# Patient Record
Sex: FEMALE | Race: WHITE | NOT HISPANIC OR LATINO | Employment: OTHER | URBAN - METROPOLITAN AREA
[De-identification: names, ages, dates, MRNs, and addresses within clinical notes are randomized per-mention and may not be internally consistent; named-entity substitution may affect disease eponyms.]

---

## 2017-01-11 DIAGNOSIS — F41.1 GAD (GENERALIZED ANXIETY DISORDER): ICD-10-CM

## 2017-01-11 NOTE — TELEPHONE ENCOUNTER
----- Message from Freya Turpin sent at 1/11/2017  4:35 PM CST -----  Refill    clonazePAM (KLONOPIN) 0.5 MG tablet   Sig - Route: Take 1 tablet (0.5 mg total) by mouth 2 (two) times daily as needed. - Oral    [facility] [address] [place] [address], [place] - [address] [facility] [address] [phone] (Phone)  [phone] (Fax)    You can reach the patient at [phone].    Thanks!

## 2017-01-12 DIAGNOSIS — F41.1 GAD (GENERALIZED ANXIETY DISORDER): ICD-10-CM

## 2017-01-12 RX ORDER — CLONAZEPAM 0.5 MG/1
0.5 TABLET ORAL 2 TIMES DAILY PRN
Qty: 60 TABLET | Refills: 5 | Status: SHIPPED | OUTPATIENT
Start: 2017-01-12 | End: 2017-07-12 | Stop reason: SDUPTHER

## 2017-01-13 RX ORDER — CLONAZEPAM 0.5 MG/1
TABLET ORAL
Qty: 60 TABLET | Refills: 0 | OUTPATIENT
Start: 2017-01-13

## 2017-01-18 DIAGNOSIS — M48.061 LUMBAR SPINAL STENOSIS: ICD-10-CM

## 2017-01-18 NOTE — TELEPHONE ENCOUNTER
----- Message from Kentrell López sent at 1/17/2017  3:17 PM CST -----  Contact: Self [phone]  Type: Rx    Name of medication(s): oxycodone-acetaminophen (PERCOCET) 5-325 mg per tablet    Is this a refill? New rx? Refill    Who prescribed medication?  Dr Rain    Pharmacy Name, Phone, & Location: Pt will     Comments:Stated that she's visiting from [facility] and is able to  tomorrow, please call and advice    Thanks

## 2017-01-19 RX ORDER — OXYCODONE AND ACETAMINOPHEN 5; 325 MG/1; MG/1
1 TABLET ORAL 2 TIMES DAILY PRN
Qty: 60 TABLET | Refills: 0 | Status: SHIPPED | OUTPATIENT
Start: 2017-01-19 | End: 2017-04-21 | Stop reason: SDUPTHER

## 2017-03-21 ENCOUNTER — HOSPITAL ENCOUNTER (OUTPATIENT)
Dept: RADIOLOGY | Facility: HOSPITAL | Age: 72
Discharge: HOME | End: 2017-03-21
Attending: NURSE PRACTITIONER
Payer: MEDICARE

## 2017-03-21 ENCOUNTER — OFFICE VISIT (OUTPATIENT)
Dept: GASTROENTEROLOGY | Facility: CLINIC | Age: 72
End: 2017-03-21
Payer: MEDICARE

## 2017-03-21 VITALS
DIASTOLIC BLOOD PRESSURE: 75 MMHG | BODY MASS INDEX: 27.71 KG/M2 | HEIGHT: 67 IN | WEIGHT: 176.56 LBS | SYSTOLIC BLOOD PRESSURE: 114 MMHG | HEART RATE: 71 BPM | RESPIRATION RATE: 18 BRPM

## 2017-03-21 DIAGNOSIS — Z87.19 HISTORY OF IBS: ICD-10-CM

## 2017-03-21 DIAGNOSIS — R10.13 EPIGASTRIC PAIN: Primary | ICD-10-CM

## 2017-03-21 DIAGNOSIS — K21.9 GASTROESOPHAGEAL REFLUX DISEASE WITHOUT ESOPHAGITIS: ICD-10-CM

## 2017-03-21 DIAGNOSIS — Z86.010 HISTORY OF COLON POLYPS: ICD-10-CM

## 2017-03-21 DIAGNOSIS — Z86.19 HISTORY OF HELICOBACTER PYLORI INFECTION: ICD-10-CM

## 2017-03-21 DIAGNOSIS — R13.14 PHARYNGOESOPHAGEAL DYSPHAGIA: ICD-10-CM

## 2017-03-21 DIAGNOSIS — R19.8 IRREGULAR BOWEL HABITS: ICD-10-CM

## 2017-03-21 DIAGNOSIS — Z80.0 FAMILY HISTORY OF COLON CANCER: ICD-10-CM

## 2017-03-21 PROCEDURE — 99999 PR PBB SHADOW E&M-EST. PATIENT-LVL IV: CPT | Mod: PBBFAC,,, | Performed by: NURSE PRACTITIONER

## 2017-03-21 PROCEDURE — 1160F RVW MEDS BY RX/DR IN RCRD: CPT | Mod: S$GLB,,, | Performed by: NURSE PRACTITIONER

## 2017-03-21 PROCEDURE — 74020 XR ABDOMEN FLAT AND ERECT: CPT | Mod: 26,,, | Performed by: RADIOLOGY

## 2017-03-21 PROCEDURE — 99499 UNLISTED E&M SERVICE: CPT | Mod: S$GLB,,, | Performed by: NURSE PRACTITIONER

## 2017-03-21 PROCEDURE — 3074F SYST BP LT 130 MM HG: CPT | Mod: S$GLB,,, | Performed by: NURSE PRACTITIONER

## 2017-03-21 PROCEDURE — 1157F ADVNC CARE PLAN IN RCRD: CPT | Mod: S$GLB,,, | Performed by: NURSE PRACTITIONER

## 2017-03-21 PROCEDURE — 74020 XR ABDOMEN FLAT AND ERECT: CPT | Mod: TC

## 2017-03-21 PROCEDURE — 1126F AMNT PAIN NOTED NONE PRSNT: CPT | Mod: S$GLB,,, | Performed by: NURSE PRACTITIONER

## 2017-03-21 PROCEDURE — 3078F DIAST BP <80 MM HG: CPT | Mod: S$GLB,,, | Performed by: NURSE PRACTITIONER

## 2017-03-21 PROCEDURE — 99214 OFFICE O/P EST MOD 30 MIN: CPT | Mod: S$GLB,,, | Performed by: NURSE PRACTITIONER

## 2017-03-21 PROCEDURE — 1159F MED LIST DOCD IN RCRD: CPT | Mod: S$GLB,,, | Performed by: NURSE PRACTITIONER

## 2017-03-21 RX ORDER — OMEPRAZOLE 40 MG/1
40 CAPSULE, DELAYED RELEASE ORAL EVERY MORNING
Qty: 90 CAPSULE | Refills: 1 | Status: SHIPPED | OUTPATIENT
Start: 2017-03-21 | End: 2017-08-08 | Stop reason: SDUPTHER

## 2017-03-21 NOTE — PROGRESS NOTES
Subjective:       Patient ID: Jovana Fall is a 71 y.o. female Body mass index is 27.66 kg/(m^2).    Chief Complaint: Gastroesophageal Reflux; Abdominal Pain (epigastrtic); and Dysphagia    This patient is new to me.  Established patient of Dr. Blas.    GI Problem   The primary symptoms include abdominal pain, nausea (occasional), vomiting (once 2 weeks ago after eating popeyes, none since then) and diarrhea. Primary symptoms do not include fever, weight loss, fatigue, melena, hematochezia or dysuria. Episode onset: recurred about a year ago; happened in 2015. The problem has not changed since onset.  The abdominal pain began more than 2 days ago (started a year ago intermittent). The abdominal pain has been unchanged since its onset. The abdominal pain is generalized (epigastric pain described as burning; generalized cramping with bowel movements- similar to previous symptoms when she saw Dr. Blas). The severity of the abdominal pain is 0/10. The abdominal pain is relieved by bowel movements (worse during bowel movement, lactose (lactose intolerant), sugary items (sour patch kids candy); relieved with bowel movement).   The vomiting began more than 2 days ago (once 2 weeks ago). Frequency: has resolved. The emesis contains stomach contents (denies bright red blood or coffee grounds).     The diarrhea began more than 1 week ago (for several years). The diarrhea is watery and semi-solid (watery occasional; usually semi-solid, and formed stool with pain medication use; bowel movements are once daily of varied consistency; occasional fecal incontinence, reports has to wipe often). Daily occurrences: bowel movements are once daily of varied consistency.   The illness is also significant for chills, dysphagia (occurs occasional with liquids and pills; denies with foods; feels like it doesn't go down, described as a spasm), bloating (especially after eating) and constipation (only after taking pain medication  (percocet), takes it about once a week). The illness does not include anorexia or odynophagia. Associated symptoms comments: Reports foul smelling flatulence and belching; reports early satiety. Significant associated medical issues include GERD (lots of reflux and heartburn, taking prilosec 20 mg once daily) and irritable bowel syndrome. Associated medical issues do not include inflammatory bowel disease, liver disease, alcohol abuse (occasional alcohol use- 1-2 glasses of wine per week), PUD, gastric bypass, bowel resection or diverticulitis (reports history of diverticulosis).     Review of Systems   Constitutional: Positive for chills. Negative for appetite change, fatigue, fever, unexpected weight change and weight loss.   HENT: Positive for trouble swallowing. Negative for sore throat.    Respiratory: Negative for cough, choking and shortness of breath.    Cardiovascular: Negative for chest pain.   Gastrointestinal: Positive for abdominal pain, bloating (especially after eating), constipation (only after taking pain medication (percocet), takes it about once a week), diarrhea, dysphagia (occurs occasional with liquids and pills; denies with foods; feels like it doesn't go down, described as a spasm), nausea (occasional) and vomiting (once 2 weeks ago after eating popeyes, none since then). Negative for anal bleeding, anorexia, blood in stool, hematochezia, melena and rectal pain.   Genitourinary: Negative for difficulty urinating, dysuria, flank pain, frequency, pelvic pain and urgency.   Neurological: Negative for weakness.       Past Medical History:   Diagnosis Date    Anxiety     Basal cell carcinoma 2014    left upper arm     BCC (basal cell carcinoma of skin) 01/07/16    mid upper back    BCC (basal cell carcinoma)     left upper back    Calcium nephrolithiasis     Cataract     Colon polyp     Coronary artery disease     Cortical cataract - Both Eyes 4/29/2013    Depression     Diabetes  mellitus     Displacement of lumbar intervertebral disc without myelopathy 5/6/2013    Diverticulosis     GERD (gastroesophageal reflux disease)     Hepatomegaly     History of melanoma in situ 2/2/2015    Mid upper back ; 5/2014     Hx of colonic polyps 1/27/2015    Hyperlipidemia     Hypertension     Lactose intolerance     Lumbar spinal stenosis 5/6/2013    Melanoma     mid upper back- in situ 5/2014    Nuclear sclerosis - Both Eyes 4/29/2013    Osteopenia     Spinal stenosis, lumbar region, with neurogenic claudication 5/6/2013    Spondylosis without myelopathy 5/6/2013    Type 2 diabetes mellitus      Past Surgical History:   Procedure Laterality Date    APPENDECTOMY      BROW LIFT AND BLEPHAROPLASTY      BUNIONECTOMY Right     CARPAL TUNNEL RELEASE Bilateral     CHOLECYSTECTOMY      COLONOSCOPY  02/05/2015    Dr. Blas, repeat in 3-5 years    HEMORRHOID SURGERY      HYSTERECTOMY      ovaries remain    LUMBAR LAMINECTOMY      RHINOPLASTY TIP      SKIN CANCER EXCISION      TUBAL LIGATION      UPPER GASTROINTESTINAL ENDOSCOPY  2015    Dr. Blas     Family History   Problem Relation Age of Onset    Heart disease Mother      MI    Cancer Mother      SQ carcinoma of lung    Heart attack Mother     Skin cancer Mother     Cancer Father      prostate    Ovarian cancer Cousin     Colon cancer Cousin     Skin cancer Daughter     Cancer Sister      uterine sarcoma    Colon cancer Brother 60    No Known Problems Son     No Known Problems Sister     Diabetes Sister     Hypertension Sister     Arthritis Sister     No Known Problems Daughter     Amblyopia Neg Hx     Blindness Neg Hx     Cataracts Neg Hx     Glaucoma Neg Hx     Retinal detachment Neg Hx     Strabismus Neg Hx     Stroke Neg Hx     Thyroid disease Neg Hx     Melanoma Neg Hx     Psoriasis Neg Hx     Lupus Neg Hx     Eczema Neg Hx     Crohn's disease Neg Hx     Ulcerative colitis Neg Hx      Wt  Readings from Last 10 Encounters:   03/21/17 80.1 kg (176 lb 9.4 oz)   10/28/16 82.5 kg (181 lb 14.1 oz)   10/06/16 81 kg (178 lb 9.6 oz)   08/19/16 85.3 kg (188 lb)   03/03/16 85.4 kg (188 lb 4.4 oz)   02/22/16 83.8 kg (184 lb 11.9 oz)   02/05/16 83.9 kg (185 lb)   01/26/16 83.9 kg (185 lb)   01/13/16 84.3 kg (185 lb 13.6 oz)   10/06/15 86.2 kg (190 lb 0.6 oz)     Lab Results   Component Value Date    WBC 8.39 03/03/2016    HGB 14.2 03/03/2016    HCT 42.4 03/03/2016    MCV 89 03/03/2016     03/03/2016     CMP  Sodium   Date Value Ref Range Status   10/06/2016 142 136 - 145 mmol/L Final     Potassium   Date Value Ref Range Status   10/06/2016 4.5 3.5 - 5.1 mmol/L Final     Chloride   Date Value Ref Range Status   10/06/2016 107 95 - 110 mmol/L Final     CO2   Date Value Ref Range Status   10/06/2016 30 (H) 23 - 29 mmol/L Final     Glucose   Date Value Ref Range Status   10/06/2016 125 (H) 70 - 110 mg/dL Final     BUN, Bld   Date Value Ref Range Status   10/06/2016 14 8 - 23 mg/dL Final     Creatinine   Date Value Ref Range Status   10/06/2016 0.8 0.5 - 1.4 mg/dL Final     Calcium   Date Value Ref Range Status   10/06/2016 9.6 8.7 - 10.5 mg/dL Final     Total Protein   Date Value Ref Range Status   03/03/2016 6.9 6.0 - 8.4 g/dL Final     Albumin   Date Value Ref Range Status   03/03/2016 4.1 3.5 - 5.2 g/dL Final     Total Bilirubin   Date Value Ref Range Status   03/03/2016 1.0 0.1 - 1.0 mg/dL Final     Comment:     For infants and newborns, interpretation of results should be based  on gestational age, weight and in agreement with clinical  observations.  Premature Infant recommended reference ranges:  Up to 24 hours.............<8.0 mg/dL  Up to 48 hours............<12.0 mg/dL  3-5 days..................<15.0 mg/dL  6-29 days.................<15.0 mg/dL       Alkaline Phosphatase   Date Value Ref Range Status   03/03/2016 118 55 - 135 U/L Final     AST   Date Value Ref Range Status   03/03/2016 19 10 - 40  "U/L Final     ALT   Date Value Ref Range Status   03/03/2016 17 10 - 44 U/L Final     Anion Gap   Date Value Ref Range Status   10/06/2016 5 (L) 8 - 16 mmol/L Final     eGFR if    Date Value Ref Range Status   10/06/2016 >60.0 >60 mL/min/1.73 m^2 Final     eGFR if non    Date Value Ref Range Status   10/06/2016 >60.0 >60 mL/min/1.73 m^2 Final     Comment:     Calculation used to obtain the estimated glomerular filtration  rate (eGFR) is the CKD-EPI equation. Since race is unknown   in our information system, the eGFR values for   -American and Non--American patients are given   for each creatinine result.       Reviewed prior medical records including radiology report of 1/15/16 renal ultrasound & h pylori stool test (negative), 11/18/14 ct renal stone/abdomen/pelvis & endoscopy history (see surgical history).    9/30/15 EGD was reviewed and procedure report states:   " Impression:          - Benign-appearing esophageal stenosis. Biopsied.                        Dilated.                       - Erythematous mucosa in the stomach. Biopsied.                       - Normal duodenal bulb and 2nd part of the duodenum.                       - Medium-sized hiatus hernia.  Recommendation:      - Use Prilosec (omeprazole) 40 mg PO daily.                       - Follow an antireflux regimen indefinitely.                       - Discharge patient to home (with escort). ".  Biopsy results:   "FINAL PATHOLOGIC DIAGNOSIS  1. Stomach, antrum/body, biopsy:  Chronic active gastritis.  POSITIVE for H. pylori species by immunostain with appropriately reactive controls.  Negative for dysplasia.  2. Esophagus, biopsy:  Squamous-type mucosa.  Negative for intraepithelial eosinophils or neutrophils.  Negative for intestinal metaplasia, dysplasia, and malignancy.    1/27/15 Colonoscopy was reviewed and procedure report states:   " Impression:          - Three 2 to 3 mm polyps in the rectum, in " "the                        distal descending colon and in the distal transverse                        colon. Resected and retrieved.                       - Diverticulosis in the sigmoid colon.                       - The ascending colon and cecum are normal.                       - The examined portion of the ileum was normal.  Recommendation:      - High fiber diet indefinitely.                       - Continue present medications.                       - Use fiber, for example Citrucel, Fibercon, Konsyl                        or Metamucil.                       - Repeat colonoscopy in 3 - 5 years for surveillance                        based on pathology results.                       - Discharge patient to home (with escort). ".  Biopsy results:   "1. Rectum biopsy:  Unremarkable colonic mucosa  Negative for adenoma  2. Distal transverse biopsy:  Tubular adenoma  3. Distal descending biopsy:  Unremarkable colonic mucosa  Negative for adenoma"  Objective:      Physical Exam   Constitutional: She is oriented to person, place, and time. She appears well-developed and well-nourished. No distress.   HENT:   Mouth/Throat: Oropharynx is clear and moist and mucous membranes are normal. No oral lesions. No oropharyngeal exudate.   Eyes: Conjunctivae are normal. Pupils are equal, round, and reactive to light. No scleral icterus.   Neck: Neck supple. No tracheal deviation present.   Cardiovascular: Normal rate.    Pulmonary/Chest: Effort normal and breath sounds normal. No respiratory distress. She has no wheezes.   Abdominal: Soft. Normal appearance and bowel sounds are normal. She exhibits no distension, no abdominal bruit and no mass. There is no hepatosplenomegaly. There is no tenderness. There is no rigidity, no rebound, no guarding, no tenderness at McBurney's point and negative Urbina's sign. No hernia.   Lymphadenopathy:     She has no cervical adenopathy.   Neurological: She is alert and oriented to person, " place, and time.   Skin: Skin is warm and dry. No rash noted. She is not diaphoretic. No erythema. No pallor.   Non-jaundiced   Psychiatric: She has a normal mood and affect. Her behavior is normal.   Nursing note and vitals reviewed.      Assessment:       1. Epigastric pain    2. History of Helicobacter pylori infection    3. History of colon polyps    4. History of IBS    5. Pharyngoesophageal dysphagia    6. Irregular bowel habits    7. Gastroesophageal reflux disease without esophagitis    8. Family history of colon cancer        Plan:       Epigastric pain  -     H. pylori antigen, stool; Future; Expected date: 3/21/17  -     Amylase; Future; Expected date: 3/21/17  -     Lipase; Future; Expected date: 3/21/17  -     IgA; Future; Expected date: 3/21/17  -     Tissue transglutaminase, IgA; Future; Expected date: 3/21/17  -     US Abdomen Complete; Future; Expected date: 3/21/17  -     Occult blood x 1, stool; Future; Expected date: 3/21/17  -     Hepatitis panel, acute; Future; Expected date: 3/21/17  - schedule EGD, discussed procedure with patient, verbalized understanding    History of Helicobacter pylori infection  -     H. pylori antigen, stool; Future; Expected date: 3/21/17  -     Occult blood x 1, stool; Future; Expected date: 3/21/17     History of colon polyps & family history of colon cancer  - next surveillance colonoscopy due 2/2018    History of IBS & Irregular bowel habits (s/p cholecystectomy)  -     X-Ray Abdomen Flat And Erect; Future; Expected date: 3/21/17  -     Adenovirus Antigen EIA, Stool; Future; Expected date: 3/21/17  -     Giardia / Cryptosporidum, EIA; Future; Expected date: 3/21/17  -     Occult blood x 1, stool; Future; Expected date: 3/21/17  -     Rotavirus antigen, stool; Future; Expected date: 3/21/17  -     Stool Exam-Ova,Cysts,Parasites; Future; Expected date: 3/21/17  -     WBC, Stool; Future; Expected date: 3/21/17  -     Clostridium difficile EIA; Future; Expected date:  3/21/17  -     Stool culture; Future; Expected date: 3/21/17  - recommended OTC probiotics, such as Align or Culturelle, as directed  - avoid lactose  - recommended increase fiber in diet, especially soluble fiber since this can help bulk up the stool consistency and may help to slow down how fast the stool goes through the colon and can prevent diarrhea  - discussed with patient that a side effect of narcotic pain medications is constipation, advised patient to talk to provider who manages pain medication and to try to stop or decrease use of narcotics, patient verbalized understanding  - possible trial of bentyl or colestid pending results of testing and if symptoms persist (if not contraindicated)    Pharyngoesophageal dysphagia  - schedule EGD, discussed procedure with patient and possible esophageal dilation may be performed during procedure if indicated, patient verbalized understanding  - educated patient to eat smaller more frequent meals and to eat slowly and advised to eat soft diet.  - possible UGI/esophagram if symptoms persist    Gastroesophageal reflux disease without esophagitis  -  INCREASE to   omeprazole (PRILOSEC) 40 MG capsule; Take 1 capsule (40 mg total) by mouth every morning.  Dispense: 90 capsule; Refill: 1, - take in the morning before breakfast, discussed about possible long term use of medication (prefer to use lowest effective dose or discontinuing if possible) and discussed the risks & benefits with taking a reflux medication long term, and to take OTC calcium and vitamin d supplements as directed (such as Citracal +D), pt verbalized understanding  - discussed with patient about long term use of reflux medications and the risk of using these medications long term. Some research studies (not all) have shown decrease absorption of calcium when taking PPI's and H2 blockers, but those same research studies showed that adequate dietary (milk and cheese) and/or supplement of calcium and  vitamin d supplement induces enough acid secretion for calcium absorption. Also, discussed about the risk vs benefit of untreated GERD such as victoria's esophagus.  - recommend annual monitoring with blood work to include CMP, CBC, vitamin B12, and magnesium.  -discussed about the different types of medications used to treat reflux and how to use them, antacids can be used PRN for breakthrough heartburn symptoms by reducing stomach acid that is already produced, H2 blockers (zantac) work by limiting the amount acid production, & PPI's work to block acid production and are taken daily, patient verbalized understanding.  -Educated patient on lifestyle modifications to help control/reduce reflux/abdominal pain including: avoid large meals, avoid eating within 2-3 hours of bedtime (avoid late night eating & lying down soon after eating), elevate head of bed if nocturnal symptoms are present, smoking cessation (if current smoker), & weight loss (if overweight).   -Educated to avoid known foods which trigger reflux symptoms & to minimize/avoid high-fat foods, chocolate, caffeine, citrus, alcohol, & tomato products.  -Advised to avoid/limit use of NSAID's, since they can cause GI upset, bleeding, and/or ulcers. If needed, take with food.  - schedule EGD, discussed procedure with patient, verbalized understanding    Return in about 2 months (around 5/21/2017), or if symptoms worsen or fail to improve.    If no improvement in symptoms or symptoms worsen, call/follow-up at clinic or go to ER.

## 2017-03-21 NOTE — MR AVS SNAPSHOT
[facility] [place]   [address] VALEN[address]A, [address]  [place] [address]  Phone: [phone]                  Jovana Fall   3/21/2017 2:30 PM   Office Visit    Description:  Female : 1945   Provider:  KAYLA Howard   Department:  [place] [facility]           Reason for Visit     Gastroesophageal Reflux     Abdominal Pain     Dysphagia           Diagnoses this Visit        Comments    Epigastric pain    -  Primary     History of Helicobacter pylori infection         Gastroesophageal reflux disease, esophagitis presence not specified         History of colon polyps         History of IBS         Pharyngoesophageal dysphagia         Irregular bowel habits                To Do List           Future Appointments        Provider Department Dept Phone    2017 1:30 PM Marcy Staley MD [facility] - Internal Medicine [phone]      Goals (5 Years of Data)     None      Follow-Up and Disposition     Return in about 2 months (around 2017), or if symptoms worsen or fail to improve.      [facility] On Call     [facility] On Call Nurse [facility] -  Assistance  Registered nurses in the [facility] On Call Center provide clinical advisement, health education, appointment booking, and other advisory services.  Call for this free service at [phone].             Medications           Message regarding Medications     Verify the changes and/or additions to your medication regime listed below are the same as discussed with your clinician today.  If any of these changes or additions are incorrect, please notify your healthcare provider.        STOP taking these medications     estradiol (ESTRACE) 0.01 % (0.1 mg/gram) vaginal cream Place 1 g vaginally once daily. Use nightly for 2 weeks and then every other night thereafter.           Verify that the below list of medications is an accurate representation of the medications you are currently taking.  If none reported, the  "list may be blank. If incorrect, please contact your healthcare provider. Carry this list with you in case of emergency.           Current Medications     aspirin 81 mg Tab Take 81 mg by mouth. 1 Tablet Oral Every day    atorvastatin (LIPITOR) 80 MG tablet TAKE 1 TABLET ONE TIME DAILY    blood sugar diagnostic Strp 1 strip by Misc.(Non-Drug; Combo Route) route once daily.    carvedilol (COREG) 12.5 MG tablet TAKE 1 TABLET TWICE DAILY WITH MEALS    clonazePAM (KLONOPIN) 0.5 MG tablet Take 1 tablet (0.5 mg total) by mouth 2 (two) times daily as needed.    fish oil-omega-3 fatty acids 300-1,000 mg capsule     gabapentin (NEURONTIN) 300 MG capsule TAKE 1 CAPSULE EVERY EVENING    JANUVIA 50 mg Tab TAKE 1 TABLET ONE TIME DAILY    lancets Misc 1 lancet by Misc.(Non-Drug; Combo Route) route once daily.    losartan (COZAAR) 50 MG tablet TAKE 1 TABLET ONE TIME DAILY    nitroGLYCERIN (NITROSTAT) 0.4 MG SL tablet Take one tab under the tongue every 5 minutes for max of three doses for chest pain. If chest pain not resolved to to the ER.    omeprazole (PRILOSEC) 20 MG capsule Take 1 capsule (20 mg total) by mouth every morning.    oxycodone-acetaminophen (PERCOCET) 5-325 mg per tablet Take 1 tablet by mouth 2 (two) times daily as needed for Pain.    valacyclovir (VALTREX) 500 MG tablet Take 1 tablet (500 mg total) by mouth 2 (two) times daily as needed. Treat for 3 days.    venlafaxine (EFFEXOR) 100 MG Tab TAKE 1 TABLET EVERY DAY (DOSE INCREASE)    VITAMIN D2 50,000 unit capsule TAKE 1 CAPSULE EVERY SEVEN DAYS           Clinical Reference Information           Your Vitals Were     BP Pulse Resp Height Weight BMI    114/75 (BP Location: Left arm, Patient Position: Sitting, BP Method: Automatic) 71 18 5' 7" (1.702 m) 80.1 kg (176 lb 9.4 oz) 27.66 kg/m2      Blood Pressure          Most Recent Value    BP  114/75      Allergies as of 3/21/2017     Adhesive Tape-silicones    Codeine    Metformin    Morphine    Penicillins    Sulfa " (Sulfonamide Antibiotics)      Immunizations Administered on Date of Encounter - 3/21/2017     None      Orders Placed During Today's Visit     Future Labs/Procedures Expected by Expires    Adenovirus Antigen EIA, Stool  3/21/2017 5/20/2018    Amylase  3/21/2017 3/21/2018    Clostridium difficile EIA  3/21/2017 3/22/2018    Giardia / Cryptosporidum, EIA  3/21/2017 5/20/2018    H. pylori antigen, stool  3/21/2017 3/21/2018    Hepatitis panel, acute  3/21/2017 3/21/2018    IgA  3/21/2017 3/21/2018    Lipase  3/21/2017 3/21/2018    Occult blood x 1, stool  3/21/2017 3/22/2018    Rotavirus antigen, stool  3/21/2017 3/22/2018    Stool culture  3/21/2017 3/22/2018    Stool Exam-Ova,Cysts,Parasites  3/21/2017 3/21/2018    Tissue transglutaminase, IgA  3/21/2017 3/21/2018    US Abdomen Complete  3/21/2017 3/21/2018    WBC, Stool  3/21/2017 3/21/2018    X-Ray Abdomen Flat And Erect  3/21/2017 3/21/2018      Instructions      GERD (Adult)    The esophagus is a tube that carries food from the mouth to the stomach. A valve at the lower end of the esophagus prevents stomach acid from flowing upward. When this valve doesn't work properly, stomach contents may repeatedly flow back up (reflux) into the esophagus. This is called gastroesophageal reflux disease (GERD). GERD can irritate the esophagus. It can cause problems with swallowing or breathing. In severe cases, GERD can cause recurrent pneumonia or other serious problems.  Symptoms of reflux include burning, pressure or sharp pain in the upper abdomen or mid to lower chest. The pain can spread to the neck, back, or shoulder. There may be belching, an acid taste in the back of the throat, chronic cough, or sore throat or hoarseness. GERD symptoms often occur during the day after a big meal. They can also occur at night when lying down.   Home care  Lifestyle changes can help reduce symptoms. If needed, medicines may be prescribed. Symptoms often improve with treatment, but if  "treatment is stopped, the symptoms often return after a few months. So most persons with GERD will need to continue treatment.  Lifestyle changes  · Limit or avoid fatty, fried, and spicy foods, as well as coffee, chocolate, mint, and foods with high acid content such as tomatoes and citrus fruit and juices (orange, grapefruit, lemon).  · Dont eat large meals, especially at night. Frequent, smaller meals are best. Do not lie down right after eating. And dont eat anything 3 hours before going to bed.  · Avoid drinking alcohol and smoking. As much as possible, stay away from second hand smoke.  · If you are overweight, losing weight will reduce symptoms.   · Avoid wearing tight clothing around your stomach area.  · If your symptoms occur during sleep, use a foam wedge to elevate your upper body (not just your head.) Or, place 4" blocks under the head of your bed.  Medicines  If needed, medicines can help relieve the symptoms of GERD and prevent damage to the esophagus. Discuss a medicine plan with your healthcare provider. This may include one or more of the following medicines:  · Antacids to help neutralize the normal acids in your stomach.  · Acid blockers (H2 blockers) to decrease acid production.  · Acid inhibitors (PPIs) to decrease acid production in a different way than the blockers. They may work better, but can take a little longer to take effect.  Take an antacid 30-60 minutes after eating and at bedtime, but not at the same time as an acid blocker.  Try not to take medicines such as ibuprofen and aspirin. If you are taking aspirin for your heart or other medical reasons, talk to your healthcare provider about stopping it.  Follow-up care  Follow up with your healthcare provider or as advised by our staff.  When to seek medical advice  Call your healthcare provider if any of the following occur:  · Stomach pain gets worse or moves to the lower right abdomen (appendix area)  · Chest pain appears or gets " worse, or spreads to the back, neck, shoulder, or arm  · Frequent vomiting (cant keep down liquids)  · Blood in the stool or vomit (red or black in color)  · Feeling weak or dizzy  · Fever of 100.4ºF (38ºC) or higher, or as directed by your healthcare provider  Date Last Reviewed: 6/23/2015 © 2000-2016 [employer]. [address] [place] [address], [place] [address]. All rights reserved. This information is not intended as a substitute for professional medical care. Always follow your healthcare professional's instructions.        Discharge Instructions: Eating a Soft Diet  You have been prescribed a soft diet (also called gastrointestinal soft diet or bland diet). This reduces the amount of work your digestive tract has to do. It also reduces the chance that your digestive tract will be irritated by the food you eat. A soft diet is prescribed for people with digestive problems. The diet consists of foods that are tender, mildly seasoned, and easy to digest. While on this diet, you should not eat fried or spicy foods, or raw fruits and vegetables. Also avoid alcoholic beverages.  General guidelines  · Eat in a calm, relaxed atmosphere. How you eat may be as important as what you eat. Dont rush while eating. Chew your food slowly and thoroughly, and swallow slowly.  · Eat small frequent meals throughout the day, but dont eat within 2 hours of bedtime.  · Avoid any foods that cause discomfort.  · Dont use NSAIDs (nonsteroidal anti-inflammatory drugs), such as aspirin, and ibuprofen. Also avoid medicine that contain aspirin. NSAIDs can cause ulcers and delay or prevent ulcer healing.  · Use antacids as needed, but keep in mind that magnesium-containing antacids may cause diarrhea.  Foods to eat  · Cream of wheat and cream of rice  · Cooked white rice  · Mashed potatoes, and boiled potatoes without skin  · Plain pasta and noodles  · Plain white crackers (such as no-salt soda crackers)  · White  bread  · Applesauce  · Cooked fruits without skins or seeds  · Mild juices, such as apple and grape  · Bananas  · Cooked or mashed vegetables without stems and seeds  ¨ Carrots  ¨ Summer squash (zucchini, yellow squash)  ¨ Winter squash (acorn, butternut, spaghetti squash)  · Cottage cheese  · Mild hard or soft cheeses  · Custard  · Yogurt without seeds or nuts  · Milk (you may need lactose-free milk)  · Ice cream without seeds or nuts  · Smooth peanut butter  · Eggs  · Fish, turkey, chicken, or other meat that is not tough or stringy  · Tofu  Foods to avoid  · Nuts and seeds  · Snack foods, such as the following:  ¨ Chocolate-containing snacks, candy, pastries, or cakes.  ¨ Potato chips (plain, barbecued, or other flavors)  ¨ Taco chips or nachos  ¨ Corn chips  ¨ Popcorn, popcorn cakes, or rice cakes  ¨ Crackers with nuts, seeds, or spicy seasonings  ¨ French fries  · Fried or greasy foods  · Whole-grain breads, rolls, and crackers  · Breads and rolls with nuts, seeds, or bran  · Bran and granola cereals  · Berries with seeds, such as strawberries, raspberries, and blackberries  · Acidic fruits, such as oranges, grapefruits, suzy, limes, and pineapples  · Raw vegetables  · Mild or hot peppers  · Sauerkraut and pickled vegetables  · Tomatoes or tomato products, such as tomato paste, tomato sauce, and tomato juice  · Barbecue sauce  · Spicy or flavored cheeses, such as jalapeño and black pepper cheese  · Crunchy peanut butter  · Dried cooked beans, such as frias, kidney, or navy beans  · The following meats:  ¨ Fried or greasy meats  ¨ Processed, spicy meats, such as sausage, dickson, ham, and lunch meats  ¨ Ribs and other meats with barbecue sauce  ¨ Tough or stringy meats, such as corned beef or beef jerky  Fluids to avoid  · Alcoholic beverages  · Coffee and regular teas  · Woody and other drinks with caffeine  · Cranberry, orange, pineapple, and grapefruit juice  · Lemonade  · Vegetable juice  · Whole milk, if  you are lactose intolerant  Follow-up  Make a follow-up appointment with a dietitian as directed by our staff.  Date Last Reviewed: 6/21/2015 © 2000-2016 [employer]. [address] [place] [address], [place] [address]. All rights reserved. This information is not intended as a substitute for professional medical care. Always follow your healthcare professional's instructions.        Irritable Bowel Syndrome    Irritable bowel syndrome (IBS) is a disorder of the intestines. It is not a disease, but a group of symptoms caused by changes in the way the intestines work. It is fairly common, but the cause is not well understood.  Symptoms of IBS include:  · Abdominal pain, discomfort, and cramping  · Diarrhea  · Constipation or dry, hard stools  · Mucous stool  · Bloating  · Feeling of incomplete bowel movements  It usually results in one of 3 patterns of symptoms:  · Chronic abdominal pain and constipation  · Recurring episodes of diarrhea, with or without pain  · Alternating diarrhea and constipation  Home care  The goal of treatment is to control and relieve your symptoms, so you can lead a full and active life. There is no cure for IBS. But it can be managed.  Diet  Your diet did not cause your IBS, but it can affect it. No one diet works for everyone. Finding the best foods for you may take trial and error. Keep a food log to help find what foods made your symptoms worse. Below are some tips that may help you.  · Eat more slowly. Eat smaller amounts at a time, but more often. Remember, you can always eat more, but cannot eat less once youve eaten too much.  · High-fiber foods are complicated. While they may help relieve constipation, they can make your bloating, cramping, gas, and diarrhea worse.  · Eat less sugar.  · Try cutting out dairy products. Sometimes this helps.  · Try cutting out foods that are high in fat and fatty meats.  · You can control bloating or passing excess gas. Be careful with gassy  vegetables and fruits like beans, cabbage, broccoli, and cauliflower.  · Be careful with carbonated beverages and fruit juices. They can make your bloating and diarrhea worse.  · Caffeine, alcohol, and stimulants can make symptoms worse. These include coffee, tea, sodas, energy drinks, and chocolate.  Lifestyle  · Look for factors that seem to worsen your symptoms. These include stress and emotions.   · Although stress does not cause IBS, it may trigger flare-ups. Counseling can help you learn to handle stress. So can self-help measures like exercise, yoga, and meditation.  · Depression can occur along with IBS. Your healthcare provider may prescribe antidepressant medicine. This may help with diarrhea, constipation, and cramping, as well as with symptoms of depression.  · Smoking doesn't cause IBS, but can make the symptoms worse.  Medicines  Your healthcare provider may prescribe medicines. Take them as directed. For acute flare-ups of your illness, your provider may give you prescription medicines.  · Check with your healthcare provider before taking any medicines for diarrhea.  · Avoid anti-inflammatory medicines like ibuprofen or naproxen.  · Consider nutritional supplements. This is especially true if your diarrhea is prolonged, or you aren't eating or are losing weight  Follow-up care  Follow up with your healthcare provider, or as advised. If a stool sample was taken or cultures were done, you will be told if your treatment needs to change. You can call as directed for the results.  When to seek medical advice  Call your healthcare provider right away if any of these occur:  · Abdominal pain gets worse  · Constant abdominal pain moves to the right-lower abdomen  · You can't keep liquids down because of vomiting  · You have severe diarrhea  · You have blood (red or black color) or mucus in your stool  · You feel very weak or dizzy, faint, or have extreme thirst  · You have a fever of 100.4ºF (38.0ºC) or  higher, or as directed by your healthcare provider  Date Last Reviewed: 8/31/2015  © 0848-8385 [employer]. [address] [place] [address], [place] [address]. All rights reserved. This information is not intended as a substitute for professional medical care. Always follow your healthcare professional's instructions.        What is Irritable Bowel Syndrome (IBS)?     Muscle contraction moves food through the digestive tract.      People who have irritable bowel syndrome (IBS) have digestive tracts that react abnormally to certain substances or to stress. This leads to symptoms like cramps, gas, bloating, pain, constipation, and diarrhea. Sometimes called spastic colon, IBS is a common condition that is not a disease, but rather a group of symptoms that happen together.  IBS--a motility problem  The muscle movement that passes food through the digestive tract is called motility. When you have IBS, the normal motility of the digestive tract (especially the colon) is disrupted. Motility may speed up, slow down, or become irregular. If stool passes too quickly through the colon, not enough water is absorbed from it. Loose, watery stools (diarrhea) can result. If stool passes through the colon too slowly, too much water is absorbed and the stool becomes hard and dry (constipation). Also, stool and gas may back up and cause painful pressure and cramping. There is no single test that can diagnose IBS. It is a group of symptoms that help your healthcare provider with the diagnosis. Often multiple blood, stool, radiologic tests, or even colonoscopy are performed in the evaluation of people suspected to have IBS. These are done primarily to make sure that there are no other illnesses that can account for your symptoms.   What causes IBS?  A great deal of research has been done on IBS, but the cause is still not known. Some of the possible factors include:   · Smoking, eating certain foods, or drinking alcohol or  "caffeinated drinks can cause, or "trigger," symptoms of IBS.  · Although no one knows for sure, IBS may be caused by a problem with the nerves or muscles in your digestive tract.  · There is also some evidence that certain bacteria found after a severe gastroinstestinal infection in the small intestine and colon may cause IBS.  · While stress and anxiety worsen the symptoms of IBS, it is not believed to be the cause.   What you can do  Recommendations include:  · Certain medicines may help regulate the working of your digestive tract. Your healthcare provider may prescribe one or more for you.  · Medicine cant cure IBS, but it may help manage the symptoms.  · Because some medicines may make IBS worse, dont take any medicine, especially laxatives, unless your healthcare provider prescribes it for you.  · Your healthcare provider may suggest some lifestyle changes to help control your IBS. Two of the most important are changing your diet and managing stress. If diet changes are suggested, ask for nutritional guidance from a dietitian so you maintain a healthy nutritional balance in your food intake.   Date Last Reviewed: 7/1/2016 © 2000-2016 [employer]. [address] [place] [address]. All rights reserved. This information is not intended as a substitute for professional medical care. Always follow your healthcare professional's instructions.             Language Assistance Services     ATTENTION: Language assistance services are available, free of charge. Please call [phone].      ATENCIÓN: Si timothyla jasmyn, tiene a jaimes disposición servicios gratuitos de asistencia lingüística. Llame al [phone].     CHÚ Ý: N?u b?n nói Ti?ng Vi?t, có các d?ch v? h? tr? ngôn ng? mi?n phí dành cho b?n. G?i s? [phone].         [facility] complies with applicable Federal civil rights laws and does not discriminate on the basis of race, color, national origin, age, " disability, or sex.

## 2017-03-21 NOTE — PATIENT INSTRUCTIONS
"  GERD (Adult)    The esophagus is a tube that carries food from the mouth to the stomach. A valve at the lower end of the esophagus prevents stomach acid from flowing upward. When this valve doesn't work properly, stomach contents may repeatedly flow back up (reflux) into the esophagus. This is called gastroesophageal reflux disease (GERD). GERD can irritate the esophagus. It can cause problems with swallowing or breathing. In severe cases, GERD can cause recurrent pneumonia or other serious problems.  Symptoms of reflux include burning, pressure or sharp pain in the upper abdomen or mid to lower chest. The pain can spread to the neck, back, or shoulder. There may be belching, an acid taste in the back of the throat, chronic cough, or sore throat or hoarseness. GERD symptoms often occur during the day after a big meal. They can also occur at night when lying down.   Home care  Lifestyle changes can help reduce symptoms. If needed, medicines may be prescribed. Symptoms often improve with treatment, but if treatment is stopped, the symptoms often return after a few months. So most persons with GERD will need to continue treatment.  Lifestyle changes  · Limit or avoid fatty, fried, and spicy foods, as well as coffee, chocolate, mint, and foods with high acid content such as tomatoes and citrus fruit and juices (orange, grapefruit, lemon).  · Dont eat large meals, especially at night. Frequent, smaller meals are best. Do not lie down right after eating. And dont eat anything 3 hours before going to bed.  · Avoid drinking alcohol and smoking. As much as possible, stay away from second hand smoke.  · If you are overweight, losing weight will reduce symptoms.   · Avoid wearing tight clothing around your stomach area.  · If your symptoms occur during sleep, use a foam wedge to elevate your upper body (not just your head.) Or, place 4" blocks under the head of your bed.  Medicines  If needed, medicines can help relieve " the symptoms of GERD and prevent damage to the esophagus. Discuss a medicine plan with your healthcare provider. This may include one or more of the following medicines:  · Antacids to help neutralize the normal acids in your stomach.  · Acid blockers (H2 blockers) to decrease acid production.  · Acid inhibitors (PPIs) to decrease acid production in a different way than the blockers. They may work better, but can take a little longer to take effect.  Take an antacid 30-60 minutes after eating and at bedtime, but not at the same time as an acid blocker.  Try not to take medicines such as ibuprofen and aspirin. If you are taking aspirin for your heart or other medical reasons, talk to your healthcare provider about stopping it.  Follow-up care  Follow up with your healthcare provider or as advised by our staff.  When to seek medical advice  Call your healthcare provider if any of the following occur:  · Stomach pain gets worse or moves to the lower right abdomen (appendix area)  · Chest pain appears or gets worse, or spreads to the back, neck, shoulder, or arm  · Frequent vomiting (cant keep down liquids)  · Blood in the stool or vomit (red or black in color)  · Feeling weak or dizzy  · Fever of 100.4ºF (38ºC) or higher, or as directed by your healthcare provider  Date Last Reviewed: 6/23/2015 © 2000-2016 [employer]. [address] [place] [address], [place] [address]. All rights reserved. This information is not intended as a substitute for professional medical care. Always follow your healthcare professional's instructions.        Discharge Instructions: Eating a Soft Diet  You have been prescribed a soft diet (also called gastrointestinal soft diet or bland diet). This reduces the amount of work your digestive tract has to do. It also reduces the chance that your digestive tract will be irritated by the food you eat. A soft diet is prescribed for people with digestive problems. The diet consists of foods  that are tender, mildly seasoned, and easy to digest. While on this diet, you should not eat fried or spicy foods, or raw fruits and vegetables. Also avoid alcoholic beverages.  General guidelines  · Eat in a calm, relaxed atmosphere. How you eat may be as important as what you eat. Dont rush while eating. Chew your food slowly and thoroughly, and swallow slowly.  · Eat small frequent meals throughout the day, but dont eat within 2 hours of bedtime.  · Avoid any foods that cause discomfort.  · Dont use NSAIDs (nonsteroidal anti-inflammatory drugs), such as aspirin, and ibuprofen. Also avoid medicine that contain aspirin. NSAIDs can cause ulcers and delay or prevent ulcer healing.  · Use antacids as needed, but keep in mind that magnesium-containing antacids may cause diarrhea.  Foods to eat  · Cream of wheat and cream of rice  · Cooked white rice  · Mashed potatoes, and boiled potatoes without skin  · Plain pasta and noodles  · Plain white crackers (such as no-salt soda crackers)  · White bread  · Applesauce  · Cooked fruits without skins or seeds  · Mild juices, such as apple and grape  · Bananas  · Cooked or mashed vegetables without stems and seeds  ¨ Carrots  ¨ Summer squash (zucchini, yellow squash)  ¨ Winter squash (acorn, butternut, spaghetti squash)  · Cottage cheese  · Mild hard or soft cheeses  · Custard  · Yogurt without seeds or nuts  · Milk (you may need lactose-free milk)  · Ice cream without seeds or nuts  · Smooth peanut butter  · Eggs  · Fish, turkey, chicken, or other meat that is not tough or stringy  · Tofu  Foods to avoid  · Nuts and seeds  · Snack foods, such as the following:  ¨ Chocolate-containing snacks, candy, pastries, or cakes.  ¨ Potato chips (plain, barbecued, or other flavors)  ¨ Taco chips or nachos  ¨ Corn chips  ¨ Popcorn, popcorn cakes, or rice cakes  ¨ Crackers with nuts, seeds, or spicy seasonings  ¨ French fries  · Fried or greasy foods  · Whole-grain breads, rolls, and  crackers  · Breads and rolls with nuts, seeds, or bran  · Bran and granola cereals  · Berries with seeds, such as strawberries, raspberries, and blackberries  · Acidic fruits, such as oranges, grapefruits, suzy, limes, and pineapples  · Raw vegetables  · Mild or hot peppers  · Sauerkraut and pickled vegetables  · Tomatoes or tomato products, such as tomato paste, tomato sauce, and tomato juice  · Barbecue sauce  · Spicy or flavored cheeses, such as jalapeño and black pepper cheese  · Crunchy peanut butter  · Dried cooked beans, such as frias, kidney, or navy beans  · The following meats:  ¨ Fried or greasy meats  ¨ Processed, spicy meats, such as sausage, dickson, ham, and lunch meats  ¨ Ribs and other meats with barbecue sauce  ¨ Tough or stringy meats, such as corned beef or beef jerky  Fluids to avoid  · Alcoholic beverages  · Coffee and regular teas  · Woody and other drinks with caffeine  · Cranberry, orange, pineapple, and grapefruit juice  · Lemonade  · Vegetable juice  · Whole milk, if you are lactose intolerant  Follow-up  Make a follow-up appointment with a dietitian as directed by our staff.  Date Last Reviewed: 6/21/2015 © 2000-2016 [employer]. [address] [place] [address]. All rights reserved. This information is not intended as a substitute for professional medical care. Always follow your healthcare professional's instructions.        Irritable Bowel Syndrome    Irritable bowel syndrome (IBS) is a disorder of the intestines. It is not a disease, but a group of symptoms caused by changes in the way the intestines work. It is fairly common, but the cause is not well understood.  Symptoms of IBS include:  · Abdominal pain, discomfort, and cramping  · Diarrhea  · Constipation or dry, hard stools  · Mucous stool  · Bloating  · Feeling of incomplete bowel movements  It usually results in one of 3 patterns of symptoms:  · Chronic abdominal pain and constipation  · Recurring  episodes of diarrhea, with or without pain  · Alternating diarrhea and constipation  Home care  The goal of treatment is to control and relieve your symptoms, so you can lead a full and active life. There is no cure for IBS. But it can be managed.  Diet  Your diet did not cause your IBS, but it can affect it. No one diet works for everyone. Finding the best foods for you may take trial and error. Keep a food log to help find what foods made your symptoms worse. Below are some tips that may help you.  · Eat more slowly. Eat smaller amounts at a time, but more often. Remember, you can always eat more, but cannot eat less once youve eaten too much.  · High-fiber foods are complicated. While they may help relieve constipation, they can make your bloating, cramping, gas, and diarrhea worse.  · Eat less sugar.  · Try cutting out dairy products. Sometimes this helps.  · Try cutting out foods that are high in fat and fatty meats.  · You can control bloating or passing excess gas. Be careful with gassy vegetables and fruits like beans, cabbage, broccoli, and cauliflower.  · Be careful with carbonated beverages and fruit juices. They can make your bloating and diarrhea worse.  · Caffeine, alcohol, and stimulants can make symptoms worse. These include coffee, tea, sodas, energy drinks, and chocolate.  Lifestyle  · Look for factors that seem to worsen your symptoms. These include stress and emotions.   · Although stress does not cause IBS, it may trigger flare-ups. Counseling can help you learn to handle stress. So can self-help measures like exercise, yoga, and meditation.  · Depression can occur along with IBS. Your healthcare provider may prescribe antidepressant medicine. This may help with diarrhea, constipation, and cramping, as well as with symptoms of depression.  · Smoking doesn't cause IBS, but can make the symptoms worse.  Medicines  Your healthcare provider may prescribe medicines. Take them as directed. For  acute flare-ups of your illness, your provider may give you prescription medicines.  · Check with your healthcare provider before taking any medicines for diarrhea.  · Avoid anti-inflammatory medicines like ibuprofen or naproxen.  · Consider nutritional supplements. This is especially true if your diarrhea is prolonged, or you aren't eating or are losing weight  Follow-up care  Follow up with your healthcare provider, or as advised. If a stool sample was taken or cultures were done, you will be told if your treatment needs to change. You can call as directed for the results.  When to seek medical advice  Call your healthcare provider right away if any of these occur:  · Abdominal pain gets worse  · Constant abdominal pain moves to the right-lower abdomen  · You can't keep liquids down because of vomiting  · You have severe diarrhea  · You have blood (red or black color) or mucus in your stool  · You feel very weak or dizzy, faint, or have extreme thirst  · You have a fever of 100.4ºF (38.0ºC) or higher, or as directed by your healthcare provider  Date Last Reviewed: 8/31/2015  © 4943-9403 [employer]. [address] [place] [address]. All rights reserved. This information is not intended as a substitute for professional medical care. Always follow your healthcare professional's instructions.        What is Irritable Bowel Syndrome (IBS)?     Muscle contraction moves food through the digestive tract.      People who have irritable bowel syndrome (IBS) have digestive tracts that react abnormally to certain substances or to stress. This leads to symptoms like cramps, gas, bloating, pain, constipation, and diarrhea. Sometimes called spastic colon, IBS is a common condition that is not a disease, but rather a group of symptoms that happen together.  IBS--a motility problem  The muscle movement that passes food through the digestive tract is called motility. When you have IBS, the normal motility  "of the digestive tract (especially the colon) is disrupted. Motility may speed up, slow down, or become irregular. If stool passes too quickly through the colon, not enough water is absorbed from it. Loose, watery stools (diarrhea) can result. If stool passes through the colon too slowly, too much water is absorbed and the stool becomes hard and dry (constipation). Also, stool and gas may back up and cause painful pressure and cramping. There is no single test that can diagnose IBS. It is a group of symptoms that help your healthcare provider with the diagnosis. Often multiple blood, stool, radiologic tests, or even colonoscopy are performed in the evaluation of people suspected to have IBS. These are done primarily to make sure that there are no other illnesses that can account for your symptoms.   What causes IBS?  A great deal of research has been done on IBS, but the cause is still not known. Some of the possible factors include:   · Smoking, eating certain foods, or drinking alcohol or caffeinated drinks can cause, or "trigger," symptoms of IBS.  · Although no one knows for sure, IBS may be caused by a problem with the nerves or muscles in your digestive tract.  · There is also some evidence that certain bacteria found after a severe gastroinstestinal infection in the small intestine and colon may cause IBS.  · While stress and anxiety worsen the symptoms of IBS, it is not believed to be the cause.   What you can do  Recommendations include:  · Certain medicines may help regulate the working of your digestive tract. Your healthcare provider may prescribe one or more for you.  · Medicine cant cure IBS, but it may help manage the symptoms.  · Because some medicines may make IBS worse, dont take any medicine, especially laxatives, unless your healthcare provider prescribes it for you.  · Your healthcare provider may suggest some lifestyle changes to help control your IBS. Two of the most important are changing " your diet and managing stress. If diet changes are suggested, ask for nutritional guidance from a dietitian so you maintain a healthy nutritional balance in your food intake.   Date Last Reviewed: 7/1/2016  © 5003-6600 [employer]. [address] [place] [address], [place] [address]. All rights reserved. This information is not intended as a substitute for professional medical care. Always follow your healthcare professional's instructions.

## 2017-03-22 ENCOUNTER — TELEPHONE (OUTPATIENT)
Dept: GASTROENTEROLOGY | Facility: CLINIC | Age: 72
End: 2017-03-22

## 2017-03-22 DIAGNOSIS — R19.8 IRREGULAR BOWEL HABITS: Primary | ICD-10-CM

## 2017-03-22 DIAGNOSIS — R74.8 ELEVATED LIPASE: ICD-10-CM

## 2017-03-22 DIAGNOSIS — R91.1 LESION OF LEFT LUNG: ICD-10-CM

## 2017-03-22 DIAGNOSIS — R10.13 EPIGASTRIC PAIN: ICD-10-CM

## 2017-03-22 NOTE — TELEPHONE ENCOUNTER
Please call to inform & review the results with the patient- abdominal x-ray showed no acute abdominal process, and incidental finding of possible lesion to the left lung. Recommend ct scan of chest to further evaluate this finding.  Blood work that we have received showed negative acute hepatitis panel, slightly elevated lipase level and normal amylase level. These last two are pancreatic enzymes, which this may indicate inflammation of the pancreas. Pancreatitis usually shows elevations in these levels by 3 times the upper normal limit level (lipase 125, normal range 4-60). Elevations can also be due to other causes such as digestive conditions. Recommend abdominal ultrasound as scheduled and repeat lipase level & CMP in 2-4 weeks and possible ct scan of abdomen/pelvis pending those results. Continue the recommendations discussed during visit. Stay well-hydrated, avoid alcohol, recommend small frequent meals of high protein & low fat diet, & activity as tolerated. If no improvement in symptoms or symptoms worsen, call/follow-up at clinic or go to ER. Will notify of remaining blood work results once received and reviewed    Thanks,  Clare MANCILLA

## 2017-03-23 ENCOUNTER — PATIENT MESSAGE (OUTPATIENT)
Dept: GASTROENTEROLOGY | Facility: CLINIC | Age: 72
End: 2017-03-23

## 2017-03-23 DIAGNOSIS — R19.8 IRREGULAR BOWEL HABITS: ICD-10-CM

## 2017-03-23 DIAGNOSIS — Z87.19 HISTORY OF IBS: Primary | ICD-10-CM

## 2017-03-24 ENCOUNTER — TELEPHONE (OUTPATIENT)
Dept: GASTROENTEROLOGY | Facility: CLINIC | Age: 72
End: 2017-03-24

## 2017-03-24 NOTE — TELEPHONE ENCOUNTER
----- Message from Hansa Stiles sent at 3/23/2017  1:13 PM CDT -----  Contact: self [phone]  Call placed to pod. Patient returned your call, please call back.  Thank you!

## 2017-03-24 NOTE — TELEPHONE ENCOUNTER
----- Message from Oksana Jackson sent at 3/24/2017  1:16 PM CDT -----  Contact: Patient  Jovana, patient [phone], Calling about the US, CT and Endo schedule. Would like to discuss. Please advise. Thanks.

## 2017-03-27 ENCOUNTER — HOSPITAL ENCOUNTER (OUTPATIENT)
Dept: RADIOLOGY | Facility: HOSPITAL | Age: 72
Discharge: HOME | End: 2017-03-27
Attending: NURSE PRACTITIONER
Payer: MEDICARE

## 2017-03-27 DIAGNOSIS — R91.1 LESION OF LEFT LUNG: ICD-10-CM

## 2017-03-27 DIAGNOSIS — R10.13 EPIGASTRIC PAIN: ICD-10-CM

## 2017-03-27 PROCEDURE — 76700 US EXAM ABDOM COMPLETE: CPT | Mod: 26,,, | Performed by: RADIOLOGY

## 2017-03-27 PROCEDURE — 76700 US EXAM ABDOM COMPLETE: CPT | Mod: TC

## 2017-03-27 PROCEDURE — 71260 CT THORAX DX C+: CPT | Mod: 26,,, | Performed by: RADIOLOGY

## 2017-03-27 PROCEDURE — 25500020 PHARM REV CODE 255

## 2017-03-27 PROCEDURE — 71260 CT THORAX DX C+: CPT | Mod: TC

## 2017-03-27 RX ADMIN — IOHEXOL 75 ML: 350 INJECTION, SOLUTION INTRAVENOUS at 03:03

## 2017-03-28 ENCOUNTER — TELEPHONE (OUTPATIENT)
Dept: GASTROENTEROLOGY | Facility: CLINIC | Age: 72
End: 2017-03-28

## 2017-03-28 DIAGNOSIS — R10.13 EPIGASTRIC PAIN: ICD-10-CM

## 2017-03-28 DIAGNOSIS — R19.8 IRREGULAR BOWEL HABITS: ICD-10-CM

## 2017-03-28 DIAGNOSIS — R74.8 ELEVATED LIPASE: Primary | ICD-10-CM

## 2017-03-28 NOTE — TELEPHONE ENCOUNTER
Please call to inform & review the results with the patient- chests ct scan showed mild hypoventilatory changes (encourage to take deep breaths frequently to help open the lungs and follow-up with PCP for continued evaluation and management).  Blood work showed normal electrolytes, kidney and liver function levels. Some stool studies are still processing and we will notify once received and reviewed, but so far the stool studies are negative.  Abdominal ultrasound showed left kidney cyst (typically a benign finding); otherwise normal findings. Unable to visual pancreas due to positioning. Recommend ct scan of abdomen and pelvis if symptoms have persisted to further evaluate pancreas and symptoms. Continue with previous recommendations.  Thanks,  Clare GARZA-TARSHA

## 2017-03-30 ENCOUNTER — TELEPHONE (OUTPATIENT)
Dept: GASTROENTEROLOGY | Facility: CLINIC | Age: 72
End: 2017-03-30

## 2017-03-30 ENCOUNTER — HOSPITAL ENCOUNTER (OUTPATIENT)
Facility: HOSPITAL | Age: 72
Discharge: HOME | End: 2017-03-30
Attending: INTERNAL MEDICINE | Admitting: INTERNAL MEDICINE
Payer: MEDICARE

## 2017-03-30 ENCOUNTER — ANESTHESIA (OUTPATIENT)
Dept: ENDOSCOPY | Facility: HOSPITAL | Age: 72
End: 2017-03-30
Payer: MEDICARE

## 2017-03-30 ENCOUNTER — ANESTHESIA EVENT (OUTPATIENT)
Dept: ENDOSCOPY | Facility: HOSPITAL | Age: 72
End: 2017-03-30
Payer: MEDICARE

## 2017-03-30 ENCOUNTER — SURGERY (OUTPATIENT)
Age: 72
End: 2017-03-30

## 2017-03-30 VITALS
SYSTOLIC BLOOD PRESSURE: 126 MMHG | HEART RATE: 70 BPM | OXYGEN SATURATION: 96 % | TEMPERATURE: 98 F | DIASTOLIC BLOOD PRESSURE: 79 MMHG | RESPIRATION RATE: 16 BRPM

## 2017-03-30 VITALS — RESPIRATION RATE: 14 BRPM

## 2017-03-30 DIAGNOSIS — K29.70 GASTRITIS, PRESENCE OF BLEEDING UNSPECIFIED, UNSPECIFIED CHRONICITY, UNSPECIFIED GASTRITIS TYPE: ICD-10-CM

## 2017-03-30 DIAGNOSIS — R10.13 EPIGASTRIC PAIN: ICD-10-CM

## 2017-03-30 DIAGNOSIS — K22.2 SCHATZKI'S RING: ICD-10-CM

## 2017-03-30 DIAGNOSIS — K29.60 BILE REFLUX GASTRITIS: Primary | ICD-10-CM

## 2017-03-30 DIAGNOSIS — K44.9 HIATAL HERNIA: ICD-10-CM

## 2017-03-30 PROCEDURE — 25000003 PHARM REV CODE 250: Performed by: INTERNAL MEDICINE

## 2017-03-30 PROCEDURE — 88305 TISSUE EXAM BY PATHOLOGIST: CPT | Performed by: PATHOLOGY

## 2017-03-30 PROCEDURE — 25000003 PHARM REV CODE 250: Performed by: NURSE ANESTHETIST, CERTIFIED REGISTERED

## 2017-03-30 PROCEDURE — 43248 EGD GUIDE WIRE INSERTION: CPT | Mod: ,,, | Performed by: INTERNAL MEDICINE

## 2017-03-30 PROCEDURE — D9220A PRA ANESTHESIA: Mod: ANES,,, | Performed by: ANESTHESIOLOGY

## 2017-03-30 PROCEDURE — 27201012 HC FORCEPS, HOT/COLD, DISP: Performed by: INTERNAL MEDICINE

## 2017-03-30 PROCEDURE — 43239 EGD BIOPSY SINGLE/MULTIPLE: CPT | Mod: 59,,, | Performed by: INTERNAL MEDICINE

## 2017-03-30 PROCEDURE — 43248 EGD GUIDE WIRE INSERTION: CPT | Performed by: INTERNAL MEDICINE

## 2017-03-30 PROCEDURE — 43239 EGD BIOPSY SINGLE/MULTIPLE: CPT | Performed by: INTERNAL MEDICINE

## 2017-03-30 PROCEDURE — 37000009 HC ANESTHESIA EA ADD 15 MINS: Performed by: INTERNAL MEDICINE

## 2017-03-30 PROCEDURE — 88342 IMHCHEM/IMCYTCHM 1ST ANTB: CPT | Mod: 26,,, | Performed by: PATHOLOGY

## 2017-03-30 PROCEDURE — 63600175 PHARM REV CODE 636 W HCPCS: Performed by: NURSE ANESTHETIST, CERTIFIED REGISTERED

## 2017-03-30 PROCEDURE — D9220A PRA ANESTHESIA: Mod: CRNA,,, | Performed by: NURSE ANESTHETIST, CERTIFIED REGISTERED

## 2017-03-30 PROCEDURE — 37000008 HC ANESTHESIA 1ST 15 MINUTES: Performed by: INTERNAL MEDICINE

## 2017-03-30 RX ORDER — PROPOFOL 10 MG/ML
VIAL (ML) INTRAVENOUS
Status: DISCONTINUED | OUTPATIENT
Start: 2017-03-30 | End: 2017-03-30

## 2017-03-30 RX ORDER — LIDOCAINE HYDROCHLORIDE 20 MG/ML
INJECTION, SOLUTION EPIDURAL; INFILTRATION; INTRACAUDAL; PERINEURAL
Status: DISCONTINUED
Start: 2017-03-30 | End: 2017-03-30 | Stop reason: HOSPADM

## 2017-03-30 RX ORDER — PROPOFOL 10 MG/ML
INJECTION, EMULSION INTRAVENOUS
Status: DISCONTINUED
Start: 2017-03-30 | End: 2017-03-30 | Stop reason: HOSPADM

## 2017-03-30 RX ORDER — SUCRALFATE 1 G/1
1 TABLET ORAL 4 TIMES DAILY
Qty: 360 TABLET | Refills: 3 | Status: SHIPPED | OUTPATIENT
Start: 2017-03-30 | End: 2018-03-30

## 2017-03-30 RX ORDER — SODIUM CHLORIDE 9 MG/ML
INJECTION, SOLUTION INTRAVENOUS CONTINUOUS
Status: DISCONTINUED | OUTPATIENT
Start: 2017-03-30 | End: 2017-03-30 | Stop reason: HOSPADM

## 2017-03-30 RX ORDER — LIDOCAINE HCL/PF 100 MG/5ML
SYRINGE (ML) INTRAVENOUS
Status: DISCONTINUED | OUTPATIENT
Start: 2017-03-30 | End: 2017-03-30

## 2017-03-30 RX ADMIN — PROPOFOL 50 MG: 10 INJECTION, EMULSION INTRAVENOUS at 11:03

## 2017-03-30 RX ADMIN — LIDOCAINE HYDROCHLORIDE 100 MG: 20 INJECTION, SOLUTION INTRAVENOUS at 11:03

## 2017-03-30 RX ADMIN — PROPOFOL 100 MG: 10 INJECTION, EMULSION INTRAVENOUS at 11:03

## 2017-03-30 RX ADMIN — SODIUM CHLORIDE 1000 ML: 0.9 INJECTION, SOLUTION INTRAVENOUS at 11:03

## 2017-03-30 NOTE — TELEPHONE ENCOUNTER
----- Message from Kerry Gonzalez sent at 3/29/2017  1:25 PM CDT -----  Contact: self  Patient would like to speak to a nurse regarding results and a CT that someone wants to schedule.   Please call  to advise.    Thanks

## 2017-03-30 NOTE — TRANSFER OF CARE
Anesthesia Transfer of Care Note    Patient: Jovana Fall    Procedure(s) Performed: Procedure(s) (LRB):  ESOPHAGOGASTRODUODENOSCOPY (EGD) (N/A)    Patient location: PACU    Anesthesia Type: general    Transport from OR: Transported from OR on room air with adequate spontaneous ventilation    Post pain: adequate analgesia    Post assessment: no apparent anesthetic complications and tolerated procedure well    Post vital signs: stable    Level of consciousness: awake, alert and oriented    Nausea/Vomiting: no nausea/vomiting    Complications: none          Last vitals:   Visit Vitals    /70 (BP Location: Left arm, Patient Position: Lying, BP Method: Automatic)    Pulse 65    Temp 36.7 °C (98 °F) (Oral)    Resp 14    SpO2 96%    Breastfeeding No

## 2017-03-30 NOTE — PLAN OF CARE
Vss, drew po fluids, adequate pain control, ambulates easily.  States ready to go home.  Discharged from facility with family.

## 2017-03-30 NOTE — ANESTHESIA PREPROCEDURE EVALUATION
03/30/2017  Jovana Fall is a 71 y.o., female.    [facility] Pre-op Assessment    I have reviewed the Patient Summary Reports.     I have reviewed the Nursing Notes.      Review of Systems  Anesthesia Hx:  No problems with previous Anesthesia Denies Hx of Anesthetic complications    Cardiovascular:   Hypertension CAD asymptomatic     Renal/:   Chronic Renal Disease, CRI    Hepatic/GI:   GERD    Musculoskeletal:   Arthritis     Endocrine:   Diabetes, type 2    Psych:   Psychiatric History          Physical Exam  General:  Obesity    Airway/Jaw/Neck:  Airway Findings: Mouth Opening: Normal Tongue: Normal  General Airway Assessment: Adult  Mallampati: I  TM Distance: Normal, at least 6 cm  Jaw/Neck Findings:  Neck ROM: Normal ROM      Dental:  Dental Findings: upper front caps, molar caps   Chest/Lungs:  Chest/Lungs Clear    Heart/Vascular:  Heart Findings: Normal            Anesthesia Plan  Type of Anesthesia, risks & benefits discussed:  Anesthesia Type:  general  Patient's Preference:   Intra-op Monitoring Plan:   Intra-op Monitoring Plan Comments:   Post Op Pain Control Plan:   Post Op Pain Control Plan Comments:   Induction:   IV  Beta Blocker:  Patient is on a Beta-Blocker and has received one dose within the past 24 hours (No further documentation required).       Informed Consent: Patient understands risks and agrees with Anesthesia plan.  Questions answered. Anesthesia consent signed with patient.  ASA Score: 3     Day of Surgery Review of History & Physical:    H&P update referred to the provider.         Ready For Surgery From Anesthesia Perspective.

## 2017-03-30 NOTE — DISCHARGE INSTRUCTIONS
"Discharge Instructions: After Your Surgery/Procedure  Youve just had surgery. During surgery you were given medicine called anesthesia to keep you relaxed and free of pain. After surgery you may have some pain or nausea. This is common. Here are some tips for feeling better and getting well after surgery.     Stay on schedule with your medication.   Going home  Your doctor or nurse will show you how to take care of yourself when you go home. He or she will also answer your questions. Have an adult family member or friend drive you home.      For your safety we recommend these precaution for the first 24 hours after your procedure:  · Do not drive or use heavy equipment.  · Do not make important decisions or sign legal papers.  · Do not drink alcohol.  · Have someone stay with you, if needed. He or she can watch for problems and help keep you safe.  · Your concentration, balance, coordination, and judgement may be impaired for many hours after anesthesia.  Use caution when ambulating or standing up.     · You may feel weak and "washed out" after anesthesia and surgery.      Subtle residual effects of general anesthesia or sedation with regional / local anesthesia can last more than 24 hours.  Rest for the remainder of the day or longer if your Doctor/Surgeon has advised you to do so.  Although you may feel normal within the first 24 hours, your reflexes and mental ability may be impaired without you realizing it.  You may feel dizzy, lightheaded or sleepy for 24 hours or longer.      Be sure to go to all follow-up visits with your doctor. And rest after your surgery for as long as your doctor tells you to.  Coping with pain  If you have pain after surgery, pain medicine will help you feel better. Take it as told, before pain becomes severe. Also, ask your doctor or pharmacist about other ways to control pain. This might be with heat, ice, or relaxation. And follow any other instructions your surgeon or nurse gives " you.  Tips for taking pain medicine  To get the best relief possible, remember these points:  · Pain medicines can upset your stomach. Taking them with a little food may help.  · Most pain relievers taken by mouth need at least 20 to 30 minutes to start to work.  · Taking medicine on a schedule can help you remember to take it. Try to time your medicine so that you can take it before starting an activity. This might be before you get dressed, go for a walk, or sit down for dinner.  · Constipation is a common side effect of pain medicines. Call your doctor before taking any medicines such as laxatives or stool softeners to help ease constipation. Also ask if you should skip any foods. Drinking lots of fluids and eating foods such as fruits and vegetables that are high in fiber can also help. Remember, do not take laxatives unless your surgeon has prescribed them.  · Drinking alcohol and taking pain medicine can cause dizziness and slow your breathing. It can even be deadly. Do not drink alcohol while taking pain medicine.  · Pain medicine can make you react more slowly to things. Do not drive or run machinery while taking pain medicine.  Your health care provider may tell you to take acetaminophen to help ease your pain. Ask him or her how much you are supposed to take each day. Acetaminophen or other pain relievers may interact with your prescription medicines or other over-the-counter (OTC) drugs. Some prescription medicines have acetaminophen and other ingredients. Using both prescription and OTC acetaminophen for pain can cause you to overdose. Read the labels on your OTC medicines with care. This will help you to clearly know the list of ingredients, how much to take, and any warnings. It may also help you not take too much acetaminophen. If you have questions or do not understand the information, ask your pharmacist or health care provider to explain it to you before you take the OTC medicine.  Managing  nausea  Some people have an upset stomach after surgery. This is often because of anesthesia, pain, or pain medicine, or the stress of surgery. These tips will help you handle nausea and eat healthy foods as you get better. If you were on a special food plan before surgery, ask your doctor if you should follow it while you get better. These tips may help:  · Do not push yourself to eat. Your body will tell you when to eat and how much.  · Start off with clear liquids and soup. They are easier to digest.  · Next try semi-solid foods, such as mashed potatoes, applesauce, and gelatin, as you feel ready.  · Slowly move to solid foods. Dont eat fatty, rich, or spicy foods at first.  · Do not force yourself to have 3 large meals a day. Instead eat smaller amounts more often.  · Take pain medicines with a small amount of solid food, such as crackers or toast, to avoid nausea.     Call your surgeon if  · You still have pain an hour after taking medicine. The medicine may not be strong enough.  · You feel too sleepy, dizzy, or groggy. The medicine may be too strong.  · You have side effects like nausea, vomiting, or skin changes, such as rash, itching, or hives.       If you have obstructive sleep apnea  You were given anesthesia medicine during surgery to keep you comfortable and free of pain. After surgery, you may have more apnea spells because of this medicine and other medicines you were given. The spells may last longer than usual.   At home:  · Keep using the continuous positive airway pressure (CPAP) device when you sleep. Unless your health care provider tells you not to, use it when you sleep, day or night. CPAP is a common device used to treat obstructive sleep apnea.  · Talk with your provider before taking any pain medicine, muscle relaxants, or sedatives. Your provider will tell you about the possible dangers of taking these medicines.  © 2690-0787 [employer]. [address] [place] [address]  [place] [address]. All rights reserved. This information is not intended as a substitute for professional medical care. Always follow your healthcare professional's instructions.        Esophageal Dilation     A balloon dilator may be used to widen a stricture in the esophagus.   An esophageal dilation is a procedure used to widen a narrowed section of your esophagus. This is the tube that leads from your throat to your stomach. Narrowing (stricture) of the esophagus can cause problems. These include trouble swallowing. This sheet explains what to expect with esophageal dilation.  Why esophageal dilation is needed  Several problems can be treated with esophageal dilation. They include:  · Peptic stricture. This is caused by reflux esophagitis. With this problem, the esophagus is irritated by acid reflux (heartburn). This occurs when acid from your stomach flows back up into the esophagus. Stomach acid damages the lining of the esophagus. This leads to a buildup of scar tissue. As a result, the esophagus is narrowed.  · Schatzkis ring. This is an abnormal ring of tissue. It forms where the esophagus meets the stomach. It can cause trouble swallowing. It can also cause food to get stuck in the esophagus. The cause of this condition is not known.  · Achalasia. This condition stops food and liquids from moving into your stomach from the esophagus. It affects the lower esophageal sphincter (LES). The LES is a muscular ring that opens (relaxes) when you swallow. With achalasia, the LES does not relax. This condition can also cause problems with peristalsis. This is the normal muscular action of the esophagus that moves food into the stomach.  · Eosinophilic esophagitis. This is a redness and swelling (inflammation) in the esophagus. It is caused by an environmental trigger such as a food allergy. It can lead to pain, trouble swallowing, and strictures.  · Less common causes of stricture. Other causes of stricture include  radiation treatment and cancer.  Before you have esophageal dilation  · Tell your provider about any medicines you take. This includes prescription medicines, over-the-counter medicines, herbs, vitamins, and other supplements. Be sure to mention aspirin or any blood thinners youre taking.  · Let your provider know if you need to take antibiotics before dental procedures. You may need to take them before esophageal dilation as well.  · Tell your provider about any health conditions you have, such as heart or lung disease. Also mention any allergies to medicines.  · Youll need to have an empty stomach for the procedure. Follow your providers instructions for not eating and drinking before the procedure.  · Arrange to have a family member or friend drive you home after the procedure.  During the procedure  · You may be given local anesthesia to numb your throat. Youll also likely be given medicine to relax you. The procedure takes about 15 minutes. It does not cause trouble breathing.  · A tube called an endoscope (scope) is used. This is a narrow tube with a tiny light and camera at the end. The scope is inserted through your mouth and into your esophagus. It lets your provider see inside your esophagus. To help guide your provider, an imaging method called fluoroscopy may also be used. This creates a moving X-ray image on a computer screen.   · Next, special tiny tools are carefully guided through your mouth and down into the esophagus. They widen the stricture and are then removed. Different types of instruments are used. The type used depends on the size and cause of the stricture. Types include:  ¨ Balloon dilator. A tiny empty balloon is put into the stricture using an endoscope. The balloon is slowly filled with air. The air is removed from the balloon when the stricture is widened to the right size. Balloon dilators are used to treat many types of strictures.  ¨ Guided wire dilator. A thin wire is eased  down the esophagus. A small tube thats wider on one end is guided down the wire. It is put into the stricture to stretch it. These dilators are used to treat all kinds of strictures.  ¨ Bougies. These are weighted, cone-shaped tubes. Starting with smaller cones, your provider uses increasingly larger cones until the stricture is stretched the right amount. Bougies are often used to treat strictures that are simple (short, straight, and not very narrow).  After the procedure  · Youll be watched closely until your provider says youre ready to go home. Youll need to have a friend or family member drive you home.  · You may have a sore throat for the rest of the day.  · You may have pain behind your breastbone for a short time afterwards.  · You can start drinking fluids again after the numbness in your throat goes away. You can resume eating the same day or the next day.  Risks and possible complications  Risks and possible complications for esophageal dilation include:  · Infection  · A tear or hole in the esophagus lining, causing bleeding and possibly needing surgery to fix  · Risks of anesthesia  Follow-up  You may need to have the procedure repeated one or more times. This depends on the cause and extent of the narrowing. Repeat procedures can allow the dilation to take place more slowly. This reduces the risks of the procedure.  If your stricture was caused by reflux esophagitis, youll likely need to take medicine to treat that condition. Your provider will tell you more.  When to call your provider  Call your healthcare provider right away if you have any of the following after the procedure:  · Fever of 100.4°F (38.0°C)  · Chest pain  · Trouble swallowing  · Vomiting blood or material that looks like coffee grounds  · Bleeding  · Black, tarry, or bloody stools   Date Last Reviewed: 7/1/2016  © 4491-8680 [employer]. [address] [place] [address], [place] [address]. All rights reserved. This  information is not intended as a substitute for professional medical care. Always follow your healthcare professional's instructions.

## 2017-03-30 NOTE — ANESTHESIA POSTPROCEDURE EVALUATION
Anesthesia Post Evaluation    Patient: Jovana Fall    Procedure(s) Performed: Procedure(s) (LRB):  ESOPHAGOGASTRODUODENOSCOPY (EGD) (N/A)    Final Anesthesia Type: general  Patient location during evaluation: PACU  Patient participation: Yes- Able to Participate  Level of consciousness: awake and alert  Post-procedure vital signs: reviewed and stable  Pain management: adequate  Airway patency: patent  PONV status at discharge: No PONV  Anesthetic complications: no      Cardiovascular status: blood pressure returned to baseline  Respiratory status: unassisted  Hydration status: euvolemic  Follow-up not needed.        Visit Vitals    /79    Pulse 70    Temp 36.7 °C (98 °F) (Oral)    Resp 16    SpO2 96%    Breastfeeding No       Pain/Braden Score: Pain Assessment Performed: Yes (3/30/2017 12:00 PM)  Presence of Pain: denies (3/30/2017 12:15 PM)  Braden Score: 10 (3/30/2017 12:15 PM)  Modified Braden Score: 20 (3/30/2017 10:34 AM)

## 2017-03-31 ENCOUNTER — PATIENT MESSAGE (OUTPATIENT)
Dept: GASTROENTEROLOGY | Facility: CLINIC | Age: 72
End: 2017-03-31

## 2017-04-06 ENCOUNTER — TELEPHONE (OUTPATIENT)
Dept: GASTROENTEROLOGY | Facility: CLINIC | Age: 72
End: 2017-04-06

## 2017-04-13 ENCOUNTER — PATIENT MESSAGE (OUTPATIENT)
Dept: RESEARCH | Facility: HOSPITAL | Age: 72
End: 2017-04-13

## 2017-04-20 ENCOUNTER — OFFICE VISIT (OUTPATIENT)
Dept: OPTOMETRY | Facility: CLINIC | Age: 72
End: 2017-04-20
Payer: MEDICARE

## 2017-04-20 DIAGNOSIS — H52.4 MYOPIA WITH PRESBYOPIA, BILATERAL: ICD-10-CM

## 2017-04-20 DIAGNOSIS — Z46.0 ENCOUNTER FOR FITTING OR ADJUSTMENT OF SPECTACLES OR CONTACT LENSES: Primary | ICD-10-CM

## 2017-04-20 DIAGNOSIS — Z13.5 SCREENING FOR OTHER EYE CONDITIONS: ICD-10-CM

## 2017-04-20 DIAGNOSIS — H25.13 NUCLEAR SCLEROSIS, BILATERAL: Primary | ICD-10-CM

## 2017-04-20 DIAGNOSIS — H52.13 MYOPIA WITH PRESBYOPIA, BILATERAL: ICD-10-CM

## 2017-04-20 DIAGNOSIS — I10 ESSENTIAL HYPERTENSION: ICD-10-CM

## 2017-04-20 DIAGNOSIS — H43.813 POSTERIOR VITREOUS DETACHMENT, BILATERAL: ICD-10-CM

## 2017-04-20 PROCEDURE — 99499 UNLISTED E&M SERVICE: CPT | Mod: S$GLB,,, | Performed by: OPTOMETRIST

## 2017-04-20 PROCEDURE — 92310 CONTACT LENS FITTING OU: CPT | Mod: ,,, | Performed by: OPTOMETRIST

## 2017-04-20 PROCEDURE — 92014 COMPRE OPH EXAM EST PT 1/>: CPT | Mod: S$GLB,,, | Performed by: OPTOMETRIST

## 2017-04-20 PROCEDURE — 92310 CONTACT LENS FITTING OU: CPT | Mod: S$GLB,,, | Performed by: OPTOMETRIST

## 2017-04-20 PROCEDURE — 99999 PR PBB SHADOW E&M-EST. PATIENT-LVL III: CPT | Mod: PBBFAC,,, | Performed by: OPTOMETRIST

## 2017-04-20 PROCEDURE — 99499 UNLISTED E&M SERVICE: CPT | Mod: ,,, | Performed by: OPTOMETRIST

## 2017-04-20 NOTE — MR AVS SNAPSHOT
[facility]  [address]  [facility] [address]  Phone: [phone]  Fax: [phone]                  Jovana Fall   2017 1:30 PM   Office Visit    Description:  Female : 1945   Provider:  Jerad Panchal OD   Department:  [facility]           Reason for Visit     Contact Lens Follow Up                To Do List           Future Appointments        Provider Department Dept Phone    2017 1:30 PM Marcy Staley MD [facility] - Internal Medicine [phone]      Goals (5 Years of Data)     None      [facility]     [facility] Nurse Care Line -  Assistance  Unless otherwise directed by your provider, please contact [facility], our nurse care line that is available for  assistance.     Registered nurses in the [facility] Call Center provide: appointment scheduling, clinical advisement, health education, and other advisory services.  Call: [phone] (toll free)               Medications           Message regarding Medications     Verify the changes and/or additions to your medication regime listed below are the same as discussed with your clinician today.  If any of these changes or additions are incorrect, please notify your healthcare provider.             Verify that the below list of medications is an accurate representation of the medications you are currently taking.  If none reported, the list may be blank. If incorrect, please contact your healthcare provider. Carry this list with you in case of emergency.           Current Medications     aspirin 81 mg Tab Take 81 mg by mouth. 1 Tablet Oral Every day    atorvastatin (LIPITOR) 80 MG tablet TAKE 1 TABLET ONE TIME DAILY    blood sugar diagnostic Strp 1 strip by Misc.(Non-Drug; Combo Route) route once daily.    carvedilol (COREG) 12.5 MG tablet TAKE 1 TABLET TWICE DAILY WITH MEALS    clonazePAM (KLONOPIN) 0.5 MG tablet Take 1 tablet (0.5 mg total) by mouth 2 (two) times daily as  needed.    fish oil-omega-3 fatty acids 300-1,000 mg capsule     gabapentin (NEURONTIN) 300 MG capsule TAKE 1 CAPSULE EVERY EVENING    JANUVIA 50 mg Tab TAKE 1 TABLET ONE TIME DAILY    lancets Misc 1 lancet by Misc.(Non-Drug; Combo Route) route once daily.    losartan (COZAAR) 50 MG tablet TAKE 1 TABLET ONE TIME DAILY    nitroGLYCERIN (NITROSTAT) 0.4 MG SL tablet Take one tab under the tongue every 5 minutes for max of three doses for chest pain. If chest pain not resolved to to the ER.    omeprazole (PRILOSEC) 40 MG capsule Take 1 capsule (40 mg total) by mouth every morning.    oxycodone-acetaminophen (PERCOCET) 5-325 mg per tablet Take 1 tablet by mouth 2 (two) times daily as needed for Pain.    sucralfate (CARAFATE) 1 gram tablet Take 1 tablet (1 g total) by mouth 4 (four) times daily.    valacyclovir (VALTREX) 500 MG tablet Take 1 tablet (500 mg total) by mouth 2 (two) times daily as needed. Treat for 3 days.    venlafaxine (EFFEXOR) 100 MG Tab TAKE 1 TABLET EVERY DAY (DOSE INCREASE)    VITAMIN D2 50,000 unit capsule TAKE 1 CAPSULE EVERY SEVEN DAYS           Clinical Reference Information           Allergies as of 4/20/2017     Adhesive Tape-silicones    Codeine    Metformin    Morphine    Penicillins    Sulfa (Sulfonamide Antibiotics)      Immunizations Administered on Date of Encounter - 4/20/2017     None      Instructions    Bilateral nuclear sclerosis.   Bilateral peripheral cortical cataract.  No need for cataract surgery in either eye..  Otherwise, good ocular health in each eye.     Myopia in each eye.  Presbyopia consistent with age.  New spectacle lens Rx issued for use in lieu of CLs.    Wearing monovision RGP CLs. Good contact lens fit in each eye.   Wearing CLs well in both eyes.   Showing need for minimal power change in right lens (for distance), but not need to replace right lens.    Do not recommend power change in the left lens (for near).  New CL Rx entered into record.  Recheck in one year -  or prior if any problems noted in the interim            Language Assistance Services     ATTENTION: Language assistance services are available, free of charge. Please call [phone].      ATENCIÓN: Si karen ivnes, tiene a jaimes disposición servicios gratuitos de asistencia lingüística. Llame al [phone].     CHÚ Ý: N?u b?n nói Ti?ng Vi?t, có các d?ch v? h? tr? ngôn ng? mi?n phí dành cho b?n. G?i s? [phone].         [facility] complies with applicable Federal civil rights laws and does not discriminate on the basis of race, color, national origin, age, disability, or sex.

## 2017-04-20 NOTE — PATIENT INSTRUCTIONS
Bilateral nuclear sclerosis.   Bilateral peripheral cortical cataract.  No need for cataract surgery in either eye..  Otherwise, good ocular health in each eye.     Myopia in each eye.  Presbyopia consistent with age.  New spectacle lens Rx issued for use in lieu of CLs.    Wearing monovision RGP CLs. Good contact lens fit in each eye.   Wearing CLs well in both eyes.   Showing need for minimal power change in right lens (for distance), but not need to replace right lens.    Do not recommend power change in the left lens (for near).  New CL Rx entered into record.  Recheck in one year - or prior if any problems noted in the interim

## 2017-04-20 NOTE — MR AVS SNAPSHOT
[facility]  [address]  [facility] [address]  Phone: [phone]  Fax: [phone]                  Jovana Fall   2017 1:00 PM   Office Visit    Description:  Female : 1945   Provider:  Jerad Panchal OD   Department:  [facility]           Reason for Visit     Concerns About Ocular Health                To Do List           Future Appointments        Provider Department Dept Phone    2017 1:30 PM Marcy Staley MD [facility] - Internal Medicine [phone]      Goals (5 Years of Data)     None      [facility]     [facility] Nurse Care Line -  Assistance  Unless otherwise directed by your provider, please contact [facility], our nurse care line that is available for  assistance.     Registered nurses in the [facility] provide: appointment scheduling, clinical advisement, health education, and other advisory services.  Call: [phone] (toll free)               Medications           Message regarding Medications     Verify the changes and/or additions to your medication regime listed below are the same as discussed with your clinician today.  If any of these changes or additions are incorrect, please notify your healthcare provider.             Verify that the below list of medications is an accurate representation of the medications you are currently taking.  If none reported, the list may be blank. If incorrect, please contact your healthcare provider. Carry this list with you in case of emergency.           Current Medications     aspirin 81 mg Tab Take 81 mg by mouth. 1 Tablet Oral Every day    atorvastatin (LIPITOR) 80 MG tablet TAKE 1 TABLET ONE TIME DAILY    blood sugar diagnostic Strp 1 strip by Misc.(Non-Drug; Combo Route) route once daily.    carvedilol (COREG) 12.5 MG tablet TAKE 1 TABLET TWICE DAILY WITH MEALS    clonazePAM (KLONOPIN) 0.5 MG tablet Take 1 tablet (0.5 mg total) by mouth 2 (two) times  daily as needed.    fish oil-omega-3 fatty acids 300-1,000 mg capsule     gabapentin (NEURONTIN) 300 MG capsule TAKE 1 CAPSULE EVERY EVENING    JANUVIA 50 mg Tab TAKE 1 TABLET ONE TIME DAILY    lancets Misc 1 lancet by Misc.(Non-Drug; Combo Route) route once daily.    losartan (COZAAR) 50 MG tablet TAKE 1 TABLET ONE TIME DAILY    nitroGLYCERIN (NITROSTAT) 0.4 MG SL tablet Take one tab under the tongue every 5 minutes for max of three doses for chest pain. If chest pain not resolved to to the ER.    omeprazole (PRILOSEC) 40 MG capsule Take 1 capsule (40 mg total) by mouth every morning.    oxycodone-acetaminophen (PERCOCET) 5-325 mg per tablet Take 1 tablet by mouth 2 (two) times daily as needed for Pain.    sucralfate (CARAFATE) 1 gram tablet Take 1 tablet (1 g total) by mouth 4 (four) times daily.    valacyclovir (VALTREX) 500 MG tablet Take 1 tablet (500 mg total) by mouth 2 (two) times daily as needed. Treat for 3 days.    venlafaxine (EFFEXOR) 100 MG Tab TAKE 1 TABLET EVERY DAY (DOSE INCREASE)    VITAMIN D2 50,000 unit capsule TAKE 1 CAPSULE EVERY SEVEN DAYS           Clinical Reference Information           Allergies as of 4/20/2017     Adhesive Tape-silicones    Codeine    Metformin    Morphine    Penicillins    Sulfa (Sulfonamide Antibiotics)      Immunizations Administered on Date of Encounter - 4/20/2017     None      Instructions    Bilateral nuclear sclerosis.   Bilateral peripheral cortical cataract.  No need for cataract surgery in either eye..  Otherwise, good ocular health in each eye.     Myopia in each eye.  Presbyopia consistent with age.  New spectacle lens Rx issued for use in lieu of CLs.    Wearing monovision RGP CLs. Good contact lens fit in each eye.   Wearing CLs well in both eyes.   Showing need for minimal power change in right lens (for distance), but not need to replace right lens.    Do not recommend power change in the left lens (for near).  New CL Rx entered into record.  Recheck in  one year - or prior if any problems noted in the interim          Language Assistance Services     ATTENTION: Language assistance services are available, free of charge. Please call [phone].      ATENCIÓN: Si habla jasmyn, tiene a jaimes disposición servicios gratuitos de asistencia lingüística. Llame al [phone].     CHÚ Ý: N?u b?n nói Ti?ng Vi?t, có các d?ch v? h? tr? ngôn ng? mi?n phí dành cho b?n. G?i s? [phone].         [facility] complies with applicable Federal civil rights laws and does not discriminate on the basis of race, color, national origin, age, disability, or sex.

## 2017-04-20 NOTE — PROGRESS NOTES
HPI     Contact Lens Follow Up    Additional comments: contact lens follow-up with general eye examination              Comments   Patient in today for contact lens follow-up with general eye examination.    Refer to additional patient encounter notes dated 04/20/2017.          Last edited by Jerad Panchal, OD on 4/20/2017  1:23 PM. (History)            Assessment /Plan     For exam results, see Encounter Report.    1. Encounter for fitting or adjustment of spectacles or contact lenses                      Bilateral nuclear sclerosis.   Bilateral peripheral cortical cataract.  No need for cataract surgery in either eye..  Otherwise, good ocular health in each eye.     Myopia in each eye.  Presbyopia consistent with age.  New spectacle lens Rx issued for use in lieu of CLs.    Wearing monovision RGP CLs. Good contact lens fit in each eye.   Wearing CLs well in both eyes.   Showing need for minimal power change in right lens (for distance), but not need to replace right lens.    Do not recommend power change in the left lens (for near).  New CL Rx entered into record.  Recheck in one year - or prior if any problems noted in the interim

## 2017-04-20 NOTE — PROGRESS NOTES
HPI     Concerns About Ocular Health    Additional comments: Eye exam and refraction, and contact lens follow-up.              Comments   Patient's age: 71 y.o. WF  Occupation: Retired  Approximate date of last eye examination:  02/01/2016 Dr. Panchal   City/State: [facility]  Wears glasses? Yes     If yes, wears  Full-time or part-time?  Part-time  Present glasses are: Bifocal, SV Distance, SV Reading?  Progressive lens  Approximate age of present glasses:  2011   Got new glasses following last exam, or subsequently?:  No   Any problem with VA with glasses?  No  Wears CLs?:  Yes           If yes:              Type of CL worn:  Monovision RGP              Wears full-time or part-time:  Full-time               Sleeps with contact lenses:  No               CL Solution used:  McClure                How often replace CLs:  As needed              Any problem with VA with CLs?  No              Has patient read and signed the contact lens fee information   document? Yes  Headaches?  No  Eye pain/discomfort?  No                                                                                  Flashes?  Yes, for several years denies increase or change   Floaters?  Yes, for several years denies increase or change  Diplopia/Double vision?  No  Patient's Ocular History:         Any eye surgeries? Bilateral Blepharoplasty          Any eye injury?  No         Any treatment for eye disease?  No  Family history of eye disease?    Maternal Aunt  + Macular Degeneration   Maternal Uncle + Macular Degeneration  Sister   + Macular Degeneration  Significant patient medical history:         1. Diabetes?  Yes  LBS - 119 this am  HEMOGLOBIN A1C  ..Hemoglobin A1C       Date                     Value               Ref Range             Status                10/06/2016               5.9                 4.5 - 6.2 %           Final              Comment:    According to ADA guidelines, hemoglobin A1C <7.0% represents  optimal   control in non-pregnant  "diabetic patients.  Different  metrics may apply   to specific populations.   Standards of Medical Care in Diabetes -   2016.  For the purpose of screening for the presence of diabetes:  <5.7%       Consistent with the absence of diabetes  5.7-6.4%  Consistent with   increasing risk for diabetes   (prediabetes)  >or=6.5%  Consistent with   diabetes  Currently no consensus exists for use of hemoglobin A1C  for   diagnosis of diabetes for children.         03/03/2016               5.9                 4.5 - 6.2 %           Final                 07/07/2015               5.7                 4.5 - 6.2 %           Final                 07/07/2015               5.7                 4.5 - 6.2 %           Final                 ----------       If yes, IDDM or NIDDM? NIDDM   2. HBP?  Yes, controlled by medication and diet              3. Other (describe):   Basal cell carcinoma removed from cheek          -  And melanoma on back   ! OTC eyedrops currently using:  Rewetting solution   ! Prescription eye meds currently using:  No   ! Any history of allergy/adverse reaction to any eye meds used   previously?  No    ! Any history of allergy/adverse reaction to eyedrops used during prior   eye exam(s)? No    ! Any history of allergy/adverse reaction to Novacaine or similar meds?   No   ! Any history of allergy/adverse reaction to Epinephrine or similar meds?   No    ! Patient okay with use of anesthetic eyedrops to check eye pressure?    Yes        ! Patient okay with use of eyedrops to dilate pupils today?  Yes   !  Allergies/Medications/Medical History/Family History reviewed today?    Yes      PD =   68/64  Desired reading distance =  17.25"                                                                    Last edited by Jerad Panchal, OD on 4/20/2017  1:49 PM. (History)            Assessment /Plan     For exam results, see Encounter Report.    1. Nuclear sclerosis, bilateral     2. Posterior vitreous detachment, bilateral  "    3. Myopia with presbyopia, bilateral     4. Essential hypertension     5. Screening for other eye conditions                    Bilateral nuclear sclerosis.   Bilateral peripheral cortical cataract.  No need for cataract surgery in either eye..  Otherwise, good ocular health in each eye.     Myopia in each eye.  Presbyopia consistent with age.  New spectacle lens Rx issued for use in lieu of CLs.    Wearing monovision RGP CLs. Good contact lens fit in each eye.   Wearing CLs well in both eyes.   Showing need for minimal power change in right lens (for distance), but not need to replace right lens.    Do not recommend power change in the left lens (for near).  New CL Rx entered into record.  Recheck in one year - or prior if any problems noted in the interim

## 2017-04-21 ENCOUNTER — OFFICE VISIT (OUTPATIENT)
Dept: INTERNAL MEDICINE | Facility: CLINIC | Age: 72
End: 2017-04-21
Payer: MEDICARE

## 2017-04-21 ENCOUNTER — HOSPITAL ENCOUNTER (OUTPATIENT)
Dept: RADIOLOGY | Facility: HOSPITAL | Age: 72
Discharge: HOME | End: 2017-04-21
Attending: INTERNAL MEDICINE
Payer: MEDICARE

## 2017-04-21 VITALS
HEIGHT: 67 IN | DIASTOLIC BLOOD PRESSURE: 80 MMHG | WEIGHT: 180.81 LBS | BODY MASS INDEX: 28.38 KG/M2 | HEART RATE: 69 BPM | SYSTOLIC BLOOD PRESSURE: 128 MMHG

## 2017-04-21 DIAGNOSIS — F33.9 MAJOR DEPRESSION, RECURRENT, CHRONIC: ICD-10-CM

## 2017-04-21 DIAGNOSIS — I70.0 ABDOMINAL AORTIC ATHEROSCLEROSIS: ICD-10-CM

## 2017-04-21 DIAGNOSIS — M48.061 LUMBAR SPINAL STENOSIS: ICD-10-CM

## 2017-04-21 DIAGNOSIS — E11.9 TYPE 2 DIABETES MELLITUS WITHOUT COMPLICATION, WITHOUT LONG-TERM CURRENT USE OF INSULIN: ICD-10-CM

## 2017-04-21 DIAGNOSIS — I25.10 CORONARY ARTERY DISEASE INVOLVING NATIVE CORONARY ARTERY OF NATIVE HEART WITHOUT ANGINA PECTORIS: ICD-10-CM

## 2017-04-21 DIAGNOSIS — I10 ESSENTIAL HYPERTENSION: ICD-10-CM

## 2017-04-21 DIAGNOSIS — R74.8 ELEVATED LIPASE: ICD-10-CM

## 2017-04-21 DIAGNOSIS — M25.561 ACUTE PAIN OF RIGHT KNEE: Primary | ICD-10-CM

## 2017-04-21 DIAGNOSIS — Z12.31 ENCOUNTER FOR SCREENING MAMMOGRAM FOR BREAST CANCER: ICD-10-CM

## 2017-04-21 DIAGNOSIS — M25.561 ACUTE PAIN OF RIGHT KNEE: ICD-10-CM

## 2017-04-21 DIAGNOSIS — E78.5 HYPERLIPIDEMIA, UNSPECIFIED HYPERLIPIDEMIA TYPE: ICD-10-CM

## 2017-04-21 PROCEDURE — 4010F ACE/ARB THERAPY RXD/TAKEN: CPT | Mod: S$GLB,,, | Performed by: INTERNAL MEDICINE

## 2017-04-21 PROCEDURE — 99499 UNLISTED E&M SERVICE: CPT | Mod: S$GLB,,, | Performed by: INTERNAL MEDICINE

## 2017-04-21 PROCEDURE — 77067 SCR MAMMO BI INCL CAD: CPT | Mod: TC

## 2017-04-21 PROCEDURE — 1126F AMNT PAIN NOTED NONE PRSNT: CPT | Mod: S$GLB,,, | Performed by: INTERNAL MEDICINE

## 2017-04-21 PROCEDURE — 3074F SYST BP LT 130 MM HG: CPT | Mod: S$GLB,,, | Performed by: INTERNAL MEDICINE

## 2017-04-21 PROCEDURE — 3079F DIAST BP 80-89 MM HG: CPT | Mod: S$GLB,,, | Performed by: INTERNAL MEDICINE

## 2017-04-21 PROCEDURE — 3044F HG A1C LEVEL LT 7.0%: CPT | Mod: S$GLB,,, | Performed by: INTERNAL MEDICINE

## 2017-04-21 PROCEDURE — 1159F MED LIST DOCD IN RCRD: CPT | Mod: S$GLB,,, | Performed by: INTERNAL MEDICINE

## 2017-04-21 PROCEDURE — 96372 THER/PROPH/DIAG INJ SC/IM: CPT | Mod: S$GLB,,, | Performed by: INTERNAL MEDICINE

## 2017-04-21 PROCEDURE — 99999 PR PBB SHADOW E&M-EST. PATIENT-LVL IV: CPT | Mod: PBBFAC,,, | Performed by: INTERNAL MEDICINE

## 2017-04-21 PROCEDURE — 77067 SCR MAMMO BI INCL CAD: CPT | Mod: 26,,, | Performed by: RADIOLOGY

## 2017-04-21 PROCEDURE — 99215 OFFICE O/P EST HI 40 MIN: CPT | Mod: 25,S$GLB,, | Performed by: INTERNAL MEDICINE

## 2017-04-21 PROCEDURE — 73560 X-RAY EXAM OF KNEE 1 OR 2: CPT | Mod: 26,RT,, | Performed by: RADIOLOGY

## 2017-04-21 PROCEDURE — 73560 X-RAY EXAM OF KNEE 1 OR 2: CPT | Mod: TC,RT

## 2017-04-21 PROCEDURE — 1160F RVW MEDS BY RX/DR IN RCRD: CPT | Mod: S$GLB,,, | Performed by: INTERNAL MEDICINE

## 2017-04-21 RX ORDER — OXYCODONE AND ACETAMINOPHEN 5; 325 MG/1; MG/1
1 TABLET ORAL 2 TIMES DAILY PRN
Qty: 60 TABLET | Refills: 0 | Status: SHIPPED | OUTPATIENT
Start: 2017-04-21 | End: 2017-08-08 | Stop reason: SDUPTHER

## 2017-04-21 RX ORDER — MELOXICAM 15 MG/1
15 TABLET ORAL DAILY PRN
Qty: 15 TABLET | Refills: 0 | Status: SHIPPED | OUTPATIENT
Start: 2017-04-21 | End: 2017-07-28

## 2017-04-21 RX ORDER — TRIAMCINOLONE ACETONIDE 40 MG/ML
40 INJECTION, SUSPENSION INTRA-ARTICULAR; INTRAMUSCULAR
Status: COMPLETED | OUTPATIENT
Start: 2017-04-21 | End: 2017-04-21

## 2017-04-21 RX ADMIN — TRIAMCINOLONE ACETONIDE 40 MG: 40 INJECTION, SUSPENSION INTRA-ARTICULAR; INTRAMUSCULAR at 02:04

## 2017-04-21 NOTE — MR AVS SNAPSHOT
[facility]  [address]  [facility] [address]  Phone: [phone]  Fax: [phone]                  Jovana Fall   2017 1:30 PM   Office Visit    Description:  Female : 1945   Provider:  Marcy Staley MD   Department:  [facility]           Reason for Visit     Diabetes           Diagnoses this Visit        Comments    Acute pain of right knee    -  Primary     Type 2 diabetes mellitus without complication, without long-term current use of insulin         Lumbar spinal stenosis         Encounter for screening mammogram for breast cancer         Essential hypertension         Elevated lipase                To Do List           Future Appointments        Provider Department Dept Phone    2017 2:45 PM [facility] [id number] [address] [facility] [phone]    2017 11:15 AM Denver Severino MD [place] [facility] [phone]    2017 1:30 PM Marcy Staley MD [facility] [phone]      Goals (5 Years of Data)     None      Follow-Up and Disposition     Return in about 3 months (around 2017).       These Medications        Disp Refills Start End    canagliflozin (INVOKANA) 100 mg Tab 30 tablet 2 2017     Take 1 tablet (100 mg total) by mouth once daily. - Oral    Pharmacy: [facility] [address] [place] [address] [address] Ph #: [phone]       oxycodone-acetaminophen (PERCOCET) 5-325 mg per tablet 60 tablet 0 2017     Take 1 tablet by mouth 2 (two) times daily as needed for Pain. - Oral    Pharmacy: [facility] [address] [place] [address] [address] Ph #: [phone]       meloxicam (MOBIC) 15 MG tablet 15 tablet 0 2017     Take 1 tablet (15 mg total) by mouth daily as needed for Pain. For pain and inflammation. - Oral    Pharmacy: [facility] [address] [place] [address] [address] Ph #: [phone]         [facility]     [facility] Care  [facility] - 24/7 Assistance  Unless otherwise directed by your provider, please contact [facility], our nurse care line that is available for 24/7 assistance.     Registered nurses in the [facility] provide: appointment scheduling, clinical advisement, health education, and other advisory services.  Call: [phone] (toll free)               Medications           Message regarding Medications     Verify the changes and/or additions to your medication regime listed below are the same as discussed with your clinician today.  If any of these changes or additions are incorrect, please notify your healthcare provider.        START taking these NEW medications        Refills    canagliflozin (INVOKANA) 100 mg Tab 2    Sig: Take 1 tablet (100 mg total) by mouth once daily.    Class: Normal    Route: Oral    meloxicam (MOBIC) 15 MG tablet 0    Sig: Take 1 tablet (15 mg total) by mouth daily as needed for Pain. For pain and inflammation.    Class: Normal    Route: Oral      These medications were administered today        Dose Freq    triamcinolone acetonide injection 40 mg 40 mg Clinic/HOD 1 time    Sig: Inject 1 mL (40 mg total) into the muscle one time.    Class: Normal    Route: Intramuscular      STOP taking these medications     JANUVIA 50 mg Tab TAKE 1 TABLET ONE TIME DAILY           Verify that the below list of medications is an accurate representation of the medications you are currently taking.  If none reported, the list may be blank. If incorrect, please contact your healthcare provider. Carry this list with you in case of emergency.           Current Medications     aspirin 81 mg Tab Take 81 mg by mouth. 1 Tablet Oral Every day    atorvastatin (LIPITOR) 80 MG tablet TAKE 1 TABLET ONE TIME DAILY    blood sugar diagnostic Strp 1 strip by Misc.(Non-Drug; Combo Route) route once daily.    carvedilol (COREG) 12.5 MG tablet TAKE 1 TABLET TWICE DAILY WITH MEALS    clonazePAM (KLONOPIN) 0.5 MG tablet  "Take 1 tablet (0.5 mg total) by mouth 2 (two) times daily as needed.    fish oil-omega-3 fatty acids 300-1,000 mg capsule     gabapentin (NEURONTIN) 300 MG capsule TAKE 1 CAPSULE EVERY EVENING    lancets Misc 1 lancet by Misc.(Non-Drug; Combo Route) route once daily.    losartan (COZAAR) 50 MG tablet TAKE 1 TABLET ONE TIME DAILY    nitroGLYCERIN (NITROSTAT) 0.4 MG SL tablet Take one tab under the tongue every 5 minutes for max of three doses for chest pain. If chest pain not resolved to to the ER.    omeprazole (PRILOSEC) 40 MG capsule Take 1 capsule (40 mg total) by mouth every morning.    oxycodone-acetaminophen (PERCOCET) 5-325 mg per tablet Take 1 tablet by mouth 2 (two) times daily as needed for Pain.    sucralfate (CARAFATE) 1 gram tablet Take 1 tablet (1 g total) by mouth 4 (four) times daily.    valacyclovir (VALTREX) 500 MG tablet Take 1 tablet (500 mg total) by mouth 2 (two) times daily as needed. Treat for 3 days.    venlafaxine (EFFEXOR) 100 MG Tab TAKE 1 TABLET EVERY DAY (DOSE INCREASE)    VITAMIN D2 50,000 unit capsule TAKE 1 CAPSULE EVERY SEVEN DAYS    canagliflozin (INVOKANA) 100 mg Tab Take 1 tablet (100 mg total) by mouth once daily.    meloxicam (MOBIC) 15 MG tablet Take 1 tablet (15 mg total) by mouth daily as needed for Pain. For pain and inflammation.           Clinical Reference Information           Your Vitals Were     BP Pulse Height Weight BMI    128/80 69 5' 7" (1.702 m) 82 kg (180 lb 12.8 oz) 28.32 kg/m2      Blood Pressure          Most Recent Value    BP  128/80      Allergies as of 4/21/2017     Adhesive Tape-silicones    Codeine    Metformin    Morphine    Penicillins    Sulfa (Sulfonamide Antibiotics)      Immunizations Administered on Date of Encounter - 4/21/2017     None      Orders Placed During Today's Visit      Normal Orders This Visit    Ambulatory Referral to Orthopedics     Future Labs/Procedures Expected by Expires    Mammo Digital Screening Bilat with CAD  4/21/2017 " 6/22/2018    X-Ray Knee 1 or 2 View Right  4/21/2017 4/21/2018      Language Assistance Services     ATTENTION: Language assistance services are available, free of charge. Please call [phone].      ATENCIÓN: Si karen vines, tiene a jaimes disposición servicios gratuitos de asistencia lingüística. Llame al [phone].     CHÚ Ý: N?u b?n nói Ti?ng Vi?t, có các d?ch v? h? tr? ngôn ng? mi?n phí dành cho b?n. G?i s? [phone].         [facility] complies with applicable Federal civil rights laws and does not discriminate on the basis of race, color, national origin, age, disability, or sex.

## 2017-04-22 ENCOUNTER — PATIENT MESSAGE (OUTPATIENT)
Dept: INTERNAL MEDICINE | Facility: CLINIC | Age: 72
End: 2017-04-22

## 2017-04-22 RX ORDER — LOSARTAN POTASSIUM 50 MG/1
50 TABLET ORAL DAILY
Qty: 90 TABLET | Refills: 2 | Status: SHIPPED | OUTPATIENT
Start: 2017-04-22 | End: 2017-11-29 | Stop reason: SDUPTHER

## 2017-04-22 RX ORDER — CARVEDILOL 12.5 MG/1
12.5 TABLET ORAL 2 TIMES DAILY
Qty: 180 TABLET | Refills: 2 | Status: SHIPPED | OUTPATIENT
Start: 2017-04-22 | End: 2018-03-20 | Stop reason: SDUPTHER

## 2017-04-22 RX ORDER — ATORVASTATIN CALCIUM 80 MG/1
80 TABLET, FILM COATED ORAL DAILY
Qty: 90 TABLET | Refills: 2 | Status: SHIPPED | OUTPATIENT
Start: 2017-04-22 | End: 2018-04-11 | Stop reason: SDUPTHER

## 2017-04-22 NOTE — PROGRESS NOTES
Subjective:       Patient ID: Jovana Fall is a 71 y.o. female.    Chief Complaint: Diabetes    HPI Comments: Last seen 6 months ago. Diabetes was well controlled. Returns for f/u after recent evaluation by Gastroenterology last month for epigastric abdominal pain and nausea. Started on Omeprazole. Labs unremarkable except elevated Lipase 125. Abdominal x-ray was unremarkable aside from possible small nodule left lower lung. CT of the Chest showed no nodules, just infiltrate vs atelectasis left base (asymptomatic). Abdominal ultrasound was unremarkable, pancreas not well visualized. CT scan of abdomen is pending. EGD revealed Schatzki's ring - dilated, and benign gastritis, no H pylori.     Fasting glucose ranges near 100 per history, no log for review.     PMH: , misc x 1.   Diabetes Type 2. HbA1c HbA1c 5.9% Oct. '16.  Hypertension.   Mixed Hyperlipidemia, , HDL 39, LDL 91 Oct. '16.  Nephrolithiasis.   Urinary incontinence.  GERD. EGD 9/15, 3/17.  Depression/Anxiety.  Non-obstructive Coronary artery disease, angiogram , Cardiology 10/15.  Osteopenia.   Mild Vitamin D deficiency, 37.  Lumbar Spinal Stenosis. Cervical Spine DJD.   Skin Cancer, Basal Cell and Melanoma.     Mammo normal 3/16. Pelvic exam . BMD 3/16 stable Osteopenia. Colonoscopy 1/15 - sigmoid diverticuli, one tubular adenoma 2-3mm, 5 yr f/u due to family history. Eye exam . Pneumovax 10/12. Zostavax 10/13. Prevnar 7/15. Flu shot 10/16.    PSH:   Cholecystectomy.   Bilateral carpal tunnel release.   Hysterectomy. Ovaries remain.  Lumbar laminectomy  Appendectomy  Tubal ligation.  Hemorrhoidectomy  Rhinoplasty  Blepharoplasty, ears pinned.  Cystoscopy, ureteral stent.  Benign tumor removed left knee.   Right Bunionectomy and Hammertoe surgery.  Skin Cancer excision.    Social: Nonsmoker. Occas. alcohol. . 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    FMH: Colon and ovarian cancer in cousin. Colon cancer in  younger brother. Mother had heart disease with MI at age 71. MGM with cervical cancer. Two aunts with diabetes.     Allergies: multiple, see Med Card.     Medications: list reviewed and reconciled.             Review of Systems   Constitutional: Negative for appetite change, fatigue, fever and unexpected weight change.   Eyes: Negative for pain and visual disturbance.   Respiratory: Negative for cough and shortness of breath.    Cardiovascular: Negative for chest pain, palpitations and leg swelling.   Genitourinary: Negative for dysuria and frequency.        Recently treated for UTI with Macrobid - resolved.   Musculoskeletal:        Non-traumatic right knee pain x weeks, thinks she jammed it getting pulled by her dog while walking. It swells, and pain is worse with weight bearing, limiting her ambulation. Uses Percocet intermittently for chronic back pain. Has not tried any NSAIDS or ice compress.    Skin: Negative for color change and rash.   Neurological: Negative for dizziness, syncope and headaches.   Psychiatric/Behavioral:        Depression is stable on Effexor.        Objective:    /80, Pulse 69, Wt 181 lbs, BMI=28  Physical Exam   Constitutional: She is oriented to person, place, and time. She appears well-developed and well-nourished. No distress.   HENT:   Nose: Nose normal.   Mouth/Throat: Oropharynx is clear and moist.   Eyes: Conjunctivae are normal. No scleral icterus.   Neck: Normal range of motion. Neck supple. No JVD present.   Cardiovascular: Normal rate, regular rhythm, normal heart sounds and intact distal pulses.    Pulmonary/Chest: Effort normal and breath sounds normal. No respiratory distress. She has no wheezes. She has no rales.   Abdominal: Soft. Bowel sounds are normal. She exhibits no distension and no mass. There is no tenderness. No hernia.   Musculoskeletal: She exhibits no edema.   Right knee with effusion and mild increased warmth, pain on range of motion, no ligamentous  laxity, not tender to touch.   Protective Sensation (w/ 10 gram monofilament):  Right: Intact  Left: Intact    Visual Inspection:  Normal -  Bilateral    Pedal Pulses:   Right: Present  Left: Present    Posterior tibialis:   Right:Present  Left: Present     Lymphadenopathy:     She has no cervical adenopathy.   Neurological: She is alert and oriented to person, place, and time. No cranial nerve deficit. Coordination normal.   Skin: Skin is warm and dry. No rash noted. She is not diaphoretic.   Psychiatric: She has a normal mood and affect. Her behavior is normal. Thought content normal.       Assessment:       1. Acute pain of right knee    2. Elevated lipase    3. Type 2 diabetes mellitus without complication, without long-term current use of insulin    4. Essential hypertension    5. Hyperlipidemia, unspecified hyperlipidemia type    6. Coronary artery disease involving native coronary artery of native heart without angina pectoris    7. Abdominal aortic atherosclerosis    8. Lumbar spinal stenosis    9. Major depression, recurrent, chronic    10. Encounter for screening mammogram for breast cancer        Plan:       Acute pain of right knee  -     X-Ray Knee 1 or 2 View Right; Future; Expected date: 4/21/17  -     Ambulatory Referral to Orthopedics  -     triamcinolone acetonide injection 40 mg; Inject 1 mL (40 mg total) into the muscle one time.  -     meloxicam (MOBIC) 15 MG tablet; Take 1 tablet (15 mg total) by mouth daily as needed for Pain. For pain and inflammation.  Dispense: 15 tablet; Refill: 0    Elevated lipase with epigastric pain and nausea        -     Stop Januvia        -     CT Abdomen as scheduled.    Type 2 diabetes mellitus without complication, without long-term current use of insulin  -     canagliflozin (INVOKANA) 100 mg Tab; Take 1 tablet (100 mg total) by mouth once daily.  Dispense: 30 tablet; Refill: 2    Essential hypertension  -     losartan (COZAAR) 50 MG tablet; Take 1 tablet (50  mg total) by mouth once daily.  Dispense: 90 tablet; Refill: 2  -     carvedilol (COREG) 12.5 MG tablet; Take 1 tablet (12.5 mg total) by mouth 2 (two) times daily.  Dispense: 180 tablet; Refill: 2    Hyperlipidemia, unspecified hyperlipidemia type  -     atorvastatin (LIPITOR) 80 MG tablet; Take 1 tablet (80 mg total) by mouth once daily.  Dispense: 90 tablet; Refill: 2    Coronary artery disease involving native coronary artery of native heart without angina pectoris - stable.     Abdominal aortic atherosclerosis - medical management as above.     Lumbar spinal stenosis  -     oxycodone-acetaminophen (PERCOCET) 5-325 mg per tablet; Take 1 tablet by mouth 2 (two) times daily as needed for Pain.  Dispense: 60 tablet; Refill: 0    Major depression, recurrent, chronic - stable on Effexor.     Encounter for screening mammogram for breast cancer  -     Mammo Digital Screening Bilat with CAD; Future; Expected date: 4/21/17

## 2017-04-24 DIAGNOSIS — E11.9 TYPE 2 DIABETES MELLITUS WITHOUT COMPLICATION: ICD-10-CM

## 2017-04-27 ENCOUNTER — TELEPHONE (OUTPATIENT)
Dept: INTERNAL MEDICINE | Facility: CLINIC | Age: 72
End: 2017-04-27

## 2017-04-27 DIAGNOSIS — E11.9 TYPE 2 DIABETES MELLITUS WITHOUT COMPLICATION, WITHOUT LONG-TERM CURRENT USE OF INSULIN: Primary | ICD-10-CM

## 2017-04-27 RX ORDER — GLIMEPIRIDE 2 MG/1
2 TABLET ORAL
Qty: 30 TABLET | Refills: 3 | Status: SHIPPED | OUTPATIENT
Start: 2017-04-27 | End: 2017-05-01 | Stop reason: ALTCHOICE

## 2017-04-27 NOTE — TELEPHONE ENCOUNTER
----- Message from Nida Hoang sent at 4/27/2017  1:46 PM CDT -----  Contact: Self/ [phone]   Pt wanted to let the doctor know the new medication is not working. Pt stated her blood sugar is 208 today. She wanted to know what should she do. Please call and advise       Thank you

## 2017-04-27 NOTE — TELEPHONE ENCOUNTER
Pt state she do not like how she feel on the med invokana sx's are diarrhea, stomach pain and  high glucose readings today 208 and 168 she would like to go back on the januvia pl adv

## 2017-04-27 NOTE — TELEPHONE ENCOUNTER
As discussed during the visit, can NOT take Januvia once Pancreatitis has occurred. This will not be re-ordered.    Invokana started 5-6 days ago; GI problems including diarrhea are chronic. And she hasn't given it enough time to work. BUT, to ease her mind, change to Glimepiride 2mg daily - ordered at [facility]. This should definitely work and not cause her any GI side effects.   (email sent).

## 2017-04-28 ENCOUNTER — TELEPHONE (OUTPATIENT)
Dept: INTERNAL MEDICINE | Facility: CLINIC | Age: 72
End: 2017-04-28

## 2017-04-28 NOTE — TELEPHONE ENCOUNTER
It seems she has had problems with multiple diabetes meds. Not really able to evaluate this over the phone. Please ask her to schedule an urgent care appt

## 2017-04-28 NOTE — TELEPHONE ENCOUNTER
----- Message from Giana Mcdonnell sent at 4/28/2017  9:23 AM CDT -----  Contact: patient- [phone]  Medication was called in for patient on yesterday and Pharmacist said it contained sulfur and patient is allergic to sulfur. Please call to advise

## 2017-04-28 NOTE — TELEPHONE ENCOUNTER
----- Message from Dennis Mcconnell sent at 4/28/2017  3:05 PM CDT -----  Contact: self/ [phone] cell  Pt is calling to check the status of the medication change before the weekend.  The pt called on yesterday and told the office that she is allergic to an ingredient in the medication glimepiride (AMARYL) 2 MG tablet sent in to the pharmacy.  The pt needs to speak with someone in the office today to see about getting something else sent in.  Please call and advise.    Thank you

## 2017-04-28 NOTE — TELEPHONE ENCOUNTER
Spoke with pt, she stated she has a hair appt tomorrow and is going to a crawfish boil she doesn't have time to come in she will take the januvia.

## 2017-05-01 ENCOUNTER — TELEPHONE (OUTPATIENT)
Dept: INTERNAL MEDICINE | Facility: CLINIC | Age: 72
End: 2017-05-01

## 2017-05-01 DIAGNOSIS — E11.9 TYPE 2 DIABETES MELLITUS WITHOUT COMPLICATION, WITHOUT LONG-TERM CURRENT USE OF INSULIN: Primary | ICD-10-CM

## 2017-05-01 DIAGNOSIS — N39.0 URINARY TRACT INFECTION WITHOUT HEMATURIA, SITE UNSPECIFIED: ICD-10-CM

## 2017-05-01 RX ORDER — REPAGLINIDE 0.5 MG/1
0.5 TABLET ORAL
Qty: 90 TABLET | Refills: 2 | Status: SHIPPED | OUTPATIENT
Start: 2017-05-01 | End: 2017-07-28

## 2017-05-01 NOTE — TELEPHONE ENCOUNTER
Please see comment below.Patient requesting RX for diabetes..States she was given Invokana and it have sulfa in it..Please advise

## 2017-05-01 NOTE — TELEPHONE ENCOUNTER
Actually, she stopped Invokana due to other side effects. It's the Glimepiride she won't take due to Sulfa allergy.   Had Pancreatitis on Januvia.   Some other issues with Metformin.   C/o cost of Byetta.     Start Prandin 0.5mg one tab po TID with meals - ordered at [facility] in [place]. Return for follow up as scheduled in July.

## 2017-05-01 NOTE — TELEPHONE ENCOUNTER
Pt informed and verbalized understanding. Pt states she has a kidney infection and will like a Rx sent in. Pt advised that she will need a visit for further evaluation to determine if antibiotic is appropriate. Please advise.

## 2017-05-01 NOTE — TELEPHONE ENCOUNTER
----- Message from Cristiana Ibarra sent at 5/1/2017  8:57 AM CDT -----  Contact: self/[phone]  Pt called in regards to getting a Rx for diabetes. She was given invokana and it has sulfa in it and she can't have sulfa.      Please advise

## 2017-05-02 RX ORDER — CIPROFLOXACIN 250 MG/1
250 TABLET, FILM COATED ORAL 2 TIMES DAILY
Qty: 14 TABLET | Refills: 0 | Status: SHIPPED | OUTPATIENT
Start: 2017-05-02 | End: 2017-05-09

## 2017-05-03 DIAGNOSIS — E11.9 TYPE 2 DIABETES MELLITUS WITHOUT COMPLICATION: ICD-10-CM

## 2017-05-03 RX ORDER — GLUCOSAM/CHON-MSM1/C/MANG/BOSW 500-416.6
TABLET ORAL
Qty: 100 EACH | Refills: 3 | Status: SHIPPED | OUTPATIENT
Start: 2017-05-03 | End: 2018-01-22 | Stop reason: SDUPTHER

## 2017-05-03 RX ORDER — CALCIUM CITRATE/VITAMIN D3 200MG-6.25
TABLET ORAL
Qty: 100 STRIP | Refills: 3 | Status: SHIPPED | OUTPATIENT
Start: 2017-05-03 | End: 2017-10-30 | Stop reason: SDUPTHER

## 2017-05-08 ENCOUNTER — OFFICE VISIT (OUTPATIENT)
Dept: ORTHOPEDICS | Facility: CLINIC | Age: 72
End: 2017-05-08
Payer: MEDICARE

## 2017-05-08 VITALS
HEIGHT: 67 IN | SYSTOLIC BLOOD PRESSURE: 143 MMHG | HEART RATE: 80 BPM | WEIGHT: 180.75 LBS | DIASTOLIC BLOOD PRESSURE: 88 MMHG | BODY MASS INDEX: 28.37 KG/M2

## 2017-05-08 DIAGNOSIS — M25.561 ACUTE PAIN OF RIGHT KNEE: Primary | ICD-10-CM

## 2017-05-08 PROCEDURE — 1159F MED LIST DOCD IN RCRD: CPT | Mod: S$GLB,,, | Performed by: ORTHOPAEDIC SURGERY

## 2017-05-08 PROCEDURE — 3079F DIAST BP 80-89 MM HG: CPT | Mod: S$GLB,,, | Performed by: ORTHOPAEDIC SURGERY

## 2017-05-08 PROCEDURE — 99203 OFFICE O/P NEW LOW 30 MIN: CPT | Mod: S$GLB,,, | Performed by: ORTHOPAEDIC SURGERY

## 2017-05-08 PROCEDURE — 1126F AMNT PAIN NOTED NONE PRSNT: CPT | Mod: S$GLB,,, | Performed by: ORTHOPAEDIC SURGERY

## 2017-05-08 PROCEDURE — 3077F SYST BP >= 140 MM HG: CPT | Mod: S$GLB,,, | Performed by: ORTHOPAEDIC SURGERY

## 2017-05-08 PROCEDURE — 1160F RVW MEDS BY RX/DR IN RCRD: CPT | Mod: S$GLB,,, | Performed by: ORTHOPAEDIC SURGERY

## 2017-05-08 NOTE — LETTER
May 9, 2017      Marcy Staley MD  [address]  [facility] [address]           [facility]  [address] [facility] [place] [address]  Phone: [phone]  Fax: [phone]          Patient: Jovana Fall   MR Number: [id number]   YOB: 1945   Date of Visit: 5/8/2017       Dear Dr. Marcy Staley:    Thank you for referring Jovana Fall to me for evaluation. Attached you will find relevant portions of my assessment and plan of care.    If you have questions, please do not hesitate to call me. I look forward to following Jovana Fall along with you.    Sincerely,    Denver Severino MD    Enclosure  CC:  No Recipients    If you would like to receive this communication electronically, please contact [email or web address] or [phone] to request more information on [facility] access.    For providers and/or their staff who would like to refer a patient to [facility], please contact us through our one-stop-shop provider referral line, Cecy Rouse, at [phone].    If you feel you have received this communication in error or would no longer like to receive these types of communications, please e-mail [email or web address]

## 2017-05-09 NOTE — PROGRESS NOTES
Past Medical History:   Diagnosis Date    Anxiety     Basal cell carcinoma 2014    left upper arm     BCC (basal cell carcinoma of skin) 01/07/16    mid upper back    BCC (basal cell carcinoma)     left upper back    Calcium nephrolithiasis     Cataract     Colon polyp     Coronary artery disease     Cortical cataract - Both Eyes 4/29/2013    Depression     Diabetes mellitus     Displacement of lumbar intervertebral disc without myelopathy 5/6/2013    Diverticulosis     GERD (gastroesophageal reflux disease)     Hepatomegaly     History of melanoma in situ 2/2/2015    Mid upper back ; 5/2014     Hx of colonic polyps 1/27/2015    Hyperlipidemia     Hypertension     Lactose intolerance     Lumbar spinal stenosis 5/6/2013    Melanoma     mid upper back- in situ 5/2014    Nuclear sclerosis - Both Eyes 4/29/2013    Osteopenia     Spinal stenosis, lumbar region, with neurogenic claudication 5/6/2013    Spondylosis without myelopathy 5/6/2013    Type 2 diabetes mellitus        Past Surgical History:   Procedure Laterality Date    APPENDECTOMY      BROW LIFT AND BLEPHAROPLASTY      BUNIONECTOMY Right     CARPAL TUNNEL RELEASE Bilateral     CHOLECYSTECTOMY      COLONOSCOPY  02/05/2015    Dr. Blas, repeat in 3-5 years    HEMORRHOID SURGERY      HYSTERECTOMY      ovaries remain    LUMBAR LAMINECTOMY      RHINOPLASTY TIP      SKIN CANCER EXCISION      TUBAL LIGATION      UPPER GASTROINTESTINAL ENDOSCOPY  2015    Dr. Blas       Current Outpatient Prescriptions   Medication Sig    aspirin 81 mg Tab Take 81 mg by mouth. 1 Tablet Oral Every day    atorvastatin (LIPITOR) 80 MG tablet Take 1 tablet (80 mg total) by mouth once daily.    carvedilol (COREG) 12.5 MG tablet Take 1 tablet (12.5 mg total) by mouth 2 (two) times daily.    ciprofloxacin HCl (CIPRO) 250 MG tablet Take 1 tablet (250 mg total) by mouth 2 (two) times daily.    clonazePAM (KLONOPIN) 0.5 MG tablet Take 1 tablet  (0.5 mg total) by mouth 2 (two) times daily as needed.    fish oil-omega-3 fatty acids 300-1,000 mg capsule     gabapentin (NEURONTIN) 300 MG capsule TAKE 1 CAPSULE EVERY EVENING    lancets Misc 1 lancet by Misc.(Non-Drug; Combo Route) route once daily.    losartan (COZAAR) 50 MG tablet Take 1 tablet (50 mg total) by mouth once daily.    meloxicam (MOBIC) 15 MG tablet Take 1 tablet (15 mg total) by mouth daily as needed for Pain. For pain and inflammation.    nitroGLYCERIN (NITROSTAT) 0.4 MG SL tablet Take one tab under the tongue every 5 minutes for max of three doses for chest pain. If chest pain not resolved to to the ER.    omeprazole (PRILOSEC) 40 MG capsule Take 1 capsule (40 mg total) by mouth every morning.    oxycodone-acetaminophen (PERCOCET) 5-325 mg per tablet Take 1 tablet by mouth 2 (two) times daily as needed for Pain.    repaglinide (PRANDIN) 0.5 MG tablet Take 1 tablet (0.5 mg total) by mouth 3 (three) times daily before meals.    sucralfate (CARAFATE) 1 gram tablet Take 1 tablet (1 g total) by mouth 4 (four) times daily.    TRUE METRIX GLUCOSE TEST STRIP Strp TEST BLOOD SUGAR ONE TIME DAILY    TRUEPLUS LANCETS 30 gauge Misc USE ONE TIME DAILY    valacyclovir (VALTREX) 500 MG tablet Take 1 tablet (500 mg total) by mouth 2 (two) times daily as needed. Treat for 3 days.    VITAMIN D2 50,000 unit capsule TAKE 1 CAPSULE EVERY SEVEN DAYS    [DISCONTINUED] venlafaxine (EFFEXOR) 100 MG Tab TAKE 1 TABLET EVERY DAY (DOSE INCREASE)     No current facility-administered medications for this visit.        Review of patient's allergies indicates:   Allergen Reactions    Adhesive tape-silicones      Other reaction(s): Swelling  Other reaction(s): Itching    Codeine Itching    Metformin Itching    Morphine Itching    Penicillins Itching     Other reaction(s): Itching    Sulfa (sulfonamide antibiotics) Itching     Other reaction(s): Itching       Family History   Problem Relation Age of Onset     Heart disease Mother      MI    Cancer Mother      SQ carcinoma of lung    Heart attack Mother     Skin cancer Mother     Cancer Father      prostate    Ovarian cancer Cousin     Colon cancer Cousin     Skin cancer Daughter     Cancer Sister      uterine sarcoma    Colon cancer Brother 60    No Known Problems Son     No Known Problems Sister     Diabetes Sister     Hypertension Sister     Arthritis Sister     No Known Problems Daughter     Amblyopia Neg Hx     Blindness Neg Hx     Cataracts Neg Hx     Glaucoma Neg Hx     Retinal detachment Neg Hx     Strabismus Neg Hx     Stroke Neg Hx     Thyroid disease Neg Hx     Melanoma Neg Hx     Psoriasis Neg Hx     Lupus Neg Hx     Eczema Neg Hx     Crohn's disease Neg Hx     Ulcerative colitis Neg Hx        Social History     Social History    Marital status:      Spouse name: N/A    Number of children: N/A    Years of education: N/A     Occupational History     Other     Social History Main Topics    Smoking status: Never Smoker    Smokeless tobacco: Never Used    Alcohol use Yes      Comment: socially    Drug use: No    Sexual activity: Not Currently     Other Topics Concern    Not on file     Social History Narrative       Chief Complaint:   Chief Complaint   Patient presents with    Right Knee - Pain       Consulting Physician: Marcy Staley, *    History of present illness:    This is a 71 y.o. year old female who complains of knee pain that started several weeks ago.  Since that time her pain has resolved.  She puts her pain today at a 0 out of 10.  She states her knee was swollen and warm but now it is not bothering her at all.    Review of Systems:    Constitution: Denies chills, fever, and sweats.  HENT: Denies headaches or blurry vision.  Cardiovascular: Denies chest pain or irregular heart beat.  Respiratory: Denies cough or shortness of breath.  Gastrointestinal: Denies abdominal pain, nausea, or  "vomiting.  Musculoskeletal:  Denies muscle cramps.  Neurological: Denies dizziness or focal weakness.  Psychiatric/Behavioral: Normal mental status.  Hematologic/Lymphatic: Denies bleeding problem or easy bruising/bleeding.  Skin: Denies rash or suspicious lesions.    Examination:    Vital Signs:    Vitals:    05/08/17 1054   BP: (!) 143/88   Pulse: 80   Weight: 82 kg (180 lb 12.4 oz)   Height: 5' 7" (1.702 m)   PainSc: 0-No pain   PainLoc: Knee       Body mass index is 28.31 kg/(m^2).    This a well-developed, well nourished patient in no acute distress.    Alert and oriented and cooperative to examination.       Physical Exam: Right Knee Exam    Gait:   Normal    Skin  Rash:   None  Scars:   None    Inspection  Erythema:  None  Bruising:  None  Effusion:  None  Masses:  None  Lymphadenopathy: None    Range of Motion: 0 to 130°    Medial Joint : None  Lateral Joint : None    Patellofemoral Tenderness: None  Patellofemoral Crepitus: None    Lachman:  Normal  Anterior Drawer: Normal  Posterior Drawer: Normal    Afua's:  Negative  Apley's:  Negative    Varus Stress:  Stable  Valgus Stress:  Stable    Strength:  5/5    Pulses:  Palpable distal    Sensation:  Intact          Imaging: X-rays ordered and reviewed today of the right knee show some mild degenerative changes.        Assessment: Acute pain of right knee        Plan:  She is doing well today.  She has full range of motion and strength.  She has no tenderness.  At this point we'll see her back as needed.      DISCLAIMER: This note may have been dictated using voice recognition software and may contain grammatical errors.     NOTE: Consult report sent to referring provider via EPIC EMR.  "

## 2017-05-19 ENCOUNTER — TELEPHONE (OUTPATIENT)
Dept: INTERNAL MEDICINE | Facility: CLINIC | Age: 72
End: 2017-05-19

## 2017-05-19 NOTE — TELEPHONE ENCOUNTER
Pt called to report since she been on med Prandin 0.5 mg she's had feet , leg ankle and hand swelling hoarseness, dizziness and headaches. Pt just started med on Sunday 5/14 then symptoms started a few days later blood glucose reading since she been on med has been between 105- to 125 checking 3 x day.  Pt is asking can you send the Byetta in to see if there is a generic brand cause this med worked. Pl adv.

## 2017-05-19 NOTE — TELEPHONE ENCOUNTER
----- Message from Hussain Gifford MA sent at 5/19/2017  9:47 AM CDT -----  Contact: [phone]  Pt stated that the Medication repaglinide (PRANDIN) 0.5 MG tablet is causing her Hoarseness, dizziness, fluid retention and weight gain. She would like to discuss these issues with the Dr or the staff. Please call. Thanks!

## 2017-05-31 DIAGNOSIS — E55.9 VITAMIN D DEFICIENCY: ICD-10-CM

## 2017-05-31 RX ORDER — ERGOCALCIFEROL 1.25 MG/1
CAPSULE ORAL
Qty: 12 CAPSULE | Refills: 1 | Status: SHIPPED | OUTPATIENT
Start: 2017-05-31 | End: 2017-12-01 | Stop reason: SDUPTHER

## 2017-07-03 RX ORDER — VENLAFAXINE 100 MG/1
TABLET ORAL
Qty: 90 TABLET | Refills: 1 | Status: SHIPPED | OUTPATIENT
Start: 2017-07-03 | End: 2017-11-29 | Stop reason: SDUPTHER

## 2017-07-12 DIAGNOSIS — F41.1 GAD (GENERALIZED ANXIETY DISORDER): ICD-10-CM

## 2017-07-12 RX ORDER — CLONAZEPAM 0.5 MG/1
0.5 TABLET ORAL 2 TIMES DAILY PRN
Qty: 60 TABLET | Refills: 5 | Status: SHIPPED | OUTPATIENT
Start: 2017-07-12 | End: 2017-12-20 | Stop reason: SDUPTHER

## 2017-07-28 ENCOUNTER — OFFICE VISIT (OUTPATIENT)
Dept: DERMATOLOGY | Facility: CLINIC | Age: 72
End: 2017-07-28
Payer: MEDICARE

## 2017-07-28 ENCOUNTER — LAB VISIT (OUTPATIENT)
Dept: LAB | Facility: HOSPITAL | Age: 72
End: 2017-07-28
Attending: INTERNAL MEDICINE
Payer: MEDICARE

## 2017-07-28 VITALS — WEIGHT: 180 LBS | HEIGHT: 67 IN | BODY MASS INDEX: 28.25 KG/M2

## 2017-07-28 DIAGNOSIS — L82.1 SEBORRHEIC KERATOSES: ICD-10-CM

## 2017-07-28 DIAGNOSIS — D18.00 ANGIOMA: ICD-10-CM

## 2017-07-28 DIAGNOSIS — Z85.820 HISTORY OF MELANOMA: ICD-10-CM

## 2017-07-28 DIAGNOSIS — D22.9 MULTIPLE BENIGN NEVI: ICD-10-CM

## 2017-07-28 DIAGNOSIS — Z85.828 HISTORY OF SKIN CANCER: ICD-10-CM

## 2017-07-28 DIAGNOSIS — L57.0 ACTINIC KERATOSES: ICD-10-CM

## 2017-07-28 DIAGNOSIS — D48.9 NEOPLASM OF UNCERTAIN BEHAVIOR: Primary | ICD-10-CM

## 2017-07-28 DIAGNOSIS — E11.9 TYPE 2 DIABETES MELLITUS WITHOUT COMPLICATION: ICD-10-CM

## 2017-07-28 DIAGNOSIS — L81.4 LENTIGO: ICD-10-CM

## 2017-07-28 PROCEDURE — 88305 TISSUE EXAM BY PATHOLOGIST: CPT | Performed by: PATHOLOGY

## 2017-07-28 PROCEDURE — 1159F MED LIST DOCD IN RCRD: CPT | Mod: S$GLB,,, | Performed by: DERMATOLOGY

## 2017-07-28 PROCEDURE — 99999 PR PBB SHADOW E&M-EST. PATIENT-LVL III: CPT | Mod: PBBFAC,,, | Performed by: DERMATOLOGY

## 2017-07-28 PROCEDURE — 99214 OFFICE O/P EST MOD 30 MIN: CPT | Mod: 25,S$GLB,, | Performed by: DERMATOLOGY

## 2017-07-28 PROCEDURE — 11100 PR BIOPSY OF SKIN LESION: CPT | Mod: 59,S$GLB,, | Performed by: DERMATOLOGY

## 2017-07-28 PROCEDURE — 1126F AMNT PAIN NOTED NONE PRSNT: CPT | Mod: S$GLB,,, | Performed by: DERMATOLOGY

## 2017-07-28 PROCEDURE — 17000 DESTRUCT PREMALG LESION: CPT | Mod: S$GLB,,, | Performed by: DERMATOLOGY

## 2017-07-28 RX ORDER — SITAGLIPTIN 50 MG/1
TABLET, FILM COATED ORAL
COMMUNITY
Start: 2017-05-22 | End: 2018-01-09 | Stop reason: SDUPTHER

## 2017-07-28 NOTE — PATIENT INSTRUCTIONS
Shave Biopsy Wound Care    Your doctor has performed a shave biopsy today.  A band aid and vaseline ointment has been placed over the site.  This should remain in place for 24 hours.  It is recommended that you keep the area dry for the first 24 hours.  After 24 hours, you may remove the band aid and wash the area with warm soap and water and apply Vaseline jelly.  Many patients prefer to use Neosporin or Bacitracin ointment.  This is acceptable; however, know that you can develop an allergy to this medication even if you have used it safely for years.  It is important to keep the area moist.  Letting it dry out and get air slows healing time, and will worsen the scar.  Band aid is optional after first 24 hours.      If you notice increasing redness, tenderness, pain, or yellow drainage at the biopsy site, please notify your doctor.  These are signs of an infection.    If your biopsy site is bleeding, apply firm pressure for 15 minutes straight.  Repeat for another 15 minutes, if it is still bleeding.   If the surgical site continues to bleed, then please contact your doctor.       [facility]  [facility]  [address]  [place] [address]  Dept: [phone]       CRYOSURGERY      Your doctor has used a method called cryosurgery to treat your skin condition. Cryosurgery refers to the use of very cold substances to treat a variety of skin conditions such as warts, pre-skin cancers, molluscum contagiosum, sun spots, and several benign growths. The substance we use in cryosurgery is liquid nitrogen and is so cold (-195 degrees Celsius) that is burns when administered.     Following treatment in the office, the skin may immediately burn and become red. You may find the area around the lesion is affected as well. It is sometimes necessary to treat not only the lesion, but a small area of the surrounding normal skin to achieve a good response.     A blister, and even a blood filled blister,  may form after treatment.   This is a normal response. If the blister is painful, it is acceptable to sterilize a needle and with rubbing alcohol and gently pop the blister. It is important that you gently wash the area with soap and warm water as the blister fluid may contain wart virus if a wart was treated. Do no remove the roof of the blister.     The area treated can take anywhere from 1-3 weeks to heal. Healing time depends on the kind skin lesion treated, the location, and how aggressively the lesion was treated. It is recommended that the areas treated are covered with Vaseline or bacitracin ointment and a band-aid. If a band-aid is not practical, just ointment applied several times per day will do. Keeping these areas moist will speed the healing time.    Treatment with liquid nitrogen can leave a scar. In dark skin, it may be a light or dark scar, in light skin it may be a white or pink scar. These will generally fade with time, but may never go away completely.     If you have any concerns after your treatment, please feel free to call the office.         [facility]  [facility]  [address] [address]  Dept: [phone]

## 2017-07-28 NOTE — PROGRESS NOTES
Subjective:       Patient ID:  Jovana Fall is a 71 y.o. female who presents for   Chief Complaint   Patient presents with    Skin Check     TBSE    Spot     R cheek     Patient last seen 8/2016 with AKs treated with cryotherapy  Requests TBSE for cancer screening and new lesion    H/o multiple BCCs  Basal cell carcinoma 2014 left upper arm   BCC (basal cell carcinoma of skin) mid upper back  BCC (basal cell carcinoma)  left upper back  superficial BCC mid upper back 1/2016, E&S  BCC R mid infraorbital, 4/2014  Severely DN left mid upper back 4/2014    Prev under the care of Dr Bria Acuna  - Initial  Affected locations: right cheek  Duration: 4 months  Signs / symptoms: redness (getting bigger)  Severity: mild  Timing: constant  Aggravated by: nothing  Relieving factors/Treatments tried: nothing        Review of Systems   Constitutional: Negative for fever, chills, weight loss, weight gain and night sweats.   Skin: Positive for daily sunscreen use and activity-related sunscreen use. Negative for wears hat.   Hematologic/Lymphatic: Bruises/bleeds easily.        Objective:    Physical Exam   Constitutional: She appears well-developed and well-nourished. No distress.   Neurological: She is alert and oriented to person, place, and time. She is not disoriented.   Psychiatric: She has a normal mood and affect.   Skin:   Areas Examined (abnormalities noted in diagram):   Scalp / Hair Palpated and Inspected  Head / Face Inspection Performed  Neck Inspection Performed  Chest / Axilla Inspection Performed  Abdomen Inspection Performed  Genitals / Buttocks / Groin Inspection Performed  Back Inspection Performed  RUE Inspected  LUE Inspection Performed  RLE Inspected  LLE Inspection Performed  Nails and Digits Inspection Performed                       Diagram Legend     Erythematous scaling macule/papule c/w actinic keratosis       Vascular papule c/w angioma      Pigmented verrucoid papule/plaque c/w seborrheic  keratosis      Yellow umbilicated papule c/w sebaceous hyperplasia      Irregularly shaped tan macule c/w lentigo     1-2 mm smooth white papules consistent with Milia      Movable subcutaneous cyst with punctum c/w epidermal inclusion cyst      Subcutaneous movable cyst c/w pilar cyst      Firm pink to brown papule c/w dermatofibroma      Pedunculated fleshy papule(s) c/w skin tag(s)      Evenly pigmented macule c/w junctional nevus     Mildly variegated pigmented, slightly irregular-bordered macule c/w mildly atypical nevus      Flesh colored to evenly pigmented papule c/w intradermal nevus       Pink pearly papule/plaque c/w basal cell carcinoma      Erythematous hyperkeratotic cursted plaque c/w SCC      Surgical scar with no sign of skin cancer recurrence      Open and closed comedones      Inflammatory papules and pustules      Verrucoid papule consistent consistent with wart     Erythematous eczematous patches and plaques     Dystrophic onycholytic nail with subungual debris c/w onychomycosis     Umbilicated papule    Erythematous-base heme-crusted tan verrucoid plaque consistent with inflamed seborrheic keratosis     Erythematous Silvery Scaling Plaque c/w Psoriasis     See annotation          Assessment / Plan:      Pathology Orders:      Normal Orders This Visit    Tissue Specimen To Pathology, Dermatology     Questions:    Directional Terms:  Other(comment)    Clinical information:  Eroded pink papule, r/o BCC    Specific Site:  R cheek        Neoplasm of uncertain behavior  -     Tissue Specimen To Pathology, Dermatology  Shave biopsy procedure note:    Shave biopsy performed after verbal consent including risk of infection, scar, recurrence, need for additional treatment of site. Area prepped with alcohol, anesthetized with approximately 1.0cc of 1% lidocaine with epinephrine. Lesional tissue shaved with razor blade. Hemostasis achieved with application of aluminum chloride followed by hyfrecation. No  complications. Dressing applied. Wound care explained.    Actinic keratoses  Cryosurgery Procedure Note    Verbal consent from the patient is obtained and the patient is aware of the precancerous quality and need for treatment of these lesions. Liquid nitrogen cryosurgery is applied to the 1 actinic keratoses, as detailed in the physical exam, to produce a freeze injury. The patient is aware that blisters may form and is instructed on wound care with gentle cleansing and use of vaseline ointment to keep moist until healed. The patient is supplied a handout on cryosurgery and is instructed to call if lesions do not completely resolve.    Multiple benign nevi  Discussed ABCDE's of nevi.  Monitor for new mole or moles that are becoming bigger, darker, irritated, or developing irregular borders. Brochure provided.    Seborrheic keratoses  These are benign inherited growths without a malignant potential. Reassurance given to patient. No treatment is necessary.     History of skin cancer, History of melanoma  Area of previous melanoma and NMSCs examined. Site well healed with no signs of recurrence.  Total body skin examination performed today including at least 12 points as noted in physical examination. No lesions suspicious for malignancy noted.    Lentigo  This is a benign hyperpigmented sun induced lesion. Daily sun protection will reduce the number of new lesions. Treatment of these benign lesions are considered cosmetic.    Angioma  This is a benign vascular lesion. Reassurance given. No treatment required.     Patient instructed in importance in daily sun protection of at least spf 30. Sun avoidance and topical protection discussed.   Recommend Elta MD (physician office) COTZ sensitive (available online) for daily use on face and neck.  Patient encouraged to wear hat for all outdoor exposure.   Also discussed sun protective clothing.           Return in about 6 months (around 1/28/2018).

## 2017-07-29 LAB
ESTIMATED AVG GLUCOSE: 120 MG/DL
HBA1C MFR BLD HPLC: 5.8 %

## 2017-08-03 ENCOUNTER — PATIENT MESSAGE (OUTPATIENT)
Dept: INTERNAL MEDICINE | Facility: CLINIC | Age: 72
End: 2017-08-03

## 2017-08-03 DIAGNOSIS — D09.9 SQUAMOUS CELL CARCINOMA IN SITU: Primary | ICD-10-CM

## 2017-08-03 RX ORDER — IMIQUIMOD 12.5 MG/.25G
CREAM TOPICAL
Qty: 24 PACKET | Refills: 0 | Status: SHIPPED | OUTPATIENT
Start: 2017-08-03 | End: 2018-03-05

## 2017-08-08 ENCOUNTER — OFFICE VISIT (OUTPATIENT)
Dept: INTERNAL MEDICINE | Facility: CLINIC | Age: 72
End: 2017-08-08
Payer: MEDICARE

## 2017-08-08 VITALS
WEIGHT: 185.19 LBS | HEIGHT: 67 IN | HEART RATE: 70 BPM | BODY MASS INDEX: 29.07 KG/M2 | DIASTOLIC BLOOD PRESSURE: 100 MMHG | SYSTOLIC BLOOD PRESSURE: 146 MMHG

## 2017-08-08 VITALS
DIASTOLIC BLOOD PRESSURE: 88 MMHG | HEIGHT: 67 IN | HEART RATE: 72 BPM | BODY MASS INDEX: 29.19 KG/M2 | WEIGHT: 186 LBS | SYSTOLIC BLOOD PRESSURE: 146 MMHG

## 2017-08-08 DIAGNOSIS — K21.9 GASTROESOPHAGEAL REFLUX DISEASE WITHOUT ESOPHAGITIS: ICD-10-CM

## 2017-08-08 DIAGNOSIS — R11.0 NAUSEA: ICD-10-CM

## 2017-08-08 DIAGNOSIS — F33.9 MAJOR DEPRESSION, RECURRENT, CHRONIC: ICD-10-CM

## 2017-08-08 DIAGNOSIS — E11.9 TYPE 2 DIABETES MELLITUS WITHOUT COMPLICATION, WITHOUT LONG-TERM CURRENT USE OF INSULIN: ICD-10-CM

## 2017-08-08 DIAGNOSIS — F41.1 GENERALIZED ANXIETY DISORDER: ICD-10-CM

## 2017-08-08 DIAGNOSIS — Z00.00 ENCOUNTER FOR PREVENTIVE HEALTH EXAMINATION: Primary | ICD-10-CM

## 2017-08-08 DIAGNOSIS — I10 ESSENTIAL HYPERTENSION: ICD-10-CM

## 2017-08-08 DIAGNOSIS — E11.59 HYPERTENSION ASSOCIATED WITH DIABETES: ICD-10-CM

## 2017-08-08 DIAGNOSIS — R10.84 GENERALIZED ABDOMINAL PAIN: Primary | ICD-10-CM

## 2017-08-08 DIAGNOSIS — I15.2 HYPERTENSION ASSOCIATED WITH DIABETES: ICD-10-CM

## 2017-08-08 DIAGNOSIS — E78.5 HYPERLIPIDEMIA ASSOCIATED WITH TYPE 2 DIABETES MELLITUS: ICD-10-CM

## 2017-08-08 DIAGNOSIS — M85.80 OSTEOPENIA, UNSPECIFIED LOCATION: ICD-10-CM

## 2017-08-08 DIAGNOSIS — I25.10 CORONARY ARTERY DISEASE INVOLVING NATIVE CORONARY ARTERY OF NATIVE HEART WITHOUT ANGINA PECTORIS: ICD-10-CM

## 2017-08-08 DIAGNOSIS — I77.9 CAROTID DISEASE, BILATERAL: ICD-10-CM

## 2017-08-08 DIAGNOSIS — I70.0 ABDOMINAL AORTIC ATHEROSCLEROSIS: ICD-10-CM

## 2017-08-08 DIAGNOSIS — E11.69 HYPERLIPIDEMIA ASSOCIATED WITH TYPE 2 DIABETES MELLITUS: ICD-10-CM

## 2017-08-08 DIAGNOSIS — M48.061 LUMBAR SPINAL STENOSIS: ICD-10-CM

## 2017-08-08 PROCEDURE — 99214 OFFICE O/P EST MOD 30 MIN: CPT | Mod: S$GLB,,, | Performed by: INTERNAL MEDICINE

## 2017-08-08 PROCEDURE — 99499 UNLISTED E&M SERVICE: CPT | Mod: S$GLB,,, | Performed by: INTERNAL MEDICINE

## 2017-08-08 PROCEDURE — 3044F HG A1C LEVEL LT 7.0%: CPT | Mod: S$GLB,,, | Performed by: INTERNAL MEDICINE

## 2017-08-08 PROCEDURE — 3078F DIAST BP <80 MM HG: CPT | Mod: S$GLB,,, | Performed by: INTERNAL MEDICINE

## 2017-08-08 PROCEDURE — 4010F ACE/ARB THERAPY RXD/TAKEN: CPT | Mod: S$GLB,,, | Performed by: INTERNAL MEDICINE

## 2017-08-08 PROCEDURE — 1125F AMNT PAIN NOTED PAIN PRSNT: CPT | Mod: S$GLB,,, | Performed by: INTERNAL MEDICINE

## 2017-08-08 PROCEDURE — 99999 PR PBB SHADOW E&M-EST. PATIENT-LVL IV: CPT | Mod: PBBFAC,,, | Performed by: NURSE PRACTITIONER

## 2017-08-08 PROCEDURE — 1159F MED LIST DOCD IN RCRD: CPT | Mod: S$GLB,,, | Performed by: INTERNAL MEDICINE

## 2017-08-08 PROCEDURE — 3008F BODY MASS INDEX DOCD: CPT | Mod: S$GLB,,, | Performed by: INTERNAL MEDICINE

## 2017-08-08 PROCEDURE — 3077F SYST BP >= 140 MM HG: CPT | Mod: S$GLB,,, | Performed by: INTERNAL MEDICINE

## 2017-08-08 PROCEDURE — 99999 PR PBB SHADOW E&M-EST. PATIENT-LVL III: CPT | Mod: PBBFAC,,, | Performed by: INTERNAL MEDICINE

## 2017-08-08 PROCEDURE — 99499 UNLISTED E&M SERVICE: CPT | Mod: S$GLB,,, | Performed by: NURSE PRACTITIONER

## 2017-08-08 PROCEDURE — G0439 PPPS, SUBSEQ VISIT: HCPCS | Mod: S$GLB,,, | Performed by: NURSE PRACTITIONER

## 2017-08-08 RX ORDER — OXYCODONE AND ACETAMINOPHEN 5; 325 MG/1; MG/1
1 TABLET ORAL 2 TIMES DAILY PRN
Qty: 60 TABLET | Refills: 0 | Status: SHIPPED | OUTPATIENT
Start: 2017-08-08 | End: 2017-12-20 | Stop reason: SDUPTHER

## 2017-08-08 RX ORDER — OMEPRAZOLE 40 MG/1
40 CAPSULE, DELAYED RELEASE ORAL
Qty: 180 CAPSULE | Refills: 0 | Status: SHIPPED | OUTPATIENT
Start: 2017-08-08 | End: 2017-11-29 | Stop reason: SDUPTHER

## 2017-08-08 NOTE — PATIENT INSTRUCTIONS
Counseling and Referral of Other Preventative  (Italic type indicates deductible and co-insurance are waived)    Patient Name: Jovana Fall  Today's Date: 8/8/2017      SERVICE LIMITATIONS RECOMMENDATION    Vaccines    · Pneumococcal (once after 65)    · Influenza (annually)    · Hepatitis B (if medium/high risk)    · Prevnar 13      Hepatitis B medium/high risk factors:       - End-stage renal disease       - Hemophiliacs who received Factor VII or         IX concentrates       - Clients of institutions for the mentally             retarded       - Persons who live in the same house as          a HepB carrier       - Homosexual men       - Illicit injectable drug abusers     Pneumococcal: Done, no repeat necessary     Influenza: Due fall 2017     Hepatitis B: N/A     Prevnar 13: Done, no repeat necessary    Mammogram (biennial age 50-74)  Annually (age 40 or over)  Last done 4/2017, recommend to repeat every 1-2  years    Pap (up to age 70 and after 70 if unknown history or abnormal study last 10 years)    N/A     The USPSTF recommends against screening for cervical cancer in women who have had a hysterectomy with removal of the cervix and who do not have a history of a high-grade precancerous lesion (cervical intraepithelial neoplasia [BILLY] grade 2 or 3) or cervical cancer.     Colorectal cancer screening (to age 75)    · Fecal occult blood test (annual)  · Flexible sigmoidoscopy (5y)  · Screening colonoscopy (10y)  · Barium enema   Last done 1/2015, recommend to repeat every 5  years    Diabetes self-management training (no USPSTF recommendations)  Requires referral by treating physician for patient with diabetes or renal disease. 10 hours of initial DSMT sessions of no less than 30 minutes each in a continuous 12-month period. 2 hours of follow-up DSMT in subsequent years.  Done, repeat as needed    Bone mass measurements (age 65 & older, biennial)  Requires diagnosis related to osteoporosis or estrogen  deficiency. Biennial benefit unless patient has history of long-term glucocorticoid  Last done 3/2016, recommend to repeat every 3  years    Glaucoma screening (no USPSTF recommendation)  Diabetes mellitus, family history   , age 50 or over    American, age 65 or over  Done this year, repeat every year    Medical nutrition therapy for diabetes or renal disease (no recommended schedule)  Requires referral by treating physician for patient with diabetes or renal disease or kidney transplant within the past 3 years.  Can be provided in same year as diabetes self-management training (DSMT), and CMS recommends medical nutrition therapy take place after DSMT. Up to 3 hours for initial year and 2 hours in subsequent years.  N/A    Cardiovascular screening blood tests (every 5 years)  · Fasting lipid panel  Order as a panel if possible  Last done 10/2016, recommend to repeat every year    Diabetes screening tests (at least every 3 years, Medicare covers annually or at 6-month intervals for prediabetic patients)  · Fasting blood sugar (FBS) or glucose tolerance test (GTT)  Patient must be diagnosed with one of the following:       - Hypertension       - Dyslipidemia       - Obesity (BMI 30kg/m2)       - Previous elevated impaired FBS or GTT       ... or any two of the following:       - Overweight (BMI 25 but <30)       - Family history of diabetes       - Age 65 or older       - History of gestational diabetes or birth of baby weighing more than 9 pounds  Last done 7/2017, recommend to repeat every 6  months    HIV screening (annually for increased risk patients)  · HIV-1 and HIV-2 by EIA, or DOROTHY, rapid antibody test or oral mucosa transudate  Patients must be at increased risk for HIV infection per USPSTF guidelines or pregnant. Tests covered annually for patient at increased risk or as requested by the patient. Pregnant patients may receive up to 3 tests during pregnancy.  Risks discussed,  screening is not recommended    Smoking cessation counseling (up to 8 sessions per year)  Patients must be asymptomatic of tobacco-related conditions to receive as a preventative service.  Non-smoker    Subsequent annual wellness visit  At least 12 months since last AWV  Return in one year     The following information is provided to all patients.  This information is to help you find resources for any of the problems found today that may be affecting your health:                Living healthy guide: www.[facility].[place].[facility]      Understanding Diabetes: www.diabetes.org      Eating healthy: www.cdc.gov/healthyweight      [facility] home safety checklist: www.cdc.gov/steadi/patient.html      Agency on Aging: www.goea.[place].[facility]      Alcoholics anonymous (AA): www.aa.org      Physical Activity: www.césar.nih.gov/sp2idum      Tobacco use: www.quitwithusla.org

## 2017-08-08 NOTE — PROGRESS NOTES
"Jovana Fall presented for a  Medicare AWV and comprehensive Health Risk Assessment today. The following components were reviewed and updated:    · Medical history  · Family History  · Social history  · Allergies and Current Medications  · Health Risk Assessment  · Health Maintenance  · Care Team     ** See Completed Assessments for Annual Wellness Visit within the encounter summary.**       The following assessments were completed:  · Living Situation  · CAGE  · Depression Screening  · Timed Get Up and Go  · Whisper Test  · Cognitive Function Screening  · Nutrition Screening  · ADL Screening  · PAQ Screening            Vitals:    08/08/17 1303   BP: (!) 146/88   BP Location: Left arm   Patient Position: Sitting   Pulse: 72   Weight: 84.4 kg (186 lb)   Height: 5' 7" (1.702 m)     Body mass index is 29.13 kg/m².     Physical Exam   Constitutional: She is oriented to person, place, and time. She appears well-developed and well-nourished. No distress.   HENT:   Head: Normocephalic and atraumatic.   Cardiovascular: Normal rate, regular rhythm, normal heart sounds and intact distal pulses.    No murmur heard.  Pulmonary/Chest: Effort normal. No respiratory distress. She has no wheezes. She has no rales.   Musculoskeletal: She exhibits no edema, tenderness or deformity.   Neurological: She is alert and oriented to person, place, and time.   Skin: Skin is warm and dry. She is not diaphoretic.   Psychiatric: She has a normal mood and affect. Her behavior is normal. She expresses no homicidal and no suicidal ideation. She expresses no suicidal plans and no homicidal plans.   Vitals reviewed.        Diagnoses and health risks identified today and associated recommendations/orders:    1. Encounter for preventive health examination  Dxa scan due 2019.  Flu vaccine due fall.    2. Carotid disease, bilateral  Stable.   Seen on US from 7/11/2014.  Followed by Cardiology.     3. Major depression, recurrent, chronic  Stable.   Continue " current medication.  Followed by PCP.     4. Hypertension associated with diabetes  Slightly elevated. Patient having epigastric pain. She follows up with PCP next.   Continue current medications.  Followed by PCP.     5. Abdominal aortic atherosclerosis  Stable.   Followed by Cardiology.     6. Hyperlipidemia associated with type 2 diabetes mellitus  Stable.   Continue current medication.  Followed by Cardiology.     7. Generalized anxiety disorder  Stable.   Continue current medication.  Followed by PCP.     8. Coronary artery disease involving native coronary artery of native heart without angina pectoris  Stable.   Continue current medications.  Followed by Cardiology.     9. Type 2 diabetes mellitus without complication, without long-term current use of insulin  Stable.   Continue current medication.  Followed by PCP.     10. Osteopenia, unspecified location  Stable.   Continue calcium and Vitamin D.  Followed by PCP.       Provided Jovana with a 5-10 year written screening schedule and personal prevention plan. Recommendations were developed using the USPSTF age appropriate recommendations. Education, counseling, and referrals were provided as needed. After Visit Summary printed and given to patient which includes a list of additional screenings\tests needed.    Follow up with PCP today. Return in 1 year for HRA. .    Genevieve Humphrey NP

## 2017-08-08 NOTE — PROGRESS NOTES
Subjective:       Patient ID: Jovana Fall is a 71 y.o. female.    Chief Complaint: Diabetes    Last seen 4 months ago after recent evaluation by Gastroenterology for epigastric abdominal pain and nausea. Started on Omeprazole. Labs unremarkable except elevated Lipase 125. Abdominal ultrasound was unremarkable, s/p cholecystectomy, pancreas not well visualized. CT scan of abdomen ordered, not done. EGD revealed Schatzki's ring - dilated, and benign gastritis, no H pylori. I took her off Januvia at that time. She had various side effects with alternate medications for diabetes since then, including Invokana and Prandin, history of problems with Metformin, Byetta too costly, and avoids Sulfonylureas due to sulfa allergy. She actually went back to taking Januvia (not prescribed), and swore she wasn't having any abdominal problems as recently as last week in an email upon review of HbA1c 5.8% . Presents today c/o acute onset generalized abdominal pain worse in mid-epigastrium with associated nausea, no vomiting. She is compliant with daily dosing of Omeprazole 40mg, but her diet has been out of control. Been visiting with family and eating a lot of rich greasy food, fried food, fast food. And has returned to gambling at the [employer] 2-3 nights per week during which she may drink 4-5 glasses of wine each night. Noted blood pressure elevated lately, likely due to diet, and she is here this afternoon having not taken her meds this morning.     Fasting glucose ranges 100-110 per history, no log for review.     PMH: , misc x 1.   Diabetes Type 2. HbA1c HbA1c 5.9% Oct. '16.  Hypertension.   Mixed Hyperlipidemia, , HDL 39, LDL 91 Oct. '16.  Nephrolithiasis.   Urinary incontinence.  GERD. EGD 9/15, 3/17 - mild chronic non-active gastritis, no dysplasia, H pylori negative.  Depression/Anxiety.  Non-obstructive Coronary artery disease, angiogram , Cardiology 10/15.  Osteopenia.   Mild Vitamin D deficiency,  "37.  Lumbar Spinal Stenosis. Cervical Spine DJD.   Skin Cancer, Basal Cell and Melanoma.     Mammo normal 4/17. Pelvic exam 2/16. BMD 3/16 stable Osteopenia. Colonoscopy 1/15 - sigmoid diverticuli, one tubular adenoma 2-3mm, 5 yr f/u due to family history. Eye exam 4/17. Pneumovax 10/12. Zostavax 10/13. Prevnar 7/15. Flu shot 10/16.    PSH:   Cholecystectomy.   Bilateral carpal tunnel release.   Hysterectomy. Ovaries remain.  Lumbar laminectomy  Appendectomy  Tubal ligation.  Hemorrhoidectomy  Rhinoplasty  Blepharoplasty, ears pinned.  Cystoscopy, ureteral stent.  Benign tumor removed left knee.   Right Bunionectomy and Hammertoe surgery.  Skin Cancer excision.    Social: Nonsmoker. Occas. alcohol. . 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    FMH: Colon and ovarian cancer in cousin. Colon cancer in younger brother. Mother had heart disease with MI at age 71. MGM with cervical cancer. Two aunts with diabetes.     Allergies: multiple, see Med Card.     Medications: list reviewed and reconciled.               Review of Systems   Constitutional: Negative for chills, diaphoresis and fever.   Respiratory: Negative for chest tightness and shortness of breath.    Cardiovascular: Negative for chest pain, palpitations and leg swelling.   Gastrointestinal: Positive for abdominal pain and nausea. Negative for blood in stool, constipation, diarrhea and vomiting.   Genitourinary: Negative for dysuria and frequency.   Musculoskeletal: Positive for back pain. Negative for arthralgias and myalgias.   Skin: Negative for color change and rash.   Neurological: Negative for dizziness, syncope and headaches.   Psychiatric/Behavioral: Positive for dysphoric mood. Negative for agitation, confusion and hallucinations.       Objective:    /100, Pulse 70, Ht 5' 7", Wt 185 lbs (from 181)  Physical Exam   Constitutional: She is oriented to person, place, and time. She appears well-developed and well-nourished. " No distress.   HENT:   Nose: Nose normal.   Mouth/Throat: Oropharynx is clear and moist.   Eyes: Conjunctivae are normal. No scleral icterus.   Cardiovascular: Normal rate, regular rhythm and normal heart sounds.    Pulmonary/Chest: Effort normal and breath sounds normal. No respiratory distress. She has no wheezes. She has no rales.   Abdominal: Soft. Bowel sounds are normal. She exhibits no distension and no mass. There is tenderness. There is no rebound and no guarding. No hernia.   Positive mid-epigastric tenderness.   Musculoskeletal: Normal range of motion. She exhibits no edema.   Neurological: She is alert and oriented to person, place, and time. No cranial nerve deficit. Coordination normal.   Skin: Skin is warm and dry. She is not diaphoretic. No pallor.   Psychiatric: Her behavior is normal. Thought content normal.   Mildly depressed mood. Seems to have enjoyed her visit with family. Will be returning home to [place] tomorrow where she feels lonely.        Assessment:       1. Generalized abdominal pain    2. Nausea    3. Gastroesophageal reflux disease without esophagitis    4. Type 2 diabetes mellitus without complication, without long-term current use of insulin    5. Essential hypertension    6. Lumbar spinal stenosis        Plan:       Generalized abdominal pain  -     CBC auto differential; Future; Expected date: 08/08/2017  -     Comprehensive metabolic panel; Future; Expected date: 08/08/2017  -     Lipase; Future; Expected date: 08/08/2017  -     omeprazole (PRILOSEC) 40 MG capsule; Take 1 capsule (40 mg total) by mouth 2 (two) times daily before meals.  Dispense: 180 capsule; Refill: 0    Nausea  -     CBC auto differential; Future; Expected date: 08/08/2017  -     Comprehensive metabolic panel; Future; Expected date: 08/08/2017  -     Lipase; Future; Expected date: 08/08/2017  -     omeprazole (PRILOSEC) 40 MG capsule; Take 1 capsule (40 mg total) by mouth 2 (two) times daily before meals.   Dispense: 180 capsule; Refill: 0    Gastroesophageal reflux disease without esophagitis  -     omeprazole (PRILOSEC) 40 MG capsule; Take 1 capsule (40 mg total) by mouth 2 (two) times daily before meals.  Dispense: 180 capsule; Refill: 0    Type 2 diabetes mellitus without complication, without long-term current use of insulin        -     STOP Januvia, no alternative med prescribed for mild diabetes at this time.     Essential hypertension - acutely elevated due to pain, diet.        -     Continue current meds, previously well controlled, stop fried food/fast food and alcohol.     Lumbar spinal stenosis  -     oxycodone-acetaminophen (PERCOCET) 5-325 mg per tablet; Take 1 tablet by mouth 2 (two) times daily as needed for Pain.  Dispense: 60 tablet; Refill: 0    Major depression, recurrent, chronic        -     Continue Effexor, Clonazepam prn. Counseled on avoidance of gambling habit, may benefit from seeing a therapist.

## 2017-08-09 ENCOUNTER — PATIENT MESSAGE (OUTPATIENT)
Dept: INTERNAL MEDICINE | Facility: CLINIC | Age: 72
End: 2017-08-09

## 2017-08-22 ENCOUNTER — TELEPHONE (OUTPATIENT)
Dept: INTERNAL MEDICINE | Facility: CLINIC | Age: 72
End: 2017-08-22

## 2017-08-22 NOTE — TELEPHONE ENCOUNTER
Pt state she did not take med Januvia  on yesterday morning , her glucose levels for that afternoon is listed   1pm-109  2:02 pm -166  2:25pm-181  3:15 pm -156  Pt then took a januvia and blood glucose was 107 @8:25pm pl adv.

## 2017-08-22 NOTE — TELEPHONE ENCOUNTER
----- Message from Eugene Moran sent at 8/21/2017  2:20 PM CDT -----  Contact: self [phone]  Patient would like a call back from the office in regards to almita Mcneilia , stated she been breaking out and sweats and glucose number been going up . Please call and advise  ,Thanks  !

## 2017-08-23 NOTE — TELEPHONE ENCOUNTER
1. How long was she off Januvia?  2. How long has the Glucose been this high? - if only for one day, than it might just be something she ate.   3. Is she sweating when she takes the med, or when she doesn't? And what is her sugar at that moment?    No answer when I called, left voicemail, sent email.

## 2017-09-20 ENCOUNTER — TELEPHONE (OUTPATIENT)
Dept: INTERNAL MEDICINE | Facility: CLINIC | Age: 72
End: 2017-09-20

## 2017-09-20 NOTE — TELEPHONE ENCOUNTER
Pt called to report she has been having sweats since she been off the med  januvia she gained 10 lbs pt is asking can she get some labs done pl adv. Pt also think it might be her thyroid.

## 2017-09-20 NOTE — TELEPHONE ENCOUNTER
----- Message from Taina Watt MA sent at 9/18/2017  2:59 PM CDT -----  Contact: self - [phone]  Patient c/o of sweating spells and would like to speak with someone today. Please call. Thanks!

## 2017-10-03 ENCOUNTER — OFFICE VISIT (OUTPATIENT)
Dept: GASTROENTEROLOGY | Facility: CLINIC | Age: 72
End: 2017-10-03
Payer: MEDICARE

## 2017-10-03 ENCOUNTER — TELEPHONE (OUTPATIENT)
Dept: INTERNAL MEDICINE | Facility: CLINIC | Age: 72
End: 2017-10-03

## 2017-10-03 ENCOUNTER — HOSPITAL ENCOUNTER (EMERGENCY)
Facility: HOSPITAL | Age: 72
Discharge: HOME | End: 2017-10-03
Attending: EMERGENCY MEDICINE
Payer: MEDICARE

## 2017-10-03 VITALS
HEART RATE: 75 BPM | SYSTOLIC BLOOD PRESSURE: 171 MMHG | OXYGEN SATURATION: 95 % | DIASTOLIC BLOOD PRESSURE: 91 MMHG | WEIGHT: 183 LBS | BODY MASS INDEX: 28.66 KG/M2 | TEMPERATURE: 98 F | RESPIRATION RATE: 16 BRPM

## 2017-10-03 VITALS
SYSTOLIC BLOOD PRESSURE: 149 MMHG | WEIGHT: 183.63 LBS | HEIGHT: 67 IN | DIASTOLIC BLOOD PRESSURE: 89 MMHG | BODY MASS INDEX: 28.82 KG/M2 | HEART RATE: 70 BPM | RESPIRATION RATE: 20 BRPM

## 2017-10-03 DIAGNOSIS — R10.84 GENERALIZED ABDOMINAL PAIN: ICD-10-CM

## 2017-10-03 DIAGNOSIS — R13.14 PHARYNGOESOPHAGEAL DYSPHAGIA: ICD-10-CM

## 2017-10-03 DIAGNOSIS — Z90.49 S/P CHOLECYSTECTOMY: ICD-10-CM

## 2017-10-03 DIAGNOSIS — R00.2 PALPITATIONS: Primary | ICD-10-CM

## 2017-10-03 DIAGNOSIS — R07.9 CHEST PAIN, UNSPECIFIED TYPE: Primary | ICD-10-CM

## 2017-10-03 DIAGNOSIS — Z87.19 HISTORY OF IBS: ICD-10-CM

## 2017-10-03 DIAGNOSIS — R19.8 IRREGULAR BOWEL HABITS: ICD-10-CM

## 2017-10-03 DIAGNOSIS — R14.0 ABDOMINAL BLOATING: ICD-10-CM

## 2017-10-03 DIAGNOSIS — K21.00 GASTROESOPHAGEAL REFLUX DISEASE WITH ESOPHAGITIS: ICD-10-CM

## 2017-10-03 DIAGNOSIS — Z87.19 HISTORY OF DIVERTICULOSIS: ICD-10-CM

## 2017-10-03 DIAGNOSIS — K44.9 HIATAL HERNIA: ICD-10-CM

## 2017-10-03 LAB
ALBUMIN SERPL BCP-MCNC: 4.1 G/DL
ALP SERPL-CCNC: 117 U/L
ALT SERPL W/O P-5'-P-CCNC: 18 U/L
ANION GAP SERPL CALC-SCNC: 12 MMOL/L
AST SERPL-CCNC: 19 U/L
BASOPHILS # BLD AUTO: 0.1 K/UL
BASOPHILS NFR BLD: 1 %
BILIRUB SERPL-MCNC: 1.2 MG/DL
BNP SERPL-MCNC: 14 PG/ML
BUN SERPL-MCNC: 12 MG/DL
CALCIUM SERPL-MCNC: 9.8 MG/DL
CHLORIDE SERPL-SCNC: 104 MMOL/L
CO2 SERPL-SCNC: 24 MMOL/L
CREAT SERPL-MCNC: 0.8 MG/DL
DIFFERENTIAL METHOD: ABNORMAL
EOSINOPHIL # BLD AUTO: 0.1 K/UL
EOSINOPHIL NFR BLD: 1.3 %
ERYTHROCYTE [DISTWIDTH] IN BLOOD BY AUTOMATED COUNT: 12.8 %
EST. GFR  (AFRICAN AMERICAN): >60 ML/MIN/1.73 M^2
EST. GFR  (NON AFRICAN AMERICAN): >60 ML/MIN/1.73 M^2
GLUCOSE SERPL-MCNC: 129 MG/DL
HCT VFR BLD AUTO: 43.6 %
HGB BLD-MCNC: 14.6 G/DL
LYMPHOCYTES # BLD AUTO: 2.6 K/UL
LYMPHOCYTES NFR BLD: 24.4 %
MCH RBC QN AUTO: 30.1 PG
MCHC RBC AUTO-ENTMCNC: 33.4 G/DL
MCV RBC AUTO: 90 FL
MONOCYTES # BLD AUTO: 0.2 K/UL
MONOCYTES NFR BLD: 1.9 %
NEUTROPHILS # BLD AUTO: 7.6 K/UL
NEUTROPHILS NFR BLD: 71.4 %
PLATELET # BLD AUTO: 251 K/UL
PMV BLD AUTO: 7.8 FL
POCT GLUCOSE: 120 MG/DL (ref 70–110)
POTASSIUM SERPL-SCNC: 4.1 MMOL/L
PROT SERPL-MCNC: 7.5 G/DL
RBC # BLD AUTO: 4.84 M/UL
SODIUM SERPL-SCNC: 140 MMOL/L
TROPONIN I SERPL DL<=0.01 NG/ML-MCNC: <0.006 NG/ML
WBC # BLD AUTO: 10.7 K/UL

## 2017-10-03 PROCEDURE — 83880 ASSAY OF NATRIURETIC PEPTIDE: CPT

## 2017-10-03 PROCEDURE — 99284 EMERGENCY DEPT VISIT MOD MDM: CPT | Mod: 25

## 2017-10-03 PROCEDURE — 84484 ASSAY OF TROPONIN QUANT: CPT

## 2017-10-03 PROCEDURE — 99999 PR PBB SHADOW E&M-EST. PATIENT-LVL V: CPT | Mod: PBBFAC,,, | Performed by: NURSE PRACTITIONER

## 2017-10-03 PROCEDURE — 99214 OFFICE O/P EST MOD 30 MIN: CPT | Mod: S$GLB,,, | Performed by: NURSE PRACTITIONER

## 2017-10-03 PROCEDURE — 85025 COMPLETE CBC W/AUTO DIFF WBC: CPT

## 2017-10-03 PROCEDURE — 99499 UNLISTED E&M SERVICE: CPT | Mod: S$GLB,,, | Performed by: NURSE PRACTITIONER

## 2017-10-03 PROCEDURE — 96374 THER/PROPH/DIAG INJ IV PUSH: CPT

## 2017-10-03 PROCEDURE — 93005 ELECTROCARDIOGRAM TRACING: CPT

## 2017-10-03 PROCEDURE — 25000003 PHARM REV CODE 250: Performed by: EMERGENCY MEDICINE

## 2017-10-03 PROCEDURE — 63600175 PHARM REV CODE 636 W HCPCS: Performed by: EMERGENCY MEDICINE

## 2017-10-03 PROCEDURE — 82962 GLUCOSE BLOOD TEST: CPT

## 2017-10-03 PROCEDURE — 80053 COMPREHEN METABOLIC PANEL: CPT

## 2017-10-03 PROCEDURE — 93010 ELECTROCARDIOGRAM REPORT: CPT | Mod: ,,, | Performed by: INTERNAL MEDICINE

## 2017-10-03 PROCEDURE — 36415 COLL VENOUS BLD VENIPUNCTURE: CPT

## 2017-10-03 RX ORDER — ONDANSETRON 2 MG/ML
4 INJECTION INTRAMUSCULAR; INTRAVENOUS
Status: COMPLETED | OUTPATIENT
Start: 2017-10-03 | End: 2017-10-03

## 2017-10-03 RX ORDER — DICYCLOMINE HYDROCHLORIDE 10 MG/1
10 CAPSULE ORAL EVERY 6 HOURS PRN
Qty: 60 CAPSULE | Refills: 1 | Status: SHIPPED | OUTPATIENT
Start: 2017-10-03 | End: 2017-11-02

## 2017-10-03 RX ORDER — ASPIRIN 325 MG
325 TABLET ORAL
Status: COMPLETED | OUTPATIENT
Start: 2017-10-03 | End: 2017-10-03

## 2017-10-03 RX ADMIN — ASPIRIN 325 MG ORAL TABLET 325 MG: 325 PILL ORAL at 02:10

## 2017-10-03 RX ADMIN — ONDANSETRON 4 MG: 2 INJECTION INTRAMUSCULAR; INTRAVENOUS at 03:10

## 2017-10-03 NOTE — TELEPHONE ENCOUNTER
----- Message from Dennis Mcconnell sent at 10/3/2017 11:37 AM CDT -----  Contact: self/ [phone] cell  Pt would like to speak with someone in the office to discuss some symptoms she had on last night.  She experienced a irregular heartbeat and her /96 p=71.  She had a strange sensation in her chest area around the same time, with some left shoulder pain.  She took a baby aspirin and is not having any symptoms today.  She didn't go to ER, but has an appt in [place] today. She wants to know if Dr. Staley wants her to have some test done while in [place].  She would like a call back to discuss.  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  Please call and advise.    Thank you

## 2017-10-03 NOTE — PATIENT INSTRUCTIONS
Abdominal Pain    Abdominal pain is pain in the stomach or belly area. Everyone has this pain from time to time. In many cases it goes away on its own. But abdominal pain can sometimes be due to a serious problem, such as appendicitis. So its important to know when to seek help.  Causes of abdominal pain  There are many possible causes of abdominal pain. Common causes in adults include:  · Constipation, diarrhea, or gas  · Stomach acid flowing back up into the esophagus (acid reflux or heartburn)  · Severe acid reflux, called GERD (gastroesophageal reflux disease)  · A sore in the lining of the stomach or small intestine (peptic ulcer)  · Inflammation of the gallbladder, liver, or pancreas  · Gallstones or kidney stones  · Appendicitis   · Intestinal blockage   · An internal organ pushing through a muscle or other tissue (hernia)  · Urinary tract infections  · In women, menstrual cramps, fibroids, or endometriosis  · Inflammation or infection of the intestines  Diagnosing the cause of abdominal pain  Your healthcare provider will do a physical exam help find the cause of your pain. If needed, tests will be ordered. Belly pain has many possible causes. So it can be hard to find the reason for your pain. Giving details about your pain can help. Tell your provider where and when you feel the pain, and what makes it better or worse. Also let your provider know if you have other symptoms such as:  · Fever  · Tiredness  · Upset stomach (nausea)  · Vomiting  · Changes in bathroom habits  Treating abdominal pain  Some causes of pain need emergency medical treatment right away. These include appendicitis or a bowel blockage. Other problems can be treated with rest, fluids, or medicines. Your healthcare provider can give you specific instructions for treatment or self-care based on what is causing your pain.  If you have vomiting or diarrhea, sip water or other clear fluids. When you are ready to eat solid foods again,  start with small amounts of easy-to-digest, low-fat foods. These include apple sauce, toast, or crackers.   When to seek medical care  Call 911 or go to the hospital right away if you:  · Cant pass stool and are vomiting  · Are vomiting blood or have bloody diarrhea or black, tarry diarrhea  · Have chest, neck, or shoulder pain  · Feel like you might pass out  · Have pain in your shoulder blades with nausea  · Have sudden, severe belly pain  · Have new, severe pain unlike any you have felt before  · Have a belly that is rigid, hard, and tender to touch  Call your healthcare provider if you have:  · Pain for more than 5 days  · Bloating for more than 2 days  · Diarrhea for more than 5 days  · A fever of 100.4°F (38.0°C) or higher, or as directed by your provider  · Pain that gets worse  · Weight loss for no reason  · Continued lack of appetite  · Blood in your stool  How to prevent abdominal pain  Here are some tips to help prevent abdominal pain:  · Eat smaller amounts of food at one time.  · Avoid greasy, fried, or other high-fat foods.  · Avoid foods that give you gas.  · Exercise regularly.  · Drink plenty of fluids.  To help prevent GERD symptoms:  · Quit smoking.  · Reduce alcohol and certain foods that increase stomach acid.  · Avoid aspirin and over-the-counter pain and fever medicines (NSAIDS or nonsteroidal anti-inflammatory drugs), if possible  · Lose extra weight.  · Finish eating at least 2 hours before you go to bed or lie down.  · Raise the head of your bed.  Date Last Reviewed: 7/1/2016  © 2275-3129 [employer]. [address] [place] [address], [place] [address]. All rights reserved. This information is not intended as a substitute for professional medical care. Always follow your healthcare professional's instructions.        Irritable Bowel Syndrome    Irritable bowel syndrome (IBS) is a disorder of the intestines. It is not a disease, but a group of symptoms caused by changes in the way the  intestines work. It is fairly common, but the cause is not well understood.  Symptoms of IBS include:  · Abdominal pain, discomfort, and cramping  · Diarrhea  · Constipation or dry, hard stools  · Mucous stool  · Bloating  · Feeling of incomplete bowel movements  It usually results in one of 3 patterns of symptoms:  · Chronic abdominal pain and constipation  · Recurring episodes of diarrhea, with or without pain  · Alternating diarrhea and constipation  Home care  The goal of treatment is to control and relieve your symptoms, so you can lead a full and active life. There is no cure for IBS. But it can be managed.  Diet  Your diet did not cause your IBS, but it can affect it. No one diet works for everyone. Finding the best foods for you may take trial and error. Keep a food log to help find what foods made your symptoms worse. Below are some tips that may help you.  · Eat more slowly. Eat smaller amounts at a time, but more often. Remember, you can always eat more, but cannot eat less once youve eaten too much.  · High-fiber foods are complicated. While they may help relieve constipation, they can make your bloating, cramping, gas, and diarrhea worse.  · Eat less sugar.  · Try cutting out dairy products. Sometimes this helps.  · Try cutting out foods that are high in fat and fatty meats.  · You can control bloating or passing excess gas. Be careful with gassy vegetables and fruits like beans, cabbage, broccoli, and cauliflower.  · Be careful with carbonated beverages and fruit juices. They can make your bloating and diarrhea worse.  · Caffeine, alcohol, and stimulants can make symptoms worse. These include coffee, tea, sodas, energy drinks, and chocolate.  Lifestyle  · Look for factors that seem to worsen your symptoms. These include stress and emotions.   · Although stress does not cause IBS, it may trigger flare-ups. Counseling can help you learn to handle stress. So can self-help measures like exercise, yoga,  and meditation.  · Depression can occur along with IBS. Your healthcare provider may prescribe antidepressant medicine. This may help with diarrhea, constipation, and cramping, as well as with symptoms of depression.  · Smoking doesn't cause IBS, but can make the symptoms worse.  Medicines  Your healthcare provider may prescribe medicines. Take them as directed. For acute flare-ups of your illness, your provider may give you prescription medicines.  · Check with your healthcare provider before taking any medicines for diarrhea.  · Avoid anti-inflammatory medicines like ibuprofen or naproxen.  · Consider nutritional supplements. This is especially true if your diarrhea is prolonged, or you aren't eating or are losing weight  Follow-up care  Follow up with your healthcare provider, or as advised. If a stool sample was taken or cultures were done, you will be told if your treatment needs to change. You can call as directed for the results.  When to seek medical advice  Call your healthcare provider right away if any of these occur:  · Abdominal pain gets worse  · Constant abdominal pain moves to the right-lower abdomen  · You can't keep liquids down because of vomiting  · You have severe diarrhea  · You have blood (red or black color) or mucus in your stool  · You feel very weak or dizzy, faint, or have extreme thirst  · You have a fever of 100.4ºF (38.0ºC) or higher, or as directed by your healthcare provider  Date Last Reviewed: 8/31/2015  © 4703-7090 [employer] [address] [place] [address], [place] [address]. All rights reserved. This information is not intended as a substitute for professional medical care. Always follow your healthcare professional's instructions.

## 2017-10-03 NOTE — PROGRESS NOTES
Subjective:       Patient ID: Jovana Fall is a 72 y.o. female Body mass index is 28.76 kg/m².    Chief Complaint: GI Problem (diarrhea, constipation, dysphagia, abdominal pain, gas, bloating)    Established patient of Dr. Blas, Dr. Dickson & myself.    Patient reports she is headed to er after this, was advised by PCP to go the ER last night for a possible cardiac event; intermittent chest pain that radiates down left arm; denies chest pain currently; I strongly advised & recommended patient to go directly to the ER and to reschedule our visit for a later time so they can further evaluate her acute symptoms of chest pain as advised by her PCP. Patient refuses to go to the ER until after her appointment with us today. Patient reports she will go to the ER after our visit and declined transportation to ER.      GI Problem   The primary symptoms include fatigue, abdominal pain, nausea (occasional) and diarrhea (denies currently, last had last week). Primary symptoms do not include fever, weight loss, vomiting, melena (black stool after pepto use), hematochezia or dysuria. Episode onset: recurred about a year ago; happened in 2015. The problem has not changed since onset.  The abdominal pain began more than 2 days ago (started a year ago intermittent). The abdominal pain has been unchanged (daily) since its onset. The abdominal pain is generalized (generalized pain & cramping with bowel movements- similar to previous symptoms when she saw Dr. Blas). The abdominal pain radiates to the back. The severity of the abdominal pain is 0/10. The abdominal pain is relieved by bowel movements (worse during bowel movement, lactose (lactose intolerant), sugary items (sour patch kids candy); relieved with bowel movement).   The diarrhea began more than 1 week ago (for several years). The diarrhea is watery and semi-solid (watery occasional; usually semi-solid, and formed stool with pain medication use; bowel movements are once  daily of varied consistency; occasional fecal incontinence, reports has to wipe often). Daily occurrences: bowel movements are once daily of varied consistency.   The illness is also significant for chills, dysphagia (recurred, had resolved after EGD with dilation; occurs occasional with liquids and pills; denies with foods; feels like it doesn't go down, described as a spasm), bloating (especially after eating) and constipation (had fecal impaction last week; colace prn and manual disimpaction; only after taking pain medication (percocet), takes it about once a week). The illness does not include anorexia or odynophagia. Associated symptoms comments: Reports foul smelling flatulence and frequent belching; reports early satiety; last bowel movement was today of formed stool. Significant associated medical issues include GERD (reflux and heartburn improved, taking prilosec 40 mg twice daily) and irritable bowel syndrome. Associated medical issues do not include inflammatory bowel disease, liver disease, alcohol abuse (occasional alcohol use- 1-2 glasses of wine per week), PUD, gastric bypass, bowel resection or diverticulitis (reports history of diverticulosis).      Review of Systems   Constitutional: Positive for appetite change (decreased), chills, diaphoresis and fatigue. Negative for fever, unexpected weight change and weight loss.   HENT: Positive for trouble swallowing. Negative for sore throat.    Respiratory: Positive for cough (coughing up foam) and shortness of breath. Negative for choking.    Cardiovascular: Positive for chest pain (patient reports she is headed to er after this, was advised by PCP to go the the ER last night for a possible cardiac event; intermittent chest pain that radiates down left arm; denies chest pain currently).   Gastrointestinal: Positive for abdominal pain, bloating (especially after eating), constipation (had fecal impaction last week; colace prn and manual disimpaction; only  after taking pain medication (percocet), takes it about once a week), diarrhea (denies currently, last had last week), dysphagia (recurred, had resolved after EGD with dilation; occurs occasional with liquids and pills; denies with foods; feels like it doesn't go down, described as a spasm) and nausea (occasional). Negative for anal bleeding, anorexia, blood in stool, hematochezia, melena (black stool after pepto use), rectal pain and vomiting.   Genitourinary: Negative for difficulty urinating, dysuria, flank pain, frequency, pelvic pain and urgency.   Neurological: Negative for weakness.   Psychiatric/Behavioral: The patient is nervous/anxious.        Past Medical History:   Diagnosis Date    Anxiety     Basal cell carcinoma 2014    left upper arm     BCC (basal cell carcinoma of skin) 01/07/16    mid upper back    BCC (basal cell carcinoma)     left upper back    Calcium nephrolithiasis     Cataract     Colon polyp     Coronary artery disease     Cortical cataract - Both Eyes 4/29/2013    Depression     Diabetes mellitus     Displacement of lumbar intervertebral disc without myelopathy 5/6/2013    Diverticulosis     GERD (gastroesophageal reflux disease)     H. pylori infection     Hepatomegaly     History of melanoma in situ 2/2/2015    Mid upper back ; 5/2014     Hx of colonic polyps 1/27/2015    Hyperlipidemia     Hypertension     Lactose intolerance     Lumbar spinal stenosis 5/6/2013    Melanoma     mid upper back- in situ 5/2014    Nuclear sclerosis - Both Eyes 4/29/2013    Osteopenia     Spinal stenosis, lumbar region, with neurogenic claudication 5/6/2013    Spondylosis without myelopathy 5/6/2013    Type 2 diabetes mellitus      Past Surgical History:   Procedure Laterality Date    APPENDECTOMY      BROW LIFT AND BLEPHAROPLASTY      BUNIONECTOMY Right     CARPAL TUNNEL RELEASE Bilateral     CHOLECYSTECTOMY      COLONOSCOPY  02/05/2015    Dr. Blas, repeat in 3-5 years     HEMORRHOID SURGERY      HYSTERECTOMY      ovaries remain    LUMBAR LAMINECTOMY      RHINOPLASTY TIP      SKIN CANCER EXCISION      TUBAL LIGATION      UPPER GASTROINTESTINAL ENDOSCOPY  2015    Dr. Blas    UPPER GASTROINTESTINAL ENDOSCOPY  03/30/2017    Dr. Dickson     Family History   Problem Relation Age of Onset    Heart disease Mother      MI    Cancer Mother      SQ carcinoma of lung    Heart attack Mother     Skin cancer Mother     Cancer Father      prostate    Ovarian cancer Cousin     Colon cancer Cousin     Skin cancer Daughter     Cancer Sister      uterine sarcoma    Colon cancer Brother 60    No Known Problems Son     No Known Problems Sister     Diabetes Sister     Hypertension Sister     Arthritis Sister     No Known Problems Daughter     Amblyopia Neg Hx     Blindness Neg Hx     Cataracts Neg Hx     Glaucoma Neg Hx     Retinal detachment Neg Hx     Strabismus Neg Hx     Stroke Neg Hx     Thyroid disease Neg Hx     Melanoma Neg Hx     Psoriasis Neg Hx     Lupus Neg Hx     Eczema Neg Hx     Crohn's disease Neg Hx     Ulcerative colitis Neg Hx      Wt Readings from Last 10 Encounters:   10/03/17 83.3 kg (183 lb 10.3 oz)   08/08/17 84 kg (185 lb 3.2 oz)   08/08/17 84.4 kg (186 lb)   07/28/17 81.6 kg (180 lb)   05/08/17 82 kg (180 lb 12.4 oz)   04/21/17 82 kg (180 lb 12.8 oz)   03/21/17 80.1 kg (176 lb 9.4 oz)   10/28/16 82.5 kg (181 lb 14.1 oz)   10/06/16 81 kg (178 lb 9.6 oz)   08/19/16 85.3 kg (188 lb)     Lab Results   Component Value Date    WBC 7.31 08/08/2017    HGB 14.2 08/08/2017    HCT 41.8 08/08/2017    MCV 90 08/08/2017     08/08/2017     CMP  Sodium   Date Value Ref Range Status   08/08/2017 145 136 - 145 mmol/L Final     Potassium   Date Value Ref Range Status   08/08/2017 4.1 3.5 - 5.1 mmol/L Final     Chloride   Date Value Ref Range Status   08/08/2017 108 95 - 110 mmol/L Final     CO2   Date Value Ref Range Status   08/08/2017 28 23 - 29  mmol/L Final     Glucose   Date Value Ref Range Status   08/08/2017 107 70 - 110 mg/dL Final     BUN, Bld   Date Value Ref Range Status   08/08/2017 14 8 - 23 mg/dL Final     Creatinine   Date Value Ref Range Status   08/08/2017 0.8 0.5 - 1.4 mg/dL Final     Calcium   Date Value Ref Range Status   08/08/2017 9.3 8.7 - 10.5 mg/dL Final     Total Protein   Date Value Ref Range Status   08/08/2017 7.2 6.0 - 8.4 g/dL Final     Albumin   Date Value Ref Range Status   08/08/2017 3.8 3.5 - 5.2 g/dL Final     Total Bilirubin   Date Value Ref Range Status   08/08/2017 0.7 0.1 - 1.0 mg/dL Final     Comment:     For infants and newborns, interpretation of results should be based  on gestational age, weight and in agreement with clinical  observations.  Premature Infant recommended reference ranges:  Up to 24 hours.............<8.0 mg/dL  Up to 48 hours............<12.0 mg/dL  3-5 days..................<15.0 mg/dL  6-29 days.................<15.0 mg/dL       Alkaline Phosphatase   Date Value Ref Range Status   08/08/2017 106 55 - 135 U/L Final     AST   Date Value Ref Range Status   08/08/2017 18 10 - 40 U/L Final     ALT   Date Value Ref Range Status   08/08/2017 17 10 - 44 U/L Final     Anion Gap   Date Value Ref Range Status   08/08/2017 9 8 - 16 mmol/L Final     eGFR if    Date Value Ref Range Status   08/08/2017 >60.0 >60 mL/min/1.73 m^2 Final     eGFR if non    Date Value Ref Range Status   08/08/2017 >60.0 >60 mL/min/1.73 m^2 Final     Comment:     Calculation used to obtain the estimated glomerular filtration  rate (eGFR) is the CKD-EPI equation. Since race is unknown   in our information system, the eGFR values for   -American and Non--American patients are given   for each creatinine result.       Lab Results   Component Value Date    LIPASE 23 08/08/2017     Lab Results   Component Value Date    AMYLASE 60 03/21/2017     Lab Results   Component Value Date    HEPAIGM  "Negative 03/21/2017    HEPBIGM Negative 03/21/2017    HEPCAB Negative 03/21/2017     Lab Results   Component Value Date    OCCULTBLOOD Negative 03/27/2017     Reviewed prior medical records including radiology report of 1/15/16 renal ultrasound, 11/18/14 ct renal stone/abdomen/pelvis, 3/21/17 abdominal x-ray and celiac serology ([facility]), 3/27/17 chest ct and abdominal ultrasound, 3/27/17 stool studies ([facility]); & endoscopy history (see surgical history).    3/30/17 EGD was reviewed and procedure report states:   " Impression:          - Normal upper third of esophagus and middle third                        of esophagus.                       - Moderate Schatzki ring. Dilated.                       - Medium-sized hiatal hernia.                       - Gastritis. Biopsied.                       - Bilious gastric fluid.                       - Normal examined duodenum.  Recommendation:      - Patient has a contact number available for                        emergencies. The signs and symptoms of potential                        delayed complications were discussed with the                        patient. Return to normal activities tomorrow.                        Written discharge instructions were provided to the                        patient.                       - Resume previous diet.                       - Continue present medications.                       - No aspirin, ibuprofen, naproxen, or other                        non-steroidal anti-inflammatory drugs.                       - Await pathology results.                       - Discharge patient to home (ambulatory).                       - Follow an antireflux regimen.                       - Use sucralfate tablets 1 gram PO QID.                       - Return to nurse practitioner in 6 weeks.".  Biopsy results:   "Supplemental Diagnosis  NEGATIVE for H. pylori species by immunostain with appropriately reactive controls.  (Electronically Signed: 2017-04-05 " "13:14:58 )  Diagnosed by: Pallavi Carlos M.D.  FINAL PATHOLOGIC DIAGNOSIS  Stomach, antrum and body, biopsy:  Mild chronic nonactive gastritis.  Immunostain for H. pylori species is pending; supplemental report to follow.  Negative for intestinal metaplasia and dysplasia."    1/27/15 Colonoscopy was reviewed and procedure report states:   " Impression:          - Three 2 to 3 mm polyps in the rectum, in the                        distal descending colon and in the distal transverse                        colon. Resected and retrieved.                       - Diverticulosis in the sigmoid colon.                       - The ascending colon and cecum are normal.                       - The examined portion of the ileum was normal.  Recommendation:      - High fiber diet indefinitely.                       - Continue present medications.                       - Use fiber, for example Citrucel, Fibercon, Konsyl                        or Metamucil.                       - Repeat colonoscopy in 3 - 5 years for surveillance                        based on pathology results.                       - Discharge patient to home (with escort). ".  Biopsy results:   "1. Rectum biopsy:  Unremarkable colonic mucosa  Negative for adenoma  2. Distal transverse biopsy:  Tubular adenoma  3. Distal descending biopsy:  Unremarkable colonic mucosa  Negative for adenoma"  Objective:      Physical Exam   Constitutional: She is oriented to person, place, and time. She appears well-developed and well-nourished. No distress.   HENT:   Mouth/Throat: Oropharynx is clear and moist and mucous membranes are normal. No oral lesions. No oropharyngeal exudate.   Eyes: Conjunctivae are normal. Pupils are equal, round, and reactive to light. No scleral icterus.   Neck: Neck supple. No tracheal deviation present.   Cardiovascular: Normal rate.    Pulmonary/Chest: Effort normal and breath sounds normal. No respiratory distress. She has no wheezes. "   Abdominal: Soft. Normal appearance and bowel sounds are normal. She exhibits no distension, no abdominal bruit and no mass. There is no hepatosplenomegaly. There is tenderness (mild) in the left upper quadrant and left lower quadrant. There is no rigidity, no rebound, no guarding, no tenderness at McBurney's point and negative Urbina's sign. No hernia.   Lymphadenopathy:     She has no cervical adenopathy.   Neurological: She is alert and oriented to person, place, and time.   Skin: Skin is warm and dry. No rash noted. She is not diaphoretic. No erythema. No pallor.   Non-jaundiced   Psychiatric: Her behavior is normal. Her mood appears anxious.   Nursing note and vitals reviewed.      Assessment:       1. Chest pain, unspecified type    2. Generalized abdominal pain    3. Abdominal bloating    4. Pharyngoesophageal dysphagia    5. Gastroesophageal reflux disease with esophagitis    6. Hiatal hernia    7. Irregular bowel habits    8. History of IBS    9. S/P cholecystectomy    10. History of diverticulosis        Plan:       Chest pain  - patient instructed to go to the ER; patient verbalized understanding and declined transportation    Generalized Abdominal pain  -  START   dicyclomine (BENTYL) 10 MG capsule; Take 1 capsule (10 mg total) by mouth every 6 (six) hours as needed. PRN Abdominal pain/cramping  Dispense: 60 capsule; Refill: 1  - complete ct abdomen pelvis as previously ordered    Abdominal bloating  - complete ct abdomen pelvis as previously ordered  - recommended OTC simethicone as directed, such as Phazyme or Gas-x  -discussed with patient about low gas diet: Reduce or eliminate these foods from your diet: Broccoli, Cauliflower, Comins sprouts, Cabbage, Cooked dried beans, Carbonated beverages (sparkling water, soda, beer, champagne)  Other Causes Of Excess Gas Include:   1) EATING TOO FAST or TALKING WHILE YOU CHEW may cause you to swallow air. This increases the amount of gas in the stomach and  may worsen your symptoms.  --> Chew each mouthful completely before swallowing. Take your time.  2) OVEREATING may increase the feeling of being bloated and cause more gas.  --> When you are full, stop eating.  3) CONSTIPATION can increase the amount of normal intestinal gas.  --> Avoid constipation by increasing the amount of fiber in your diet by including whole cereal grains, fresh vegetables (except those in the above list) and fresh fruits. High-fiber foods absorb water and carry it out of the body. When increasing the amount of fiber in your diet, you also need to increase the amount of water that you drink. You should drink at least eight 8-ounce glasses of water (two quarts) per day.    Pharyngoesophageal dysphagia  - possibly repeat EGD, discussed procedure with patient and possible esophageal dilation may be performed during procedure if indicated, patient verbalized understanding; recommend follow-up after ER evaluation for continued evaluation and management  - educated patient to eat smaller more frequent meals and to eat slowly and advised to eat a soft diet.  - possible UGI/esophagram/esophageal manometry if symptoms persist    Gastroesophageal reflux disease with esophagitis  - continue carafate 1 gram QID PRN  -  CONTINUE   omeprazole (PRILOSEC) 40 MG capsule; Take 1 capsule (40 mg total) TWICE DAILY, take in the morning before breakfast & before dinner, discussed about possible long term use of medication (prefer to use lowest effective dose or discontinuing if possible) and discussed the risks & benefits with taking a reflux medication long term, and to take OTC calcium and vitamin d supplements as directed (such as Citracal +D), pt verbalized understanding  - discussed with patient about long term use of reflux medications and the risk of using these medications long term. Some research studies (not all) have shown decrease absorption of calcium when taking PPI's and H2 blockers, but those same  research studies showed that adequate dietary (milk and cheese) and/or supplement of calcium and vitamin d supplement induces enough acid secretion for calcium absorption. Also, discussed about the risk vs benefit of untreated GERD such as victoria's esophagus.  - recommend annual monitoring with blood work to include CMP, CBC, vitamin B12, and magnesium.  -continue lifestyle modifications to help control/reduce reflux/abdominal pain including: avoid large meals, avoid eating within 2-3 hours of bedtime (avoid late night eating & lying down soon after eating), elevate head of bed if nocturnal symptoms are present, smoking cessation (if current smoker), & weight loss (if overweight).   - avoid known foods which trigger reflux symptoms & to minimize/avoid high-fat foods, chocolate, caffeine, citrus, alcohol, & tomato products.  -Advised to avoid/limit use of NSAID's, since they can cause GI upset, bleeding, and/or ulcers. If needed, take with food.    Hiatal hernia  -discussed diagnosis with patient & that it is usually managed by controlling reflux symptoms, surgery is an option, but usually performed if reflux is uncontrolled by medication management, if symptoms persist despite medication management refer to general surgery to discuss about surgical options, patient verbalized understanding    Irregular bowel habits, S/P cholecystectomy, & History of IBS  -  START   dicyclomine (BENTYL) 10 MG capsule; Take 1 capsule (10 mg total) by mouth every 6 (six) hours as needed. PRN Abdominal pain/cramping  Dispense: 60 capsule; Refill: 1  - discussed with patient that a side effect of narcotic pain medications is constipation, advised patient to talk to provider who manages pain medication and to try to stop or decrease use of narcotics, patient verbalized understanding  - recommended increase fiber in diet, especially soluble fiber since this can help bulk up the stool consistency and may help to slow down how fast the  stool goes through the colon and can prevent diarrhea  - recommended OTC probiotics, such as Align or Culturelle, as directed  - avoid lactose  - possible trial of Colestid pending results of testing and if symptoms persist (if not contraindicated)    History of diverticulosis  - discussed the diagnosis of diverticulosis and diverticulitis, & to prevent diverticulitis, high fiber diet is recommended.  - Recommended daily exercise, adequate water intake (six 8-oz glasses of water daily), and high fiber diet. OTC fiber supplements are recommended if diet does not reach daily fiber goal (25 grams daily), such as Metamucil, Citrucel, or FiberCon (take as directed, separate from other oral medications by >2 hours).  - Advised to avoid/minimize popcorn, corn, seeds, and nuts.  - complete ct abdomen pelvis as previously ordered    History of colon polyps & family history of colon cancer  - next surveillance colonoscopy due 2/2018    Return in about 1 month (around 11/3/2017), or if symptoms worsen or fail to improve.    If no improvement in symptoms or symptoms worsen, call/follow-up at clinic or go to ER.

## 2017-10-03 NOTE — ED NOTES
"Patient here with complaints of "funny feeling" in left chest onset last PM while urinating. She states that it did not last very long and is gone now. She does report a heaviness/ aching in left arm. States that she has had symptoms off and on for about one year. Patient placed on cardiac monitor showing NSR. Skin warm and dry. Respirations even and non labored.  "

## 2017-10-03 NOTE — ED PROVIDER NOTES
"Encounter Date: 10/3/2017    SCRIBE #1 NOTE: IShaggy, am scribing for, and in the presence of, Dr. Bach .       History     Chief Complaint   Patient presents with    Palpitations     palpitations and chest pain last night. No symptoms today       10/03/2017 2:24 PM     Chief complaint: Palpitations      Jovana Fall is a 72 y.o. female with CAD, Diverticulosis, DM, IBS, HTN, HLD and GERD who presents to the ED with complaints of palpitations x 1 day and left sided CP with LUE radiation x 6 months. Pt states she felt a "weird sensation." She notes HTN and an irregular heart beat upon checking her pulse immediately after. She also notes SOB, nausea no vomiting, fatigue and diaphoresis for sometime. She notes 2 previous angiograms with blockage and denies cardiac stents. She is followed by cardiologist Dr. Carmona who she has an appointment with in a few weeks. Pt was advised by PCP to present to the ED to r/o cardiac event. Allergens include adhesive, Codeine, Metformin, Morphine, Penicillin and Sulfa.             The history is provided by the patient.     Review of patient's allergies indicates:   Allergen Reactions    Adhesive tape-silicones      Other reaction(s): Swelling  Other reaction(s): Itching    Codeine Itching    Metformin Itching    Morphine Itching    Penicillins Itching     Other reaction(s): Itching    Sulfa (sulfonamide antibiotics) Itching     Other reaction(s): Itching     Past Medical History:   Diagnosis Date    Anxiety     Basal cell carcinoma 2014    left upper arm     BCC (basal cell carcinoma of skin) 01/07/16    mid upper back    BCC (basal cell carcinoma)     left upper back    Calcium nephrolithiasis     Cataract     Colon polyp     Coronary artery disease     Cortical cataract - Both Eyes 4/29/2013    Depression     Diabetes mellitus     Displacement of lumbar intervertebral disc without myelopathy 5/6/2013    Diverticulosis     GERD (gastroesophageal reflux " disease)     H. pylori infection     Hepatomegaly     History of melanoma in situ 2/2/2015    Mid upper back ; 5/2014     Hx of colonic polyps 1/27/2015    Hyperlipidemia     Hypertension     Lactose intolerance     Lumbar spinal stenosis 5/6/2013    Melanoma     mid upper back- in situ 5/2014    Nuclear sclerosis - Both Eyes 4/29/2013    Osteopenia     Spinal stenosis, lumbar region, with neurogenic claudication 5/6/2013    Spondylosis without myelopathy 5/6/2013    Type 2 diabetes mellitus      Past Surgical History:   Procedure Laterality Date    APPENDECTOMY      BROW LIFT AND BLEPHAROPLASTY      BUNIONECTOMY Right     CARPAL TUNNEL RELEASE Bilateral     CHOLECYSTECTOMY      COLONOSCOPY  02/05/2015    Dr. Blas, repeat in 3-5 years    HEMORRHOID SURGERY      HYSTERECTOMY      ovaries remain    LUMBAR LAMINECTOMY      RHINOPLASTY TIP      SKIN CANCER EXCISION      TUBAL LIGATION      UPPER GASTROINTESTINAL ENDOSCOPY  2015    Dr. Blas    UPPER GASTROINTESTINAL ENDOSCOPY  03/30/2017    Dr. Dickson     Family History   Problem Relation Age of Onset    Heart disease Mother      MI    Cancer Mother      SQ carcinoma of lung    Heart attack Mother     Skin cancer Mother     Cancer Father      prostate    Ovarian cancer Cousin     Colon cancer Cousin     Skin cancer Daughter     Cancer Sister      uterine sarcoma    Colon cancer Brother 60    No Known Problems Son     No Known Problems Sister     Diabetes Sister     Hypertension Sister     Arthritis Sister     No Known Problems Daughter     Amblyopia Neg Hx     Blindness Neg Hx     Cataracts Neg Hx     Glaucoma Neg Hx     Retinal detachment Neg Hx     Strabismus Neg Hx     Stroke Neg Hx     Thyroid disease Neg Hx     Melanoma Neg Hx     Psoriasis Neg Hx     Lupus Neg Hx     Eczema Neg Hx     Crohn's disease Neg Hx     Ulcerative colitis Neg Hx      Social History   Substance Use Topics    Smoking status:  Never Smoker    Smokeless tobacco: Never Used    Alcohol use Yes      Comment: socially     Review of Systems   Constitutional: Positive for diaphoresis and fatigue. Negative for fever.   HENT: Negative for sore throat.    Respiratory: Positive for shortness of breath.    Cardiovascular: Positive for chest pain and palpitations.   Gastrointestinal: Positive for nausea. Negative for vomiting.   Genitourinary: Negative for dysuria.   Musculoskeletal: Positive for myalgias (LUE radiation). Negative for back pain.   Skin: Negative for rash.   Neurological: Negative for weakness.   Hematological: Does not bruise/bleed easily.       Physical Exam     Initial Vitals [10/03/17 1413]   BP Pulse Resp Temp SpO2   (!) 179/90 76 16 98.3 °F (36.8 °C) 97 %      MAP       119.67         Physical Exam    Nursing note and vitals reviewed.  Constitutional: She appears well-developed and well-nourished.   HENT:   Head: Atraumatic.   Eyes: EOM are normal. Pupils are equal, round, and reactive to light.   Neck: Normal range of motion.   Cardiovascular: Normal rate and regular rhythm.   Pulmonary/Chest: Breath sounds normal.   Abdominal: Soft. Bowel sounds are normal. She exhibits no distension. There is no tenderness. There is no rebound and no guarding.   Musculoskeletal: Normal range of motion.        Right shoulder: Normal.        Left shoulder: Normal.   Neurological: She is alert and oriented to person, place, and time.   Skin: Skin is warm and dry.   Psychiatric: She has a normal mood and affect.         ED Course   Procedures  Labs Reviewed - No data to display          Medical Decision Making:   Initial Assessment:   72-year-old female presented with a chief complaint of palpitations.  Differential Diagnosis:   Initial differential diagnosis included but not limited to cardiac arrhythmia, atypical MI, dehydration, and vasovagal episode.  Clinical Tests:   Lab Tests: Ordered and Reviewed  Radiological Study: Ordered and  Reviewed  Medical Tests: Ordered and Reviewed  ED Management:  The patient was emergently evaluated in the emergency Department, her evaluation was significant for an elderly female with a normal lung exam.  The patient's EKG showed no acute abnormalities per my independent interpretation.  The patient's x-ray showed no acute abnormalities per my independent interpretation.  The patient's labs were significant for a negative troponin and showed no acute processes.  The patient's symptoms are resolved, and she is stable for discharge to home.  The patient did have an episode of nausea in the emergency Department, which she reports she feels hungry and has not eaten today, this was all with a dose of IV Zofran.  She will be discharged home to follow-up with her PCP for further care, edition she has been told to follow-up with her cardiologist as an outpatient for further workup as well.  Her diagnosis is palpitations.            Scribe Attestation:   Scribe #1: I performed the above scribed service and the documentation accurately describes the services I performed. I attest to the accuracy of the note.    Attending Attestation:           Physician Attestation for Scribe:  Physician Attestation Statement for Scribe #1: I, Dr. Bach, reviewed documentation, as scribed by Shaggy Mendez in my presence, and it is both accurate and complete.                 ED Course      Clinical Impression:   The encounter diagnosis was Palpitations.                           Bladimir Bach MD  10/03/17 1729       Bladimir Bach MD  10/27/17 1922

## 2017-10-03 NOTE — TELEPHONE ENCOUNTER
Needs to be seen before any tests can be ordered. Given her history and the nature of her symptoms suggesting a cardiac event, I recommend she go to the ER.

## 2017-10-05 ENCOUNTER — TELEPHONE (OUTPATIENT)
Dept: GASTROENTEROLOGY | Facility: CLINIC | Age: 72
End: 2017-10-05

## 2017-10-05 DIAGNOSIS — R19.8 IRREGULAR BOWEL HABITS: ICD-10-CM

## 2017-10-05 DIAGNOSIS — Z90.49 S/P CHOLECYSTECTOMY: ICD-10-CM

## 2017-10-05 DIAGNOSIS — R10.84 GENERALIZED ABDOMINAL PAIN: Primary | ICD-10-CM

## 2017-10-05 DIAGNOSIS — Z87.19 HISTORY OF IBS: ICD-10-CM

## 2017-10-05 DIAGNOSIS — R74.8 ELEVATED LIPASE: ICD-10-CM

## 2017-10-05 DIAGNOSIS — R14.0 ABDOMINAL BLOATING: ICD-10-CM

## 2017-10-05 DIAGNOSIS — Z87.19 HISTORY OF DIVERTICULOSIS: ICD-10-CM

## 2017-10-05 NOTE — TELEPHONE ENCOUNTER
----- Message from Chandni Metz sent at 10/5/2017  3:26 PM CDT -----  Contact: self  Patient called regarding having MRI. She states that all test from ED came out normal. Please contact her at [phone].    Thanks!

## 2017-10-05 NOTE — TELEPHONE ENCOUNTER
Pt  states has been cleared of cardiac issues by the ER, ready to schedule Ct for sometime next week.

## 2017-10-09 NOTE — TELEPHONE ENCOUNTER
There is not an order in only a referral to radiology. We cannot schedule from referral.   Please place order then we can get pt scheduled.   Thanks

## 2017-10-19 ENCOUNTER — HOSPITAL ENCOUNTER (OUTPATIENT)
Dept: RADIOLOGY | Facility: HOSPITAL | Age: 72
Discharge: HOME | End: 2017-10-19
Attending: NURSE PRACTITIONER
Payer: MEDICARE

## 2017-10-19 DIAGNOSIS — R74.8 ELEVATED LIPASE: ICD-10-CM

## 2017-10-19 DIAGNOSIS — R19.8 IRREGULAR BOWEL HABITS: ICD-10-CM

## 2017-10-19 DIAGNOSIS — R10.84 GENERALIZED ABDOMINAL PAIN: ICD-10-CM

## 2017-10-19 DIAGNOSIS — Z87.19 HISTORY OF IBS: ICD-10-CM

## 2017-10-19 DIAGNOSIS — R14.0 ABDOMINAL BLOATING: ICD-10-CM

## 2017-10-19 DIAGNOSIS — Z90.49 S/P CHOLECYSTECTOMY: ICD-10-CM

## 2017-10-19 DIAGNOSIS — Z87.19 HISTORY OF DIVERTICULOSIS: ICD-10-CM

## 2017-10-19 PROCEDURE — 74177 CT ABD & PELVIS W/CONTRAST: CPT | Mod: TC

## 2017-10-19 PROCEDURE — 25500020 PHARM REV CODE 255

## 2017-10-19 PROCEDURE — 74177 CT ABD & PELVIS W/CONTRAST: CPT | Mod: 26,,, | Performed by: RADIOLOGY

## 2017-10-19 RX ADMIN — IOHEXOL 75 ML: 350 INJECTION, SOLUTION INTRAVENOUS at 01:10

## 2017-10-19 RX ADMIN — IOHEXOL 30 ML: 350 INJECTION, SOLUTION INTRAVENOUS at 01:10

## 2017-10-20 ENCOUNTER — TELEPHONE (OUTPATIENT)
Dept: GASTROENTEROLOGY | Facility: CLINIC | Age: 72
End: 2017-10-20

## 2017-10-20 NOTE — TELEPHONE ENCOUNTER
Please call to inform & review the results with the patient- ct scan showed no acute findings of the GI organs. Diverticulosis without diverticulitis (no signs of infection/inflammation). Recommend high fiber diet/OTC fiber supplements daily as directed.  Hiatal hernia noted- seen on last EGD on 3/30/17. Left kidney cyst- seen on previous ultrasound from 3/27/17 (cyst are typically benign). Otherwise, unremarkable findings.  Continue with previous recommendations.  Please release results to patient's mychart once you have discussed results and recommendations with patient.  Thanks,  Clare MANCILLA

## 2017-10-30 NOTE — TELEPHONE ENCOUNTER
----- Message from Eugene Moran sent at 10/30/2017  2:52 PM CDT -----  Contact: self [phone]  Type: Rx    Name of medication(s):  TRUE METRIX GLUCOSE TEST STRIP Strp    Is this a refill? New rx? Refill      Who prescribed medication?    Pharmacy Name, Phone, & Location: [facility] [phone]     Comments: please call and advise , Thanks !

## 2017-11-07 ENCOUNTER — PATIENT MESSAGE (OUTPATIENT)
Dept: INTERNAL MEDICINE | Facility: CLINIC | Age: 72
End: 2017-11-07

## 2017-11-08 ENCOUNTER — TELEPHONE (OUTPATIENT)
Dept: INTERNAL MEDICINE | Facility: CLINIC | Age: 72
End: 2017-11-08

## 2017-11-08 NOTE — TELEPHONE ENCOUNTER
----- Message from Nida Hoang sent at 11/8/2017 11:57 AM CST -----  Contact: Self/ [phone]   Pt want to have her medication refill. Pt do not know which one she need to have a refill but she stated which ever one is called to be refilled. Pt want to have the medication sent to [facility] - [place], [place] - [address] [phone] (Phone)  [phone] (Fax) . Please call and advise     Thank you

## 2017-11-13 ENCOUNTER — OFFICE VISIT (OUTPATIENT)
Dept: DERMATOLOGY | Facility: CLINIC | Age: 72
End: 2017-11-13
Payer: MEDICARE

## 2017-11-13 VITALS — BODY MASS INDEX: 28.72 KG/M2 | WEIGHT: 183 LBS | HEIGHT: 67 IN

## 2017-11-13 DIAGNOSIS — L72.0 MILIA: ICD-10-CM

## 2017-11-13 DIAGNOSIS — L82.1 SEBORRHEIC KERATOSES: ICD-10-CM

## 2017-11-13 DIAGNOSIS — D09.9 SQUAMOUS CELL CARCINOMA IN SITU: Primary | ICD-10-CM

## 2017-11-13 PROCEDURE — 99999 PR PBB SHADOW E&M-EST. PATIENT-LVL II: CPT | Mod: PBBFAC,,, | Performed by: DERMATOLOGY

## 2017-11-13 PROCEDURE — 99213 OFFICE O/P EST LOW 20 MIN: CPT | Mod: S$GLB,,, | Performed by: DERMATOLOGY

## 2017-11-13 NOTE — PROGRESS NOTES
Subjective:       Patient ID:  Jovana Fall is a 72 y.o. female who presents for   Chief Complaint   Patient presents with    Squamous Cell Carcinoma     AK with focal transition to SCCIS     Patient last seen 7/28/2017  Lesion bx on R cheek, treated with imiquimod 5 times weekly for 2 weeks, stopped because intense redness developed  Has since healed nicely  Here for reevaluation of the site  Also c/o new lesions on labia, new sexual partners in the recent past, worried they may represent warts      FINAL PATHOLOGIC DIAGNOSIS  1. Skin, right cheek, shave biopsy:  - ACTINIC KERATOSIS WITH FOCAL TRANSITION TO SQUAMOUS CELL CARCINOMA IN SITU.  - MARGINS ARE NEGATIVE FOR FULL THICKNESS SQUAMOUS ATYPIA IN THE PLANES OF SECTION.  - THE LESION IS ULCERATED.  MICROSCOPIC DESCRIPTION: Sections show atypia within the lowermost epidermal layers associated with foci  showing full-thickness atypia. The underlying papillary dermis shows solar elastosis. There is epidermal ulceration  with underlying dermal necrosis and inflammation.  Diagnosed by: Marilu Hunter M.D.    H/o AKs treated with cryotherapy      H/o multiple BCCs  Basal cell carcinoma 2014 left upper arm   BCC (basal cell carcinoma of skin) mid upper back  BCC (basal cell carcinoma)  left upper back  superficial BCC mid upper back 1/2016, E&S  BCC R mid infraorbital, 4/2014  Severely DN left mid upper back 4/2014    Prev under the care of Dr Fraser          Squamous Cell Carcinoma  - Follow-up  Diagnosis: AK with focal transition to SCCIS.  Symptom course: resolved  Currently using: aldara cream.  Affected locations: right cheek  Signs / symptoms: asymptomatic  Severity: mild        Review of Systems   Constitutional: Positive for fatigue and night sweats. Negative for fever, chills, weight loss, weight gain and malaise.   Skin: Positive for daily sunscreen use, activity-related sunscreen use and wears hat.   Hematologic/Lymphatic: Bruises/bleeds easily.         Objective:    Physical Exam   Constitutional: She appears well-developed and well-nourished. No distress.   Genitourinary:         Neurological: She is alert and oriented to person, place, and time. She is not disoriented.   Psychiatric: She has a normal mood and affect.   Skin:   Areas Examined (abnormalities noted in diagram):   Head / Face Inspection Performed  Genitals / Buttocks / Groin Inspection Performed                   Diagram Legend     Erythematous scaling macule/papule c/w actinic keratosis       Vascular papule c/w angioma      Pigmented verrucoid papule/plaque c/w seborrheic keratosis      Yellow umbilicated papule c/w sebaceous hyperplasia      Irregularly shaped tan macule c/w lentigo     1-2 mm smooth white papules consistent with Milia      Movable subcutaneous cyst with punctum c/w epidermal inclusion cyst      Subcutaneous movable cyst c/w pilar cyst      Firm pink to brown papule c/w dermatofibroma      Pedunculated fleshy papule(s) c/w skin tag(s)      Evenly pigmented macule c/w junctional nevus     Mildly variegated pigmented, slightly irregular-bordered macule c/w mildly atypical nevus      Flesh colored to evenly pigmented papule c/w intradermal nevus       Pink pearly papule/plaque c/w basal cell carcinoma      Erythematous hyperkeratotic cursted plaque c/w SCC      Surgical scar with no sign of skin cancer recurrence      Open and closed comedones      Inflammatory papules and pustules      Verrucoid papule consistent consistent with wart     Erythematous eczematous patches and plaques     Dystrophic onycholytic nail with subungual debris c/w onychomycosis     Umbilicated papule    Erythematous-base heme-crusted tan verrucoid plaque consistent with inflamed seborrheic keratosis     Erythematous Silvery Scaling Plaque c/w Psoriasis     See annotation      Assessment / Plan:        Squamous cell carcinoma in situ  AK/transition to SCCIS  Complete resolution post imiquimod  Monitor  site    Milia, labia majora  reassurance    Seborrheic keratoses  These are benign inherited growths without a malignant potential. Reassurance given to patient. No treatment is necessary.            No Follow-up on file.

## 2017-11-29 DIAGNOSIS — K21.9 GASTROESOPHAGEAL REFLUX DISEASE WITHOUT ESOPHAGITIS: ICD-10-CM

## 2017-11-29 DIAGNOSIS — R10.84 GENERALIZED ABDOMINAL PAIN: ICD-10-CM

## 2017-11-29 DIAGNOSIS — R11.0 NAUSEA: ICD-10-CM

## 2017-11-29 DIAGNOSIS — I10 ESSENTIAL HYPERTENSION: ICD-10-CM

## 2017-12-01 RX ORDER — OMEPRAZOLE 40 MG/1
40 CAPSULE, DELAYED RELEASE ORAL EVERY MORNING
Qty: 90 CAPSULE | Refills: 1 | Status: SHIPPED | OUTPATIENT
Start: 2017-12-01 | End: 2018-05-16 | Stop reason: SDUPTHER

## 2017-12-01 RX ORDER — LOSARTAN POTASSIUM 50 MG/1
TABLET ORAL
Qty: 90 TABLET | Refills: 2 | Status: SHIPPED | OUTPATIENT
Start: 2017-12-01 | End: 2017-12-20 | Stop reason: DRUGHIGH

## 2017-12-01 RX ORDER — GABAPENTIN 300 MG/1
CAPSULE ORAL
Qty: 90 CAPSULE | Refills: 2 | Status: SHIPPED | OUTPATIENT
Start: 2017-12-01 | End: 2018-07-23 | Stop reason: SDUPTHER

## 2017-12-01 RX ORDER — ASPIRIN 325 MG
TABLET, DELAYED RELEASE (ENTERIC COATED) ORAL
Qty: 12 CAPSULE | Refills: 1 | Status: SHIPPED | OUTPATIENT
Start: 2017-12-01 | End: 2018-05-16 | Stop reason: SDUPTHER

## 2017-12-01 RX ORDER — VENLAFAXINE 100 MG/1
TABLET ORAL
Qty: 90 TABLET | Refills: 1 | Status: SHIPPED | OUTPATIENT
Start: 2017-12-01 | End: 2018-04-11 | Stop reason: SDUPTHER

## 2017-12-20 ENCOUNTER — IMMUNIZATION (OUTPATIENT)
Dept: INTERNAL MEDICINE | Facility: CLINIC | Age: 72
End: 2017-12-20
Payer: MEDICARE

## 2017-12-20 ENCOUNTER — OFFICE VISIT (OUTPATIENT)
Dept: INTERNAL MEDICINE | Facility: CLINIC | Age: 72
End: 2017-12-20
Payer: MEDICARE

## 2017-12-20 ENCOUNTER — LAB VISIT (OUTPATIENT)
Dept: LAB | Facility: HOSPITAL | Age: 72
End: 2017-12-20
Attending: INTERNAL MEDICINE
Payer: MEDICARE

## 2017-12-20 VITALS
HEART RATE: 75 BPM | BODY MASS INDEX: 29.76 KG/M2 | HEIGHT: 67 IN | WEIGHT: 189.63 LBS | DIASTOLIC BLOOD PRESSURE: 98 MMHG | SYSTOLIC BLOOD PRESSURE: 140 MMHG

## 2017-12-20 DIAGNOSIS — E11.51 DM (DIABETES MELLITUS), TYPE 2 WITH PERIPHERAL VASCULAR COMPLICATIONS: ICD-10-CM

## 2017-12-20 DIAGNOSIS — E11.69 HYPERLIPIDEMIA ASSOCIATED WITH TYPE 2 DIABETES MELLITUS: ICD-10-CM

## 2017-12-20 DIAGNOSIS — E78.5 HYPERLIPIDEMIA ASSOCIATED WITH TYPE 2 DIABETES MELLITUS: ICD-10-CM

## 2017-12-20 DIAGNOSIS — R82.90 ABNORMAL URINE ODOR: ICD-10-CM

## 2017-12-20 DIAGNOSIS — I25.10 CORONARY ARTERY DISEASE INVOLVING NATIVE CORONARY ARTERY OF NATIVE HEART WITHOUT ANGINA PECTORIS: ICD-10-CM

## 2017-12-20 DIAGNOSIS — F33.9 MAJOR DEPRESSION, RECURRENT, CHRONIC: ICD-10-CM

## 2017-12-20 DIAGNOSIS — Z23 INFLUENZA VACCINE NEEDED: ICD-10-CM

## 2017-12-20 DIAGNOSIS — M85.80 OSTEOPENIA, UNSPECIFIED LOCATION: ICD-10-CM

## 2017-12-20 DIAGNOSIS — M48.061 SPINAL STENOSIS OF LUMBAR REGION, UNSPECIFIED WHETHER NEUROGENIC CLAUDICATION PRESENT: ICD-10-CM

## 2017-12-20 DIAGNOSIS — F41.1 GAD (GENERALIZED ANXIETY DISORDER): ICD-10-CM

## 2017-12-20 DIAGNOSIS — R53.83 FATIGUE, UNSPECIFIED TYPE: ICD-10-CM

## 2017-12-20 DIAGNOSIS — I10 ESSENTIAL HYPERTENSION: Primary | ICD-10-CM

## 2017-12-20 LAB
25(OH)D3+25(OH)D2 SERPL-MCNC: 55 NG/ML
ALBUMIN SERPL BCP-MCNC: 4 G/DL
ALP SERPL-CCNC: 129 U/L
ALT SERPL W/O P-5'-P-CCNC: 19 U/L
ANION GAP SERPL CALC-SCNC: 9 MMOL/L
AST SERPL-CCNC: 18 U/L
BASOPHILS # BLD AUTO: 0.05 K/UL
BASOPHILS NFR BLD: 0.7 %
BILIRUB SERPL-MCNC: 0.6 MG/DL
BUN SERPL-MCNC: 16 MG/DL
CALCIUM SERPL-MCNC: 9.4 MG/DL
CHLORIDE SERPL-SCNC: 108 MMOL/L
CHOLEST SERPL-MCNC: 157 MG/DL
CHOLEST/HDLC SERPL: 4.1 {RATIO}
CO2 SERPL-SCNC: 26 MMOL/L
CREAT SERPL-MCNC: 0.7 MG/DL
DIFFERENTIAL METHOD: NORMAL
EOSINOPHIL # BLD AUTO: 0.2 K/UL
EOSINOPHIL NFR BLD: 3 %
ERYTHROCYTE [DISTWIDTH] IN BLOOD BY AUTOMATED COUNT: 12.5 %
EST. GFR  (AFRICAN AMERICAN): >60 ML/MIN/1.73 M^2
EST. GFR  (NON AFRICAN AMERICAN): >60 ML/MIN/1.73 M^2
ESTIMATED AVG GLUCOSE: 120 MG/DL
GLUCOSE SERPL-MCNC: 124 MG/DL
HBA1C MFR BLD HPLC: 5.8 %
HCT VFR BLD AUTO: 42.2 %
HDLC SERPL-MCNC: 38 MG/DL
HDLC SERPL: 24.2 %
HGB BLD-MCNC: 14.1 G/DL
LDLC SERPL CALC-MCNC: 89 MG/DL
LYMPHOCYTES # BLD AUTO: 2.7 K/UL
LYMPHOCYTES NFR BLD: 35.5 %
MCH RBC QN AUTO: 30.2 PG
MCHC RBC AUTO-ENTMCNC: 33.4 G/DL
MCV RBC AUTO: 90 FL
MONOCYTES # BLD AUTO: 0.4 K/UL
MONOCYTES NFR BLD: 5.8 %
NEUTROPHILS # BLD AUTO: 4.1 K/UL
NEUTROPHILS NFR BLD: 54.7 %
NONHDLC SERPL-MCNC: 119 MG/DL
PLATELET # BLD AUTO: 215 K/UL
PMV BLD AUTO: 10.5 FL
POTASSIUM SERPL-SCNC: 4.3 MMOL/L
PROT SERPL-MCNC: 7.1 G/DL
RBC # BLD AUTO: 4.67 M/UL
SODIUM SERPL-SCNC: 143 MMOL/L
TRIGL SERPL-MCNC: 150 MG/DL
TSH SERPL DL<=0.005 MIU/L-ACNC: 0.78 UIU/ML
WBC # BLD AUTO: 7.55 K/UL

## 2017-12-20 PROCEDURE — 83036 HEMOGLOBIN GLYCOSYLATED A1C: CPT

## 2017-12-20 PROCEDURE — 84443 ASSAY THYROID STIM HORMONE: CPT

## 2017-12-20 PROCEDURE — G0008 ADMIN INFLUENZA VIRUS VAC: HCPCS | Mod: S$GLB,,, | Performed by: INTERNAL MEDICINE

## 2017-12-20 PROCEDURE — 99499 UNLISTED E&M SERVICE: CPT | Mod: S$GLB,,, | Performed by: INTERNAL MEDICINE

## 2017-12-20 PROCEDURE — 80061 LIPID PANEL: CPT

## 2017-12-20 PROCEDURE — 99999 PR PBB SHADOW E&M-EST. PATIENT-LVL III: CPT | Mod: PBBFAC,,, | Performed by: INTERNAL MEDICINE

## 2017-12-20 PROCEDURE — 36415 COLL VENOUS BLD VENIPUNCTURE: CPT

## 2017-12-20 PROCEDURE — 80053 COMPREHEN METABOLIC PANEL: CPT

## 2017-12-20 PROCEDURE — 85025 COMPLETE CBC W/AUTO DIFF WBC: CPT

## 2017-12-20 PROCEDURE — 82306 VITAMIN D 25 HYDROXY: CPT

## 2017-12-20 PROCEDURE — 99214 OFFICE O/P EST MOD 30 MIN: CPT | Mod: S$GLB,,, | Performed by: INTERNAL MEDICINE

## 2017-12-20 PROCEDURE — 90662 IIV NO PRSV INCREASED AG IM: CPT | Mod: S$GLB,,, | Performed by: INTERNAL MEDICINE

## 2017-12-20 RX ORDER — OXYCODONE AND ACETAMINOPHEN 5; 325 MG/1; MG/1
1 TABLET ORAL 2 TIMES DAILY PRN
Qty: 60 TABLET | Refills: 0 | Status: SHIPPED | OUTPATIENT
Start: 2017-12-20 | End: 2018-04-11 | Stop reason: SDUPTHER

## 2017-12-20 RX ORDER — LOSARTAN POTASSIUM 100 MG/1
100 TABLET ORAL DAILY
Qty: 90 TABLET | Refills: 1 | Status: SHIPPED | OUTPATIENT
Start: 2017-12-20 | End: 2018-04-11 | Stop reason: SDUPTHER

## 2017-12-20 RX ORDER — CLONAZEPAM 0.5 MG/1
0.5 TABLET ORAL 2 TIMES DAILY PRN
Qty: 60 TABLET | Refills: 5 | Status: SHIPPED | OUTPATIENT
Start: 2017-12-20 | End: 2018-07-10 | Stop reason: SDUPTHER

## 2017-12-26 NOTE — PROGRESS NOTES
Subjective:       Patient ID: Jovana Fall is a 72 y.o. female.    Chief Complaint: Hypertension    Last seen 4 months ago. Returns for f/u chronic medical conditions. Blood pressure has been high at all recent visits. Taking meds as prescribed. C/O fatigue. ER visit 10/3/17 for chest pain - MI ruled out. Last seen by Cardiology two years ago, needs f/u. Has seen GI recently, CT Abdomen for abdominal pain revealed only a small hiatal hernia, diverticulosis and 2.9cm left renal cyst.     PMH: , misc x 1.   Diabetes Type 2. HbA1c HbA1c 5.9% Oct. '16.  Hypertension.   Mixed Hyperlipidemia, , HDL 39, LDL 91 Oct. '16.  Nephrolithiasis.   Urinary incontinence.  GERD. EGD 9/15, 3/17 - mild chronic non-active gastritis, no dysplasia, H pylori negative.  Depression/Anxiety.  Non-obstructive Coronary artery disease, angiogram , Cardiology 10/15.  Osteopenia.   Mild Vitamin D deficiency, 37.  Lumbar Spinal Stenosis. Cervical Spine DJD.   Skin Cancer, Basal Cell and Melanoma.     Mammo normal . Pelvic exam . BMD 3/16 stable Osteopenia. Colonoscopy 1/15 - sigmoid diverticuli, one tubular adenoma 2-3mm, 5 yr f/u due to family history. Eye exam . Pneumovax 10/12. Zostavax 10/13. Prevnar 7/15. Flu shot 10/16.    PSH:   Cholecystectomy.   Bilateral carpal tunnel release.   Hysterectomy. Ovaries remain.  Lumbar laminectomy  Appendectomy  Tubal ligation.  Hemorrhoidectomy  Rhinoplasty  Blepharoplasty, ears pinned.  Cystoscopy, ureteral stent.  Benign tumor removed left knee.   Right Bunionectomy and Hammertoe surgery.  Skin Cancer excision.    Social: Nonsmoker. Occas. alcohol. . 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    FMH: Colon and ovarian cancer in cousin. Colon cancer in younger brother. Mother had heart disease with MI at age 71. MGM with cervical cancer. Two aunts with diabetes.     Allergies: multiple, see Med Card.     Medications: list reviewed and reconciled.                Review of Systems   Constitutional: Positive for diaphoresis and fatigue. Negative for chills, fever and unexpected weight change.   HENT: Negative for congestion, ear pain, hearing loss, sinus pain and sore throat.    Eyes: Negative for pain and visual disturbance.   Respiratory: Negative for cough, shortness of breath and wheezing.    Cardiovascular: Negative for chest pain, palpitations and leg swelling.   Gastrointestinal: Negative for blood in stool, constipation, diarrhea, nausea and vomiting.   Endocrine: Negative for polydipsia and polyuria.   Genitourinary: Negative for dysuria, flank pain, frequency and hematuria.        Urine deep in color.    Musculoskeletal: Positive for arthralgias and back pain.   Skin: Negative for color change and rash.   Neurological: Negative for dizziness, syncope, weakness and headaches.   Psychiatric/Behavioral: Positive for dysphoric mood. Negative for agitation and suicidal ideas. The patient is nervous/anxious.        Objective:    /98, Pulse 75, Wt 189.6 lbs (from 185), BMI=29.7  Physical Exam   Constitutional: She is oriented to person, place, and time. She appears well-developed and well-nourished. No distress.   HENT:   Nose: Nose normal.   Mouth/Throat: Oropharynx is clear and moist.   Eyes: EOM are normal. No scleral icterus.   Neck: Normal range of motion. Neck supple. No JVD present.   Cardiovascular: Normal rate, regular rhythm and normal heart sounds.    Pulmonary/Chest: Effort normal and breath sounds normal. No respiratory distress. She has no wheezes. She has no rales.   Abdominal: Soft. Bowel sounds are normal. She exhibits no distension. There is no tenderness.   Musculoskeletal: Normal range of motion. She exhibits no edema.   Neurological: She is alert and oriented to person, place, and time. No cranial nerve deficit. Coordination normal.   Skin: Skin is warm and dry. No rash noted. She is not diaphoretic.   Psychiatric: Her speech is  normal and behavior is normal. Judgment and thought content normal. She exhibits a depressed mood.       Assessment:       1. Essential hypertension    2. DM (diabetes mellitus), type 2 with peripheral vascular complications    3. Hyperlipidemia associated with type 2 diabetes mellitus    4. Coronary artery disease involving native coronary artery of native heart without angina pectoris    5. Fatigue, unspecified type    6. Abnormal urine odor    7. Spinal stenosis of lumbar region, unspecified whether neurogenic claudication present    8. Osteopenia, unspecified location    9. Major depression, recurrent, chronic    10. SUDARSHAN (generalized anxiety disorder)    11. Influenza vaccine needed        Plan:       Essential hypertension - not at goal.  -     Increase Losartan (COZAAR) from 50 to 100 MG tablet; Take 1 tablet (100 mg total) by mouth once daily.  Dispense: 90 tablet; Refill: 1    DM (diabetes mellitus), type 2 with peripheral vascular complications  -     Hemoglobin A1c; Future; Expected date: 12/20/2017    Hyperlipidemia associated with type 2 diabetes mellitus  -     Lipid panel; Future; Expected date: 12/20/2017    Coronary artery disease involving native coronary artery of native heart without angina pectoris        -     Cardiology follow up to be scheduled.    Fatigue, unspecified type  -     CBC auto differential; Future; Expected date: 12/20/2017  -     Comprehensive metabolic panel; Future; Expected date: 12/20/2017  -     TSH; Future; Expected date: 12/20/2017    Abnormal urine odor  -     Urinalysis  -     Urine culture    Spinal stenosis of lumbar region, unspecified whether neurogenic claudication present  -     oxyCODONE-acetaminophen (PERCOCET) 5-325 mg per tablet; Take 1 tablet by mouth 2 (two) times daily as needed for Pain.  Dispense: 60 tablet; Refill: 0    Osteopenia, unspecified location  -     Vitamin D; Future; Expected date: 12/20/2017    Major depression, recurrent, chronic        -      Continue Venlafaxine.    SUDARSHAN (generalized anxiety disorder)  -     clonazePAM (KLONOPIN) 0.5 MG tablet; Take 1 tablet (0.5 mg total) by mouth 2 (two) times daily as needed.  Dispense: 60 tablet; Refill: 5    Influenza vaccine needed        -     Flu shot today.

## 2017-12-28 ENCOUNTER — PATIENT MESSAGE (OUTPATIENT)
Dept: INTERNAL MEDICINE | Facility: CLINIC | Age: 72
End: 2017-12-28

## 2018-01-09 RX ORDER — SITAGLIPTIN 50 MG/1
TABLET, FILM COATED ORAL
Qty: 90 TABLET | Refills: 1 | Status: SHIPPED | OUTPATIENT
Start: 2018-01-09 | End: 2018-05-16 | Stop reason: SDUPTHER

## 2018-01-22 DIAGNOSIS — E11.9 TYPE 2 DIABETES MELLITUS WITHOUT COMPLICATION: ICD-10-CM

## 2018-01-23 RX ORDER — CALCIUM CITRATE/VITAMIN D3 200MG-6.25
TABLET ORAL
Qty: 100 STRIP | Refills: 3 | Status: SHIPPED | OUTPATIENT
Start: 2018-01-23 | End: 2018-11-12 | Stop reason: SDUPTHER

## 2018-01-23 RX ORDER — GLUCOSAM/CHON-MSM1/C/MANG/BOSW 500-416.6
TABLET ORAL
Qty: 100 EACH | Refills: 3 | Status: SHIPPED | OUTPATIENT
Start: 2018-01-23 | End: 2018-08-29 | Stop reason: SDUPTHER

## 2018-02-01 ENCOUNTER — TELEPHONE (OUTPATIENT)
Dept: INTERNAL MEDICINE | Facility: CLINIC | Age: 73
End: 2018-02-01

## 2018-02-01 DIAGNOSIS — E11.51 DM (DIABETES MELLITUS), TYPE 2 WITH PERIPHERAL VASCULAR COMPLICATIONS: Primary | ICD-10-CM

## 2018-02-01 NOTE — TELEPHONE ENCOUNTER
----- Message from Cristiana Garsia sent at 2/1/2018  7:59 AM CST -----  Contact: patient [phone]  Pt called [employer] trying to get a new rx from you for a part for her True Metrix . Pt has test strips and lancets but the instrument is broken that she sticks her finger with. Pt c/o retaining fluid .

## 2018-02-02 ENCOUNTER — TELEPHONE (OUTPATIENT)
Dept: RESEARCH | Facility: HOSPITAL | Age: 73
End: 2018-02-02

## 2018-02-04 RX ORDER — LANCETS 26 GAUGE
1 EACH MISCELLANEOUS DAILY
Qty: 1 EACH | Refills: 0 | Status: SHIPPED | OUTPATIENT
Start: 2018-02-04 | End: 2020-01-23 | Stop reason: SDUPTHER

## 2018-02-06 ENCOUNTER — TELEPHONE (OUTPATIENT)
Dept: INTERNAL MEDICINE | Facility: CLINIC | Age: 73
End: 2018-02-06

## 2018-02-06 DIAGNOSIS — I10 ESSENTIAL HYPERTENSION: Primary | ICD-10-CM

## 2018-02-06 RX ORDER — CHLORTHALIDONE 25 MG/1
25 TABLET ORAL DAILY
Qty: 30 TABLET | Refills: 2 | Status: SHIPPED | OUTPATIENT
Start: 2018-02-06 | End: 2018-03-05

## 2018-02-06 NOTE — TELEPHONE ENCOUNTER
Blood pressure is consistently uncontrolled. Add Chlorthalidone 25mg one tab daily, continue Losartan and Carvedilol the same.   Schedule BP check with BMP in 3-4 weeks. No fasting required. Portal message sent.

## 2018-02-06 NOTE — TELEPHONE ENCOUNTER
----- Message from Amanda Adamson sent at 2/6/2018  3:29 PM CST -----  Contact: Patient [phone]  BP Readings:    01/18/2018 151/83  01/19/2018 154/87  01/20/2018 158/78  01/23/2018 156/94  01/26/2018 158/88  01/28/2018 153/87  01/30/2018 147/82  02/01/2018 166/93 rechecked 141/83  02/04/2018 152/89  02/05/2018 160/94 rechecked 144/90 rechecked 136/87  02/06/2018 147/83    Thank You

## 2018-02-27 ENCOUNTER — TELEPHONE (OUTPATIENT)
Dept: INTERNAL MEDICINE | Facility: CLINIC | Age: 73
End: 2018-02-27

## 2018-02-27 NOTE — TELEPHONE ENCOUNTER
----- Message from January Harris sent at 2/20/2018  8:13 AM CST -----  Contact: Self Call  Mobile: [phone]   Patient was put on a Dieretic , chlorthalidone (HYGROTEN) 25 MG Tab. It's giving her stomach issues with diarrhea. She want to know if she should discontinue taking them?

## 2018-03-05 ENCOUNTER — OFFICE VISIT (OUTPATIENT)
Dept: CARDIOLOGY | Facility: CLINIC | Age: 73
End: 2018-03-05
Payer: MEDICARE

## 2018-03-05 ENCOUNTER — HOSPITAL ENCOUNTER (OUTPATIENT)
Dept: CARDIOLOGY | Facility: CLINIC | Age: 73
Discharge: HOME | End: 2018-03-05
Payer: MEDICARE

## 2018-03-05 ENCOUNTER — TELEPHONE (OUTPATIENT)
Dept: CARDIOLOGY | Facility: CLINIC | Age: 73
End: 2018-03-05

## 2018-03-05 VITALS
DIASTOLIC BLOOD PRESSURE: 75 MMHG | WEIGHT: 188.06 LBS | HEART RATE: 75 BPM | HEIGHT: 67 IN | SYSTOLIC BLOOD PRESSURE: 128 MMHG | BODY MASS INDEX: 29.52 KG/M2 | OXYGEN SATURATION: 94 %

## 2018-03-05 DIAGNOSIS — I25.10 CORONARY ARTERY DISEASE, ANGINA PRESENCE UNSPECIFIED, UNSPECIFIED VESSEL OR LESION TYPE, UNSPECIFIED WHETHER NATIVE OR TRANSPLANTED HEART: Primary | ICD-10-CM

## 2018-03-05 DIAGNOSIS — E11.9 TYPE 2 DIABETES MELLITUS WITHOUT COMPLICATION, WITHOUT LONG-TERM CURRENT USE OF INSULIN: Chronic | ICD-10-CM

## 2018-03-05 DIAGNOSIS — E11.69 HYPERLIPIDEMIA ASSOCIATED WITH TYPE 2 DIABETES MELLITUS: Chronic | ICD-10-CM

## 2018-03-05 DIAGNOSIS — E66.3 OVERWEIGHT (BMI 25.0-29.9): ICD-10-CM

## 2018-03-05 DIAGNOSIS — I25.10 CAD (CORONARY ARTERY DISEASE): ICD-10-CM

## 2018-03-05 DIAGNOSIS — R06.09 DOE (DYSPNEA ON EXERTION): ICD-10-CM

## 2018-03-05 DIAGNOSIS — I70.0 ABDOMINAL AORTIC ATHEROSCLEROSIS: ICD-10-CM

## 2018-03-05 DIAGNOSIS — I25.10 CORONARY ARTERY DISEASE DUE TO CALCIFIED CORONARY LESION: Primary | Chronic | ICD-10-CM

## 2018-03-05 DIAGNOSIS — R00.2 PALPITATION: ICD-10-CM

## 2018-03-05 DIAGNOSIS — E11.59 HYPERTENSION ASSOCIATED WITH DIABETES: ICD-10-CM

## 2018-03-05 DIAGNOSIS — E78.5 HYPERLIPIDEMIA ASSOCIATED WITH TYPE 2 DIABETES MELLITUS: Chronic | ICD-10-CM

## 2018-03-05 DIAGNOSIS — I77.9 CAROTID DISEASE, BILATERAL: Chronic | ICD-10-CM

## 2018-03-05 DIAGNOSIS — I15.2 HYPERTENSION ASSOCIATED WITH DIABETES: ICD-10-CM

## 2018-03-05 DIAGNOSIS — R00.2 PALPITATION: Primary | ICD-10-CM

## 2018-03-05 DIAGNOSIS — I25.84 CORONARY ARTERY DISEASE DUE TO CALCIFIED CORONARY LESION: Primary | Chronic | ICD-10-CM

## 2018-03-05 PROCEDURE — 93000 ELECTROCARDIOGRAM COMPLETE: CPT | Mod: S$GLB,,, | Performed by: INTERNAL MEDICINE

## 2018-03-05 PROCEDURE — 99499 UNLISTED E&M SERVICE: CPT | Mod: S$GLB,,, | Performed by: NURSE PRACTITIONER

## 2018-03-05 PROCEDURE — 99214 OFFICE O/P EST MOD 30 MIN: CPT | Mod: S$GLB,,, | Performed by: NURSE PRACTITIONER

## 2018-03-05 PROCEDURE — 3074F SYST BP LT 130 MM HG: CPT | Mod: S$GLB,,, | Performed by: NURSE PRACTITIONER

## 2018-03-05 PROCEDURE — 99999 PR PBB SHADOW E&M-EST. PATIENT-LVL III: CPT | Mod: PBBFAC,,, | Performed by: NURSE PRACTITIONER

## 2018-03-05 PROCEDURE — 3078F DIAST BP <80 MM HG: CPT | Mod: S$GLB,,, | Performed by: NURSE PRACTITIONER

## 2018-03-05 NOTE — PATIENT INSTRUCTIONS
Do not take the carvedilol the night before and the morning of the stress echo.     Increase cardiovascular exercise to 30 minutes of brisk walking a day for 4-5 days a week.  You can use a stationary bike or swim for 30 minutes a day instead of walking.  Whatever exercise you choose, make sure you are working hard enough to increase your heart rate.

## 2018-03-05 NOTE — TELEPHONE ENCOUNTER
----- Message from Sonal Partida sent at 3/5/2018  1:23 PM CST -----  Regarding: EKG ORDER  Please put in EKG order, thanks. Sonal [phone]

## 2018-03-05 NOTE — PROGRESS NOTES
Ms. Fall is a patient of Dr. Connell and was last seen in [facility] Cardiology Visit 10/6/15.     Subjective:   Patient ID:  Jovana Fall is a 72 y.o. female who presents for follow-up of Essential hypertension; Chest Pain; and Arm Pain (left arm )    Problems:  CAD seen CT 3/22/17  Aortic atherosclerosis 3/22/17  Carotid artery disease, ROSETTE 20-39% in 2014  DM type II  HLD  HTN    HPI  Ms. Fall is in clinic today for routine follow up.  Reports chest pain/pressure 1-2 times a week at night when she lies down.  The pressure is in the center of her chest that radiates down her left arm.  The pain lasts 5-10 minutes and is not associated with SOB, sweating, or nausea.  Chest pressure is not associated exertion, typically with lying down.  Reports TAVERA with vacuuming and going up a flight of stairs.  Patient denies chest pain with exertion or at rest, palpitations, SOB, dizziness, syncope, edema, orthopnea, PND, or claudication.  Reports routine exercise. She walks her dogs several times a day and goes up and down the stairs.  She is treated with low dose ASA and high intensity statin.  Patient is taking atorvastatin 80mg and LDL is 89.   Stress echo 7/2014: normal left diastolic and systolic function, EF 60-65%.      Review of Systems   Constitution: Negative for decreased appetite, diaphoresis, weakness, malaise/fatigue, weight gain and weight loss.   Eyes: Negative for visual disturbance.   Cardiovascular: Positive for dyspnea on exertion. Negative for chest pain, claudication, irregular heartbeat, leg swelling, near-syncope, orthopnea, palpitations, paroxysmal nocturnal dyspnea and syncope.        Reports chest pressure   Respiratory: Negative for cough, hemoptysis, shortness of breath, sleep disturbances due to breathing and snoring.    Endocrine: Negative for cold intolerance and heat intolerance.   Hematologic/Lymphatic: Negative for bleeding problem. Does not bruise/bleed easily.   Musculoskeletal: Negative for  myalgias.   Gastrointestinal: Negative for bloating, abdominal pain, anorexia, change in bowel habit, constipation, diarrhea, nausea and vomiting.   Neurological: Negative for difficulty with concentration, disturbances in coordination, excessive daytime sleepiness, dizziness, headaches, light-headedness, loss of balance and numbness.   Psychiatric/Behavioral: The patient does not have insomnia.      Allergies and current medications updated and reviewed:  Review of patient's allergies indicates:   Allergen Reactions    Adhesive tape-silicones      Other reaction(s): Swelling  Other reaction(s): Itching    Codeine Itching    Metformin Itching    Morphine Itching    Penicillins Itching     Other reaction(s): Itching    Sulfa (sulfonamide antibiotics) Itching     Other reaction(s): Itching     Current Outpatient Prescriptions   Medication Sig    aspirin 81 mg Tab Take 81 mg by mouth. 1 Tablet Oral Every day    atorvastatin (LIPITOR) 80 MG tablet Take 1 tablet (80 mg total) by mouth once daily.    carvedilol (COREG) 12.5 MG tablet Take 1 tablet (12.5 mg total) by mouth 2 (two) times daily.    cholecalciferol, vitamin D3, 50,000 unit capsule TAKE 1 CAPSULE EVERY SEVEN DAYS    clonazePAM (KLONOPIN) 0.5 MG tablet Take 1 tablet (0.5 mg total) by mouth 2 (two) times daily as needed.    fish oil-omega-3 fatty acids 300-1,000 mg capsule     gabapentin (NEURONTIN) 300 MG capsule TAKE 1 CAPSULE EVERY EVENING    JANUVIA 50 mg Tab TAKE 1 TABLET EVERY DAY    lancets Misc 1 lancet by Misc.(Non-Drug; Combo Route) route once daily.    lancing device with lancets Kit 1 Device by Misc.(Non-Drug; Combo Route) route once daily.    losartan (COZAAR) 100 MG tablet Take 1 tablet (100 mg total) by mouth once daily.    nitroGLYCERIN (NITROSTAT) 0.4 MG SL tablet Take one tab under the tongue every 5 minutes for max of three doses for chest pain. If chest pain not resolved to to the ER.    omeprazole (PRILOSEC) 40 MG capsule  "Take 1 capsule (40 mg total) by mouth every morning. (Patient taking differently: Take 40 mg by mouth 2 (two) times daily before meals. )    oxyCODONE-acetaminophen (PERCOCET) 5-325 mg per tablet Take 1 tablet by mouth 2 (two) times daily as needed for Pain.    sucralfate (CARAFATE) 1 gram tablet Take 1 tablet (1 g total) by mouth 4 (four) times daily. (Patient taking differently: Take 1 g by mouth 4 (four) times daily as needed. )    TRUE METRIX GLUCOSE TEST STRIP Strp TEST BLOOD SUGAR ONE TIME DAILY    TRUEPLUS LANCETS 30 gauge Misc USE ONE TIME DAILY    valacyclovir (VALTREX) 500 MG tablet Take 1 tablet (500 mg total) by mouth 2 (two) times daily as needed. Treat for 3 days.    venlafaxine (EFFEXOR) 100 MG Tab TAKE 1 TABLET EVERY DAY     No current facility-administered medications for this visit.      Objective:     Right Arm BP - Sittin/79 (18 1316)  Left Arm BP - Sittin/75 (18 1316)    /75 (BP Location: Left arm, Patient Position: Sitting, BP Method: Large (Automatic))   Pulse 75   Ht 5' 7" (1.702 m)   Wt 85.3 kg (188 lb 0.8 oz)   SpO2 (!) 94%   BMI 29.45 kg/m²     Physical Exam   Constitutional: She is oriented to person, place, and time. Vital signs are normal. She appears well-developed and well-nourished. She is active. No distress.   HENT:   Head: Normocephalic and atraumatic.   Eyes: Conjunctivae and lids are normal. No scleral icterus.   Neck: Neck supple. Normal carotid pulses, no hepatojugular reflux and no JVD present. Carotid bruit is not present.   Cardiovascular: Normal rate, regular rhythm, S1 normal, S2 normal and intact distal pulses.  PMI is not displaced.  Exam reveals no gallop and no friction rub.    No murmur heard.  Pulses:       Carotid pulses are 2+ on the right side, and 2+ on the left side.       Radial pulses are 2+ on the right side, and 2+ on the left side.        Dorsalis pedis pulses are 2+ on the right side, and 2+ on the left side.       "  Posterior tibial pulses are 1+ on the right side, and 1+ on the left side.   Pulmonary/Chest: Effort normal and breath sounds normal. No respiratory distress. She has no decreased breath sounds. She has no wheezes. She has no rhonchi. She has no rales. She exhibits no tenderness.   Abdominal: Soft. Normal appearance and bowel sounds are normal. She exhibits no distension, no fluid wave, no abdominal bruit, no ascites and no pulsatile midline mass. There is no hepatosplenomegaly. There is no tenderness.   Musculoskeletal: She exhibits no edema.   Neurological: She is alert and oriented to person, place, and time. Gait normal.   Skin: Skin is warm, dry and intact. No rash noted. She is not diaphoretic. Nails show no clubbing.   Psychiatric: She has a normal mood and affect. Her speech is normal and behavior is normal. Judgment and thought content normal. Cognition and memory are normal.   Nursing note and vitals reviewed.      Chemistry        Component Value Date/Time     12/20/2017 1126    K 4.3 12/20/2017 1126     12/20/2017 1126    CO2 26 12/20/2017 1126    BUN 16 12/20/2017 1126    CREATININE 0.7 12/20/2017 1126     (H) 12/20/2017 1126        Component Value Date/Time    CALCIUM 9.4 12/20/2017 1126    ALKPHOS 129 12/20/2017 1126    AST 18 12/20/2017 1126    ALT 19 12/20/2017 1126    BILITOT 0.6 12/20/2017 1126    ESTGFRAFRICA >60 12/20/2017 1126    EGFRNONAA >60 12/20/2017 1126        Lab Results   Component Value Date    HGBA1C 5.8 (H) 12/20/2017       Recent Labs  Lab 10/03/17  1445 12/20/17  1126   WHITE BLOOD CELL COUNT 10.70 7.55   HEMOGLOBIN 14.6 14.1   HEMATOCRIT 43.6 42.2   MCV 90 90   PLATELETS 251 215   BNP 14  --    TSH  --  0.784   CHOLESTEROL  --  157   HDL  --  38 L   LDL CHOLESTEROL  --  89.0   TRIGLYCERIDES  --  150   HDL/CHOLESTEROL RATIO  --  24.2          Test(s) Reviewed  I have reviewed the following in detail:  [x] Stress test   [] Angiography   [x] Echocardiogram   [x]  Labs   [] Other:       Assessment/Plan:     Coronary artery disease due to calcified coronary lesion  Comments:  Symptomatic: TAVERA with diaphoresis. Last ischemic w/u in 2014. Repeat stress echo. Continue ASA and high intensity statin  Orders:  -     Exercise stress echo with color flow; Future    Carotid disease, bilateral  Comments:  ROSETTE mild disease. Continue ASA and lynne intensity statin therapy    Hyperlipidemia associated with type 2 diabetes mellitus  Comments:  LDL fairly well controlled, 89. Continue atorvastatin 80mg    Abdominal aortic atherosclerosis    Hypertension associated with diabetes  Comments:  BP at goal <130/80. Continue current regimen. Stop coreg night before and morning of stress echo.     Type 2 diabetes mellitus without complication, without long-term current use of insulin  Comments:  A1C at goal <7. F/U with PCP as planned.     Overweight (BMI 25.0-29.9)  Comments:  BMI 29.5    TAVERA (dyspnea on exertion)  -     Exercise stress echo with color flow; Future      Follow-up in about 3 months (around 6/5/2018).

## 2018-03-08 ENCOUNTER — TELEPHONE (OUTPATIENT)
Dept: INTERNAL MEDICINE | Facility: CLINIC | Age: 73
End: 2018-03-08

## 2018-03-08 NOTE — TELEPHONE ENCOUNTER
Pt saw her cardiologist on the 5th and he dc'd pts [place] pt with no more diarrhea. Pt requesting to visit with you on next Wednesday , she will be in town. Pt scheduled .

## 2018-03-08 NOTE — TELEPHONE ENCOUNTER
----- Message from January Harris sent at 2/28/2018  3:19 PM CST -----  Contact: self  Mobile: [phone]   Patient has had diarrhea since Saturday.  Should she discontinue taking her chlorthalidone (HYGROTEN) 25 MG Tab until it pass or get off all together and glucose issa higher then  Normal.     Call her ASAP.      [facility]   [phone] (Phone) or  [phone] (Fax)

## 2018-03-20 DIAGNOSIS — I10 ESSENTIAL HYPERTENSION: ICD-10-CM

## 2018-03-20 RX ORDER — CARVEDILOL 12.5 MG/1
12.5 TABLET ORAL 2 TIMES DAILY
Qty: 180 TABLET | Refills: 2 | OUTPATIENT
Start: 2018-03-20

## 2018-03-20 RX ORDER — CARVEDILOL 12.5 MG/1
TABLET ORAL
Qty: 180 TABLET | Refills: 2 | Status: SHIPPED | OUTPATIENT
Start: 2018-03-20 | End: 2018-04-11 | Stop reason: SDUPTHER

## 2018-03-20 NOTE — TELEPHONE ENCOUNTER
----- Message from Eugene Moran sent at 3/20/2018 11:27 AM CDT -----  Contact: self [phone]  Type: Rx    Name of medication(s): carvedilol (COREG) 12.5 MG tablet    Is this a refill? New rx? Refill      Who prescribed medication?    Pharmacy Name, Phone, & Location: [facility]     Comments: please advise, Thanks

## 2018-03-27 ENCOUNTER — PES CALL (OUTPATIENT)
Dept: ADMINISTRATIVE | Facility: CLINIC | Age: 73
End: 2018-03-27

## 2018-03-29 ENCOUNTER — PATIENT MESSAGE (OUTPATIENT)
Dept: CARDIOLOGY | Facility: CLINIC | Age: 73
End: 2018-03-29

## 2018-03-29 ENCOUNTER — HOSPITAL ENCOUNTER (OUTPATIENT)
Dept: CARDIOLOGY | Facility: CLINIC | Age: 73
Discharge: HOME | End: 2018-03-29
Attending: NURSE PRACTITIONER
Payer: MEDICARE

## 2018-03-29 DIAGNOSIS — I25.10 CORONARY ARTERY DISEASE DUE TO CALCIFIED CORONARY LESION: Chronic | ICD-10-CM

## 2018-03-29 DIAGNOSIS — I25.84 CORONARY ARTERY DISEASE DUE TO CALCIFIED CORONARY LESION: Chronic | ICD-10-CM

## 2018-03-29 DIAGNOSIS — R06.09 DOE (DYSPNEA ON EXERTION): ICD-10-CM

## 2018-03-29 LAB
DIASTOLIC DYSFUNCTION: NO
ESTIMATED PA SYSTOLIC PRESSURE: 26.23
RETIRED EF AND QEF - SEE NOTES: 63 (ref 55–65)
TRICUSPID VALVE REGURGITATION: NORMAL

## 2018-03-29 PROCEDURE — 93325 DOPPLER ECHO COLOR FLOW MAPG: CPT | Mod: S$GLB,,, | Performed by: INTERNAL MEDICINE

## 2018-03-29 PROCEDURE — 93351 STRESS TTE COMPLETE: CPT | Mod: S$GLB,,, | Performed by: INTERNAL MEDICINE

## 2018-03-29 PROCEDURE — 93320 DOPPLER ECHO COMPLETE: CPT | Mod: S$GLB,,, | Performed by: INTERNAL MEDICINE

## 2018-04-02 DIAGNOSIS — Z00.6 RESEARCH SUBJECT: Primary | ICD-10-CM

## 2018-04-03 ENCOUNTER — RESEARCH ENCOUNTER (OUTPATIENT)
Dept: RESEARCH | Facility: HOSPITAL | Age: 73
End: 2018-04-03

## 2018-04-03 NOTE — PROGRESS NOTES
Date Consent signed: 03/29/2018    Sponsor: Bev Blankenship MD    Study Title/IRB Number: [id number]    Principle Investigator: Bev Blankenship MD    Present for Discussion: Jovanasteven FallArline     Is LAR Consenting for Subject: No    Prior to the Informed Consent (IC) being signed, or any study protocol required data collection, testing, procedure, or intervention being performed, the following was done and/or discussed:   Patient was given a copy of the IC for review    Purpose of the study and qualifications to participate    Study design, Follow up schedule, and tests or procedures done at each visit   Confidentiality and HIPAA Authorization for Release of Medical Records for the research trial/ subject's rights/research related injury   Risk, Benefits, Alternative Treatments, Compensation and Costs   Participation in the research trial is voluntary and patient may withdraw at anytime   Contact information for study related questions    Patient verbalizes understanding of the above: Yes  Contact information for CRC and PI given to patient: Yes  Patient able to adequately summarize: the purpose of the study, the risks associated with the study, and all procedures, testing, and follow-ups associated with the study: Yes    Jovana Elbagilmar Fall signed the informed consent form for the SEE research study with an IRB approval date of 10/17/2017.  Each page of the consent form was reviewed with Jovanasteven Fall (and pts family) and all questions answered satisfactorily. Jovana Cristiana Juan David signed the consent form and received a copy of same. The original consent was scanned into electronic medical records (EPIC) and filed into the subject's research study binder.

## 2018-04-11 ENCOUNTER — OFFICE VISIT (OUTPATIENT)
Dept: INTERNAL MEDICINE | Facility: CLINIC | Age: 73
End: 2018-04-11
Payer: MEDICARE

## 2018-04-11 ENCOUNTER — LAB VISIT (OUTPATIENT)
Dept: LAB | Facility: HOSPITAL | Age: 73
End: 2018-04-11
Attending: INTERNAL MEDICINE
Payer: MEDICARE

## 2018-04-11 VITALS
HEIGHT: 67 IN | HEART RATE: 73 BPM | BODY MASS INDEX: 28.72 KG/M2 | SYSTOLIC BLOOD PRESSURE: 116 MMHG | WEIGHT: 183 LBS | DIASTOLIC BLOOD PRESSURE: 80 MMHG

## 2018-04-11 VITALS
WEIGHT: 183 LBS | SYSTOLIC BLOOD PRESSURE: 116 MMHG | DIASTOLIC BLOOD PRESSURE: 89 MMHG | HEART RATE: 73 BPM | BODY MASS INDEX: 28.66 KG/M2

## 2018-04-11 DIAGNOSIS — E11.51 DM (DIABETES MELLITUS), TYPE 2 WITH PERIPHERAL VASCULAR COMPLICATIONS: ICD-10-CM

## 2018-04-11 DIAGNOSIS — F33.9 MAJOR DEPRESSION, RECURRENT, CHRONIC: ICD-10-CM

## 2018-04-11 DIAGNOSIS — I70.0 THORACIC AORTA ATHEROSCLEROSIS: ICD-10-CM

## 2018-04-11 DIAGNOSIS — Z23 NEED FOR DIPHTHERIA-TETANUS-PERTUSSIS (TDAP) VACCINE: ICD-10-CM

## 2018-04-11 DIAGNOSIS — I77.9 CAROTID DISEASE, BILATERAL: ICD-10-CM

## 2018-04-11 DIAGNOSIS — N76.1 CHRONIC VAGINITIS: ICD-10-CM

## 2018-04-11 DIAGNOSIS — M47.816 LUMBAR FACET ARTHROPATHY: ICD-10-CM

## 2018-04-11 DIAGNOSIS — E11.69 HYPERLIPIDEMIA ASSOCIATED WITH TYPE 2 DIABETES MELLITUS: ICD-10-CM

## 2018-04-11 DIAGNOSIS — I11.9 HYPERTENSIVE HEART DISEASE WITHOUT HEART FAILURE: Primary | ICD-10-CM

## 2018-04-11 DIAGNOSIS — Z12.31 ENCOUNTER FOR SCREENING MAMMOGRAM FOR BREAST CANCER: ICD-10-CM

## 2018-04-11 DIAGNOSIS — E11.59 HYPERTENSION ASSOCIATED WITH DIABETES: ICD-10-CM

## 2018-04-11 DIAGNOSIS — E11.21 TYPE 2 DIABETES MELLITUS WITH DIABETIC NEPHROPATHY, WITHOUT LONG-TERM CURRENT USE OF INSULIN: ICD-10-CM

## 2018-04-11 DIAGNOSIS — Z00.00 ENCOUNTER FOR PREVENTIVE HEALTH EXAMINATION: Primary | ICD-10-CM

## 2018-04-11 DIAGNOSIS — I25.10 CORONARY ARTERY DISEASE INVOLVING NATIVE CORONARY ARTERY OF NATIVE HEART WITHOUT ANGINA PECTORIS: ICD-10-CM

## 2018-04-11 DIAGNOSIS — M25.541 ARTHRALGIA OF BOTH HANDS: ICD-10-CM

## 2018-04-11 DIAGNOSIS — F41.1 GENERALIZED ANXIETY DISORDER: ICD-10-CM

## 2018-04-11 DIAGNOSIS — K21.9 GASTROESOPHAGEAL REFLUX DISEASE, ESOPHAGITIS PRESENCE NOT SPECIFIED: ICD-10-CM

## 2018-04-11 DIAGNOSIS — E78.5 HYPERLIPIDEMIA ASSOCIATED WITH TYPE 2 DIABETES MELLITUS: ICD-10-CM

## 2018-04-11 DIAGNOSIS — M85.80 OSTEOPENIA, UNSPECIFIED LOCATION: ICD-10-CM

## 2018-04-11 DIAGNOSIS — I70.0 ABDOMINAL AORTIC ATHEROSCLEROSIS: ICD-10-CM

## 2018-04-11 DIAGNOSIS — K21.9 GASTROESOPHAGEAL REFLUX DISEASE WITHOUT ESOPHAGITIS: ICD-10-CM

## 2018-04-11 DIAGNOSIS — I11.9 HYPERTENSIVE HEART DISEASE WITHOUT HEART FAILURE: ICD-10-CM

## 2018-04-11 DIAGNOSIS — M47.816 ARTHRITIS OF LUMBAR SPINE: ICD-10-CM

## 2018-04-11 DIAGNOSIS — I15.2 HYPERTENSION ASSOCIATED WITH DIABETES: ICD-10-CM

## 2018-04-11 DIAGNOSIS — N18.2 STAGE 2 CHRONIC KIDNEY DISEASE: ICD-10-CM

## 2018-04-11 DIAGNOSIS — M25.542 ARTHRALGIA OF BOTH HANDS: ICD-10-CM

## 2018-04-11 PROBLEM — R10.13 EPIGASTRIC PAIN: Status: RESOLVED | Noted: 2017-03-30 | Resolved: 2018-04-11

## 2018-04-11 PROBLEM — E11.29 TYPE 2 DIABETES MELLITUS WITH RENAL MANIFESTATIONS: Status: ACTIVE | Noted: 2018-04-11

## 2018-04-11 LAB
ANION GAP SERPL CALC-SCNC: 8 MMOL/L
BACTERIA #/AREA URNS AUTO: ABNORMAL /HPF
BILIRUB UR QL STRIP: NEGATIVE
BUN SERPL-MCNC: 18 MG/DL
CALCIUM SERPL-MCNC: 9.8 MG/DL
CAOX CRY UR QL COMP ASSIST: ABNORMAL
CHLORIDE SERPL-SCNC: 108 MMOL/L
CLARITY UR REFRACT.AUTO: ABNORMAL
CO2 SERPL-SCNC: 26 MMOL/L
COLOR UR AUTO: YELLOW
CREAT SERPL-MCNC: 0.8 MG/DL
CREAT UR-MCNC: 263 MG/DL
CRP SERPL-MCNC: 2 MG/L
ERYTHROCYTE [SEDIMENTATION RATE] IN BLOOD BY WESTERGREN METHOD: 5 MM/HR
EST. GFR  (AFRICAN AMERICAN): >60 ML/MIN/1.73 M^2
EST. GFR  (NON AFRICAN AMERICAN): >60 ML/MIN/1.73 M^2
ESTIMATED AVG GLUCOSE: 117 MG/DL
GLUCOSE SERPL-MCNC: 117 MG/DL
GLUCOSE UR QL STRIP: NEGATIVE
HBA1C MFR BLD HPLC: 5.7 %
HGB UR QL STRIP: NEGATIVE
KETONES UR QL STRIP: NEGATIVE
LEUKOCYTE ESTERASE UR QL STRIP: NEGATIVE
MICROALBUMIN UR DL<=1MG/L-MCNC: 12 UG/ML
MICROALBUMIN/CREATININE RATIO: 4.6 UG/MG
MICROSCOPIC COMMENT: ABNORMAL
NITRITE UR QL STRIP: NEGATIVE
PH UR STRIP: 5 [PH] (ref 5–8)
POTASSIUM SERPL-SCNC: 4.7 MMOL/L
PROT UR QL STRIP: NEGATIVE
RBC #/AREA URNS AUTO: 3 /HPF (ref 0–4)
RHEUMATOID FACT SERPL-ACNC: <10 IU/ML
SODIUM SERPL-SCNC: 142 MMOL/L
SP GR UR STRIP: 1.03 (ref 1–1.03)
SQUAMOUS #/AREA URNS AUTO: 8 /HPF
URN SPEC COLLECT METH UR: ABNORMAL
UROBILINOGEN UR STRIP-ACNC: NEGATIVE EU/DL
WBC #/AREA URNS AUTO: 1 /HPF (ref 0–5)

## 2018-04-11 PROCEDURE — 99499 UNLISTED E&M SERVICE: CPT | Mod: S$GLB,,, | Performed by: NURSE PRACTITIONER

## 2018-04-11 PROCEDURE — 36415 COLL VENOUS BLD VENIPUNCTURE: CPT

## 2018-04-11 PROCEDURE — 3044F HG A1C LEVEL LT 7.0%: CPT | Mod: CPTII,S$GLB,, | Performed by: INTERNAL MEDICINE

## 2018-04-11 PROCEDURE — 80048 BASIC METABOLIC PNL TOTAL CA: CPT

## 2018-04-11 PROCEDURE — 86140 C-REACTIVE PROTEIN: CPT

## 2018-04-11 PROCEDURE — 99215 OFFICE O/P EST HI 40 MIN: CPT | Mod: S$GLB,,, | Performed by: INTERNAL MEDICINE

## 2018-04-11 PROCEDURE — 83036 HEMOGLOBIN GLYCOSYLATED A1C: CPT

## 2018-04-11 PROCEDURE — G0439 PPPS, SUBSEQ VISIT: HCPCS | Mod: S$GLB,,, | Performed by: NURSE PRACTITIONER

## 2018-04-11 PROCEDURE — 99999 PR PBB SHADOW E&M-EST. PATIENT-LVL IV: CPT | Mod: PBBFAC,,, | Performed by: INTERNAL MEDICINE

## 2018-04-11 PROCEDURE — 3079F DIAST BP 80-89 MM HG: CPT | Mod: CPTII,S$GLB,, | Performed by: INTERNAL MEDICINE

## 2018-04-11 PROCEDURE — 81001 URINALYSIS AUTO W/SCOPE: CPT

## 2018-04-11 PROCEDURE — 86431 RHEUMATOID FACTOR QUANT: CPT

## 2018-04-11 PROCEDURE — 82043 UR ALBUMIN QUANTITATIVE: CPT

## 2018-04-11 PROCEDURE — 99499 UNLISTED E&M SERVICE: CPT | Mod: S$GLB,,, | Performed by: INTERNAL MEDICINE

## 2018-04-11 PROCEDURE — 99999 PR PBB SHADOW E&M-EST. PATIENT-LVL V: CPT | Mod: PBBFAC,,, | Performed by: NURSE PRACTITIONER

## 2018-04-11 PROCEDURE — 3074F SYST BP LT 130 MM HG: CPT | Mod: CPTII,S$GLB,, | Performed by: INTERNAL MEDICINE

## 2018-04-11 PROCEDURE — 85651 RBC SED RATE NONAUTOMATED: CPT

## 2018-04-11 RX ORDER — VENLAFAXINE 100 MG/1
100 TABLET ORAL DAILY
Qty: 90 TABLET | Refills: 2 | Status: SHIPPED | OUTPATIENT
Start: 2018-04-11 | End: 2018-11-12 | Stop reason: SDUPTHER

## 2018-04-11 RX ORDER — CARVEDILOL 12.5 MG/1
12.5 TABLET ORAL 2 TIMES DAILY
Qty: 180 TABLET | Refills: 2 | Status: SHIPPED | OUTPATIENT
Start: 2018-04-11 | End: 2018-11-12 | Stop reason: SDUPTHER

## 2018-04-11 RX ORDER — LOSARTAN POTASSIUM 100 MG/1
100 TABLET ORAL DAILY
Qty: 90 TABLET | Refills: 2 | Status: SHIPPED | OUTPATIENT
Start: 2018-04-11 | End: 2018-08-29 | Stop reason: SDUPTHER

## 2018-04-11 RX ORDER — ATORVASTATIN CALCIUM 80 MG/1
80 TABLET, FILM COATED ORAL DAILY
Qty: 90 TABLET | Refills: 2 | Status: SHIPPED | OUTPATIENT
Start: 2018-04-11 | End: 2018-11-12 | Stop reason: SDUPTHER

## 2018-04-11 RX ORDER — OXYCODONE AND ACETAMINOPHEN 5; 325 MG/1; MG/1
1 TABLET ORAL 2 TIMES DAILY PRN
Qty: 60 TABLET | Refills: 0 | Status: SHIPPED | OUTPATIENT
Start: 2018-04-11 | End: 2018-11-12 | Stop reason: SDUPTHER

## 2018-04-11 NOTE — PROGRESS NOTES
Jovana Fall presented for a  Medicare AWV and comprehensive Health Risk Assessment today. The following components were reviewed and updated:    · Medical history  · Family History  · Social history  · Allergies and Current Medications  · Health Risk Assessment  · Health Maintenance  · Care Team     ** See Completed Assessments for Annual Wellness Visit within the encounter summary.**       The following assessments were completed:  · Living Situation  · CAGE  · Depression Screening  · Timed Get Up and Go  · Whisper Test  · Cognitive Function Screening  ·   ·   ·   · Nutrition Screening  · ADL Screening  · PAQ Screening    Vitals:    04/11/18 0946   BP: 116/89   Pulse: 73   Weight: 83 kg (182 lb 15.7 oz)     Body mass index is 28.66 kg/m².  Physical Exam   Constitutional: She is oriented to person, place, and time. She appears well-developed and well-nourished.   Cardiovascular:   Pulses:       Dorsalis pedis pulses are 2+ on the right side, and 2+ on the left side.        Posterior tibial pulses are 1+ on the right side, and 1+ on the left side.   Musculoskeletal: Normal range of motion.        Right foot: There is normal range of motion and no deformity.        Left foot: There is normal range of motion and no deformity.   Feet:   Right Foot:   Protective Sensation: 7 sites tested. 7 sites sensed.   Skin Integrity: Negative for ulcer, blister, skin breakdown, erythema, warmth, callus or dry skin.   Left Foot:   Protective Sensation: 7 sites tested. 7 sites sensed.   Skin Integrity: Negative for ulcer, blister, skin breakdown, erythema, warmth, callus or dry skin.   Neurological: She is alert and oriented to person, place, and time.   Skin: Skin is warm and dry.   Psychiatric: She has a normal mood and affect.         Diagnoses and health risks identified today and associated recommendations/orders:    1. Encounter for preventive health examination  Here for Health Risk Assessment/Annual Wellness Visit.  Follow up in  one year.  Discussed shingles vaccine - Shingrix.    2. Hypertension associated with diabetes  Chronic, stable on current medications. Followed by PCP.    3. Abdominal aortic atherosclerosis  Chronic, stable on current medications. Noted CT Renal Stone 11/21/14. Followed by PCP.    4. Thoracic aorta atherosclerosis  Chronic, stable on current medications. Noted CT Chest 3/27/17. Followed by PCP.    5. Coronary artery disease involving native coronary artery of native heart without angina pectoris  Chronic, stable on current medications. Followed by Cardiology.    6. Carotid disease, bilateral  Chronic, stable on current medications. 20-39% right, 0-19% left noted on U/S 7/11/14. Followed by PCP.    7. Hyperlipidemia associated with type 2 diabetes mellitus  Chronic, stable on current medications. Followed by PCP.    8. Type 2 diabetes mellitus with diabetic nephropathy, without long-term current use of insulin  Chronic, stable on current medication. Followed by PCP.    9. Stage 2 chronic kidney disease  Chronic, mild, stable GFR 88. Followed by PCP.    10. Major depression, recurrent, chronic  Chronic, reporting depression/loneliness. Denies SI/HI. PHQ-9 score 9. Seen by PCP prior to HRA visit today.    11. Generalized anxiety disorder  Chronic, stable on current medications. Followed by PCP.    12. Gastroesophageal reflux disease, esophagitis presence not specified  Chronic, stable on current medication. Followed by PCP.    13. Osteopenia, unspecified location  Chronic, stable on current medications. Followed by PCP.    14. Arthritis of lumbar spine  Chronic, stable on current medications. Noted Lumbar XR 5/06/13. Followed by PCP.      Provided Jovana with a 5-10 year written screening schedule and personal prevention plan. Recommendations were developed using the USPSTF age appropriate recommendations. Education, counseling, and referrals were provided as needed. After Visit Summary printed and given to patient  which includes a list of additional screenings\tests needed.    Follow-up in 4 months (on 8/10/2018).with PCP    Suzie Berrios NP

## 2018-04-11 NOTE — PATIENT INSTRUCTIONS
Counseling and Referral of Other Preventative  (Italic type indicates deductible and co-insurance are waived)    Patient Name: Jovana Fall  Today's Date: 4/11/2018    Health Maintenance       Date Due Completion Date    Eye Exam 04/20/2018 4/20/2017    Hemoglobin A1c 06/20/2018 12/20/2017    Lipid Panel 12/20/2018 12/20/2017    DEXA SCAN 03/21/2019 3/21/2016    High Dose Statin 04/11/2019 4/11/2018    Foot Exam 04/11/2019 4/11/2018 (Done)    Override on 4/11/2018: Done    Override on 2/1/2016: Done    Mammogram 04/24/2019 4/24/2017    Colonoscopy 01/27/2020 1/27/2015    TETANUS VACCINE 04/11/2028 4/11/2018 (Done)    Override on 4/11/2018: Done    Override on 10/28/2016: Declined        Consider getting new shingles vaccine - Shingrix.    No orders of the defined types were placed in this encounter.    The following information is provided to all patients.  This information is to help you find resources for any of the problems found today that may be affecting your health:                Living healthy guide: www.[facility].[place].gov      Understanding Diabetes: www.diabetes.org      Eating healthy: www.cdc.gov/healthyweight      CDC home safety checklist: www.cdc.gov/steadi/patient.html      Agency on Aging: www.goea.[place].[facility]      Alcoholics anonymous (AA): www.aa.org      Physical Activity: www.césar.nih.gov/ix5gtyi      Tobacco use: www.quitwithusla.org

## 2018-04-11 NOTE — PROGRESS NOTES
Subjective:       Patient ID: Jovana Fall is a 72 y.o. female.    Chief Complaint: Hypertension (2 mth f/u)    Last seen 3.5 months ago. Had f/u with Cardiology last month, blood pressure was acceptable, EKG normal, Exercise Stress Echo showed concentric remodeling with normal LV systolic and diastolic function, and no ischemia. Returns for f/u chronic medical conditions today. Log of home BP readings (few in number) are often elevated, she did not bring the meter to check its accuracy. Log of fasting glucose readings ranges 110-150 with fluctuations depending on diet. She is compliant with daily meds as prescribed, no adverse effects.     PMH: , misc x 1.   Diabetes Type 2. HbA1c HbA1c 5.8% Dec. '17.  Hypertensive heart disease without heart failure.   Mixed Hyperlipidemia, , LDL 89 Dec. '17.   Nephrolithiasis.   Urinary incontinence.  GERD. EGD 9/15, 3/17 - mild chronic non-active gastritis, no dysplasia, H pylori negative.  Depression/Anxiety.  Non-obstructive Coronary artery disease, angiogram , Cardiology 10/15.  Osteopenia.   Mild Vitamin D deficiency, 37.  Lumbar Spinal Stenosis. Cervical Spine DJD.   Skin Cancer, Basal Cell and Melanoma.     Mammo normal . Pelvic exam . BMD 3/16 stable Osteopenia. Colonoscopy 1/15 - sigmoid diverticuli, one tubular adenoma 2-3mm, 5 yr f/u due to family history. Eye exam . Exercise Stress Echo negative 3/18.  Pneumovax 10/12. Zostavax 10/13. Prevnar 7/15. Flu shot 10/16.    PSH:   Cholecystectomy.   Bilateral carpal tunnel release.   Hysterectomy. Ovaries remain.  Lumbar laminectomy  Appendectomy  Tubal ligation.  Hemorrhoidectomy  Rhinoplasty  Blepharoplasty, ears pinned.  Cystoscopy, ureteral stent.  Benign tumor removed left knee.   Right Bunionectomy and Hammertoe surgery.  Skin Cancer excision.    Social: Nonsmoker. Occas. alcohol. . 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    FMH: Colon and ovarian cancer in  "cousin. Colon cancer in younger brother. Mother had heart disease with MI at age 71. MGM with cervical cancer. Two aunts with diabetes.     Allergies: multiple, see Med Card.     Medications: list reviewed and reconciled.               Review of Systems   Constitutional: Negative for activity change, appetite change, fatigue, fever and unexpected weight change.   HENT: Negative for congestion, hearing loss, rhinorrhea, sneezing, sore throat, trouble swallowing and voice change.    Eyes: Negative for pain and visual disturbance.   Respiratory: Negative for cough, chest tightness, shortness of breath and wheezing.    Cardiovascular: Negative for chest pain, palpitations and leg swelling.   Gastrointestinal: Negative for abdominal pain, blood in stool, constipation, diarrhea, nausea and vomiting.   Genitourinary: Negative for difficulty urinating, dysuria, flank pain, frequency, hematuria, menstrual problem and urgency.        Chronic odor without vaginal discharge.    Musculoskeletal: Positive for arthralgias and back pain. Negative for joint swelling, myalgias and neck pain.        Joint pain in knuckles and fingers, bunion left foot.   Skin: Negative for color change and rash.   Neurological: Negative for dizziness, syncope, facial asymmetry, speech difficulty, weakness, numbness and headaches.   Hematological: Negative for adenopathy. Does not bruise/bleed easily.   Psychiatric/Behavioral: Positive for dysphoric mood. Negative for agitation, behavioral problems, confusion, sleep disturbance and suicidal ideas. The patient is not nervous/anxious.         Mild depressive symptoms persist on Effexor, she is considering a Psychiatry consult.        Objective:    /80, Pulse 73, Ht 5' 7", Wt 183 lbs, BMI=28.7  Physical Exam   Constitutional: She is oriented to person, place, and time. She appears well-developed and well-nourished. No distress.   HENT:   Head: Normocephalic and atraumatic.   Right Ear: External ear " normal.   Left Ear: External ear normal.   Nose: Nose normal.   Mouth/Throat: Oropharynx is clear and moist. No oropharyngeal exudate.   Eyes: Conjunctivae and EOM are normal. Pupils are equal, round, and reactive to light. Right conjunctiva is not injected. Left conjunctiva is not injected. No scleral icterus.   Neck: Normal range of motion. Neck supple. No JVD present. Carotid bruit is not present. No thyromegaly present.   Cardiovascular: Normal rate, regular rhythm, normal heart sounds and intact distal pulses.  Exam reveals no gallop and no friction rub.    No murmur heard.  Pulmonary/Chest: Effort normal and breath sounds normal. No respiratory distress. She has no wheezes. She has no rhonchi. She has no rales.   Abdominal: Soft. Bowel sounds are normal. She exhibits no distension and no mass. There is no hepatosplenomegaly. There is no tenderness. There is no CVA tenderness.   Musculoskeletal: Normal range of motion. She exhibits no edema or tenderness.   Osteoarthritic deformity of IP joints both hands, with mild ulnar deviation at MCP joints on right hand.   Protective Sensation (w/ 10 gram monofilament):  Right: Intact  Left: Intact    Visual Inspection:  Normal -  Bilateral except mild bunion deformity of left foot and overlapping of left second toe on the great toe, with callus formation.     Pedal Pulses:   Right: Present  Left: Present    Posterior tibialis:   Right:Present  Left: Present     Lymphadenopathy:     She has no cervical adenopathy.   Neurological: She is alert and oriented to person, place, and time. She has normal strength and normal reflexes. No cranial nerve deficit. She exhibits normal muscle tone. Coordination and gait normal.   Skin: Skin is warm and dry. No lesion and no rash noted. She is not diaphoretic. No cyanosis or erythema. No pallor. Nails show no clubbing.   Psychiatric: She has a normal mood and affect. Her behavior is normal. Judgment and thought content normal.   Vitals  reviewed.      Assessment:       1. Hypertensive heart disease without heart failure    2. DM (diabetes mellitus), type 2 with peripheral vascular complications    3. Hyperlipidemia associated with type 2 diabetes mellitus    4. Thoracic aorta atherosclerosis    5. Coronary artery disease involving native coronary artery of native heart without angina pectoris    6. Major depression, recurrent, chronic    7. Lumbar facet arthropathy    8. Osteopenia, unspecified location    9. Gastroesophageal reflux disease without esophagitis    10. Encounter for screening mammogram for breast cancer        Plan:       Hypertensive heart disease without heart failure - BP controlled in office, continue same.  -     Basic metabolic panel; Future; Expected date: 04/11/2018  -     carvedilol (COREG) 12.5 MG tablet; Take 1 tablet (12.5 mg total) by mouth 2 (two) times daily.  Dispense: 180 tablet; Refill: 2  -     losartan (COZAAR) 100 MG tablet; Take 1 tablet (100 mg total) by mouth once daily.  Dispense: 90 tablet; Refill: 2    DM (diabetes mellitus), type 2 with peripheral vascular complications  -     Hemoglobin A1c; Future; Expected date: 04/11/2018  -     Urinalysis  -     Microalbumin/creatinine urine ratio  -     Eye exam due this month, to be scheduled.    Hyperlipidemia associated with type 2 diabetes mellitus  -     atorvastatin (LIPITOR) 80 MG tablet; Take 1 tablet (80 mg total) by mouth once daily.  Dispense: 90 tablet; Refill: 2    Thoracic aorta atherosclerosis seen on chest imaging - medical management as above.     Coronary artery disease involving native coronary artery of native heart without angina pectoris - stable.    Major depression, recurrent, chronic  -     Ambulatory Referral to Psychiatry  -     venlafaxine (EFFEXOR) 100 MG Tab; Take 1 tablet (100 mg total) by mouth once daily.  Dispense: 90 tablet; Refill: 2    Lumbar facet arthropathy  -     oxyCODONE-acetaminophen (PERCOCET) 5-325 mg per tablet; Take 1  tablet by mouth 2 (two) times daily as needed for Pain.  Dispense: 60 tablet; Refill: 0    Osteopenia, unspecified location - consider repeat BMD next year.     Gastroesophageal reflux disease without esophagitis - stable on Omeprazole.     Chronic vaginitis  -     Ambulatory Referral to Gynecology    Arthralgia of both hands  -     Rheumatoid factor; Future; Expected date: 04/11/2018  -     Sedimentation rate, manual; Future; Expected date: 04/11/2018  -     C-reactive protein; Future; Expected date: 04/11/2018    Encounter for screening mammogram for breast cancer  -     Mammo Digital Screening Bilat with CAD due later this month.    Need for diphtheria-tetanus-pertussis (Tdap) vaccine - Tdap today.

## 2018-05-02 ENCOUNTER — PATIENT MESSAGE (OUTPATIENT)
Dept: ADMINISTRATIVE | Facility: OTHER | Age: 73
End: 2018-05-02

## 2018-05-16 DIAGNOSIS — I11.9 HYPERTENSIVE HEART DISEASE WITHOUT HEART FAILURE: ICD-10-CM

## 2018-05-16 DIAGNOSIS — M85.80 OSTEOPENIA, UNSPECIFIED LOCATION: ICD-10-CM

## 2018-05-16 DIAGNOSIS — R10.84 GENERALIZED ABDOMINAL PAIN: ICD-10-CM

## 2018-05-16 DIAGNOSIS — E11.9 CONTROLLED TYPE 2 DIABETES MELLITUS WITHOUT COMPLICATION, WITHOUT LONG-TERM CURRENT USE OF INSULIN: Primary | ICD-10-CM

## 2018-05-16 DIAGNOSIS — E55.9 VITAMIN D INSUFFICIENCY: ICD-10-CM

## 2018-05-16 DIAGNOSIS — K21.9 GASTROESOPHAGEAL REFLUX DISEASE WITHOUT ESOPHAGITIS: ICD-10-CM

## 2018-05-16 DIAGNOSIS — R11.0 NAUSEA: ICD-10-CM

## 2018-05-16 RX ORDER — ASPIRIN 325 MG
TABLET, DELAYED RELEASE (ENTERIC COATED) ORAL
Qty: 12 CAPSULE | Refills: 1 | Status: SHIPPED | OUTPATIENT
Start: 2018-05-16 | End: 2018-08-29 | Stop reason: SDUPTHER

## 2018-05-16 RX ORDER — OMEPRAZOLE 40 MG/1
CAPSULE, DELAYED RELEASE ORAL
Qty: 90 CAPSULE | Refills: 1 | Status: SHIPPED | OUTPATIENT
Start: 2018-05-16 | End: 2018-08-29 | Stop reason: SDUPTHER

## 2018-05-16 RX ORDER — SITAGLIPTIN 50 MG/1
TABLET, FILM COATED ORAL
Qty: 90 TABLET | Refills: 1 | Status: SHIPPED | OUTPATIENT
Start: 2018-05-16 | End: 2018-11-12 | Stop reason: SDUPTHER

## 2018-05-16 RX ORDER — LOSARTAN POTASSIUM 100 MG/1
100 TABLET ORAL DAILY
Qty: 90 TABLET | Refills: 1 | OUTPATIENT
Start: 2018-05-16

## 2018-05-21 ENCOUNTER — PATIENT MESSAGE (OUTPATIENT)
Dept: INTERNAL MEDICINE | Facility: CLINIC | Age: 73
End: 2018-05-21

## 2018-06-04 ENCOUNTER — OFFICE VISIT (OUTPATIENT)
Dept: CARDIOLOGY | Facility: CLINIC | Age: 73
End: 2018-06-04
Payer: MEDICARE

## 2018-06-04 VITALS
SYSTOLIC BLOOD PRESSURE: 129 MMHG | DIASTOLIC BLOOD PRESSURE: 74 MMHG | BODY MASS INDEX: 29.31 KG/M2 | OXYGEN SATURATION: 97 % | HEIGHT: 67 IN | HEART RATE: 77 BPM | WEIGHT: 186.75 LBS

## 2018-06-04 DIAGNOSIS — E78.5 HYPERLIPIDEMIA ASSOCIATED WITH TYPE 2 DIABETES MELLITUS: ICD-10-CM

## 2018-06-04 DIAGNOSIS — I70.0 THORACIC AORTA ATHEROSCLEROSIS: ICD-10-CM

## 2018-06-04 DIAGNOSIS — E11.69 HYPERLIPIDEMIA ASSOCIATED WITH TYPE 2 DIABETES MELLITUS: ICD-10-CM

## 2018-06-04 DIAGNOSIS — E11.21 TYPE 2 DIABETES MELLITUS WITH DIABETIC NEPHROPATHY, WITHOUT LONG-TERM CURRENT USE OF INSULIN: Chronic | ICD-10-CM

## 2018-06-04 DIAGNOSIS — R00.2 PALPITATIONS: Primary | ICD-10-CM

## 2018-06-04 DIAGNOSIS — I15.2 HYPERTENSION ASSOCIATED WITH DIABETES: ICD-10-CM

## 2018-06-04 DIAGNOSIS — E11.59 HYPERTENSION ASSOCIATED WITH DIABETES: ICD-10-CM

## 2018-06-04 DIAGNOSIS — I25.84 CORONARY ATHEROSCLEROSIS DUE TO CALCIFIED CORONARY LESION (CODE): ICD-10-CM

## 2018-06-04 DIAGNOSIS — I77.9 RIGHT-SIDED CAROTID ARTERY DISEASE: ICD-10-CM

## 2018-06-04 PROCEDURE — 3044F HG A1C LEVEL LT 7.0%: CPT | Mod: CPTII,S$GLB,, | Performed by: NURSE PRACTITIONER

## 2018-06-04 PROCEDURE — 99499 UNLISTED E&M SERVICE: CPT | Mod: S$GLB,,, | Performed by: NURSE PRACTITIONER

## 2018-06-04 PROCEDURE — 3074F SYST BP LT 130 MM HG: CPT | Mod: CPTII,S$GLB,, | Performed by: NURSE PRACTITIONER

## 2018-06-04 PROCEDURE — 99214 OFFICE O/P EST MOD 30 MIN: CPT | Mod: S$GLB,,, | Performed by: NURSE PRACTITIONER

## 2018-06-04 PROCEDURE — 3078F DIAST BP <80 MM HG: CPT | Mod: CPTII,S$GLB,, | Performed by: NURSE PRACTITIONER

## 2018-06-04 PROCEDURE — 99999 PR PBB SHADOW E&M-EST. PATIENT-LVL III: CPT | Mod: PBBFAC,,, | Performed by: NURSE PRACTITIONER

## 2018-06-04 RX ORDER — NITROGLYCERIN 0.4 MG/1
TABLET SUBLINGUAL
Qty: 25 TABLET | Refills: 3 | Status: SHIPPED | OUTPATIENT
Start: 2018-06-04 | End: 2020-06-18 | Stop reason: SDUPTHER

## 2018-06-04 NOTE — PROGRESS NOTES
"Ms. Fall is a patient of Dr. Connell and was last seen in [facility] 3/5/2018.    Subjective:   Patient ID:  Jovana Fall is a 72 y.o. female who presents for follow-up of Coronary Artery Disease (3 month f/u ); Fatigue; Shortness of Breath; Excessive Sweating; Palpitations; and Results (Discuss stress test 3/29/18)    Problems:  CAD seen CT 3/22/17  Aortic atherosclerosis 3/22/17  Carotid artery disease, ROSETTE 20-39% in 2014  DM type II  HLD  HTN    HPI  Ms. Fall is in clinic today for routine follow up.  Reports "pounding heartbeat" with any exertion and at night when she lies down at night.   Patient denies chest pain with exertion or at rest, palpitations, SOB, TAVERA, dizziness, syncope, edema, orthopnea, PND, or claudication.  Reports no routine exercise.  She is treated with low dose ASA and high intensity statin.  Patient is taking atorvastatin 80mg and LDL is 89.    Review of Systems   Constitution: Negative for decreased appetite, diaphoresis, weakness, malaise/fatigue, weight gain and weight loss.   Eyes: Negative for visual disturbance.   Cardiovascular: Negative for chest pain, claudication, dyspnea on exertion, irregular heartbeat, leg swelling, near-syncope, orthopnea, palpitations, paroxysmal nocturnal dyspnea and syncope.        Denies chest pressure   Respiratory: Negative for cough, hemoptysis, shortness of breath, sleep disturbances due to breathing and snoring.    Endocrine: Negative for cold intolerance and heat intolerance.   Hematologic/Lymphatic: Negative for bleeding problem. Does not bruise/bleed easily.   Musculoskeletal: Negative for myalgias.   Gastrointestinal: Negative for bloating, abdominal pain, anorexia, change in bowel habit, constipation, diarrhea, nausea and vomiting.   Neurological: Negative for difficulty with concentration, disturbances in coordination, excessive daytime sleepiness, dizziness, headaches, light-headedness, loss of balance and numbness. "   Psychiatric/Behavioral: The patient does not have insomnia.        Allergies and current medications updated and reviewed:  Review of patient's allergies indicates:   Allergen Reactions    Adhesive tape-silicones      Other reaction(s): Swelling  Other reaction(s): Itching    Codeine Itching    Metformin Itching    Morphine Itching    Penicillins Itching     Other reaction(s): Itching    Sulfa (sulfonamide antibiotics) Itching     Other reaction(s): Itching     Current Outpatient Prescriptions   Medication Sig    aspirin 81 mg Tab Take 81 mg by mouth. 1 Tablet Oral Every day    atorvastatin (LIPITOR) 80 MG tablet Take 1 tablet (80 mg total) by mouth once daily.    carvedilol (COREG) 12.5 MG tablet Take 1 tablet (12.5 mg total) by mouth 2 (two) times daily.    cholecalciferol, vitamin D3, 50,000 unit capsule TAKE 1 CAPSULE EVERY SEVEN DAYS    clonazePAM (KLONOPIN) 0.5 MG tablet Take 1 tablet (0.5 mg total) by mouth 2 (two) times daily as needed.    fish oil-omega-3 fatty acids 300-1,000 mg capsule     gabapentin (NEURONTIN) 300 MG capsule TAKE 1 CAPSULE EVERY EVENING    JANUVIA 50 mg Tab TAKE 1 TABLET EVERY DAY    lancets Misc 1 lancet by Misc.(Non-Drug; Combo Route) route once daily.    lancing device with lancets Kit 1 Device by Misc.(Non-Drug; Combo Route) route once daily.    losartan (COZAAR) 100 MG tablet Take 1 tablet (100 mg total) by mouth once daily.    nitroGLYCERIN (NITROSTAT) 0.4 MG SL tablet Take one tab under the tongue every 5 minutes for max of three doses for chest pain. If chest pain not resolved to to the ER.    omeprazole (PRILOSEC) 40 MG capsule TAKE 1 CAPSULE EVERY MORNING    oxyCODONE-acetaminophen (PERCOCET) 5-325 mg per tablet Take 1 tablet by mouth 2 (two) times daily as needed for Pain.    TRUE METRIX GLUCOSE TEST STRIP Strp TEST BLOOD SUGAR ONE TIME DAILY    TRUEPLUS LANCETS 30 gauge Misc USE ONE TIME DAILY    valacyclovir (VALTREX) 500 MG tablet Take 1 tablet  "(500 mg total) by mouth 2 (two) times daily as needed. Treat for 3 days.    venlafaxine (EFFEXOR) 100 MG Tab Take 1 tablet (100 mg total) by mouth once daily.    vitamin E 100 UNIT capsule Take 100 Units by mouth 2 (two) times daily.     No current facility-administered medications for this visit.      Objective:     Right Arm BP - Sittin/68 (18 1534)  Left Arm BP - Sittin/75 (18 1534)    /74 (BP Location: Left arm, Patient Position: Sitting, BP Method: Medium (Automatic))   Pulse 77   Ht 5' 7" (1.702 m)   Wt 84.7 kg (186 lb 11.7 oz)   SpO2 97%   BMI 29.25 kg/m²       Physical Exam   Constitutional: She is oriented to person, place, and time. Vital signs are normal. She appears well-developed and well-nourished. She is active. No distress.   HENT:   Head: Normocephalic and atraumatic.   Eyes: Conjunctivae and lids are normal. No scleral icterus.   Neck: Neck supple. Normal carotid pulses, no hepatojugular reflux and no JVD present. Carotid bruit is not present.   Cardiovascular: Normal rate, regular rhythm, S1 normal, S2 normal and intact distal pulses.  PMI is not displaced.  Exam reveals no gallop and no friction rub.    No murmur heard.  Pulses:       Carotid pulses are 2+ on the right side, and 2+ on the left side.       Radial pulses are 2+ on the right side, and 2+ on the left side.        Dorsalis pedis pulses are 2+ on the right side, and 2+ on the left side.        Posterior tibial pulses are 1+ on the right side, and 1+ on the left side.   Pulmonary/Chest: Effort normal and breath sounds normal. No respiratory distress. She has no decreased breath sounds. She has no wheezes. She has no rhonchi. She has no rales. She exhibits no tenderness.   Abdominal: Soft. Normal appearance and bowel sounds are normal. She exhibits no distension, no fluid wave, no abdominal bruit, no ascites and no pulsatile midline mass. There is no hepatosplenomegaly. There is no tenderness. " "  Musculoskeletal: She exhibits no edema.   Neurological: She is alert and oriented to person, place, and time. Gait normal.   Skin: Skin is warm, dry and intact. No rash noted. She is not diaphoretic. Nails show no clubbing.   Psychiatric: She has a normal mood and affect. Her speech is normal and behavior is normal. Judgment and thought content normal. Cognition and memory are normal.   Nursing note and vitals reviewed.      Chemistry        Component Value Date/Time     04/11/2018 1110    K 4.7 04/11/2018 1110     04/11/2018 1110    CO2 26 04/11/2018 1110    BUN 18 04/11/2018 1110    CREATININE 0.8 04/11/2018 1110     (H) 04/11/2018 1110        Component Value Date/Time    CALCIUM 9.8 04/11/2018 1110    ALKPHOS 129 12/20/2017 1126    AST 18 12/20/2017 1126    ALT 19 12/20/2017 1126    BILITOT 0.6 12/20/2017 1126    ESTGFRAFRICA >60 04/11/2018 1110    EGFRNONAA >60 04/11/2018 1110        Lab Results   Component Value Date    HGBA1C 5.7 (H) 04/11/2018         Recent Labs  Lab 10/03/17  1445 12/20/17  1126   WHITE BLOOD CELL COUNT 10.70 7.55   HEMOGLOBIN 14.6 14.1   HEMATOCRIT 43.6 42.2   MCV 90 90   PLATELETS 251 215   BNP 14  --    TSH  --  0.784   CHOLESTEROL  --  157   HDL  --  38 L   LDL CHOLESTEROL  --  89.0   TRIGLYCERIDES  --  150   HDL/CHOLESTEROL RATIO  --  24.2              Test(s) Reviewed  I have reviewed the following in detail:  [x] Stress test   [] Angiography   [x] Echocardiogram   [x] Labs   [] Other:         Assessment/Plan:     Palpitations  Comments:  Increased HR and "pounding" with exertion likely 2/2 deconditioning. Encouraged increased CV exercise to 150 minutes/wk.   Orders:  -     Cancel: Holter monitor - 24 hour  -     Holter monitor - 24 hour    Coronary atherosclerosis due to calcified coronary lesion (CODE)  Comments:  Asymptomatic. No changes.  Orders:  -     nitroGLYCERIN (NITROSTAT) 0.4 MG SL tablet; Take one tab under the tongue every 5 minutes for max of three " doses for chest pain. If chest pain not resolved to to the ER.  Dispense: 25 tablet; Refill: 3    Right-sided carotid artery disease  Comments:  Mild disease noted in 2014. No bruit on exam. Continue ASA and high intensity statin therapy.     Thoracic aorta atherosclerosis    Hyperlipidemia associated with type 2 diabetes mellitus  Comments:  LDL 89 on high intensity statin. Encouraged increased CV exercise, weight loss, and mediterranean diet. Rpt lipid 6mo. If LDL>70, change to rosuvastatin 40mg    Hypertension associated with diabetes  Comments:  BP at goal <130/80. No changes.     Type 2 diabetes mellitus with diabetic nephropathy, without long-term current use of insulin  Comments:  A1C at goal <7. F/U with PCP as planned.         Follow-up in about 6 months (around 12/4/2018).

## 2018-07-10 ENCOUNTER — TELEPHONE (OUTPATIENT)
Dept: INTERNAL MEDICINE | Facility: CLINIC | Age: 73
End: 2018-07-10

## 2018-07-10 DIAGNOSIS — F41.1 GAD (GENERALIZED ANXIETY DISORDER): ICD-10-CM

## 2018-07-10 RX ORDER — CLONAZEPAM 0.5 MG/1
0.5 TABLET ORAL 2 TIMES DAILY PRN
Qty: 30 TABLET | Refills: 0 | Status: SHIPPED | OUTPATIENT
Start: 2018-07-10 | End: 2018-07-20 | Stop reason: SDUPTHER

## 2018-07-10 NOTE — TELEPHONE ENCOUNTER
"----- Message from Brenton Feliz sent at 7/9/2018 10:24 AM CDT -----  Contact: Patient [phone]  RX request - refill or new RX.  Is this a refill or new RX:  Refill  RX name and strength: clonazePAM (KLONOPIN) 0.5 MG tablet     Is this a 30 day or 90 day RX:  90 Day Supply    Pharmacy name and phone # (DON'T enter "on file" or "in chart"): [facility] - [place] [id number] [facility] [phone] (Phone)  [phone] (Fax)    Comments:  Patient would like a Temporary Supply for Rx above sent to [facility] until the Shipment from [facility] makes it to patient please 119[address] - [place], [place] - [address] [phone] (Phone) [phone] (Fax)    Please call an advise  Thank you  "

## 2018-07-18 ENCOUNTER — OFFICE VISIT (OUTPATIENT)
Dept: OPTOMETRY | Facility: CLINIC | Age: 73
End: 2018-07-18
Payer: COMMERCIAL

## 2018-07-18 ENCOUNTER — OFFICE VISIT (OUTPATIENT)
Dept: OPTOMETRY | Facility: CLINIC | Age: 73
End: 2018-07-18

## 2018-07-18 DIAGNOSIS — Z13.5 SCREENING FOR EYE CONDITION: ICD-10-CM

## 2018-07-18 DIAGNOSIS — H26.9 CORTICAL CATARACT OF BOTH EYES: ICD-10-CM

## 2018-07-18 DIAGNOSIS — H52.13 MYOPIA WITH PRESBYOPIA, BILATERAL: ICD-10-CM

## 2018-07-18 DIAGNOSIS — Z46.0 ENCOUNTER FOR FITTING OR ADJUSTMENT OF SPECTACLES OR CONTACT LENSES: Primary | ICD-10-CM

## 2018-07-18 DIAGNOSIS — I10 HYPERTENSION, UNSPECIFIED TYPE: ICD-10-CM

## 2018-07-18 DIAGNOSIS — E11.9 TYPE 2 DIABETES MELLITUS WITHOUT RETINOPATHY: ICD-10-CM

## 2018-07-18 DIAGNOSIS — H52.4 MYOPIA WITH PRESBYOPIA, BILATERAL: ICD-10-CM

## 2018-07-18 DIAGNOSIS — H25.13 NUCLEAR SCLEROSIS, BILATERAL: Primary | ICD-10-CM

## 2018-07-18 PROCEDURE — 99499 UNLISTED E&M SERVICE: CPT | Mod: ,,, | Performed by: OPTOMETRIST

## 2018-07-18 PROCEDURE — 92310 CONTACT LENS FITTING OU: CPT | Mod: ,,, | Performed by: OPTOMETRIST

## 2018-07-18 PROCEDURE — 99999 PR PBB SHADOW E&M-EST. PATIENT-LVL II: CPT | Mod: PBBFAC,,, | Performed by: OPTOMETRIST

## 2018-07-18 PROCEDURE — 92015 DETERMINE REFRACTIVE STATE: CPT | Mod: S$GLB,,, | Performed by: OPTOMETRIST

## 2018-07-18 PROCEDURE — 92014 COMPRE OPH EXAM EST PT 1/>: CPT | Mod: S$GLB,,, | Performed by: OPTOMETRIST

## 2018-07-18 NOTE — PROGRESS NOTES
HPI     Patient in today for contact lens follow-up with general eye examination.    Refer to additional patient encounter notes dated 07/18/2018.       Last edited by Jread Panchal, OD on 7/18/2018  4:18 PM. (History)            Assessment /Plan     For exam results, see Encounter Report.    1. Encounter for fitting or adjustment of spectacles or contact lenses                      Bilateral nuclear sclerosis.   Bilateral peripheral cortical cataract.  No need for cataract surgery in either eye..  Otherwise, good ocular health in each eye.      Myopia in each eye.  Presbyopia consistent with age.  New spectacle lens Rx issued for use in lieu of CLs.     Wearing monovision RGP CLs. Good contact lens fit in each eye.   Wearing CLs well in both eyes.   No need for power change in either lens (right for distance and left for near).  New (duplicate) CL Rx entered into record.  Recheck in one year - or prior if any problems noted in the interim

## 2018-07-18 NOTE — PATIENT INSTRUCTIONS
Bilateral nuclear sclerosis.   Bilateral peripheral cortical cataract.  No need for cataract surgery in either eye..  Otherwise, good ocular health in each eye.      Myopia in each eye.  Presbyopia consistent with age.  New spectacle lens Rx issued for use in lieu of CLs.     Wearing monovision RGP CLs. Good contact lens fit in each eye.   Wearing CLs well in both eyes.   No need for power change in either lens (right for distance and left for near).  New (duplicate) CL Rx entered into record.  Recheck in one year - or prior if any problems noted in the interim

## 2018-07-18 NOTE — PATIENT INSTRUCTIONS
Bilateral nuclear sclerosis.   Bilateral peripheral cortical cataract.  No need for cataract surgery in either eye..  Otherwise, good ocular health in each eye.     Type 2 diabetes without retinopathy in either eye.     Myopia in each eye.  Presbyopia consistent with age.  New spectacle lens Rx issued for use in lieu of CLs.     Wearing monovision RGP CLs. Good contact lens fit in each eye.   Wearing CLs well in both eyes.   No need for power change in either lens (right for distance and left for near).  New (duplicate) CL Rx entered into record.  Recheck in one year - or prior if any problems noted in the interim

## 2018-07-18 NOTE — PROGRESS NOTES
HPI     Concerns About Ocular Health    Additional comments: Eye exam            Comments   Patient's age: 72 y.o. WF  Occupation: Retired  Approximate date of last eye examination: 04/20/2017  City/State: [facility]  Wears glasses? Yes     If yes, wears  Full-time or part-time?  Part-time  Present glasses are: Bifocal, SV Distance, SV Reading?  Progressive lens  Approximate age of present glasses:  2011   Got new glasses following last exam, or subsequently?:  No   Any problem with VA with glasses?  No  Wears CLs?:  Yes           If yes:              Type of CL worn:  Monovision RGP              Wears full-time or part-time:  Full-time               Sleeps with contact lenses:  No               CL Solution used:  MasterImage 3D                How often replace CLs:  As needed              Any problem with VA with CLs?  No                Headaches?  No  Eye pain/discomfort?  No                                                                                  Flashes?  White light in vision in the OS  Floaters?  No  Diplopia/Double vision?  No  Patient's Ocular History:         Any eye surgeries? Bilateral Blepharoplasty          Any eye injury?  No         Any treatment for eye disease?  No  Family history of eye disease?    Maternal Aunt  + Macular Degeneration   Maternal Uncle + Macular Degeneration  Sister   + Macular Degeneration  Significant patient medical history:         1. Diabetes?  Yes  LBS - 136 this am         If yes, IDDM or NIDDM? NIDDM   2. HBP?  Yes, controlled by medication and diet              3. Other (describe):   Basal cell carcinoma removed from cheek          -  And melanoma on back   ! OTC eyedrops currently using:  Rewetting solution   ! Prescription eye meds currently using:  No   ! Any history of allergy/adverse reaction to any eye meds used   previously?  No    ! Any history of allergy/adverse reaction to eyedrops used during prior   eye exam(s)? No    ! Any history of allergy/adverse reaction to  "Novacaine or similar meds?   No   ! Any history of allergy/adverse reaction to Epinephrine or similar meds?   No    ! Patient okay with use of anesthetic eyedrops to check eye pressure?    Yes        ! Patient okay with use of eyedrops to dilate pupils today?  Yes   !  Allergies/Medications/Medical History/Family History reviewed today?    Yes      PD =   68/64  Desired reading distance =  17.25"                                                                    Last edited by Jerad Panchal, OD on 7/18/2018  4:41 PM. (History)            Assessment /Plan     For exam results, see Encounter Report.    1. Nuclear sclerosis, bilateral     2. Cortical cataract of both eyes     3. Type 2 diabetes mellitus without retinopathy     4. Hypertension, unspecified type     5. Screening for eye condition     6. Myopia with presbyopia, bilateral                    Bilateral nuclear sclerosis.   Bilateral peripheral cortical cataract.  No need for cataract surgery in either eye..  Otherwise, good ocular health in each eye.     Type 2 diabetes without retinopathy in either eye.     Myopia in each eye.  Presbyopia consistent with age.  New spectacle lens Rx issued for use in lieu of CLs.     Wearing monovision RGP CLs. Good contact lens fit in each eye.   Wearing CLs well in both eyes.   No need for power change in either lens (right for distance and left for near).  New (duplicate) CL Rx entered into record.  Recheck in one year - or prior if any problems noted in the interim            "

## 2018-07-20 DIAGNOSIS — F41.1 GAD (GENERALIZED ANXIETY DISORDER): ICD-10-CM

## 2018-07-20 RX ORDER — CLONAZEPAM 0.5 MG/1
0.5 TABLET ORAL 2 TIMES DAILY PRN
Qty: 180 TABLET | Refills: 1 | Status: SHIPPED | OUTPATIENT
Start: 2018-07-20 | End: 2019-02-28 | Stop reason: SDUPTHER

## 2018-07-20 NOTE — TELEPHONE ENCOUNTER
----- Message from Eugene Moran sent at 7/20/2018  3:30 PM CDT -----  Contact: [phone]  Type: Rx    Name of medication(s):  clonazePAM (KLONOPIN) 0.5 MG tablet     Is this a refill? New rx? Refill      Who prescribed medication?    Pharmacy Name, Phone, & Location: [facility]     Comments: please advise, Thanks

## 2018-07-23 RX ORDER — GABAPENTIN 300 MG/1
CAPSULE ORAL
Qty: 90 CAPSULE | Refills: 1 | Status: SHIPPED | OUTPATIENT
Start: 2018-07-23 | End: 2018-12-11 | Stop reason: SDUPTHER

## 2018-08-29 ENCOUNTER — TELEPHONE (OUTPATIENT)
Dept: GASTROENTEROLOGY | Facility: CLINIC | Age: 73
End: 2018-08-29

## 2018-08-29 DIAGNOSIS — E55.9 VITAMIN D INSUFFICIENCY: ICD-10-CM

## 2018-08-29 DIAGNOSIS — K21.9 GASTROESOPHAGEAL REFLUX DISEASE WITHOUT ESOPHAGITIS: ICD-10-CM

## 2018-08-29 DIAGNOSIS — E11.9 TYPE 2 DIABETES MELLITUS WITHOUT COMPLICATION: ICD-10-CM

## 2018-08-29 DIAGNOSIS — I11.9 HYPERTENSIVE HEART DISEASE WITHOUT HEART FAILURE: ICD-10-CM

## 2018-08-29 DIAGNOSIS — M85.80 OSTEOPENIA, UNSPECIFIED LOCATION: ICD-10-CM

## 2018-08-29 RX ORDER — GLUCOSAM/CHON-MSM1/C/MANG/BOSW 500-416.6
TABLET ORAL
Qty: 100 EACH | Refills: 3 | Status: SHIPPED | OUTPATIENT
Start: 2018-08-29 | End: 2020-08-21

## 2018-08-29 RX ORDER — ASPIRIN 325 MG
TABLET, DELAYED RELEASE (ENTERIC COATED) ORAL
Qty: 12 CAPSULE | Refills: 1 | Status: SHIPPED | OUTPATIENT
Start: 2018-08-29 | End: 2019-02-28 | Stop reason: SDUPTHER

## 2018-08-29 RX ORDER — LOSARTAN POTASSIUM 100 MG/1
100 TABLET ORAL DAILY
Qty: 90 TABLET | Refills: 1 | Status: SHIPPED | OUTPATIENT
Start: 2018-08-29 | End: 2018-11-12 | Stop reason: SDUPTHER

## 2018-08-29 RX ORDER — LANCING DEVICE
EACH MISCELLANEOUS
Qty: 1 EACH | Refills: 0 | Status: SHIPPED | OUTPATIENT
Start: 2018-08-29

## 2018-08-30 RX ORDER — OMEPRAZOLE 40 MG/1
CAPSULE, DELAYED RELEASE ORAL
Qty: 90 CAPSULE | Refills: 0 | Status: SHIPPED | OUTPATIENT
Start: 2018-08-30 | End: 2018-11-12 | Stop reason: SDUPTHER

## 2018-08-30 NOTE — TELEPHONE ENCOUNTER
Please inform the patient that I refilled the prescription for prilosec and she is due for a follow-up visit in the next 2-3 months for continued evaluation and management.  Thanks  DANISH

## 2018-08-31 ENCOUNTER — PATIENT MESSAGE (OUTPATIENT)
Dept: GASTROENTEROLOGY | Facility: CLINIC | Age: 73
End: 2018-08-31

## 2018-10-02 ENCOUNTER — TELEPHONE (OUTPATIENT)
Dept: INTERNAL MEDICINE | Facility: CLINIC | Age: 73
End: 2018-10-02

## 2018-10-02 NOTE — TELEPHONE ENCOUNTER
Pt called wanted to report swelling in both legs and ankles for 4 days now. Pt stated she think she has been doing too much, she is taking care of a friend who is in ICU right now. Stated she has been elevating her legs when she gets home at night. Also stated he has an ace wrap on her Right knee because she injured it moving a recliner at home. Wanted to make the doctor aware. Stated no shortness of breath, but she stated she has gained 10lbs since last visit. Advised pt to continue to keep legs and feet elevated and to monitor the swelling and to give office a call back if the swelling continues by the end of the week. Pt stated she probably wouldn't be able to come for an appt, but could probably go to an urgent care because she has to be by her friend's side. Will forward message to Dr. Staley.

## 2018-10-07 ENCOUNTER — PATIENT MESSAGE (OUTPATIENT)
Dept: INTERNAL MEDICINE | Facility: CLINIC | Age: 73
End: 2018-10-07

## 2018-11-12 ENCOUNTER — OFFICE VISIT (OUTPATIENT)
Dept: INTERNAL MEDICINE | Facility: CLINIC | Age: 73
End: 2018-11-12
Payer: MEDICARE

## 2018-11-12 VITALS
HEIGHT: 67 IN | DIASTOLIC BLOOD PRESSURE: 82 MMHG | WEIGHT: 196.19 LBS | SYSTOLIC BLOOD PRESSURE: 146 MMHG | BODY MASS INDEX: 30.79 KG/M2 | HEART RATE: 74 BPM

## 2018-11-12 DIAGNOSIS — E11.69 HYPERLIPIDEMIA ASSOCIATED WITH TYPE 2 DIABETES MELLITUS: ICD-10-CM

## 2018-11-12 DIAGNOSIS — M85.80 OSTEOPENIA, UNSPECIFIED LOCATION: ICD-10-CM

## 2018-11-12 DIAGNOSIS — E11.59 HYPERTENSION ASSOCIATED WITH DIABETES: ICD-10-CM

## 2018-11-12 DIAGNOSIS — M47.816 LUMBAR FACET ARTHROPATHY: ICD-10-CM

## 2018-11-12 DIAGNOSIS — I15.2 HYPERTENSION ASSOCIATED WITH DIABETES: ICD-10-CM

## 2018-11-12 DIAGNOSIS — K21.9 GASTROESOPHAGEAL REFLUX DISEASE WITHOUT ESOPHAGITIS: ICD-10-CM

## 2018-11-12 DIAGNOSIS — E11.9 TYPE 2 DIABETES MELLITUS WITHOUT COMPLICATION: ICD-10-CM

## 2018-11-12 DIAGNOSIS — F33.9 MAJOR DEPRESSION, RECURRENT, CHRONIC: ICD-10-CM

## 2018-11-12 DIAGNOSIS — E11.21 TYPE 2 DIABETES MELLITUS WITH DIABETIC NEPHROPATHY, WITHOUT LONG-TERM CURRENT USE OF INSULIN: Primary | ICD-10-CM

## 2018-11-12 DIAGNOSIS — E78.5 HYPERLIPIDEMIA ASSOCIATED WITH TYPE 2 DIABETES MELLITUS: ICD-10-CM

## 2018-11-12 PROCEDURE — 3077F SYST BP >= 140 MM HG: CPT | Mod: CPTII,HCNC,S$GLB, | Performed by: INTERNAL MEDICINE

## 2018-11-12 PROCEDURE — 99999 PR PBB SHADOW E&M-EST. PATIENT-LVL III: CPT | Mod: PBBFAC,HCNC,, | Performed by: INTERNAL MEDICINE

## 2018-11-12 PROCEDURE — 99499 UNLISTED E&M SERVICE: CPT | Mod: HCNC,S$GLB,, | Performed by: INTERNAL MEDICINE

## 2018-11-12 PROCEDURE — 3044F HG A1C LEVEL LT 7.0%: CPT | Mod: CPTII,HCNC,S$GLB, | Performed by: INTERNAL MEDICINE

## 2018-11-12 PROCEDURE — 3079F DIAST BP 80-89 MM HG: CPT | Mod: CPTII,HCNC,S$GLB, | Performed by: INTERNAL MEDICINE

## 2018-11-12 PROCEDURE — 1101F PT FALLS ASSESS-DOCD LE1/YR: CPT | Mod: CPTII,HCNC,S$GLB, | Performed by: INTERNAL MEDICINE

## 2018-11-12 PROCEDURE — 99214 OFFICE O/P EST MOD 30 MIN: CPT | Mod: HCNC,S$GLB,, | Performed by: INTERNAL MEDICINE

## 2018-11-12 RX ORDER — OMEPRAZOLE 20 MG/1
20 CAPSULE, DELAYED RELEASE ORAL EVERY MORNING
Qty: 90 CAPSULE | Refills: 2 | Status: SHIPPED | OUTPATIENT
Start: 2018-11-12 | End: 2019-06-24 | Stop reason: SDUPTHER

## 2018-11-12 RX ORDER — ATORVASTATIN CALCIUM 80 MG/1
80 TABLET, FILM COATED ORAL DAILY
Qty: 90 TABLET | Refills: 2 | Status: SHIPPED | OUTPATIENT
Start: 2018-11-12 | End: 2019-06-24 | Stop reason: SDUPTHER

## 2018-11-12 RX ORDER — CALCIUM CITRATE/VITAMIN D3 200MG-6.25
TABLET ORAL
Qty: 100 STRIP | Refills: 3 | Status: CANCELLED | OUTPATIENT
Start: 2018-11-12

## 2018-11-12 RX ORDER — OXYCODONE AND ACETAMINOPHEN 5; 325 MG/1; MG/1
1 TABLET ORAL 2 TIMES DAILY PRN
Qty: 60 TABLET | Refills: 0 | Status: SHIPPED | OUTPATIENT
Start: 2018-11-12 | End: 2019-03-20 | Stop reason: SDUPTHER

## 2018-11-12 RX ORDER — VENLAFAXINE 100 MG/1
100 TABLET ORAL DAILY
Qty: 90 TABLET | Refills: 2 | Status: SHIPPED | OUTPATIENT
Start: 2018-11-12 | End: 2019-06-24 | Stop reason: SDUPTHER

## 2018-11-12 RX ORDER — CARVEDILOL 12.5 MG/1
12.5 TABLET ORAL 2 TIMES DAILY
Qty: 180 TABLET | Refills: 2 | Status: SHIPPED | OUTPATIENT
Start: 2018-11-12 | End: 2019-08-08

## 2018-11-12 RX ORDER — LOSARTAN POTASSIUM 100 MG/1
100 TABLET ORAL DAILY
Qty: 90 TABLET | Refills: 2 | Status: SHIPPED | OUTPATIENT
Start: 2018-11-12 | End: 2019-06-25 | Stop reason: SDUPTHER

## 2018-11-13 RX ORDER — GLUCOSAM/CHON-MSM1/C/MANG/BOSW 500-416.6
TABLET ORAL
Qty: 100 EACH | Refills: 3 | Status: SHIPPED | OUTPATIENT
Start: 2018-11-13 | End: 2020-08-21

## 2018-11-13 NOTE — PROGRESS NOTES
Subjective:       Patient ID: Jovana Fall is a 73 y.o. female.    Chief Complaint: Diabetes and Hypertension    Last seen 7 months ago. Returns for f/u chronic medical conditions. Has been under a lot of stress looking after her next door neighbor who has no family, and has been critically ill in and out of the hospital including some time in the ICU, now in inpatient hospice. She sat in the hospital with him many days, and is now looking after his dog and his property. She had bilateral leg swelling for a time, associated with prolonged sitting - this has resolved. Admits to a lot of stress eating with comfort foods and desserts, has gained weight. Glucose near 180 this morning which is most unusual for her.     PMH: , misc x 1.   Diabetes Type 2. HbA1c HbA1c 5.7%, Urine Microalbumin normal .  Hypertensive heart disease without heart failure.   Mixed Hyperlipidemia, , LDL 89 Dec. '17.   Nephrolithiasis.   Urinary incontinence.  GERD. EGD 9/15, 3/17 - mild chronic non-active gastritis, no dysplasia, H pylori negative.  Depression/Anxiety.  Non-obstructive Coronary artery disease, angiogram , Cardiology 10/15.  Osteopenia.   Mild Vitamin D deficiency, 37.  Lumbar Spinal Stenosis. Cervical Spine DJD.   Skin Cancer, Basal Cell and Melanoma.     Mammo normal  - ordered, not done. Pelvic exam . BMD 3/16 stable Osteopenia. Colonoscopy 1/15 - sigmoid diverticuli, one tubular adenoma 2-3mm, 5 yr f/u due to family history. Eye exam . Exercise Stress Echo negative 3/18.  Pneumovax 10/12. Zostavax 10/13. Prevnar 7/15. Tdap . Flu shot .    PSH:   Cholecystectomy.   Bilateral carpal tunnel release.   Hysterectomy. Ovaries remain.  Lumbar laminectomy  Appendectomy  Tubal ligation.  Hemorrhoidectomy  Rhinoplasty  Blepharoplasty, ears pinned.  Cystoscopy, ureteral stent.  Benign tumor removed left knee.   Right Bunionectomy and Hammertoe surgery.  Skin Cancer excision.    Social:  "Nonsmoker. Occas. alcohol. . 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    FMH: Colon and ovarian cancer in cousin. Colon cancer in younger brother. Mother had heart disease with MI at age 71. MGM with cervical cancer. Two aunts with diabetes.     Allergies: multiple, see Med Card.     Medications: list reviewed and reconciled.               Review of Systems   Constitutional: Positive for appetite change and unexpected weight change. Negative for fever.   Respiratory: Negative for cough, chest tightness and shortness of breath.    Cardiovascular: Negative for chest pain and palpitations.   Gastrointestinal: Negative for abdominal pain, diarrhea, nausea and vomiting.   Genitourinary: Negative for dysuria, frequency and hematuria.   Musculoskeletal: Positive for back pain.   Skin: Negative for rash and wound.   Neurological: Negative for dizziness, syncope, weakness and headaches.   Psychiatric/Behavioral: Negative for agitation and dysphoric mood. The patient is nervous/anxious.        Objective:    /82, Pulse 74, Ht 5' 7", Wt 196 lbs, BMI=30.7  Physical Exam   Constitutional: She is oriented to person, place, and time. She appears well-nourished. No distress.   HENT:   Nose: Nose normal.   Mouth/Throat: Oropharynx is clear and moist.   Neck: Normal range of motion. Neck supple. No JVD present.   Cardiovascular: Normal rate, regular rhythm and normal heart sounds.   Pulmonary/Chest: Effort normal and breath sounds normal. No respiratory distress. She has no wheezes. She has no rales.   Musculoskeletal: Normal range of motion. She exhibits no edema.   Neurological: She is alert and oriented to person, place, and time. A cranial nerve deficit is present. Coordination normal.   Skin: Skin is warm and dry.   Psychiatric: Her speech is normal and behavior is normal. Thought content normal. Her mood appears anxious.       Assessment:       1. 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 Hypertension associated with diabetes    3. Hyperlipidemia associated with type 2 diabetes mellitus    4. Osteopenia, unspecified location    5. Lumbar facet arthropathy    6. Gastroesophageal reflux disease without esophagitis    7. Major depression, recurrent, chronic        Plan:       Type 2 diabetes mellitus with diabetic nephropathy, without long-term current use of insulin  -     CBC auto differential; Future; Expected date: 11/12/2018  -     Comprehensive metabolic panel; Future; Expected date: 11/12/2018  -     Lipid panel; Future; Expected date: 11/12/2018  -     Hemoglobin A1c; Future; Expected date: 11/12/2018  -     SITagliptin (JANUVIA) 50 MG Tab; Take 1 tablet (50 mg total) by mouth once daily.  Dispense: 90 tablet; Refill: 1  -     blood sugar diagnostic (TRUE METRIX GLUCOSE TEST STRIP) Strp; TEST BLOOD SUGAR ONE TIME DAILY  Dispense: 100 strip; Refill: 3    Hypertension associated with diabetes - BP acutely elevated due to stress and anxiety, previously controlled, continue same.  -     losartan (COZAAR) 100 MG tablet; Take 1 tablet (100 mg total) by mouth once daily.  Dispense: 90 tablet; Refill: 2  -     carvedilol (COREG) 12.5 MG tablet; Take 1 tablet (12.5 mg total) by mouth 2 (two) times daily.  Dispense: 180 tablet; Refill: 2    Hyperlipidemia associated with type 2 diabetes mellitus  -     atorvastatin (LIPITOR) 80 MG tablet; Take 1 tablet (80 mg total) by mouth once daily.  Dispense: 90 tablet; Refill: 2    Osteopenia, unspecified location  -     Vitamin D; Future; Expected date: 11/12/2018    Lumbar facet arthropathy  -     oxyCODONE-acetaminophen (PERCOCET) 5-325 mg per tablet; Take 1 tablet by mouth 2 (two) times daily as needed for Pain.  Dispense: 60 tablet; Refill: 0    Gastroesophageal reflux disease without esophagitis  -     omeprazole (PRILOSEC) 20 MG capsule; Take 1 capsule (20 mg total) by mouth every morning.  Dispense:  90 capsule; Refill: 2    Major depression, recurrent, chronic  -     venlafaxine (EFFEXOR) 100 MG Tab; Take 1 tablet (100 mg total) by mouth once daily.  Dispense: 90 tablet; Refill: 2    Mammogram previously ordered.

## 2018-12-11 DIAGNOSIS — E11.69 HYPERLIPIDEMIA ASSOCIATED WITH TYPE 2 DIABETES MELLITUS: ICD-10-CM

## 2018-12-11 DIAGNOSIS — E78.5 HYPERLIPIDEMIA ASSOCIATED WITH TYPE 2 DIABETES MELLITUS: ICD-10-CM

## 2018-12-11 RX ORDER — ATORVASTATIN CALCIUM 80 MG/1
TABLET, FILM COATED ORAL
Qty: 90 TABLET | Refills: 2 | OUTPATIENT
Start: 2018-12-11

## 2018-12-11 RX ORDER — GABAPENTIN 300 MG/1
CAPSULE ORAL
Qty: 90 CAPSULE | Refills: 1 | Status: SHIPPED | OUTPATIENT
Start: 2018-12-11 | End: 2019-04-28 | Stop reason: SDUPTHER

## 2018-12-14 ENCOUNTER — HOSPITAL ENCOUNTER (OUTPATIENT)
Dept: RADIOLOGY | Facility: HOSPITAL | Age: 73
Discharge: HOME | End: 2018-12-14
Attending: INTERNAL MEDICINE
Payer: MEDICARE

## 2018-12-14 VITALS — BODY MASS INDEX: 30.61 KG/M2 | WEIGHT: 195 LBS | HEIGHT: 67 IN

## 2018-12-14 DIAGNOSIS — Z12.31 ENCOUNTER FOR SCREENING MAMMOGRAM FOR BREAST CANCER: ICD-10-CM

## 2018-12-14 PROCEDURE — 77067 SCR MAMMO BI INCL CAD: CPT | Mod: TC,HCWC

## 2018-12-14 PROCEDURE — 77067 SCR MAMMO BI INCL CAD: CPT | Mod: 26,HCWC,, | Performed by: RADIOLOGY

## 2018-12-16 ENCOUNTER — PATIENT MESSAGE (OUTPATIENT)
Dept: INTERNAL MEDICINE | Facility: CLINIC | Age: 73
End: 2018-12-16

## 2019-01-07 ENCOUNTER — TELEPHONE (OUTPATIENT)
Dept: CARDIOLOGY | Facility: CLINIC | Age: 74
End: 2019-01-07

## 2019-01-07 NOTE — TELEPHONE ENCOUNTER
----- Message from Brittany Carrera sent at 1/7/2019 11:03 AM CST -----  Contact: self  Pt called in about wanting to speak with Dr. Pt just wants to speak with her. Pt stated that she is not having any issues. Pt would like the nurse to give her a call back        Pt can be reached at [phone] ty

## 2019-02-06 ENCOUNTER — OFFICE VISIT (OUTPATIENT)
Dept: CARDIOLOGY | Facility: CLINIC | Age: 74
End: 2019-02-06
Payer: MEDICARE

## 2019-02-06 VITALS
HEIGHT: 67 IN | SYSTOLIC BLOOD PRESSURE: 130 MMHG | DIASTOLIC BLOOD PRESSURE: 64 MMHG | WEIGHT: 181 LBS | HEART RATE: 71 BPM | BODY MASS INDEX: 28.41 KG/M2

## 2019-02-06 DIAGNOSIS — I15.2 HYPERTENSION ASSOCIATED WITH DIABETES: ICD-10-CM

## 2019-02-06 DIAGNOSIS — I65.21 STENOSIS OF RIGHT CAROTID ARTERY: ICD-10-CM

## 2019-02-06 DIAGNOSIS — E11.69 HYPERLIPIDEMIA ASSOCIATED WITH TYPE 2 DIABETES MELLITUS: ICD-10-CM

## 2019-02-06 DIAGNOSIS — E11.59 HYPERTENSION ASSOCIATED WITH DIABETES: ICD-10-CM

## 2019-02-06 DIAGNOSIS — I70.0 ABDOMINAL AORTIC ATHEROSCLEROSIS: ICD-10-CM

## 2019-02-06 DIAGNOSIS — I25.10 CORONARY ARTERY CALCIFICATION SEEN ON CAT SCAN: Primary | ICD-10-CM

## 2019-02-06 DIAGNOSIS — E78.5 HYPERLIPIDEMIA ASSOCIATED WITH TYPE 2 DIABETES MELLITUS: ICD-10-CM

## 2019-02-06 PROCEDURE — 3044F PR MOST RECENT HEMOGLOBIN A1C LEVEL <7.0%: ICD-10-PCS | Mod: HCNC,CPTII,S$GLB, | Performed by: NURSE PRACTITIONER

## 2019-02-06 PROCEDURE — 3075F PR MOST RECENT SYSTOLIC BLOOD PRESS GE 130-139MM HG: ICD-10-PCS | Mod: HCNC,CPTII,S$GLB, | Performed by: NURSE PRACTITIONER

## 2019-02-06 PROCEDURE — 1101F PR PT FALLS ASSESS DOC 0-1 FALLS W/OUT INJ PAST YR: ICD-10-PCS | Mod: HCNC,CPTII,S$GLB, | Performed by: NURSE PRACTITIONER

## 2019-02-06 PROCEDURE — 1101F PT FALLS ASSESS-DOCD LE1/YR: CPT | Mod: HCNC,CPTII,S$GLB, | Performed by: NURSE PRACTITIONER

## 2019-02-06 PROCEDURE — 3075F SYST BP GE 130 - 139MM HG: CPT | Mod: HCNC,CPTII,S$GLB, | Performed by: NURSE PRACTITIONER

## 2019-02-06 PROCEDURE — 3078F DIAST BP <80 MM HG: CPT | Mod: HCNC,CPTII,S$GLB, | Performed by: NURSE PRACTITIONER

## 2019-02-06 PROCEDURE — 99999 PR PBB SHADOW E&M-EST. PATIENT-LVL III: CPT | Mod: PBBFAC,HCNC,, | Performed by: NURSE PRACTITIONER

## 2019-02-06 PROCEDURE — 99214 PR OFFICE/OUTPT VISIT, EST, LEVL IV, 30-39 MIN: ICD-10-PCS | Mod: HCNC,S$GLB,, | Performed by: NURSE PRACTITIONER

## 2019-02-06 PROCEDURE — 99999 PR PBB SHADOW E&M-EST. PATIENT-LVL III: ICD-10-PCS | Mod: PBBFAC,HCNC,, | Performed by: NURSE PRACTITIONER

## 2019-02-06 PROCEDURE — 99214 OFFICE O/P EST MOD 30 MIN: CPT | Mod: HCNC,S$GLB,, | Performed by: NURSE PRACTITIONER

## 2019-02-06 PROCEDURE — 3078F PR MOST RECENT DIASTOLIC BLOOD PRESSURE < 80 MM HG: ICD-10-PCS | Mod: HCNC,CPTII,S$GLB, | Performed by: NURSE PRACTITIONER

## 2019-02-06 PROCEDURE — 3044F HG A1C LEVEL LT 7.0%: CPT | Mod: HCNC,CPTII,S$GLB, | Performed by: NURSE PRACTITIONER

## 2019-02-06 NOTE — PROGRESS NOTES
Ms. Fall is a patient of Dr. Connell and was last seen in [facility] Cardiology Visit 6/4/18    Subjective:   Patient ID:  Jovana Fall is a 73 y.o. female who presents for follow-up of Palpitations (6 month f/u) and Chest Pain    Problems:  CAD seen CT 3/22/17  Aortic atherosclerosis 3/22/17  Carotid artery disease, ROSETTE 20-39% in 2014  DM type II  HLD  HTN    HPI  Ms. Fall is in clinic today for routine follow up.  Patient denies chest pain with exertion or at rest, palpitations, SOB, TVAERA, dizziness, syncope, edema, orthopnea, PND, or claudication.  Reports routine exercise.  She is treated with low dose ASA and high intensity statin.  Patient is taking atorvastatin 80mg and LDL is 84.      Review of Systems   Constitution: Negative for decreased appetite, diaphoresis, weakness, malaise/fatigue, weight gain and weight loss.   Eyes: Negative for visual disturbance.   Cardiovascular: Negative for chest pain, claudication, dyspnea on exertion, irregular heartbeat, leg swelling, near-syncope, orthopnea, palpitations, paroxysmal nocturnal dyspnea and syncope.        Denies chest pressure   Respiratory: Negative for cough, hemoptysis, shortness of breath, sleep disturbances due to breathing and snoring.    Endocrine: Negative for cold intolerance and heat intolerance.   Hematologic/Lymphatic: Negative for bleeding problem. Does not bruise/bleed easily.   Musculoskeletal: Negative for myalgias.   Gastrointestinal: Negative for bloating, abdominal pain, anorexia, change in bowel habit, constipation, diarrhea, nausea and vomiting.   Neurological: Negative for difficulty with concentration, disturbances in coordination, excessive daytime sleepiness, dizziness, headaches, light-headedness, loss of balance and numbness.   Psychiatric/Behavioral: The patient does not have insomnia.      Allergies and current medications updated and reviewed:  Review of patient's allergies indicates:   Allergen Reactions    Adhesive  tape-silicones      Other reaction(s): Swelling  Other reaction(s): Itching    Codeine Itching    Metformin Itching    Morphine Itching    Penicillins Itching     Other reaction(s): Itching    Sulfa (sulfonamide antibiotics) Itching     Other reaction(s): Itching     Current Outpatient Medications   Medication Sig    aspirin 81 mg Tab Take 81 mg by mouth. 1 Tablet Oral Every day    atorvastatin (LIPITOR) 80 MG tablet Take 1 tablet (80 mg total) by mouth once daily.    blood sugar diagnostic (TRUE METRIX GLUCOSE TEST STRIP) Strp TEST BLOOD SUGAR ONE TIME DAILY    carvedilol (COREG) 12.5 MG tablet Take 1 tablet (12.5 mg total) by mouth 2 (two) times daily.    cholecalciferol, vitamin D3, 50,000 unit capsule TAKE 1 CAPSULE EVERY SEVEN DAYS    clonazePAM (KLONOPIN) 0.5 MG tablet Take 1 tablet (0.5 mg total) by mouth 2 (two) times daily as needed.    fish oil-omega-3 fatty acids 300-1,000 mg capsule     FLUAD 3080-0935, 65 YR UP,,PF, 45 mcg (15 mcg x 3)/0.5 mL Syrg ADM 0.5ML IM UTD    gabapentin (NEURONTIN) 300 MG capsule TAKE 1 CAPSULE EVERY EVENING    lancing device with lancets Kit 1 Device by Misc.(Non-Drug; Combo Route) route once daily.    losartan (COZAAR) 100 MG tablet Take 1 tablet (100 mg total) by mouth once daily.    nitroGLYCERIN (NITROSTAT) 0.4 MG SL tablet Take one tab under the tongue every 5 minutes for max of three doses for chest pain. If chest pain not resolved to to the ER.    omeprazole (PRILOSEC) 20 MG capsule Take 1 capsule (20 mg total) by mouth every morning.    oxyCODONE-acetaminophen (PERCOCET) 5-325 mg per tablet Take 1 tablet by mouth 2 (two) times daily as needed for Pain.    SITagliptin (JANUVIA) 50 MG Tab Take 1 tablet (50 mg total) by mouth once daily.    TRUEDRAW LANCING DEVICE Misc USE ONE TIME DAILY    TRUEPLUS LANCETS 30 gauge Misc USE ONE TIME DAILY    TRUEPLUS LANCETS 30 gauge Misc USE ONE TIME DAILY    valacyclovir (VALTREX) 500 MG tablet Take 1 tablet (500  "mg total) by mouth 2 (two) times daily as needed. Treat for 3 days.    venlafaxine (EFFEXOR) 100 MG Tab Take 1 tablet (100 mg total) by mouth once daily.    vitamin E 100 UNIT capsule Take 100 Units by mouth 2 (two) times daily.     No current facility-administered medications for this visit.        Objective:     Right Arm BP - Sittin/63 (19 1621)  Left Arm BP - Sittin/64 (19 1621)    /64 (BP Location: Left arm, Patient Position: Sitting, BP Method: Large (Automatic))   Pulse 71   Ht 5' 7" (1.702 m)   Wt 82.1 kg (181 lb)   BMI 28.35 kg/m²       Physical Exam   Constitutional: She is oriented to person, place, and time. Vital signs are normal. She appears well-developed and well-nourished. She is active. No distress.   HENT:   Head: Normocephalic and atraumatic.   Eyes: Conjunctivae and lids are normal. No scleral icterus.   Neck: Neck supple. Normal carotid pulses, no hepatojugular reflux and no JVD present. Carotid bruit is not present.   Cardiovascular: Normal rate, regular rhythm, S1 normal, S2 normal and intact distal pulses. PMI is not displaced. Exam reveals no gallop and no friction rub.   No murmur heard.  Pulses:       Carotid pulses are 2+ on the right side, and 2+ on the left side.       Radial pulses are 2+ on the right side, and 2+ on the left side.        Dorsalis pedis pulses are 2+ on the right side, and 2+ on the left side.        Posterior tibial pulses are 1+ on the right side, and 1+ on the left side.   Pulmonary/Chest: Effort normal and breath sounds normal. No respiratory distress. She has no decreased breath sounds. She has no wheezes. She has no rhonchi. She has no rales. She exhibits no tenderness.   Abdominal: Soft. Normal appearance and bowel sounds are normal. She exhibits no distension, no fluid wave, no abdominal bruit, no ascites and no pulsatile midline mass. There is no hepatosplenomegaly. There is no tenderness.   Musculoskeletal: She exhibits no " edema.   Neurological: She is alert and oriented to person, place, and time. Gait normal.   Skin: Skin is warm, dry and intact. No rash noted. She is not diaphoretic. Nails show no clubbing.   Psychiatric: She has a normal mood and affect. Her speech is normal and behavior is normal. Judgment and thought content normal. Cognition and memory are normal.   Nursing note and vitals reviewed.      Chemistry        Component Value Date/Time     12/14/2018 1203    K 4.2 12/14/2018 1203     12/14/2018 1203    CO2 25 12/14/2018 1203    BUN 20 12/14/2018 1203    CREATININE 0.7 12/14/2018 1203     (H) 12/14/2018 1203        Component Value Date/Time    CALCIUM 9.7 12/14/2018 1203    ALKPHOS 143 (H) 12/14/2018 1203    AST 21 12/14/2018 1203    ALT 19 12/14/2018 1203    BILITOT 0.7 12/14/2018 1203    ESTGFRAFRICA >60.0 12/14/2018 1203    EGFRNONAA >60.0 12/14/2018 1203        Lab Results   Component Value Date    HGBA1C 6.3 (H) 12/14/2018     Recent Labs   Lab 10/03/17  1445 12/20/17  1126 12/14/18  1203   WHITE BLOOD CELL COUNT 10.70 7.55 8.19   HEMOGLOBIN 14.6 14.1 14.6   HEMATOCRIT 43.6 42.2 43.4   MCV 90 90 90   PLATELETS 251 215 244   BNP 14  --   --    TSH  --  0.784  --    CHOLESTEROL  --  157 149   HDL  --  38 L 35 L   LDL CHOLESTEROL  --  89.0 84.0   TRIGLYCERIDES  --  150 150   HDL/CHOLESTEROL RATIO  --  24.2 23.5              Test(s) Reviewed  I have reviewed the following in detail:  [] Stress test   [] Angiography   [x] Echocardiogram   [x] Labs   [] Other:         Assessment/Plan:   1. Coronary calcifications by CT scan  Asymptomatic. Taking high intensity statin and ASA. No changes.    2. Stenosis of right carotid artery      3. Abdominal aortic atherosclerosis      4. Hyperlipidemia associated with type 2 diabetes mellitus  LDL not at goal <100. No changes      5. Hypertension associated with diabetes  BP at goal <130/80. Continue current regimen.        Patient was discussed with but not  examined by Dr. Connell    Follow-up in about 6 months (around 8/6/2019).

## 2019-02-28 DIAGNOSIS — F41.1 GAD (GENERALIZED ANXIETY DISORDER): ICD-10-CM

## 2019-02-28 DIAGNOSIS — E55.9 VITAMIN D INSUFFICIENCY: ICD-10-CM

## 2019-02-28 DIAGNOSIS — M85.80 OSTEOPENIA, UNSPECIFIED LOCATION: ICD-10-CM

## 2019-02-28 RX ORDER — ASPIRIN 325 MG
TABLET, DELAYED RELEASE (ENTERIC COATED) ORAL
Qty: 12 CAPSULE | Refills: 1 | Status: SHIPPED | OUTPATIENT
Start: 2019-02-28 | End: 2019-06-24 | Stop reason: SDUPTHER

## 2019-03-01 RX ORDER — CLONAZEPAM 0.5 MG/1
0.5 TABLET ORAL 2 TIMES DAILY PRN
Qty: 180 TABLET | Refills: 1 | Status: SHIPPED | OUTPATIENT
Start: 2019-03-01 | End: 2019-09-04 | Stop reason: SDUPTHER

## 2019-03-20 ENCOUNTER — LAB VISIT (OUTPATIENT)
Dept: LAB | Facility: HOSPITAL | Age: 74
End: 2019-03-20
Attending: INTERNAL MEDICINE
Payer: MEDICARE

## 2019-03-20 ENCOUNTER — OFFICE VISIT (OUTPATIENT)
Dept: INTERNAL MEDICINE | Facility: CLINIC | Age: 74
End: 2019-03-20
Payer: MEDICARE

## 2019-03-20 ENCOUNTER — PATIENT MESSAGE (OUTPATIENT)
Dept: INTERNAL MEDICINE | Facility: CLINIC | Age: 74
End: 2019-03-20

## 2019-03-20 VITALS
HEART RATE: 62 BPM | DIASTOLIC BLOOD PRESSURE: 80 MMHG | WEIGHT: 196.19 LBS | BODY MASS INDEX: 30.79 KG/M2 | HEIGHT: 67 IN | SYSTOLIC BLOOD PRESSURE: 148 MMHG

## 2019-03-20 DIAGNOSIS — F33.9 MAJOR DEPRESSION, RECURRENT, CHRONIC: ICD-10-CM

## 2019-03-20 DIAGNOSIS — I70.0 AORTIC ATHEROSCLEROSIS: ICD-10-CM

## 2019-03-20 DIAGNOSIS — I11.9 HYPERTENSIVE HEART DISEASE WITHOUT HEART FAILURE: ICD-10-CM

## 2019-03-20 DIAGNOSIS — E11.69 HYPERLIPIDEMIA ASSOCIATED WITH TYPE 2 DIABETES MELLITUS: ICD-10-CM

## 2019-03-20 DIAGNOSIS — E11.21 TYPE 2 DIABETES MELLITUS WITH DIABETIC NEPHROPATHY, WITHOUT LONG-TERM CURRENT USE OF INSULIN: Primary | ICD-10-CM

## 2019-03-20 DIAGNOSIS — E78.5 HYPERLIPIDEMIA ASSOCIATED WITH TYPE 2 DIABETES MELLITUS: ICD-10-CM

## 2019-03-20 DIAGNOSIS — M81.0 OSTEOPOROSIS WITHOUT CURRENT PATHOLOGICAL FRACTURE, UNSPECIFIED OSTEOPOROSIS TYPE: ICD-10-CM

## 2019-03-20 DIAGNOSIS — K21.9 GASTROESOPHAGEAL REFLUX DISEASE WITHOUT ESOPHAGITIS: ICD-10-CM

## 2019-03-20 DIAGNOSIS — I25.10 CORONARY ARTERY DISEASE INVOLVING NATIVE CORONARY ARTERY OF NATIVE HEART WITHOUT ANGINA PECTORIS: ICD-10-CM

## 2019-03-20 DIAGNOSIS — F41.1 GENERALIZED ANXIETY DISORDER: ICD-10-CM

## 2019-03-20 DIAGNOSIS — Z23 NEED FOR SHINGLES VACCINE: ICD-10-CM

## 2019-03-20 DIAGNOSIS — M48.061 DEGENERATIVE LUMBAR SPINAL STENOSIS: ICD-10-CM

## 2019-03-20 DIAGNOSIS — E11.21 TYPE 2 DIABETES MELLITUS WITH DIABETIC NEPHROPATHY, WITHOUT LONG-TERM CURRENT USE OF INSULIN: ICD-10-CM

## 2019-03-20 LAB
ALBUMIN/CREAT UR: 5.1 UG/MG
ANION GAP SERPL CALC-SCNC: 8 MMOL/L
BUN SERPL-MCNC: 15 MG/DL
CALCIUM SERPL-MCNC: 9.5 MG/DL
CHLORIDE SERPL-SCNC: 107 MMOL/L
CO2 SERPL-SCNC: 26 MMOL/L
CREAT SERPL-MCNC: 0.8 MG/DL
CREAT UR-MCNC: 177 MG/DL
EST. GFR  (AFRICAN AMERICAN): >60 ML/MIN/1.73 M^2
EST. GFR  (NON AFRICAN AMERICAN): >60 ML/MIN/1.73 M^2
ESTIMATED AVG GLUCOSE: 140 MG/DL
GLUCOSE SERPL-MCNC: 129 MG/DL
HBA1C MFR BLD HPLC: 6.5 %
MICROALBUMIN UR DL<=1MG/L-MCNC: 9 UG/ML
POTASSIUM SERPL-SCNC: 4.1 MMOL/L
SODIUM SERPL-SCNC: 141 MMOL/L

## 2019-03-20 PROCEDURE — 3077F SYST BP >= 140 MM HG: CPT | Mod: HCNC,CPTII,S$GLB, | Performed by: INTERNAL MEDICINE

## 2019-03-20 PROCEDURE — 3044F PR MOST RECENT HEMOGLOBIN A1C LEVEL <7.0%: ICD-10-PCS | Mod: HCNC,CPTII,S$GLB, | Performed by: INTERNAL MEDICINE

## 2019-03-20 PROCEDURE — 3044F HG A1C LEVEL LT 7.0%: CPT | Mod: HCNC,CPTII,S$GLB, | Performed by: INTERNAL MEDICINE

## 2019-03-20 PROCEDURE — 1101F PR PT FALLS ASSESS DOC 0-1 FALLS W/OUT INJ PAST YR: ICD-10-PCS | Mod: HCNC,CPTII,S$GLB, | Performed by: INTERNAL MEDICINE

## 2019-03-20 PROCEDURE — 3077F PR MOST RECENT SYSTOLIC BLOOD PRESSURE >= 140 MM HG: ICD-10-PCS | Mod: HCNC,CPTII,S$GLB, | Performed by: INTERNAL MEDICINE

## 2019-03-20 PROCEDURE — 1101F PT FALLS ASSESS-DOCD LE1/YR: CPT | Mod: HCNC,CPTII,S$GLB, | Performed by: INTERNAL MEDICINE

## 2019-03-20 PROCEDURE — 80048 BASIC METABOLIC PNL TOTAL CA: CPT | Mod: HCNC

## 2019-03-20 PROCEDURE — 99499 RISK ADDL DX/OHS AUDIT: ICD-10-PCS | Mod: HCNC,S$GLB,, | Performed by: INTERNAL MEDICINE

## 2019-03-20 PROCEDURE — 82043 UR ALBUMIN QUANTITATIVE: CPT | Mod: HCNC

## 2019-03-20 PROCEDURE — 99214 OFFICE O/P EST MOD 30 MIN: CPT | Mod: HCNC,S$GLB,, | Performed by: INTERNAL MEDICINE

## 2019-03-20 PROCEDURE — 99499 UNLISTED E&M SERVICE: CPT | Mod: HCNC,S$GLB,, | Performed by: INTERNAL MEDICINE

## 2019-03-20 PROCEDURE — 36415 COLL VENOUS BLD VENIPUNCTURE: CPT | Mod: HCNC

## 2019-03-20 PROCEDURE — 3079F PR MOST RECENT DIASTOLIC BLOOD PRESSURE 80-89 MM HG: ICD-10-PCS | Mod: HCNC,CPTII,S$GLB, | Performed by: INTERNAL MEDICINE

## 2019-03-20 PROCEDURE — 3079F DIAST BP 80-89 MM HG: CPT | Mod: HCNC,CPTII,S$GLB, | Performed by: INTERNAL MEDICINE

## 2019-03-20 PROCEDURE — 99999 PR PBB SHADOW E&M-EST. PATIENT-LVL III: ICD-10-PCS | Mod: PBBFAC,HCNC,, | Performed by: INTERNAL MEDICINE

## 2019-03-20 PROCEDURE — 99999 PR PBB SHADOW E&M-EST. PATIENT-LVL III: CPT | Mod: PBBFAC,HCNC,, | Performed by: INTERNAL MEDICINE

## 2019-03-20 PROCEDURE — 99214 PR OFFICE/OUTPT VISIT, EST, LEVL IV, 30-39 MIN: ICD-10-PCS | Mod: HCNC,S$GLB,, | Performed by: INTERNAL MEDICINE

## 2019-03-20 PROCEDURE — 83036 HEMOGLOBIN GLYCOSYLATED A1C: CPT | Mod: HCNC

## 2019-03-20 RX ORDER — OXYCODONE AND ACETAMINOPHEN 5; 325 MG/1; MG/1
1 TABLET ORAL 2 TIMES DAILY PRN
Qty: 60 TABLET | Refills: 0 | Status: SHIPPED | OUTPATIENT
Start: 2019-03-20 | End: 2019-08-08 | Stop reason: SDUPTHER

## 2019-03-20 NOTE — PROGRESS NOTES
Subjective:       Patient ID: Jovana Fall is a 73 y.o. female.    Chief Complaint: Diabetes    Last seen 4 months ago. Returns for f/u chronic medical conditions. Fasting glucose was 119 at home this morning, no log for review. Taking daily meds as prescribed. Had f/u with Cardiology last month, felt to be doing well. No changes made in medications. She complains of increased low back pain in sacral area, not much radicular symptoms. History of chronic back problems with spinal stenosis on MRI . She declines referral for injections or PT, just a refill of Percocet which she uses sparingly, last ordered four months ago.    PMH: , misc x 1.   Diabetes Type 2. HbA1c HbA1c 6.3% .  Hypertensive heart disease without heart failure.   Mixed Hyperlipidemia, TChol 149, , HDL 35, LDL 84 .  Nephrolithiasis.   Urinary incontinence.  GERD. EGD 9/15, 3/17 - mild chronic non-active gastritis, no dysplasia, H pylori negative.  Depression/Anxiety.  Non-obstructive Coronary artery disease, angiogram , Cardiology .  Osteopenia.   Mild Vitamin D deficiency, up to 68.  Lumbar Spinal Stenosis. Cervical Spine DJD.   Skin Cancer, Basal Cell and Melanoma.     Mammogram normal . Pelvic exam . BMD 3/16 stable. Colonoscopy 1/15 - sigmoid diverticuli, one tubular adenoma 2-3mm, 5 yr f/u due to family history. Eye exam . Exercise Stress Echo negative 3/18.  Pneumovax 10/12. Zostavax 10/13. Prevnar 7/15. Tdap . Flu shot .    PSH:   Cholecystectomy.   Bilateral carpal tunnel release.   Hysterectomy. Ovaries remain.  Lumbar laminectomy  Appendectomy  Tubal ligation.  Hemorrhoidectomy  Rhinoplasty  Blepharoplasty, ears pinned.  Cystoscopy, ureteral stent.  Benign tumor removed left knee.   Right Bunionectomy and Hammertoe surgery.  Skin Cancer excision.    Social: Nonsmoker. Occas. alcohol. . 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    FMH: Colon and  "ovarian cancer in cousin. Colon cancer in younger brother. Mother had heart disease with MI at age 71. MGM with cervical cancer. Two aunts with diabetes.     Allergies: multiple, see Med Card.     Medications: list reviewed and reconciled.               Review of Systems   Constitutional: Negative for activity change, appetite change, fatigue, fever and unexpected weight change.   HENT: Negative for congestion, hearing loss, rhinorrhea, sneezing, sore throat, trouble swallowing and voice change.    Eyes: Negative for pain and visual disturbance.   Respiratory: Negative for cough, chest tightness, shortness of breath and wheezing.    Cardiovascular: Negative for chest pain, palpitations and leg swelling.   Gastrointestinal: Negative for abdominal pain, blood in stool, constipation, diarrhea, nausea and vomiting.   Genitourinary: Negative for difficulty urinating, dysuria, flank pain, frequency, hematuria and urgency.   Musculoskeletal: Positive for arthralgias and back pain. Negative for joint swelling, myalgias and neck pain.   Skin: Negative for color change and rash.   Neurological: Negative for dizziness, syncope, facial asymmetry, speech difficulty, weakness, numbness and headaches.   Hematological: Negative for adenopathy. Does not bruise/bleed easily.   Psychiatric/Behavioral: Negative for agitation, dysphoric mood and sleep disturbance. The patient is not nervous/anxious.         Depression and anxiety are stable on current meds.        Objective:    /80, repeat unchanged, Pulse 62, Ht 5' 7", Wt 196 lbs (stable), BMI=30.7  Physical Exam   Constitutional: She is oriented to person, place, and time. She appears well-developed and well-nourished. No distress.   HENT:   Head: Normocephalic and atraumatic.   Right Ear: External ear normal.   Left Ear: External ear normal.   Nose: Nose normal.   Mouth/Throat: Oropharynx is clear and moist. No oropharyngeal exudate.   Eyes: Conjunctivae and EOM are normal. " Pupils are equal, round, and reactive to light. Right conjunctiva is not injected. Left conjunctiva is not injected. No scleral icterus.   Neck: Normal range of motion. Neck supple. No JVD present. Carotid bruit is not present. No thyromegaly present.   Cardiovascular: Normal rate, regular rhythm, normal heart sounds and intact distal pulses. Exam reveals no gallop and no friction rub.   No murmur heard.  Pulmonary/Chest: Effort normal and breath sounds normal. No respiratory distress. She has no wheezes. She has no rhonchi. She has no rales.   Abdominal: Soft. Bowel sounds are normal. She exhibits no distension and no mass. There is no hepatosplenomegaly. There is no tenderness. There is no CVA tenderness.   Musculoskeletal: Normal range of motion. She exhibits no edema or tenderness.   Mild osteoarthritic deformity of hand joints.   Protective Sensation (w/ 10 gram monofilament):  Right: Intact  Left: Intact    Visual Inspection:  Normal -  Bilateral    Pedal Pulses:   Right: Present  Left: Present    Posterior tibialis:   Right:Present  Left: Present     Lymphadenopathy:     She has no cervical adenopathy.   Neurological: She is alert and oriented to person, place, and time. She has normal strength and normal reflexes. No cranial nerve deficit. She exhibits normal muscle tone. Coordination and gait normal.   Skin: Skin is warm and dry. No lesion and no rash noted. She is not diaphoretic. No cyanosis or erythema. No pallor. Nails show no clubbing.   Psychiatric: She has a normal mood and affect. Her behavior is normal. Judgment and thought content normal.   Vitals reviewed.      Assessment:       1. Type 2 diabetes mellitus with diabetic nephropathy, without long-term current use of insulin    2. Hypertensive heart disease without heart failure    3. Hyperlipidemia associated with type 2 diabetes mellitus    4. Coronary artery disease involving native coronary artery of native heart without angina pectoris    5.  Aortic atherosclerosis    6. Major depression, recurrent, chronic    7. Generalized anxiety disorder    8. Degenerative lumbar spinal stenosis    9. Gastroesophageal reflux disease without esophagitis    10. Osteoporosis without current pathological fracture, unspecified osteoporosis type    11. Need for shingles vaccine        Plan:       Type 2 diabetes mellitus with diabetic nephropathy, without long-term current use of insulin  -     Basic metabolic panel  -     Hemoglobin A1c  -     Microalbumin/creatinine urine ratio today.    Hypertensive heart disease without heart failure - BP usually controlled, continue same, monitor at home and call if freq>130/80.    Hyperlipidemia associated with type 2 diabetes mellitus - controlled, continue Atorvastatin.     Coronary artery disease involving native coronary artery of native heart without angina pectoris - asymptomatic.     Aortic atherosclerosis - medical management as above.     Major depression, recurrent, chronic - continue Effexor.     Generalized anxiety disorder - Clonazepam prn.     Degenerative lumbar spinal stenosis  -     oxyCODONE-acetaminophen (PERCOCET) 5-325 mg per tablet; Take 1 tablet by mouth 2 (two) times daily as needed for Pain.  Dispense: 60 tablet; Refill: 0    Gastroesophageal reflux disease without esophagitis - Omeprazole as needed.     Osteoporosis without current pathological fracture, unspecified osteoporosis type  -     DXA Bone Density Spine And Hip; Future; Expected date: 03/20/2019    Need for shingles vaccine - Shingrix recommended.

## 2019-03-21 ENCOUNTER — TELEPHONE (OUTPATIENT)
Dept: INTERNAL MEDICINE | Facility: CLINIC | Age: 74
End: 2019-03-21

## 2019-03-21 NOTE — TELEPHONE ENCOUNTER
----- Message from Amanda Adamson sent at 3/21/2019  3:40 PM CDT -----  Contact: [facility] [phone]  Prior Authorization Needed    Medication: oxyCODONE-acetaminophen (PERCOCET) 5-325 mg per tablet    Pharmacy Info: [facility] [address] [place] [address] - [address] AT [facility] [address]    Plan does not cover this medication. Please call plan at [phone] to initiate prior authorization or call/fax pharmacy to change medication. Patient ID#[id number]    Note chart when prior authorization has been submitted.    Please notify pharmacy when prior authorization has been approved.    Thank You

## 2019-03-27 ENCOUNTER — HOSPITAL ENCOUNTER (OUTPATIENT)
Dept: RADIOLOGY | Facility: HOSPITAL | Age: 74
Discharge: HOME | End: 2019-03-27
Attending: INTERNAL MEDICINE
Payer: MEDICARE

## 2019-03-27 DIAGNOSIS — M81.0 OSTEOPOROSIS WITHOUT CURRENT PATHOLOGICAL FRACTURE, UNSPECIFIED OSTEOPOROSIS TYPE: ICD-10-CM

## 2019-03-27 PROCEDURE — 77080 DXA BONE DENSITY AXIAL: CPT | Mod: TC,HCWC

## 2019-03-27 PROCEDURE — 77080 DXA BONE DENSITY AXIAL: CPT | Mod: 26,HCWC,, | Performed by: RADIOLOGY

## 2019-03-27 PROCEDURE — 77080 DEXA BONE DENSITY SPINE HIP: ICD-10-PCS | Mod: 26,HCWC,, | Performed by: RADIOLOGY

## 2019-04-16 ENCOUNTER — OFFICE VISIT (OUTPATIENT)
Dept: FAMILY MEDICINE | Facility: CLINIC | Age: 74
End: 2019-04-16
Payer: MEDICARE

## 2019-04-16 ENCOUNTER — NURSE TRIAGE (OUTPATIENT)
Dept: ADMINISTRATIVE | Facility: CLINIC | Age: 74
End: 2019-04-16

## 2019-04-16 VITALS
BODY MASS INDEX: 30.92 KG/M2 | DIASTOLIC BLOOD PRESSURE: 85 MMHG | HEART RATE: 70 BPM | OXYGEN SATURATION: 98 % | WEIGHT: 197 LBS | RESPIRATION RATE: 16 BRPM | SYSTOLIC BLOOD PRESSURE: 144 MMHG | TEMPERATURE: 99 F | HEIGHT: 67 IN

## 2019-04-16 DIAGNOSIS — I10 UNCONTROLLED HYPERTENSION: Primary | ICD-10-CM

## 2019-04-16 PROCEDURE — 3079F PR MOST RECENT DIASTOLIC BLOOD PRESSURE 80-89 MM HG: ICD-10-PCS | Mod: HCWC,CPTII,S$GLB, | Performed by: FAMILY MEDICINE

## 2019-04-16 PROCEDURE — 3077F PR MOST RECENT SYSTOLIC BLOOD PRESSURE >= 140 MM HG: ICD-10-PCS | Mod: HCWC,CPTII,S$GLB, | Performed by: FAMILY MEDICINE

## 2019-04-16 PROCEDURE — 3077F SYST BP >= 140 MM HG: CPT | Mod: HCWC,CPTII,S$GLB, | Performed by: FAMILY MEDICINE

## 2019-04-16 PROCEDURE — 1101F PT FALLS ASSESS-DOCD LE1/YR: CPT | Mod: HCWC,CPTII,S$GLB, | Performed by: FAMILY MEDICINE

## 2019-04-16 PROCEDURE — 99499 RISK ADDL DX/OHS AUDIT: ICD-10-PCS | Mod: S$GLB,,, | Performed by: FAMILY MEDICINE

## 2019-04-16 PROCEDURE — 99214 OFFICE O/P EST MOD 30 MIN: CPT | Mod: HCWC,S$GLB,, | Performed by: FAMILY MEDICINE

## 2019-04-16 PROCEDURE — 99499 UNLISTED E&M SERVICE: CPT | Mod: S$GLB,,, | Performed by: FAMILY MEDICINE

## 2019-04-16 PROCEDURE — 3079F DIAST BP 80-89 MM HG: CPT | Mod: HCWC,CPTII,S$GLB, | Performed by: FAMILY MEDICINE

## 2019-04-16 PROCEDURE — 1101F PR PT FALLS ASSESS DOC 0-1 FALLS W/OUT INJ PAST YR: ICD-10-PCS | Mod: HCWC,CPTII,S$GLB, | Performed by: FAMILY MEDICINE

## 2019-04-16 PROCEDURE — 99214 PR OFFICE/OUTPT VISIT, EST, LEVL IV, 30-39 MIN: ICD-10-PCS | Mod: HCWC,S$GLB,, | Performed by: FAMILY MEDICINE

## 2019-04-16 NOTE — TELEPHONE ENCOUNTER
Reason for Disposition   Systolic BP >= 160 OR Diastolic >= 100    Protocols used: HIGH BLOOD PRESSURE-A-OH    Ms. Fall states she blood pressure was 166/102 (left arm) and 170/102 (right arm). She states she has a dull headache on the top of her head. Scheduled patient an appointment for today.

## 2019-04-16 NOTE — PROGRESS NOTES
Subjective:       Patient ID: Jovana Fall is a 73 y.o. female.    Chief Complaint: Hypertension    New to me patient here for UC visit.  Blood pressure has been running high recently.  !90/90, 166/102, 170/102.  Much stress in past months, weeks and days.    Hypertension   This is a recurrent problem. Episode onset: past 2 days. The problem has been waxing and waning since onset. The problem is uncontrolled. Associated symptoms include anxiety, blurred vision and headaches. Pertinent negatives include no chest pain, palpitations, peripheral edema or shortness of breath. There are no associated agents to hypertension. Past treatments include angiotensin blockers and beta blockers. The current treatment provides significant improvement. Compliance problems include diet (Saltier foods).      Review of Systems   Constitutional: Negative for fever.   Eyes: Positive for blurred vision.   Respiratory: Negative for shortness of breath.    Cardiovascular: Negative for chest pain and palpitations.   Gastrointestinal: Negative for abdominal pain and nausea.   Skin: Negative for rash.   Neurological: Positive for headaches.   All other systems reviewed and are negative.      Objective:      Physical Exam   Constitutional: She appears well-developed and well-nourished.   Eyes: Pupils are equal, round, and reactive to light. No scleral icterus.   Neck: Neck supple.   Cardiovascular: Normal rate and regular rhythm.  No extrasystoles are present.   No murmur heard.  Pulmonary/Chest: Effort normal and breath sounds normal.   Musculoskeletal: She exhibits no edema or tenderness.   Lymphadenopathy:     She has no cervical adenopathy.   Skin: Skin is warm and dry.       Assessment:       Jovana was seen today for hypertension.    Diagnoses and all orders for this visit:    Uncontrolled hypertension    Discussed stress reduction techniques as well.    Patient Instructions   Carvedilol 12.5 mg - take one three times a day for now  and if BP does not respond, next increase to two pills twice a day (4 total).  Avoid salty foods, ensure plenty of water.  Work on the de-stressing techniques as discussed.

## 2019-04-16 NOTE — PATIENT INSTRUCTIONS
Carvedilol 12.5 mg - take one three times a day for now and if BP does not respond, next increase to two pills twice a day (4 total).  Avoid salty foods, ensure plenty of water.  Work on the de-stressing techniques as discussed.

## 2019-04-17 ENCOUNTER — TELEPHONE (OUTPATIENT)
Dept: INTERNAL MEDICINE | Facility: CLINIC | Age: 74
End: 2019-04-17

## 2019-04-17 NOTE — TELEPHONE ENCOUNTER
----- Message from Fiedl Eckert sent at 4/17/2019  8:45 AM CDT -----  Contact: self/[phone]  Pt is calling to speak with someone in the office in regards to messages she's been sending to the office. Pt states that she has not received a call back yet. Please call and advise.        Thank You

## 2019-04-17 NOTE — TELEPHONE ENCOUNTER
Dr. Spain advised increase in Carvedilol 12.5mg from one tab twice a day to three times a day, then in three days go up to two tabs BID.  She can go up to Carvedilol 12.5 two tabs (25mg) BID now.
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
## 2019-04-24 ENCOUNTER — TELEPHONE (OUTPATIENT)
Dept: FAMILY MEDICINE | Facility: CLINIC | Age: 74
End: 2019-04-24

## 2019-04-24 ENCOUNTER — NURSE TRIAGE (OUTPATIENT)
Dept: ADMINISTRATIVE | Facility: CLINIC | Age: 74
End: 2019-04-24

## 2019-04-24 NOTE — TELEPHONE ENCOUNTER
Reason for Disposition   Systolic BP >= 160 OR Diastolic >= 100   Abdominal pain and age > 60    Protocols used: HIGH BLOOD PRESSURE-A-OH, ST FLANK PAIN-A-OH    Wanted to speak to Dr. Staley's nurse- please call pt back today  States b/p has been high  B/p left arm 171/94 pulse 78, right arm 164/97 pulse 76 this morning  On 4/22 states systolic was ranging between 150's-180's.    Also reporting left flank pain/abdominal pain for a few days. Rated 3/10 on pain scale. History of kidney stones. Recommended ED now per protocol. She says she will go

## 2019-04-24 NOTE — TELEPHONE ENCOUNTER
----- Message from Paula Vasquez sent at 4/24/2019  2:10 PM CDT -----  Contact: Jovana desouza  Type:  Same Day Appointment Request    Caller is requesting a same day appointment.  Caller declined first available appointment listed below.      Name of Caller:  Jovana  When is the first available appointment?  04/25/19  Symptoms:  Blood pressure, rt side flank pain  Best Call Back Number:  [phone]  Additional Information:   She has already spoken to on call nurse who told her to go to the ER. She does not want to pay the price. She wants to see if she can possibly see Dr Spain today. She thinks it may be a UTI or kidney stone

## 2019-04-24 NOTE — TELEPHONE ENCOUNTER
Pt stated she will see Dr. Spain in [place] on tomorrow for the flank pain. Offered pt appt to check her BP. Pt declined stated she lives in [place]. Stated her BP a few minutes ago was 171/97. Pt stated she will continue to monitor BP and if the bottom number goes above 100 she will go to the ER.

## 2019-04-25 ENCOUNTER — LAB VISIT (OUTPATIENT)
Dept: LAB | Facility: HOSPITAL | Age: 74
End: 2019-04-25
Attending: FAMILY MEDICINE
Payer: MEDICARE

## 2019-04-25 ENCOUNTER — OFFICE VISIT (OUTPATIENT)
Dept: FAMILY MEDICINE | Facility: CLINIC | Age: 74
End: 2019-04-25
Payer: MEDICARE

## 2019-04-25 VITALS
BODY MASS INDEX: 30.76 KG/M2 | HEIGHT: 67 IN | TEMPERATURE: 98 F | WEIGHT: 196 LBS | HEART RATE: 82 BPM | DIASTOLIC BLOOD PRESSURE: 100 MMHG | SYSTOLIC BLOOD PRESSURE: 166 MMHG

## 2019-04-25 DIAGNOSIS — R10.9 LEFT FLANK PAIN: ICD-10-CM

## 2019-04-25 DIAGNOSIS — R10.9 FLANK PAIN: ICD-10-CM

## 2019-04-25 DIAGNOSIS — R30.0 DYSURIA: ICD-10-CM

## 2019-04-25 DIAGNOSIS — R30.0 DYSURIA: Primary | ICD-10-CM

## 2019-04-25 DIAGNOSIS — R10.84 GENERALIZED ABDOMINAL PAIN: ICD-10-CM

## 2019-04-25 LAB
ANION GAP SERPL CALC-SCNC: 11 MMOL/L (ref 8–16)
BUN SERPL-MCNC: 16 MG/DL (ref 8–23)
CALCIUM SERPL-MCNC: 9.5 MG/DL (ref 8.7–10.5)
CHLORIDE SERPL-SCNC: 101 MMOL/L (ref 95–110)
CO2 SERPL-SCNC: 26 MMOL/L (ref 23–29)
CREAT SERPL-MCNC: 0.7 MG/DL (ref 0.5–1.4)
EST. GFR  (AFRICAN AMERICAN): >60 ML/MIN/1.73 M^2
EST. GFR  (NON AFRICAN AMERICAN): >60 ML/MIN/1.73 M^2
GLUCOSE SERPL-MCNC: 100 MG/DL (ref 70–110)
POTASSIUM SERPL-SCNC: 3.8 MMOL/L (ref 3.5–5.1)
SODIUM SERPL-SCNC: 138 MMOL/L (ref 136–145)

## 2019-04-25 PROCEDURE — 99214 PR OFFICE/OUTPT VISIT, EST, LEVL IV, 30-39 MIN: ICD-10-PCS | Mod: HCWC,S$GLB,, | Performed by: FAMILY MEDICINE

## 2019-04-25 PROCEDURE — 3077F SYST BP >= 140 MM HG: CPT | Mod: HCWC,CPTII,S$GLB, | Performed by: FAMILY MEDICINE

## 2019-04-25 PROCEDURE — 36415 COLL VENOUS BLD VENIPUNCTURE: CPT | Mod: HCWC

## 2019-04-25 PROCEDURE — 99214 OFFICE O/P EST MOD 30 MIN: CPT | Mod: HCWC,S$GLB,, | Performed by: FAMILY MEDICINE

## 2019-04-25 PROCEDURE — 80048 BASIC METABOLIC PNL TOTAL CA: CPT | Mod: HCWC

## 2019-04-25 PROCEDURE — 1101F PR PT FALLS ASSESS DOC 0-1 FALLS W/OUT INJ PAST YR: ICD-10-PCS | Mod: HCWC,CPTII,S$GLB, | Performed by: FAMILY MEDICINE

## 2019-04-25 PROCEDURE — 1101F PT FALLS ASSESS-DOCD LE1/YR: CPT | Mod: HCWC,CPTII,S$GLB, | Performed by: FAMILY MEDICINE

## 2019-04-25 PROCEDURE — 3080F PR MOST RECENT DIASTOLIC BLOOD PRESSURE >= 90 MM HG: ICD-10-PCS | Mod: HCWC,CPTII,S$GLB, | Performed by: FAMILY MEDICINE

## 2019-04-25 PROCEDURE — 3080F DIAST BP >= 90 MM HG: CPT | Mod: HCWC,CPTII,S$GLB, | Performed by: FAMILY MEDICINE

## 2019-04-25 PROCEDURE — 3077F PR MOST RECENT SYSTOLIC BLOOD PRESSURE >= 140 MM HG: ICD-10-PCS | Mod: HCWC,CPTII,S$GLB, | Performed by: FAMILY MEDICINE

## 2019-04-25 RX ORDER — LOSARTAN POTASSIUM 100 MG/1
100 TABLET ORAL DAILY
Qty: 30 TABLET | Refills: 11 | Status: SHIPPED | OUTPATIENT
Start: 2019-04-25 | End: 2019-06-25 | Stop reason: SDUPTHER

## 2019-04-25 NOTE — PROGRESS NOTES
Subjective:       Patient ID: Joavna Fall is a 73 y.o. female.    Chief Complaint: Other (urinary urgency and discomfort)    Patient here for UC visit.  CC is left flank pain and some urinary urgency.  She is drinking more water lately.  Miscommunication on her BP meds and she did not yet increase the carvedilol.  Also, it seems that her Losartan  changed prior to the time her BP became elevated.     Flank Pain   This is a new problem. Episode onset: 2-3 weeks. The problem occurs intermittently. The problem has been waxing and waning since onset. The pain is present in the costovertebral angle. The quality of the pain is described as aching and stabbing. The pain does not radiate. The pain is moderate. Pertinent negatives include no dysuria or fever. (Urgency, no dysuria) Risk factors: HTN, recently not well controlled.  Diverticulosis. Treatments tried: More water. The treatment provided no relief.     Review of Systems   Constitutional: Negative for fever.   Gastrointestinal: Negative for nausea and vomiting.        BM's vary from normal to softer - hx of IBS, stable   Genitourinary: Positive for flank pain and frequency. Negative for dysuria and hematuria.       Objective:      Physical Exam   Constitutional: She appears well-developed and well-nourished.   Eyes: Pupils are equal, round, and reactive to light. No scleral icterus.   Neck: Neck supple.   Cardiovascular: Normal rate and regular rhythm.   No murmur heard.  Pulmonary/Chest: Effort normal and breath sounds normal.   Abdominal: Soft. Bowel sounds are normal. She exhibits no abdominal bruit and no pulsatile midline mass. There is tenderness in the left upper quadrant. There is CVA tenderness (on left).   Musculoskeletal: She exhibits no edema or tenderness.   Lymphadenopathy:     She has no cervical adenopathy.   Skin: Skin is warm and dry.       Assessment:       1. Dysuria        Plan:     Jovana was seen today for  "other.    Diagnoses and all orders for this visit:    Dysuria  -     POCT Urinalysis  -     Cancel: Urine culture  -     CT Abdomen Pelvis W Wo Contrast; Future  -     Basic metabolic panel; Future    Left flank pain  -     CT Abdomen Pelvis W Wo Contrast; Future  -     Basic metabolic panel; Future    Generalized abdominal pain  -     CT Abdomen Pelvis W Wo Contrast; Future    Flank pain  -     CT Abdomen Pelvis W Wo Contrast; Future  -     Basic metabolic panel; Future    Other orders  -     losartan (COZAAR) 100 MG tablet; Take 1 tablet (100 mg total) by mouth once daily.    UA today - Protein and Bili only; no sign of infection.  Check BMP and CT w/wo contrast to eval Kidney, colon, renal artery, etc.    Will try a new/differeent Losartan.  Start the "new" Losartan instead of what you have.  IF the BP remains high, then proceed with the Carvedilol increase of TWO, 12.5 mg, twice a day.      "

## 2019-04-25 NOTE — PATIENT INSTRUCTIONS
"Start the "new" Losartan instead of what you have.  IF the BP remains high, then proceed with the Carvedilol increase of TWO, 12.5 mg, twice a day.  "

## 2019-04-26 ENCOUNTER — PATIENT MESSAGE (OUTPATIENT)
Dept: FAMILY MEDICINE | Facility: CLINIC | Age: 74
End: 2019-04-26

## 2019-04-28 DIAGNOSIS — E11.21 TYPE 2 DIABETES MELLITUS WITH DIABETIC NEPHROPATHY, WITHOUT LONG-TERM CURRENT USE OF INSULIN: ICD-10-CM

## 2019-04-29 RX ORDER — GABAPENTIN 300 MG/1
CAPSULE ORAL
Qty: 90 CAPSULE | Refills: 1 | Status: SHIPPED | OUTPATIENT
Start: 2019-04-29 | End: 2019-12-09 | Stop reason: SDUPTHER

## 2019-04-29 RX ORDER — SITAGLIPTIN 50 MG/1
TABLET, FILM COATED ORAL
Qty: 90 TABLET | Refills: 1 | Status: SHIPPED | OUTPATIENT
Start: 2019-04-29 | End: 2019-10-04 | Stop reason: SDUPTHER

## 2019-05-05 ENCOUNTER — PATIENT MESSAGE (OUTPATIENT)
Dept: INTERNAL MEDICINE | Facility: CLINIC | Age: 74
End: 2019-05-05

## 2019-05-07 ENCOUNTER — HOSPITAL ENCOUNTER (OUTPATIENT)
Dept: RADIOLOGY | Facility: HOSPITAL | Age: 74
Discharge: HOME | End: 2019-05-07
Attending: FAMILY MEDICINE
Payer: MEDICARE

## 2019-05-07 DIAGNOSIS — R30.0 DYSURIA: ICD-10-CM

## 2019-05-07 DIAGNOSIS — R10.84 GENERALIZED ABDOMINAL PAIN: ICD-10-CM

## 2019-05-07 DIAGNOSIS — R10.9 FLANK PAIN: ICD-10-CM

## 2019-05-07 DIAGNOSIS — R10.9 LEFT FLANK PAIN: ICD-10-CM

## 2019-05-07 PROCEDURE — 74178 CT ABDOMEN PELVIS W WO CONTRAST: ICD-10-PCS | Mod: 26,HCWC,, | Performed by: RADIOLOGY

## 2019-05-07 PROCEDURE — 74178 CT ABD&PLV WO CNTR FLWD CNTR: CPT | Mod: TC,HCWC

## 2019-05-07 PROCEDURE — 74178 CT ABD&PLV WO CNTR FLWD CNTR: CPT | Mod: 26,HCWC,, | Performed by: RADIOLOGY

## 2019-05-07 PROCEDURE — 25500020 PHARM REV CODE 255: Mod: HCWC | Performed by: FAMILY MEDICINE

## 2019-05-07 RX ADMIN — IOHEXOL 15 ML: 300 INJECTION, SOLUTION INTRAVENOUS at 11:05

## 2019-05-07 RX ADMIN — IOHEXOL 100 ML: 350 INJECTION, SOLUTION INTRAVENOUS at 12:05

## 2019-05-07 RX ADMIN — IOHEXOL 15 ML: 300 INJECTION, SOLUTION INTRAVENOUS at 10:05

## 2019-06-24 DIAGNOSIS — E55.9 VITAMIN D INSUFFICIENCY: ICD-10-CM

## 2019-06-24 DIAGNOSIS — E78.5 HYPERLIPIDEMIA ASSOCIATED WITH TYPE 2 DIABETES MELLITUS: ICD-10-CM

## 2019-06-24 DIAGNOSIS — M85.80 OSTEOPENIA, UNSPECIFIED LOCATION: ICD-10-CM

## 2019-06-24 DIAGNOSIS — F33.9 MAJOR DEPRESSION, RECURRENT, CHRONIC: ICD-10-CM

## 2019-06-24 DIAGNOSIS — K21.9 GASTROESOPHAGEAL REFLUX DISEASE WITHOUT ESOPHAGITIS: ICD-10-CM

## 2019-06-24 DIAGNOSIS — E11.69 HYPERLIPIDEMIA ASSOCIATED WITH TYPE 2 DIABETES MELLITUS: ICD-10-CM

## 2019-06-25 RX ORDER — VENLAFAXINE 100 MG/1
TABLET ORAL
Qty: 90 TABLET | Refills: 1 | Status: SHIPPED | OUTPATIENT
Start: 2019-06-25 | End: 2019-08-08 | Stop reason: DRUGHIGH

## 2019-06-25 RX ORDER — LOSARTAN POTASSIUM 100 MG/1
TABLET ORAL
Qty: 90 TABLET | Refills: 1 | Status: SHIPPED | OUTPATIENT
Start: 2019-06-25 | End: 2020-01-26 | Stop reason: SDUPTHER

## 2019-06-25 RX ORDER — ATORVASTATIN CALCIUM 80 MG/1
TABLET, FILM COATED ORAL
Qty: 90 TABLET | Refills: 1 | Status: SHIPPED | OUTPATIENT
Start: 2019-06-25 | End: 2020-01-26 | Stop reason: SDUPTHER

## 2019-06-25 RX ORDER — ASPIRIN 325 MG
TABLET, DELAYED RELEASE (ENTERIC COATED) ORAL
Qty: 12 CAPSULE | Refills: 1 | Status: SHIPPED | OUTPATIENT
Start: 2019-06-25 | End: 2020-01-26 | Stop reason: ALTCHOICE

## 2019-06-25 RX ORDER — OMEPRAZOLE 20 MG/1
CAPSULE, DELAYED RELEASE ORAL
Qty: 90 CAPSULE | Refills: 1 | Status: SHIPPED | OUTPATIENT
Start: 2019-06-25 | End: 2020-01-26 | Stop reason: SDUPTHER

## 2019-07-09 ENCOUNTER — HOSPITAL ENCOUNTER (EMERGENCY)
Facility: HOSPITAL | Age: 74
Discharge: HOME | End: 2019-07-09
Payer: MEDICARE

## 2019-07-09 VITALS
OXYGEN SATURATION: 95 % | DIASTOLIC BLOOD PRESSURE: 98 MMHG | TEMPERATURE: 99 F | HEART RATE: 81 BPM | WEIGHT: 195 LBS | RESPIRATION RATE: 16 BRPM | HEIGHT: 67 IN | BODY MASS INDEX: 30.61 KG/M2 | SYSTOLIC BLOOD PRESSURE: 159 MMHG

## 2019-07-09 DIAGNOSIS — B02.8 HERPES ZOSTER WITH COMPLICATION: Primary | ICD-10-CM

## 2019-07-09 LAB — POCT GLUCOSE: 153 MG/DL (ref 70–110)

## 2019-07-09 PROCEDURE — 82962 GLUCOSE BLOOD TEST: CPT | Mod: HCWC

## 2019-07-09 PROCEDURE — 99283 EMERGENCY DEPT VISIT LOW MDM: CPT | Mod: 25,HCWC

## 2019-07-09 RX ORDER — PREDNISONE 20 MG/1
40 TABLET ORAL DAILY
Qty: 10 TABLET | Refills: 0 | Status: SHIPPED | OUTPATIENT
Start: 2019-07-09 | End: 2019-07-14

## 2019-07-09 RX ORDER — HYDROCODONE BITARTRATE AND ACETAMINOPHEN 5; 325 MG/1; MG/1
1 TABLET ORAL EVERY 6 HOURS PRN
Qty: 18 TABLET | Refills: 0 | Status: SHIPPED | OUTPATIENT
Start: 2019-07-09 | End: 2019-08-08

## 2019-07-09 RX ORDER — VALACYCLOVIR HYDROCHLORIDE 1 G/1
1000 TABLET, FILM COATED ORAL 3 TIMES DAILY
Qty: 21 TABLET | Refills: 0 | Status: SHIPPED | OUTPATIENT
Start: 2019-07-09 | End: 2019-08-08

## 2019-07-09 NOTE — ED PROVIDER NOTES
Encounter Date: 7/9/2019       History     Chief Complaint   Patient presents with    lesions on top of right side of head     started last two days.     Jovana Fall is a 73 y.o female with no sign PMHx. She presents to ED with painful rash with blisters to right scalp, forehead, and near right eye x 2 days  Patient reports painful burning sensation and sensativity to scalp for a few days and then blisters started.  No fever, body aches or chills  No neurological deficits    She has been vaccinated for shingles and had chicken pox as a child        Review of patient's allergies indicates:   Allergen Reactions    Adhesive tape-silicones      Other reaction(s): Swelling  Other reaction(s): Itching    Codeine Itching    Metformin Itching    Morphine Itching    Penicillins Itching     Other reaction(s): Itching    Sulfa (sulfonamide antibiotics) Itching     Other reaction(s): Itching     Past Medical History:   Diagnosis Date    Anxiety     Basal cell carcinoma 2014    left upper arm     BCC (basal cell carcinoma of skin) 01/07/16    mid upper back    BCC (basal cell carcinoma)     left upper back    Calcium nephrolithiasis     Cataract     Colon polyp     Coronary artery disease     Cortical cataract - Both Eyes 4/29/2013    Depression     Diabetes mellitus     Displacement of lumbar intervertebral disc without myelopathy 5/6/2013    Diverticulosis     GERD (gastroesophageal reflux disease)     H. pylori infection     Hepatomegaly     History of melanoma in situ 2/2/2015    Mid upper back ; 5/2014     Hx of colonic polyps 1/27/2015    Hyperlipidemia     Hypertension     Lactose intolerance     Lumbar spinal stenosis 5/6/2013    Melanoma     mid upper back- in situ 5/2014    Nuclear sclerosis - Both Eyes 4/29/2013    Osteopenia     Renal manifestation of secondary diabetes mellitus     Spinal stenosis, lumbar region, with neurogenic claudication 5/6/2013    Spondylosis without myelopathy  5/6/2013    Type 2 diabetes mellitus      Past Surgical History:   Procedure Laterality Date    APPENDECTOMY      BROW LIFT AND BLEPHAROPLASTY      BUNIONECTOMY Right     CARPAL TUNNEL RELEASE Bilateral     CHOLECYSTECTOMY      COLONOSCOPY  02/05/2015    Dr. Blas, repeat in 3-5 years    COLONOSCOPY N/A 1/27/2015    [facility]    ESOPHAGOGASTRODUODENOSCOPY (EGD) N/A 3/30/2017    [facility]wal [facility] at [facility]    ESOPHAGOGASTRODUODENOSCOPY (EGD) N/A 9/30/2015    [facility] at [facility]    HEMORRHOID SURGERY      HYSTERECTOMY      ovaries remain    LUMBAR LAMINECTOMY      RHINOPLASTY TIP      SKIN CANCER EXCISION      TUBAL LIGATION      UPPER GASTROINTESTINAL ENDOSCOPY  2015    Dr. Blas    UPPER GASTROINTESTINAL ENDOSCOPY  03/30/2017    Dr. Aggarwal     Family History   Problem Relation Age of Onset    Heart disease Mother         MI    Cancer Mother         SQ carcinoma of lung    Heart attack Mother     Skin cancer Mother     Cancer Father         prostate    Ovarian cancer Cousin     Colon cancer Cousin     Skin cancer Daughter     Cancer Sister         uterine sarcoma    Colon cancer Brother 60    No Known Problems Son     No Known Problems Sister     Diabetes Sister     Hypertension Sister     Arthritis Sister     No Known Problems Daughter     Breast cancer Maternal Aunt     Amblyopia Neg Hx     Blindness Neg Hx     Cataracts Neg Hx     Glaucoma Neg Hx     Retinal detachment Neg Hx     Strabismus Neg Hx     Stroke Neg Hx     Thyroid disease Neg Hx     Melanoma Neg Hx     Psoriasis Neg Hx     Lupus Neg Hx     Eczema Neg Hx     Crohn's disease Neg Hx     Ulcerative colitis Neg Hx      Social History     Tobacco Use    Smoking status: Never Smoker    Smokeless tobacco: Never Used   Substance Use Topics    Alcohol use: Yes     Comment: rare glass - less than one monthly    Drug  use: No     Review of Systems   Constitutional: Positive for fatigue. Negative for fever.   HENT: Negative.  Negative for sore throat.    Eyes: Negative.    Respiratory: Negative.  Negative for shortness of breath.    Cardiovascular: Negative.  Negative for chest pain.   Gastrointestinal: Negative.  Negative for nausea.   Endocrine: Negative.    Genitourinary: Negative.  Negative for dysuria.   Musculoskeletal: Negative.  Negative for back pain.   Skin: Positive for rash.   Allergic/Immunologic: Negative.    Neurological: Negative.  Negative for weakness.   Hematological: Negative.  Does not bruise/bleed easily.   Psychiatric/Behavioral: Negative.    All other systems reviewed and are negative.      Physical Exam     Initial Vitals [07/09/19 1628]   BP Pulse Resp Temp SpO2   (!) 159/98 81 16 99.4 °F (37.4 °C) 95 %      MAP       --         Physical Exam    Constitutional: She appears well-developed and well-nourished.   HENT:   Head: Normocephalic.   Eyes: Conjunctivae and EOM are normal. Pupils are equal, round, and reactive to light. Right conjunctiva is not injected. Right conjunctiva has no hemorrhage.   Neck: Normal range of motion.   Cardiovascular: Normal rate.   Pulmonary/Chest: Breath sounds normal.   Musculoskeletal: Normal range of motion.   Neurological: She is alert and oriented to person, place, and time. GCS score is 15. GCS eye subscore is 4. GCS verbal subscore is 5. GCS motor subscore is 6.   Skin: Skin is warm.        Psychiatric: She has a normal mood and affect. Her behavior is normal. Judgment and thought content normal.         ED Course   Procedures  Labs Reviewed   POCT GLUCOSE - Abnormal; Notable for the following components:       Result Value    POCT Glucose 153 (*)     All other components within normal limits          Imaging Results    None          Medical Decision Making:   Initial Assessment:   Patient with blisters to right scalp, forehead, and near right eye x 2 days  Patient  reports painful burning sensation and sensativity to scalp for a few days and then blisters started.  No fever, body aches or chills    No neurological deficits    She has been vaccinated for shingles and had chicken pox as a child    Patient with faint, herpetic rash to scalp and forehead along hairline and near outer aspect of right eye. The rash appears to be an a dermatomal distribution. She reports mild right eye irriation    Differential Diagnosis:   Shingles    Impetigo,    Contact dermatitis      ED Management:    Discussed physical exam findings with patient  No acute emergent medical condition identified at this time to warrant further testing/diagnostics  At this time, I believe the patient is clinically stable for discharge.   Patient to follow up with PCP in 1-2 days.  The patient acknowledges that close follow up with a MD is required after all ER visits  Pt given instructions; take all medications prescribed in the ER as directed.   Patient agrees to comply with all instruction and direction given in the ER  Pt agrees to return to ER if any symptoms reoccur                               Clinical Impression:       ICD-10-CM ICD-9-CM   1. Herpes zoster with complication B02.8 053.8                                Ana Osorio NP  07/09/19 2669

## 2019-07-10 ENCOUNTER — TELEPHONE (OUTPATIENT)
Dept: OPHTHALMOLOGY | Facility: CLINIC | Age: 74
End: 2019-07-10

## 2019-07-25 ENCOUNTER — PATIENT OUTREACH (OUTPATIENT)
Dept: ADMINISTRATIVE | Facility: HOSPITAL | Age: 74
End: 2019-07-25

## 2019-07-25 NOTE — PROGRESS NOTES
Pre-visit chart review completed. Pt eligible/ due for  Health Maintenance Due   Topic Date Due    Eye Exam  07/18/2019     Patient has eye exam scheduled for 8/20/2019    Patient is eligible for digital htn and dm program.  Orders pended.

## 2019-08-08 ENCOUNTER — LAB VISIT (OUTPATIENT)
Dept: LAB | Facility: HOSPITAL | Age: 74
End: 2019-08-08
Attending: INTERNAL MEDICINE
Payer: MEDICARE

## 2019-08-08 ENCOUNTER — OFFICE VISIT (OUTPATIENT)
Dept: PRIMARY CARE CLINIC | Facility: CLINIC | Age: 74
End: 2019-08-08
Payer: MEDICARE

## 2019-08-08 VITALS
DIASTOLIC BLOOD PRESSURE: 82 MMHG | BODY MASS INDEX: 31.12 KG/M2 | HEIGHT: 67 IN | HEART RATE: 66 BPM | WEIGHT: 198.31 LBS | SYSTOLIC BLOOD PRESSURE: 138 MMHG

## 2019-08-08 DIAGNOSIS — I11.9 HYPERTENSIVE HEART DISEASE WITHOUT HEART FAILURE: ICD-10-CM

## 2019-08-08 DIAGNOSIS — M48.061 DEGENERATIVE LUMBAR SPINAL STENOSIS: ICD-10-CM

## 2019-08-08 DIAGNOSIS — E11.59 TYPE 2 DIABETES MELLITUS WITH OTHER CIRCULATORY COMPLICATION, WITHOUT LONG-TERM CURRENT USE OF INSULIN: Primary | ICD-10-CM

## 2019-08-08 DIAGNOSIS — F33.9 MAJOR DEPRESSION, RECURRENT, CHRONIC: ICD-10-CM

## 2019-08-08 DIAGNOSIS — E11.59 TYPE 2 DIABETES MELLITUS WITH OTHER CIRCULATORY COMPLICATION, WITHOUT LONG-TERM CURRENT USE OF INSULIN: ICD-10-CM

## 2019-08-08 LAB
ANION GAP SERPL CALC-SCNC: 9 MMOL/L (ref 8–16)
BUN SERPL-MCNC: 15 MG/DL (ref 8–23)
CALCIUM SERPL-MCNC: 9.7 MG/DL (ref 8.7–10.5)
CHLORIDE SERPL-SCNC: 105 MMOL/L (ref 95–110)
CO2 SERPL-SCNC: 25 MMOL/L (ref 23–29)
CREAT SERPL-MCNC: 0.8 MG/DL (ref 0.5–1.4)
EST. GFR  (AFRICAN AMERICAN): >60 ML/MIN/1.73 M^2
EST. GFR  (NON AFRICAN AMERICAN): >60 ML/MIN/1.73 M^2
ESTIMATED AVG GLUCOSE: 146 MG/DL (ref 68–131)
GLUCOSE SERPL-MCNC: 139 MG/DL (ref 70–110)
HBA1C MFR BLD HPLC: 6.7 % (ref 4–5.6)
POTASSIUM SERPL-SCNC: 4 MMOL/L (ref 3.5–5.1)
SODIUM SERPL-SCNC: 139 MMOL/L (ref 136–145)

## 2019-08-08 PROCEDURE — 1101F PR PT FALLS ASSESS DOC 0-1 FALLS W/OUT INJ PAST YR: ICD-10-PCS | Mod: HCNC,CPTII,S$GLB, | Performed by: INTERNAL MEDICINE

## 2019-08-08 PROCEDURE — 3079F DIAST BP 80-89 MM HG: CPT | Mod: HCNC,CPTII,S$GLB, | Performed by: INTERNAL MEDICINE

## 2019-08-08 PROCEDURE — 99999 PR PBB SHADOW E&M-EST. PATIENT-LVL III: ICD-10-PCS | Mod: PBBFAC,HCNC,, | Performed by: INTERNAL MEDICINE

## 2019-08-08 PROCEDURE — 83036 HEMOGLOBIN GLYCOSYLATED A1C: CPT | Mod: HCNC

## 2019-08-08 PROCEDURE — 99499 RISK ADDL DX/OHS AUDIT: ICD-10-PCS | Mod: HCNC,S$GLB,, | Performed by: INTERNAL MEDICINE

## 2019-08-08 PROCEDURE — 3044F PR MOST RECENT HEMOGLOBIN A1C LEVEL <7.0%: ICD-10-PCS | Mod: HCNC,CPTII,S$GLB, | Performed by: INTERNAL MEDICINE

## 2019-08-08 PROCEDURE — 99214 OFFICE O/P EST MOD 30 MIN: CPT | Mod: HCNC,S$GLB,, | Performed by: INTERNAL MEDICINE

## 2019-08-08 PROCEDURE — 36415 COLL VENOUS BLD VENIPUNCTURE: CPT | Mod: HCNC,PN

## 2019-08-08 PROCEDURE — 99499 UNLISTED E&M SERVICE: CPT | Mod: HCNC,S$GLB,, | Performed by: INTERNAL MEDICINE

## 2019-08-08 PROCEDURE — 3075F PR MOST RECENT SYSTOLIC BLOOD PRESS GE 130-139MM HG: ICD-10-PCS | Mod: HCNC,CPTII,S$GLB, | Performed by: INTERNAL MEDICINE

## 2019-08-08 PROCEDURE — 80048 BASIC METABOLIC PNL TOTAL CA: CPT | Mod: HCNC

## 2019-08-08 PROCEDURE — 3044F HG A1C LEVEL LT 7.0%: CPT | Mod: HCNC,CPTII,S$GLB, | Performed by: INTERNAL MEDICINE

## 2019-08-08 PROCEDURE — 1101F PT FALLS ASSESS-DOCD LE1/YR: CPT | Mod: HCNC,CPTII,S$GLB, | Performed by: INTERNAL MEDICINE

## 2019-08-08 PROCEDURE — 3079F PR MOST RECENT DIASTOLIC BLOOD PRESSURE 80-89 MM HG: ICD-10-PCS | Mod: HCNC,CPTII,S$GLB, | Performed by: INTERNAL MEDICINE

## 2019-08-08 PROCEDURE — 99999 PR PBB SHADOW E&M-EST. PATIENT-LVL III: CPT | Mod: PBBFAC,HCNC,, | Performed by: INTERNAL MEDICINE

## 2019-08-08 PROCEDURE — 3075F SYST BP GE 130 - 139MM HG: CPT | Mod: HCNC,CPTII,S$GLB, | Performed by: INTERNAL MEDICINE

## 2019-08-08 PROCEDURE — 99214 PR OFFICE/OUTPT VISIT, EST, LEVL IV, 30-39 MIN: ICD-10-PCS | Mod: HCNC,S$GLB,, | Performed by: INTERNAL MEDICINE

## 2019-08-08 RX ORDER — CARVEDILOL 25 MG/1
25 TABLET ORAL 2 TIMES DAILY
Qty: 180 TABLET | Refills: 2 | Status: SHIPPED | OUTPATIENT
Start: 2019-08-08 | End: 2020-01-26 | Stop reason: SDUPTHER

## 2019-08-08 RX ORDER — VENLAFAXINE HYDROCHLORIDE 150 MG/1
150 CAPSULE, EXTENDED RELEASE ORAL DAILY
Qty: 90 CAPSULE | Refills: 1 | Status: SHIPPED | OUTPATIENT
Start: 2019-08-08 | End: 2020-01-26 | Stop reason: DRUGHIGH

## 2019-08-08 RX ORDER — OXYCODONE AND ACETAMINOPHEN 5; 325 MG/1; MG/1
1 TABLET ORAL 2 TIMES DAILY PRN
Qty: 60 TABLET | Refills: 0 | Status: SHIPPED | OUTPATIENT
Start: 2019-08-08 | End: 2020-01-17 | Stop reason: SDUPTHER

## 2019-08-09 NOTE — PROGRESS NOTES
Subjective:       Patient ID: Jovana Fall is a 73 y.o. female.    Chief Complaint: Diabetes    Last seen 5 months ago. Returns for f/u chronic medical conditions. Fasting glucose was 138 at home this morning, no log for review. Taking daily meds as prescribed. BP has been running high, med adjusted during urgent visit in April - Coreg increased from 12.5 to 25mg BID (taking two 12.5mg tabs BID). Also, recent episode of shingles on right forehead/scalp; no eye involvement. Treated timely with Valtrex, healed well with no residual pain.    PMH: , misc x 1.   Diabetes Type 2. HbA1c HbA1c 6.5% , BMP normal.  Hypertensive heart disease without heart failure.   Mixed Hyperlipidemia, TChol 149, , HDL 35, LDL 84 .  Nephrolithiasis.   Urinary incontinence.  GERD. EGD 9/15, 3/17 - mild chronic non-active gastritis, no dysplasia, H pylori negative.  Depression/Anxiety.  Non-obstructive Coronary artery disease, angiogram , Cardiology .  Osteopenia.   Mild Vitamin D deficiency, up to 68.  Lumbar Spinal Stenosis. Cervical Spine DJD.   Skin Cancer, Basal Cell and Melanoma.     Mammogram normal . Pelvic exam . BMD 3/19 stable. Colonoscopy 1/15 - sigmoid diverticuli, one tubular adenoma 2-3mm, 5 yr f/u due to family history. Eye exam . Exercise Stress Echo negative 3/18.  Pneumovax 10/12. Zostavax 10/13. Prevnar 7/15. Tdap . Flu shot .    PSH:   Cholecystectomy.   Bilateral carpal tunnel release.   Hysterectomy. Ovaries remain.  Lumbar laminectomy  Appendectomy  Tubal ligation.  Hemorrhoidectomy  Rhinoplasty  Blepharoplasty, ears pinned.  Cystoscopy, ureteral stent.  Benign tumor removed left knee.   Right Bunionectomy and Hammertoe surgery.  Skin Cancer excision.    Social: Nonsmoker. Occas. alcohol. . 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    FMH: Colon and ovarian cancer in cousin. Colon cancer in younger brother. Mother had heart  "disease with MI at age 71. MGM with cervical cancer. Two aunts with diabetes.     Allergies: multiple, see Med Card.     Medications: list reviewed and reconciled.             Review of Systems   Constitutional: Negative for chills, diaphoresis and fever.   Eyes: Negative for pain and visual disturbance.   Respiratory: Negative for cough and shortness of breath.    Cardiovascular: Negative for chest pain, palpitations and leg swelling.   Gastrointestinal: Negative for abdominal pain, nausea and vomiting.   Genitourinary: Negative for dysuria and frequency.   Musculoskeletal: Positive for back pain.   Neurological: Negative for dizziness and headaches.   Psychiatric/Behavioral: Positive for dysphoric mood.        Depression is worse in recent months, largely due to loneliness, doesn't see her children often.       Objective:    /82, Pulse 66, Ht 5' 7" Wt 198 lbs, BMI=31  Physical Exam   Constitutional: She is oriented to person, place, and time. She appears well-developed and well-nourished.   HENT:   Nose: Nose normal.   Mouth/Throat: Oropharynx is clear and moist.   Neck: Normal range of motion. Neck supple. No JVD present.   Cardiovascular: Normal rate, regular rhythm, normal heart sounds and intact distal pulses.   Pulmonary/Chest: Effort normal and breath sounds normal. No respiratory distress.   Abdominal: Soft. Bowel sounds are normal. She exhibits no distension. There is no tenderness.   Musculoskeletal: Normal range of motion. She exhibits no edema.   Neurological: She is alert and oriented to person, place, and time. No cranial nerve deficit. Coordination normal.   Skin: Skin is warm and dry. No rash noted.       Assessment:       1. Type 2 diabetes mellitus with other circulatory complication, without long-term current use of insulin    2. Hypertensive heart disease without heart failure    3. Major depression, recurrent, chronic    4. Degenerative lumbar spinal stenosis        Plan:       Type 2 " diabetes mellitus with other circulatory complication, without long-term current use of insulin  -     Hemoglobin A1c; Future; Expected date: 08/08/2019    Hypertensive heart disease without heart failure  -     Basic metabolic panel; Future; Expected date: 08/08/2019  -     Continue Coreg 25mg BID, Losartan 100mg daily    Major depression, recurrent, chronic  -     Increase Venlafaxine (EFFEXOR-XR) 150 MG Cp24; Take 1 capsule (150 mg total) by mouth once daily.  Dispense: 90 capsule; Refill: 1    Degenerative lumbar spinal stenosis  -     oxyCODONE-acetaminophen (PERCOCET) 5-325 mg per tablet; Take 1 tablet by mouth 2 (two) times daily as needed for Pain.  Dispense: 60 tablet; Refill: 0    Flu shot when available.

## 2019-08-11 ENCOUNTER — PATIENT MESSAGE (OUTPATIENT)
Dept: PRIMARY CARE CLINIC | Facility: CLINIC | Age: 74
End: 2019-08-11

## 2019-09-04 DIAGNOSIS — F41.1 GAD (GENERALIZED ANXIETY DISORDER): ICD-10-CM

## 2019-09-04 NOTE — TELEPHONE ENCOUNTER
----- Message from Megaan Pierce sent at 9/4/2019  1:59 PM CDT -----  Contact: marc Simon [phone]  Patient is calling for an RX refill or new RX.  Is this a refill or new RX:  New   RX name and strength: clonazePAM (KLONOPIN) 0.5 MG tablet  Directions (copy/paste from chart):  Take 1 tablet (0.5 mg total) by mouth 2 (two) times daily as needed  Is this a 30 day or 90 day RX:  90  Local pharmacy or mail order pharmacy:  Mail order   Pharmacy name and phone # (copy/paste from chart):   [facility] - [place], [place] - [address] [phone] (Phone)  [phone] (Fax)  Comments:

## 2019-09-09 ENCOUNTER — TELEPHONE (OUTPATIENT)
Dept: PRIMARY CARE CLINIC | Facility: CLINIC | Age: 74
End: 2019-09-09

## 2019-09-09 RX ORDER — CLONAZEPAM 0.5 MG/1
0.5 TABLET ORAL 2 TIMES DAILY PRN
Qty: 180 TABLET | Refills: 0 | Status: SHIPPED | OUTPATIENT
Start: 2019-09-09 | End: 2020-01-26

## 2019-10-03 ENCOUNTER — PATIENT OUTREACH (OUTPATIENT)
Dept: ADMINISTRATIVE | Facility: OTHER | Age: 74
End: 2019-10-03

## 2019-10-03 DIAGNOSIS — Z13.5 ENCOUNTER FOR SCREENING FOR DIABETIC RETINOPATHY: Primary | ICD-10-CM

## 2019-10-04 DIAGNOSIS — E11.21 TYPE 2 DIABETES MELLITUS WITH DIABETIC NEPHROPATHY, WITHOUT LONG-TERM CURRENT USE OF INSULIN: ICD-10-CM

## 2019-10-04 RX ORDER — SITAGLIPTIN 50 MG/1
TABLET, FILM COATED ORAL
Qty: 90 TABLET | Refills: 2 | Status: SHIPPED | OUTPATIENT
Start: 2019-10-04 | End: 2020-01-17 | Stop reason: DRUGHIGH

## 2019-10-10 ENCOUNTER — TELEPHONE (OUTPATIENT)
Dept: OPTOMETRY | Facility: CLINIC | Age: 74
End: 2019-10-10

## 2019-12-10 RX ORDER — GABAPENTIN 300 MG/1
CAPSULE ORAL
Qty: 90 CAPSULE | Refills: 1 | Status: SHIPPED | OUTPATIENT
Start: 2019-12-10 | End: 2020-04-01

## 2019-12-16 ENCOUNTER — TELEPHONE (OUTPATIENT)
Dept: OPHTHALMOLOGY | Facility: CLINIC | Age: 74
End: 2019-12-16

## 2019-12-16 NOTE — TELEPHONE ENCOUNTER
Called patient reschedule her appointment    ----- Message from Sandra Webb sent at 12/16/2019 12:17 PM CST -----  Contact: self  Pt would like to reschedule appt     Please advise, can be reached at [phone]

## 2020-01-17 ENCOUNTER — OFFICE VISIT (OUTPATIENT)
Dept: PRIMARY CARE CLINIC | Facility: CLINIC | Age: 75
End: 2020-01-17
Payer: MEDICARE

## 2020-01-17 ENCOUNTER — IMMUNIZATION (OUTPATIENT)
Dept: PHARMACY | Facility: CLINIC | Age: 75
End: 2020-01-17
Payer: MEDICARE

## 2020-01-17 ENCOUNTER — LAB VISIT (OUTPATIENT)
Dept: LAB | Facility: HOSPITAL | Age: 75
End: 2020-01-17
Attending: INTERNAL MEDICINE
Payer: MEDICARE

## 2020-01-17 VITALS
HEIGHT: 67 IN | DIASTOLIC BLOOD PRESSURE: 70 MMHG | OXYGEN SATURATION: 98 % | TEMPERATURE: 98 F | HEART RATE: 70 BPM | WEIGHT: 195.56 LBS | BODY MASS INDEX: 30.69 KG/M2 | SYSTOLIC BLOOD PRESSURE: 118 MMHG | RESPIRATION RATE: 18 BRPM

## 2020-01-17 DIAGNOSIS — E11.69 HYPERLIPIDEMIA ASSOCIATED WITH TYPE 2 DIABETES MELLITUS: ICD-10-CM

## 2020-01-17 DIAGNOSIS — I70.0 AORTIC ATHEROSCLEROSIS: ICD-10-CM

## 2020-01-17 DIAGNOSIS — E55.9 VITAMIN D INSUFFICIENCY: ICD-10-CM

## 2020-01-17 DIAGNOSIS — Z12.31 ENCOUNTER FOR SCREENING MAMMOGRAM FOR BREAST CANCER: ICD-10-CM

## 2020-01-17 DIAGNOSIS — E11.59 TYPE 2 DIABETES MELLITUS WITH OTHER CIRCULATORY COMPLICATION, WITHOUT LONG-TERM CURRENT USE OF INSULIN: Primary | ICD-10-CM

## 2020-01-17 DIAGNOSIS — K21.9 GASTROESOPHAGEAL REFLUX DISEASE WITHOUT ESOPHAGITIS: ICD-10-CM

## 2020-01-17 DIAGNOSIS — E11.59 TYPE 2 DIABETES MELLITUS WITH OTHER CIRCULATORY COMPLICATION, WITHOUT LONG-TERM CURRENT USE OF INSULIN: ICD-10-CM

## 2020-01-17 DIAGNOSIS — I11.9 HYPERTENSIVE HEART DISEASE WITHOUT HEART FAILURE: ICD-10-CM

## 2020-01-17 DIAGNOSIS — M48.061 DEGENERATIVE LUMBAR SPINAL STENOSIS: ICD-10-CM

## 2020-01-17 DIAGNOSIS — E78.5 HYPERLIPIDEMIA ASSOCIATED WITH TYPE 2 DIABETES MELLITUS: ICD-10-CM

## 2020-01-17 DIAGNOSIS — F33.9 MAJOR DEPRESSION, RECURRENT, CHRONIC: ICD-10-CM

## 2020-01-17 DIAGNOSIS — E11.21 TYPE 2 DIABETES MELLITUS WITH DIABETIC NEPHROPATHY, WITHOUT LONG-TERM CURRENT USE OF INSULIN: ICD-10-CM

## 2020-01-17 DIAGNOSIS — R32 ENURESIS: ICD-10-CM

## 2020-01-17 DIAGNOSIS — Z23 INFLUENZA VACCINE NEEDED: ICD-10-CM

## 2020-01-17 LAB
BASOPHILS # BLD AUTO: 0.08 K/UL (ref 0–0.2)
BASOPHILS NFR BLD: 1 % (ref 0–1.9)
BILIRUB UR QL STRIP: NEGATIVE
CLARITY UR REFRACT.AUTO: CLEAR
COLOR UR AUTO: YELLOW
DIFFERENTIAL METHOD: ABNORMAL
EOSINOPHIL # BLD AUTO: 0.2 K/UL (ref 0–0.5)
EOSINOPHIL NFR BLD: 2.8 % (ref 0–8)
ERYTHROCYTE [DISTWIDTH] IN BLOOD BY AUTOMATED COUNT: 12.1 % (ref 11.5–14.5)
GLUCOSE UR QL STRIP: NEGATIVE
HCT VFR BLD AUTO: 44.2 % (ref 37–48.5)
HGB BLD-MCNC: 14 G/DL (ref 12–16)
HGB UR QL STRIP: NEGATIVE
IMM GRANULOCYTES # BLD AUTO: 0.03 K/UL (ref 0–0.04)
IMM GRANULOCYTES NFR BLD AUTO: 0.4 % (ref 0–0.5)
KETONES UR QL STRIP: NEGATIVE
LEUKOCYTE ESTERASE UR QL STRIP: NEGATIVE
LYMPHOCYTES # BLD AUTO: 2.6 K/UL (ref 1–4.8)
LYMPHOCYTES NFR BLD: 32.2 % (ref 18–48)
MCH RBC QN AUTO: 30 PG (ref 27–31)
MCHC RBC AUTO-ENTMCNC: 31.7 G/DL (ref 32–36)
MCV RBC AUTO: 95 FL (ref 82–98)
MONOCYTES # BLD AUTO: 0.4 K/UL (ref 0.3–1)
MONOCYTES NFR BLD: 5 % (ref 4–15)
NEUTROPHILS # BLD AUTO: 4.8 K/UL (ref 1.8–7.7)
NEUTROPHILS NFR BLD: 58.6 % (ref 38–73)
NITRITE UR QL STRIP: NEGATIVE
NRBC BLD-RTO: 0 /100 WBC
PH UR STRIP: 6 [PH] (ref 5–8)
PLATELET # BLD AUTO: 261 K/UL (ref 150–350)
PMV BLD AUTO: 10.7 FL (ref 9.2–12.9)
PROT UR QL STRIP: NEGATIVE
RBC # BLD AUTO: 4.67 M/UL (ref 4–5.4)
SP GR UR STRIP: 1.01 (ref 1–1.03)
URN SPEC COLLECT METH UR: NORMAL
WBC # BLD AUTO: 8.19 K/UL (ref 3.9–12.7)

## 2020-01-17 PROCEDURE — 3044F PR MOST RECENT HEMOGLOBIN A1C LEVEL <7.0%: ICD-10-PCS | Mod: HCNC,CPTII,S$GLB, | Performed by: INTERNAL MEDICINE

## 2020-01-17 PROCEDURE — 85025 COMPLETE CBC W/AUTO DIFF WBC: CPT | Mod: HCNC

## 2020-01-17 PROCEDURE — 3074F PR MOST RECENT SYSTOLIC BLOOD PRESSURE < 130 MM HG: ICD-10-PCS | Mod: HCNC,CPTII,S$GLB, | Performed by: INTERNAL MEDICINE

## 2020-01-17 PROCEDURE — 99999 PR PBB SHADOW E&M-EST. PATIENT-LVL IV: ICD-10-PCS | Mod: PBBFAC,HCNC,, | Performed by: INTERNAL MEDICINE

## 2020-01-17 PROCEDURE — 99215 OFFICE O/P EST HI 40 MIN: CPT | Mod: HCNC,S$GLB,, | Performed by: INTERNAL MEDICINE

## 2020-01-17 PROCEDURE — 82306 VITAMIN D 25 HYDROXY: CPT | Mod: HCNC

## 2020-01-17 PROCEDURE — 99499 RISK ADDL DX/OHS AUDIT: ICD-10-PCS | Mod: HCNC,S$GLB,, | Performed by: INTERNAL MEDICINE

## 2020-01-17 PROCEDURE — 83036 HEMOGLOBIN GLYCOSYLATED A1C: CPT | Mod: HCNC

## 2020-01-17 PROCEDURE — 3074F SYST BP LT 130 MM HG: CPT | Mod: HCNC,CPTII,S$GLB, | Performed by: INTERNAL MEDICINE

## 2020-01-17 PROCEDURE — 3078F PR MOST RECENT DIASTOLIC BLOOD PRESSURE < 80 MM HG: ICD-10-PCS | Mod: HCNC,CPTII,S$GLB, | Performed by: INTERNAL MEDICINE

## 2020-01-17 PROCEDURE — 80061 LIPID PANEL: CPT | Mod: HCNC

## 2020-01-17 PROCEDURE — 80053 COMPREHEN METABOLIC PANEL: CPT | Mod: HCNC

## 2020-01-17 PROCEDURE — 1126F AMNT PAIN NOTED NONE PRSNT: CPT | Mod: HCNC,S$GLB,, | Performed by: INTERNAL MEDICINE

## 2020-01-17 PROCEDURE — 1159F MED LIST DOCD IN RCRD: CPT | Mod: HCNC,S$GLB,, | Performed by: INTERNAL MEDICINE

## 2020-01-17 PROCEDURE — 1159F PR MEDICATION LIST DOCUMENTED IN MEDICAL RECORD: ICD-10-PCS | Mod: HCNC,S$GLB,, | Performed by: INTERNAL MEDICINE

## 2020-01-17 PROCEDURE — 1101F PR PT FALLS ASSESS DOC 0-1 FALLS W/OUT INJ PAST YR: ICD-10-PCS | Mod: HCNC,CPTII,S$GLB, | Performed by: INTERNAL MEDICINE

## 2020-01-17 PROCEDURE — 99499 UNLISTED E&M SERVICE: CPT | Mod: HCNC,S$GLB,, | Performed by: INTERNAL MEDICINE

## 2020-01-17 PROCEDURE — 3078F DIAST BP <80 MM HG: CPT | Mod: HCNC,CPTII,S$GLB, | Performed by: INTERNAL MEDICINE

## 2020-01-17 PROCEDURE — 99215 PR OFFICE/OUTPT VISIT, EST, LEVL V, 40-54 MIN: ICD-10-PCS | Mod: HCNC,S$GLB,, | Performed by: INTERNAL MEDICINE

## 2020-01-17 PROCEDURE — 1101F PT FALLS ASSESS-DOCD LE1/YR: CPT | Mod: HCNC,CPTII,S$GLB, | Performed by: INTERNAL MEDICINE

## 2020-01-17 PROCEDURE — 81003 URINALYSIS AUTO W/O SCOPE: CPT | Mod: HCNC

## 2020-01-17 PROCEDURE — 3044F HG A1C LEVEL LT 7.0%: CPT | Mod: HCNC,CPTII,S$GLB, | Performed by: INTERNAL MEDICINE

## 2020-01-17 PROCEDURE — 1126F PR PAIN SEVERITY QUANTIFIED, NO PAIN PRESENT: ICD-10-PCS | Mod: HCNC,S$GLB,, | Performed by: INTERNAL MEDICINE

## 2020-01-17 PROCEDURE — 36415 COLL VENOUS BLD VENIPUNCTURE: CPT | Mod: HCNC,PN

## 2020-01-17 PROCEDURE — 99999 PR PBB SHADOW E&M-EST. PATIENT-LVL IV: CPT | Mod: PBBFAC,HCNC,, | Performed by: INTERNAL MEDICINE

## 2020-01-17 RX ORDER — OXYCODONE AND ACETAMINOPHEN 5; 325 MG/1; MG/1
1 TABLET ORAL 2 TIMES DAILY PRN
Qty: 60 TABLET | Refills: 0 | Status: SHIPPED | OUTPATIENT
Start: 2020-01-17 | End: 2020-03-19 | Stop reason: SDUPTHER

## 2020-01-18 LAB
25(OH)D3+25(OH)D2 SERPL-MCNC: 72 NG/ML (ref 30–96)
ALBUMIN SERPL BCP-MCNC: 4.2 G/DL (ref 3.5–5.2)
ALP SERPL-CCNC: 131 U/L (ref 55–135)
ALT SERPL W/O P-5'-P-CCNC: 16 U/L (ref 10–44)
ANION GAP SERPL CALC-SCNC: 15 MMOL/L (ref 8–16)
AST SERPL-CCNC: 20 U/L (ref 10–40)
BILIRUB SERPL-MCNC: 1 MG/DL (ref 0.1–1)
BUN SERPL-MCNC: 14 MG/DL (ref 8–23)
CALCIUM SERPL-MCNC: 9.9 MG/DL (ref 8.7–10.5)
CHLORIDE SERPL-SCNC: 104 MMOL/L (ref 95–110)
CHOLEST SERPL-MCNC: 185 MG/DL (ref 120–199)
CHOLEST/HDLC SERPL: 4.9 {RATIO} (ref 2–5)
CO2 SERPL-SCNC: 21 MMOL/L (ref 23–29)
CREAT SERPL-MCNC: 0.8 MG/DL (ref 0.5–1.4)
EST. GFR  (AFRICAN AMERICAN): >60 ML/MIN/1.73 M^2
EST. GFR  (NON AFRICAN AMERICAN): >60 ML/MIN/1.73 M^2
ESTIMATED AVG GLUCOSE: 148 MG/DL (ref 68–131)
GLUCOSE SERPL-MCNC: 122 MG/DL (ref 70–110)
HBA1C MFR BLD HPLC: 6.8 % (ref 4–5.6)
HDLC SERPL-MCNC: 38 MG/DL (ref 40–75)
HDLC SERPL: 20.5 % (ref 20–50)
LDLC SERPL CALC-MCNC: 105 MG/DL (ref 63–159)
NONHDLC SERPL-MCNC: 147 MG/DL
POTASSIUM SERPL-SCNC: 4.2 MMOL/L (ref 3.5–5.1)
PROT SERPL-MCNC: 7.6 G/DL (ref 6–8.4)
SODIUM SERPL-SCNC: 140 MMOL/L (ref 136–145)
TRIGL SERPL-MCNC: 210 MG/DL (ref 30–150)

## 2020-01-19 ENCOUNTER — PATIENT MESSAGE (OUTPATIENT)
Dept: PRIMARY CARE CLINIC | Facility: CLINIC | Age: 75
End: 2020-01-19

## 2020-01-20 ENCOUNTER — TELEPHONE (OUTPATIENT)
Dept: PRIMARY CARE CLINIC | Facility: CLINIC | Age: 75
End: 2020-01-20

## 2020-01-20 NOTE — TELEPHONE ENCOUNTER
Pt state she has a outbreak of fever blisters on her lips she would like to have valtrex sent to the pharmacy   Pt state she found a old prescription dated 2013 she has two tabs and took them

## 2020-01-20 NOTE — TELEPHONE ENCOUNTER
----- Message from Mami Castellon sent at 1/20/2020  1:47 PM CST -----  Contact: [phone]  Patient would like to get medical advice.  Symptoms (please be specific):  Fever blisters on her lips   How long has patient had these symptoms:  2 days  Pharmacy name and phone # (copy/paste from chart):  [facility] [address] [place] [address], [place] - [address]   [phone] (Phone)  [phone] (Fax)  Would the patient rather a call back or a response via [facility]?: call back   Comments: please advise, thanks

## 2020-01-23 DIAGNOSIS — E11.51 DM (DIABETES MELLITUS), TYPE 2 WITH PERIPHERAL VASCULAR COMPLICATIONS: ICD-10-CM

## 2020-01-23 DIAGNOSIS — E11.69 HYPERLIPIDEMIA ASSOCIATED WITH TYPE 2 DIABETES MELLITUS: ICD-10-CM

## 2020-01-23 DIAGNOSIS — F41.1 GAD (GENERALIZED ANXIETY DISORDER): ICD-10-CM

## 2020-01-23 DIAGNOSIS — E78.5 HYPERLIPIDEMIA ASSOCIATED WITH TYPE 2 DIABETES MELLITUS: ICD-10-CM

## 2020-01-23 DIAGNOSIS — K21.9 GASTROESOPHAGEAL REFLUX DISEASE WITHOUT ESOPHAGITIS: ICD-10-CM

## 2020-01-23 DIAGNOSIS — E11.21 TYPE 2 DIABETES MELLITUS WITH DIABETIC NEPHROPATHY, WITHOUT LONG-TERM CURRENT USE OF INSULIN: ICD-10-CM

## 2020-01-23 DIAGNOSIS — F33.9 MAJOR DEPRESSION, RECURRENT, CHRONIC: ICD-10-CM

## 2020-01-23 DIAGNOSIS — I11.9 HYPERTENSIVE HEART DISEASE WITHOUT HEART FAILURE: ICD-10-CM

## 2020-01-23 RX ORDER — CARVEDILOL 25 MG/1
25 TABLET ORAL 2 TIMES DAILY
Qty: 180 TABLET | Refills: 2 | Status: CANCELLED | OUTPATIENT
Start: 2020-01-23

## 2020-01-23 RX ORDER — VENLAFAXINE HYDROCHLORIDE 150 MG/1
150 CAPSULE, EXTENDED RELEASE ORAL DAILY
Qty: 90 CAPSULE | Refills: 1 | Status: CANCELLED | OUTPATIENT
Start: 2020-01-23

## 2020-01-23 RX ORDER — OMEPRAZOLE 20 MG/1
20 CAPSULE, DELAYED RELEASE ORAL EVERY MORNING
Qty: 90 CAPSULE | Refills: 1 | Status: CANCELLED | OUTPATIENT
Start: 2020-01-23

## 2020-01-23 RX ORDER — ATORVASTATIN CALCIUM 80 MG/1
80 TABLET, FILM COATED ORAL DAILY
Qty: 90 TABLET | Refills: 1 | Status: CANCELLED | OUTPATIENT
Start: 2020-01-23

## 2020-01-23 RX ORDER — LOSARTAN POTASSIUM 100 MG/1
100 TABLET ORAL DAILY
Qty: 90 TABLET | Refills: 1 | Status: CANCELLED | OUTPATIENT
Start: 2020-01-23

## 2020-01-23 NOTE — TELEPHONE ENCOUNTER
----- Message from Ewa Haddad sent at 1/23/2020  3:53 PM CST -----  Contact: Self [phone]  Pt is calling about refills for all Rx. Pt states she spoke with Dr. Staley and was suppose to have all meds refilled but [facility] pharmacy said that none were sent. Pt states that all Rx need to be refilled and for it to be sent to pharmacy . Please call and advise.

## 2020-01-23 NOTE — TELEPHONE ENCOUNTER
Pt state can the Effexor be changed back to 100 mg and can she get a prescription for Valtrex 500 mg for a outbreak

## 2020-01-26 RX ORDER — ATORVASTATIN CALCIUM 80 MG/1
80 TABLET, FILM COATED ORAL DAILY
Qty: 90 TABLET | Refills: 2 | Status: SHIPPED | OUTPATIENT
Start: 2020-01-26 | End: 2020-07-17 | Stop reason: SDUPTHER

## 2020-01-26 RX ORDER — CARVEDILOL 25 MG/1
25 TABLET ORAL 2 TIMES DAILY
Qty: 180 TABLET | Refills: 2 | Status: SHIPPED | OUTPATIENT
Start: 2020-01-26 | End: 2020-07-17 | Stop reason: SDUPTHER

## 2020-01-26 RX ORDER — LANCETS 26 GAUGE
1 EACH MISCELLANEOUS DAILY
Qty: 1 EACH | Refills: 0 | Status: SHIPPED | OUTPATIENT
Start: 2020-01-26 | End: 2020-07-17

## 2020-01-26 RX ORDER — LOSARTAN POTASSIUM 100 MG/1
100 TABLET ORAL DAILY
Qty: 90 TABLET | Refills: 2 | Status: SHIPPED | OUTPATIENT
Start: 2020-01-26 | End: 2020-07-17 | Stop reason: SDUPTHER

## 2020-01-26 RX ORDER — VENLAFAXINE 100 MG/1
100 TABLET ORAL DAILY
Qty: 90 TABLET | Refills: 2 | Status: SHIPPED | OUTPATIENT
Start: 2020-01-26 | End: 2020-07-17 | Stop reason: SDUPTHER

## 2020-01-26 RX ORDER — CLONAZEPAM 0.5 MG/1
TABLET ORAL
Qty: 180 TABLET | Refills: 0 | Status: SHIPPED | OUTPATIENT
Start: 2020-01-26 | End: 2020-07-17 | Stop reason: HOSPADM

## 2020-01-26 RX ORDER — OMEPRAZOLE 20 MG/1
20 CAPSULE, DELAYED RELEASE ORAL EVERY MORNING
Qty: 90 CAPSULE | Refills: 2 | Status: SHIPPED | OUTPATIENT
Start: 2020-01-26 | End: 2020-07-17 | Stop reason: SDUPTHER

## 2020-01-27 NOTE — PROGRESS NOTES
Subjective:       Patient ID: Jovana Fall is a 74 y.o. female.    Chief Complaint: Follow-up    Last seen 5 months ago. Returns for f/u chronic medical conditions. Reports blood glucose is running high 180-200 in recent weeks (no log) due to dietary indiscretion during the holidays. Compliant with meds as prescribed.     PMH: , misc x 1.   Diabetes Type 2. HbA1c HbA1c 6.5% , BMP normal.  Hypertensive heart disease without heart failure.   Mixed Hyperlipidemia, TChol 149, , HDL 35, LDL 84 .  Nephrolithiasis.   Urinary incontinence.  GERD. EGD 9/15, 3/17 - mild chronic non-active gastritis, no dysplasia, H pylori negative.  Depression/Anxiety.  Non-obstructive Coronary artery disease, angiogram , Cardiology .  Osteopenia.   Mild Vitamin D deficiency, up to 68.  Lumbar Spinal Stenosis. Cervical Spine DJD.   Skin Cancer, Basal Cell and Melanoma.     Mammogram normal . Pelvic exam . BMD 3/19 stable. Colonoscopy 1/15 - sigmoid diverticuli, one tubular adenoma 2-3mm, 5 yr f/u due to family history. Eye exam . Exercise Stress Echo negative 3/18.  Pneumovax 10/12. Zostavax 10/13. Prevnar 7/15. Tdap . Flu shot .    PSH:   Cholecystectomy.   Bilateral carpal tunnel release.   Hysterectomy. Ovaries remain.  Lumbar laminectomy  Appendectomy  Tubal ligation.  Hemorrhoidectomy  Rhinoplasty  Blepharoplasty, ears pinned.  Cystoscopy, ureteral stent.  Benign tumor removed left knee.   Right Bunionectomy and Hammertoe surgery.  Skin Cancer excision.    Social: Nonsmoker. Occas. alcohol. . 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    FMH: Colon and ovarian cancer in cousin. Colon cancer in younger brother. Mother had heart disease with MI at age 71. MGM with cervical cancer. Two aunts with diabetes.     Allergies: multiple, see Med Card.     Medications: list reviewed and reconciled.             Review of Systems   Constitutional: Negative for  "activity change, appetite change, fatigue, fever and unexpected weight change.   HENT: Negative for congestion, hearing loss, rhinorrhea, sneezing, sore throat, trouble swallowing and voice change.    Eyes: Negative for pain and visual disturbance.   Respiratory: Negative for cough, chest tightness, shortness of breath and wheezing.    Cardiovascular: Negative for chest pain, palpitations and leg swelling.   Gastrointestinal: Negative for abdominal pain, blood in stool, constipation, diarrhea, nausea and vomiting.   Genitourinary: Negative for difficulty urinating, dysuria, flank pain, frequency, hematuria, urgency and vaginal bleeding.   Musculoskeletal: Positive for back pain. Negative for arthralgias, joint swelling, myalgias and neck pain.   Skin: Negative for color change and rash.   Neurological: Negative for dizziness, syncope, facial asymmetry, speech difficulty, weakness, numbness and headaches.   Hematological: Negative for adenopathy. Does not bruise/bleed easily.   Psychiatric/Behavioral: Negative for agitation, dysphoric mood and sleep disturbance. The patient is nervous/anxious.         Depression subsiding, she requests decreasing Effexor back to 100mg (from 150).       Objective:    /70, Pulse 70, Temp 98, O2 Sat 98%, Ht 5' 7", Wt 195.6 lbs (stable), BMI=30.6  Physical Exam   Constitutional: She is oriented to person, place, and time. She appears well-developed and well-nourished. No distress.   HENT:   Head: Normocephalic and atraumatic.   Right Ear: External ear normal.   Left Ear: External ear normal.   Nose: Nose normal.   Mouth/Throat: Oropharynx is clear and moist. No oropharyngeal exudate.   Eyes: Pupils are equal, round, and reactive to light. Conjunctivae and EOM are normal. Right conjunctiva is not injected. Left conjunctiva is not injected. No scleral icterus.   Neck: Normal range of motion. Neck supple. No JVD present. Carotid bruit is not present. No thyromegaly present. "   Cardiovascular: Normal rate, regular rhythm, normal heart sounds and intact distal pulses. Exam reveals no gallop and no friction rub.   No murmur heard.  Pulmonary/Chest: Effort normal and breath sounds normal. No respiratory distress. She has no wheezes. She has no rhonchi. She has no rales.   Abdominal: Soft. Bowel sounds are normal. She exhibits no distension and no mass. There is no hepatosplenomegaly. There is no tenderness. There is no CVA tenderness.   Musculoskeletal: Normal range of motion. She exhibits no edema, tenderness or deformity.   Lymphadenopathy:     She has no cervical adenopathy.   Neurological: She is alert and oriented to person, place, and time. She has normal strength and normal reflexes. No cranial nerve deficit. She exhibits normal muscle tone. Coordination and gait normal.   Skin: Skin is warm and dry. No lesion and no rash noted. She is not diaphoretic. No cyanosis or erythema. No pallor. Nails show no clubbing.   Psychiatric: She has a normal mood and affect. Her behavior is normal. Thought content normal.   Vitals reviewed.      Assessment:       1. Type 2 diabetes mellitus with other circulatory complication, without long-term current use of insulin    2. Hypertensive heart disease without heart failure    3. Hyperlipidemia associated with type 2 diabetes mellitus    4. Aortic atherosclerosis    5. Enuresis    6. Vitamin D insufficiency    7. Type 2 diabetes mellitus with diabetic nephropathy, without long-term current use of insulin    8. Degenerative lumbar spinal stenosis    9. Major depression, recurrent, chronic    10. Gastroesophageal reflux disease without esophagitis    11. Encounter for screening mammogram for breast cancer    12. Influenza vaccine needed        Plan:       Type 2 diabetes mellitus with other circulatory complication, without long-term current use of insulin  -     CBC auto differential; Future; Expected date: 01/17/2020  -     Comprehensive metabolic  panel; Future; Expected date: 01/17/2020  -     Lipid panel; Future; Expected date: 01/17/2020  -     Hemoglobin A1c; Future; Expected date: 01/17/2020    Hypertensive heart disease without heart failure - controlled.  -     carvedilol (COREG) 25 MG tablet; Take 1 tablet (25 mg total) by mouth 2 (two) times daily.  Dispense: 180 tablet; Refill: 2  -     losartan (COZAAR) 100 MG tablet; Take 1 tablet (100 mg total) by mouth once daily.  Dispense: 90 tablet; Refill: 2    Hyperlipidemia associated with type 2 diabetes mellitus  -     atorvastatin (LIPITOR) 80 MG tablet; Take 1 tablet (80 mg total) by mouth once daily.  Dispense: 90 tablet; Refill: 2    Aortic atherosclerosis- continue statin therapy.    Enuresis  -     Urinalysis, Reflex to Urine Culture Urine, Clean Catch    Vitamin D insufficiency  -     Vitamin D; Future; Expected date: 01/17/2020    Type 2 diabetes mellitus with diabetic nephropathy, without long-term current use of insulin  -     SITagliptin (JANUVIA) 100 MG Tab; Take 1 tablet (100 mg total) by mouth once daily.  Dispense: 90 tablet; Refill: 1    Degenerative lumbar spinal stenosis  -     oxyCODONE-acetaminophen (PERCOCET) 5-325 mg per tablet; Take 1 tablet by mouth 2 (two) times daily as needed for Pain.  Dispense: 60 tablet; Refill: 0    Major depression, recurrent, chronic  -     venlafaxine (EFFEXOR) 100 MG Tab; Take 1 tablet (100 mg total) by mouth once daily.  Dispense: 90 tablet; Refill: 2    Gastroesophageal reflux disease without esophagitis  -     omeprazole (PRILOSEC) 20 MG capsule; Take 1 capsule (20 mg total) by mouth every morning.  Dispense: 90 capsule; Refill: 2    Encounter for screening mammogram for breast cancer  -     Mammo Digital Screening Bilat w/ [place]; Future; Expected date: 01/17/2020    Influenza vaccine needed - Flu shot today.

## 2020-01-28 DIAGNOSIS — E11.21 TYPE 2 DIABETES MELLITUS WITH DIABETIC NEPHROPATHY, WITHOUT LONG-TERM CURRENT USE OF INSULIN: ICD-10-CM

## 2020-01-28 RX ORDER — GLUCOSAM/CHON-MSM1/C/MANG/BOSW 500-416.6
TABLET ORAL
Qty: 100 EACH | Refills: 3 | Status: SHIPPED | OUTPATIENT
Start: 2020-01-28 | End: 2021-03-06

## 2020-01-28 RX ORDER — CALCIUM CITRATE/VITAMIN D3 200MG-6.25
TABLET ORAL
Qty: 100 STRIP | Refills: 3 | Status: SHIPPED | OUTPATIENT
Start: 2020-01-28 | End: 2021-03-06

## 2020-02-06 ENCOUNTER — PATIENT OUTREACH (OUTPATIENT)
Dept: ADMINISTRATIVE | Facility: OTHER | Age: 75
End: 2020-02-06

## 2020-02-19 ENCOUNTER — TELEPHONE (OUTPATIENT)
Dept: PRIMARY CARE CLINIC | Facility: CLINIC | Age: 75
End: 2020-02-19

## 2020-02-19 DIAGNOSIS — S39.012A LUMBAR STRAIN, INITIAL ENCOUNTER: Primary | ICD-10-CM

## 2020-02-19 RX ORDER — METHOCARBAMOL 750 MG/1
750 TABLET, FILM COATED ORAL 3 TIMES DAILY PRN
Qty: 60 TABLET | Refills: 0 | Status: SHIPPED | OUTPATIENT
Start: 2020-02-19 | End: 2020-07-17

## 2020-02-19 NOTE — TELEPHONE ENCOUNTER
Pt called stating she is having lower back pain radiating down her legs she has been picking up her 20 lb dog and when she bend it hurt pt is taking her pain med as prescribed but she cannot drive the long distance to come get this checked out can she get a muscle relaxer sent to her pharmacy.[facility] in [place]

## 2020-02-19 NOTE — TELEPHONE ENCOUNTER
----- Message from Mariam Phillip sent at 2/19/2020  9:14 AM CST -----  Contact: Pt [phone]  Would like to get medical advice.  Symptoms (please be specific):  Lower back pain intense sharp/Level 10 and down both legs  How long has patient had these symptoms:  2 weeks  Pharmacy name and phone #:  [facility] [place] [facility] [address] [phone] (Phone) [phone] (Fax)    Comments:  The pain meds she is taking aren't  Working. She wants to know what else she can do to alleviate the pain.

## 2020-03-03 ENCOUNTER — PATIENT OUTREACH (OUTPATIENT)
Dept: ADMINISTRATIVE | Facility: OTHER | Age: 75
End: 2020-03-03

## 2020-03-19 DIAGNOSIS — M48.061 DEGENERATIVE LUMBAR SPINAL STENOSIS: ICD-10-CM

## 2020-03-19 RX ORDER — OXYCODONE AND ACETAMINOPHEN 5; 325 MG/1; MG/1
1 TABLET ORAL 2 TIMES DAILY PRN
Qty: 60 TABLET | Refills: 0 | Status: SHIPPED | OUTPATIENT
Start: 2020-03-19 | End: 2020-05-20 | Stop reason: SDUPTHER

## 2020-03-19 NOTE — TELEPHONE ENCOUNTER
----- Message from Cristiana Garsia sent at 3/19/2020 12:18 PM CDT -----  Contact: patient [phone]  Requesting an RX refill or new RX.  Is this a refill or new RX:  refill  RX name and strength: oxyCODONE-acetaminophen (PERCOCET) 5-325 mg per tablet  Directions (copy/paste from chart): Take 1 tablet by mouth 2 (two) times daily as needed for Pain. -   Is this a 30 day or 90 day RX:  #60  Local pharmacy or mail order pharmacy:  local  Pharmacy name and phone # (copy/paste from chart):         [facility] [address] [facility], [place] - [address] [phone]      Comments:

## 2020-03-20 ENCOUNTER — TELEPHONE (OUTPATIENT)
Dept: PRIMARY CARE CLINIC | Facility: CLINIC | Age: 75
End: 2020-03-20

## 2020-03-20 NOTE — TELEPHONE ENCOUNTER
----- Message from Brenton Feliz sent at 3/20/2020 10:30 AM CDT -----  Contact: [facility] [phone]  Pharmacy is calling to clarify an RX.  RX name:  oxyCODONE-acetaminophen (PERCOCET) 5-325 mg per tablet   And     clonazePAM (KLONOPIN) 0.5 MG tablet   What do they need to clarify:  Both interacting Rx, to inform office  Comments:     Please call an advise  Thank you

## 2020-03-23 ENCOUNTER — TELEPHONE (OUTPATIENT)
Dept: INTERNAL MEDICINE | Facility: CLINIC | Age: 75
End: 2020-03-23

## 2020-03-24 NOTE — TELEPHONE ENCOUNTER
"Percocet order does not require "clarification". If they can not fill it because she is on Clonazepam, then they can't fill it.   Inform patient that she can not be on opiates and sedatives simultaneously.   "

## 2020-03-24 NOTE — TELEPHONE ENCOUNTER
Pt state she will wean off the clonazepam and just take her percocet. I informed pharmacy pt state she  will not take both med but she do need her pain medication.

## 2020-03-25 ENCOUNTER — PATIENT MESSAGE (OUTPATIENT)
Dept: PRIMARY CARE CLINIC | Facility: CLINIC | Age: 75
End: 2020-03-25

## 2020-04-01 DIAGNOSIS — S39.012A LUMBAR STRAIN, INITIAL ENCOUNTER: Primary | ICD-10-CM

## 2020-04-01 DIAGNOSIS — M48.061 DEGENERATIVE LUMBAR SPINAL STENOSIS: ICD-10-CM

## 2020-04-01 RX ORDER — GABAPENTIN 300 MG/1
CAPSULE ORAL
Qty: 90 CAPSULE | Refills: 1 | Status: SHIPPED | OUTPATIENT
Start: 2020-04-01 | End: 2020-08-24

## 2020-04-01 RX ORDER — ASPIRIN 325 MG
TABLET, DELAYED RELEASE (ENTERIC COATED) ORAL
Qty: 12 CAPSULE | OUTPATIENT
Start: 2020-04-01

## 2020-04-02 ENCOUNTER — PATIENT MESSAGE (OUTPATIENT)
Dept: DERMATOLOGY | Facility: CLINIC | Age: 75
End: 2020-04-02

## 2020-04-03 ENCOUNTER — TELEPHONE (OUTPATIENT)
Dept: DERMATOLOGY | Facility: CLINIC | Age: 75
End: 2020-04-03

## 2020-04-03 NOTE — TELEPHONE ENCOUNTER
Several attempts made to contact patient, message sent via my chart portal.  Have not gotten a response from patient.

## 2020-04-29 ENCOUNTER — TELEPHONE (OUTPATIENT)
Dept: DERMATOLOGY | Facility: CLINIC | Age: 75
End: 2020-04-29

## 2020-04-29 NOTE — TELEPHONE ENCOUNTER
Spoke with patient, she states she needs to be seen because she has a history of cancer and needs to be checked.  Advised she can keep her appointment.  Instructed on wearing a mask and coming alone and do not come early for her appointment as she will have to wait in her car if she comes too early.  She voiced understanding and will be here Tuesday.

## 2020-04-29 NOTE — TELEPHONE ENCOUNTER
----- Message from Jovana Burgess sent at 4/29/2020 12:36 PM CDT -----  Type: Needs Medical Advice    Who Called:  Patient  Best Call Back Number: [phone]  Additional Information: Patient requesting to speak with nurse concerning appointment on May 5th/has question/please call back to advise.

## 2020-05-04 ENCOUNTER — TELEPHONE (OUTPATIENT)
Dept: DERMATOLOGY | Facility: CLINIC | Age: 75
End: 2020-05-04

## 2020-05-04 NOTE — TELEPHONE ENCOUNTER
----- Message from Sabrina Pacheco MA sent at 5/4/2020  9:02 AM CDT -----  Contact: self/[phone]  Who Called:PT canceled appt stated she is not feeling to well and would like a call to R/S for next month. Please call back to discuss.

## 2020-05-19 ENCOUNTER — PATIENT OUTREACH (OUTPATIENT)
Dept: ADMINISTRATIVE | Facility: OTHER | Age: 75
End: 2020-05-19

## 2020-05-20 DIAGNOSIS — M48.061 DEGENERATIVE LUMBAR SPINAL STENOSIS: ICD-10-CM

## 2020-05-20 NOTE — TELEPHONE ENCOUNTER
----- Message from Marti Dennison sent at 5/20/2020 11:19 AM CDT -----  ..1 Patient would like to get medical advice.  Symptoms (please be specific): back pain and upper right jaw pain  How long has patient had these symptoms: 2 months  Pharmacy name and phone#:[facility] [place], [place] - [address] [facility] [address] [phone] (Phone) [phone] (Fax)  Any drug allergies: onfile  Comments: Patient would like to get medical advice. Patient would like to get an mri to see what's going on. Thank you

## 2020-05-21 ENCOUNTER — OFFICE VISIT (OUTPATIENT)
Dept: DERMATOLOGY | Facility: CLINIC | Age: 75
End: 2020-05-21
Payer: MEDICARE

## 2020-05-21 VITALS — HEIGHT: 67 IN | WEIGHT: 195 LBS | BODY MASS INDEX: 30.61 KG/M2

## 2020-05-21 DIAGNOSIS — D22.9 MULTIPLE BENIGN NEVI: ICD-10-CM

## 2020-05-21 DIAGNOSIS — Z85.828 HISTORY OF NONMELANOMA SKIN CANCER: Primary | ICD-10-CM

## 2020-05-21 DIAGNOSIS — L73.8 SEBACEOUS HYPERPLASIA OF FACE: ICD-10-CM

## 2020-05-21 DIAGNOSIS — Z12.11 SPECIAL SCREENING FOR MALIGNANT NEOPLASMS, COLON: Primary | ICD-10-CM

## 2020-05-21 DIAGNOSIS — D18.01 CHERRY ANGIOMA: ICD-10-CM

## 2020-05-21 DIAGNOSIS — L82.0 INFLAMED SEBORRHEIC KERATOSIS: ICD-10-CM

## 2020-05-21 DIAGNOSIS — L82.1 SEBORRHEIC KERATOSES: ICD-10-CM

## 2020-05-21 DIAGNOSIS — L81.4 SOLAR LENTIGO: ICD-10-CM

## 2020-05-21 DIAGNOSIS — B07.8 COMMON WART: ICD-10-CM

## 2020-05-21 PROCEDURE — 99214 PR OFFICE/OUTPT VISIT, EST, LEVL IV, 30-39 MIN: ICD-10-PCS | Mod: 25,HCNC,S$GLB, | Performed by: DERMATOLOGY

## 2020-05-21 PROCEDURE — 99999 PR PBB SHADOW E&M-EST. PATIENT-LVL III: ICD-10-PCS | Mod: PBBFAC,HCNC,, | Performed by: DERMATOLOGY

## 2020-05-21 PROCEDURE — 99999 PR PBB SHADOW E&M-EST. PATIENT-LVL III: CPT | Mod: PBBFAC,HCNC,, | Performed by: DERMATOLOGY

## 2020-05-21 PROCEDURE — 99214 OFFICE O/P EST MOD 30 MIN: CPT | Mod: 25,HCNC,S$GLB, | Performed by: DERMATOLOGY

## 2020-05-21 PROCEDURE — 17110 PR DESTRUCTION BENIGN LESIONS UP TO 14: ICD-10-PCS | Mod: HCNC,S$GLB,, | Performed by: DERMATOLOGY

## 2020-05-21 PROCEDURE — 1126F AMNT PAIN NOTED NONE PRSNT: CPT | Mod: HCNC,S$GLB,, | Performed by: DERMATOLOGY

## 2020-05-21 PROCEDURE — 1159F MED LIST DOCD IN RCRD: CPT | Mod: HCNC,S$GLB,, | Performed by: DERMATOLOGY

## 2020-05-21 PROCEDURE — 17110 DESTRUCTION B9 LES UP TO 14: CPT | Mod: HCNC,S$GLB,, | Performed by: DERMATOLOGY

## 2020-05-21 PROCEDURE — 1159F PR MEDICATION LIST DOCUMENTED IN MEDICAL RECORD: ICD-10-PCS | Mod: HCNC,S$GLB,, | Performed by: DERMATOLOGY

## 2020-05-21 PROCEDURE — 1101F PR PT FALLS ASSESS DOC 0-1 FALLS W/OUT INJ PAST YR: ICD-10-PCS | Mod: HCNC,CPTII,S$GLB, | Performed by: DERMATOLOGY

## 2020-05-21 PROCEDURE — 1101F PT FALLS ASSESS-DOCD LE1/YR: CPT | Mod: HCNC,CPTII,S$GLB, | Performed by: DERMATOLOGY

## 2020-05-21 PROCEDURE — 1126F PR PAIN SEVERITY QUANTIFIED, NO PAIN PRESENT: ICD-10-PCS | Mod: HCNC,S$GLB,, | Performed by: DERMATOLOGY

## 2020-05-21 RX ORDER — OXYCODONE AND ACETAMINOPHEN 5; 325 MG/1; MG/1
1 TABLET ORAL 2 TIMES DAILY PRN
Qty: 60 TABLET | Refills: 0 | Status: SHIPPED | OUTPATIENT
Start: 2020-05-21 | End: 2020-07-17 | Stop reason: SDUPTHER

## 2020-05-21 NOTE — TELEPHONE ENCOUNTER
Both problems require evaluation (physical exam) prior to any diagnostic studies ordered, office visit required.     Why is there an order for GI referral on the chart (I'm being asked to sign it, but I did not make the referral)? If it's just for Colonoscopy, I'll just order Colonoscopy; but this can't be done until her complaints are evaluated.

## 2020-05-21 NOTE — PROGRESS NOTES
Subjective:       Patient ID:  Jovana Fall is a 74 y.o. female who presents for   Chief Complaint   Patient presents with    Spot    Skin Check     LOV 11/13/2017 imiquimod f/u to right cheek     Pt here today for spots on nose, back, and leg, x yrs sometimes itchy  Requests [facility] for cancer screening    H/o AKs treated with cryotherapy     H/o multiple BCCs  AK w/ focal transition to SCCIS- right cheek tx w/ imiquimod  Basal cell carcinoma 2014      left upper arm   BCC (basal cell carcinoma of skin) mid upper back  BCC (basal cell carcinoma)  left upper back  superficial BCC mid upper back 1/2016, E&S  BCC R mid infraorbital, 4/2014  Severely DN left mid upper back 4/2014     Prev under the care of Dr Fraser      Review of Systems   Constitutional: Positive for fatigue and night sweats. Negative for fever, chills, weight loss, weight gain and malaise.   Skin: Positive for daily sunscreen use, activity-related sunscreen use and wears hat.   Hematologic/Lymphatic: Bruises/bleeds easily.        Objective:    Physical Exam   Constitutional: She appears well-developed and well-nourished. No distress.   Neurological: She is alert and oriented to person, place, and time. She is not disoriented.   Psychiatric: She has a normal mood and affect.   Skin:   Areas Examined (abnormalities noted in diagram):   Scalp / Hair Palpated and Inspected  Head / Face Inspection Performed  Neck Inspection Performed  Chest / Axilla Inspection Performed  Abdomen Inspection Performed  Genitals / Buttocks / Groin Inspection Performed  Back Inspection Performed  RUE Inspected  LUE Inspection Performed  RLE Inspected  LLE Inspection Performed  Nails and Digits Inspection Performed                           Diagram Legend     Erythematous scaling macule/papule c/w actinic keratosis       Vascular papule c/w angioma      Pigmented verrucoid papule/plaque c/w seborrheic keratosis      Yellow umbilicated papule c/w sebaceous hyperplasia       Irregularly shaped tan macule c/w lentigo     1-2 mm smooth white papules consistent with Milia      Movable subcutaneous cyst with punctum c/w epidermal inclusion cyst      Subcutaneous movable cyst c/w pilar cyst      Firm pink to brown papule c/w dermatofibroma      Pedunculated fleshy papule(s) c/w skin tag(s)      Evenly pigmented macule c/w junctional nevus     Mildly variegated pigmented, slightly irregular-bordered macule c/w mildly atypical nevus      Flesh colored to evenly pigmented papule c/w intradermal nevus       Pink pearly papule/plaque c/w basal cell carcinoma      Erythematous hyperkeratotic cursted plaque c/w SCC      Surgical scar with no sign of skin cancer recurrence      Open and closed comedones      Inflammatory papules and pustules      Verrucoid papule consistent consistent with wart     Erythematous eczematous patches and plaques     Dystrophic onycholytic nail with subungual debris c/w onychomycosis     Umbilicated papule    Erythematous-base heme-crusted tan verrucoid plaque consistent with inflamed seborrheic keratosis     Erythematous Silvery Scaling Plaque c/w Psoriasis     See annotation      Assessment / Plan:        History of nonmelanoma skin cancer  Area of previous NMSCs (multiple) examined. Site well healed with no signs of recurrence.    Total body skin examination performed today including at least 12 points as noted in physical examination. No lesions suspicious for malignancy noted.    Seborrheic keratoses  These are benign inherited growths without a malignant potential. Reassurance given to patient. No treatment is necessary.     Solar lentigo  This is a benign hyperpigmented sun induced lesion. Daily sun protection will reduce the number of new lesions. Treatment of these benign lesions are considered cosmetic.    Cherry angioma  This is a benign vascular lesion. Reassurance given. No treatment required.     Multiple benign nevi  Monitor for new mole or moles that  are becoming bigger, darker, irritated, or developing irregular borders. Brochure provided.    Sebaceous hyperplasia of face  This is a common condition representing benign enlargement of the sebaceous lobule. It typically occurs in adulthood. Reassurance given to patient.     Common wart  Cryosurgery procedure note:    Verbal consent from the patient is obtained. Liquid nitrogen cryosurgery is applied to 1 lesions to produce a freeze injury. The patient is aware that blisters may form and is instructed on wound care with gentle cleansing and use of vaseline ointment to keep moist until healed. The patient is supplied a handout on cryosurgery and is instructed to call if lesions do not completely resolve.    Inflamed seborrheic keratosis  Cryosurgery procedure note:    Verbal consent from the patient is obtained. Liquid nitrogen cryosurgery is applied to 1 lesions to produce a freeze injury. The patient is aware that blisters may form and is instructed on wound care with gentle cleansing and use of vaseline ointment to keep moist until healed. The patient is supplied a handout on cryosurgery and is instructed to call if lesions do not completely resolve.    Patient instructed in importance in daily sun protection of at least spf 30. Mineral sunscreen ingredients preferred (Zinc +/- Titanium).   Recommend Elta MD for daily use on face and neck.  Patient encouraged to wear hat for all outdoor exposure.   Also discussed sun avoidance and use of protective clothing.           Follow up in about 1 year (around 5/21/2021), or if symptoms worsen or fail to improve.

## 2020-05-22 NOTE — TELEPHONE ENCOUNTER
Pt informed med is sent to the pharmacy   She will call after memorial day to schedule a physical exam

## 2020-06-02 DIAGNOSIS — E11.21 TYPE 2 DIABETES MELLITUS WITH DIABETIC NEPHROPATHY, WITHOUT LONG-TERM CURRENT USE OF INSULIN: ICD-10-CM

## 2020-06-08 ENCOUNTER — TELEPHONE (OUTPATIENT)
Dept: PRIMARY CARE CLINIC | Facility: CLINIC | Age: 75
End: 2020-06-08

## 2020-06-08 DIAGNOSIS — E11.59 TYPE 2 DIABETES MELLITUS WITH OTHER CIRCULATORY COMPLICATION, WITHOUT LONG-TERM CURRENT USE OF INSULIN: Primary | ICD-10-CM

## 2020-06-08 DIAGNOSIS — Z12.11 COLON CANCER SCREENING: ICD-10-CM

## 2020-06-08 NOTE — TELEPHONE ENCOUNTER
Spoke with patient informed of provider message and recommendation.  She will have labs in [place] and will schedule with GI for colonoscopy.

## 2020-06-08 NOTE — TELEPHONE ENCOUNTER
Five year follow up Colonoscopy due now.   Six month f/u diabetes lab prior to visit with me July 17th.

## 2020-06-11 ENCOUNTER — HOSPITAL ENCOUNTER (EMERGENCY)
Facility: HOSPITAL | Age: 75
Discharge: HOME | End: 2020-06-11
Attending: EMERGENCY MEDICINE
Payer: MEDICARE

## 2020-06-11 VITALS
HEART RATE: 82 BPM | OXYGEN SATURATION: 95 % | WEIGHT: 200 LBS | HEIGHT: 67 IN | DIASTOLIC BLOOD PRESSURE: 102 MMHG | SYSTOLIC BLOOD PRESSURE: 183 MMHG | TEMPERATURE: 98 F | BODY MASS INDEX: 31.39 KG/M2 | RESPIRATION RATE: 20 BRPM

## 2020-06-11 DIAGNOSIS — S90.811A ABRASION OF RIGHT FOOT, INITIAL ENCOUNTER: ICD-10-CM

## 2020-06-11 DIAGNOSIS — S80.211A ABRASION OF RIGHT KNEE, INITIAL ENCOUNTER: ICD-10-CM

## 2020-06-11 DIAGNOSIS — S02.2XXA CLOSED FRACTURE OF NASAL BONE, INITIAL ENCOUNTER: Primary | ICD-10-CM

## 2020-06-11 PROCEDURE — 99284 EMERGENCY DEPT VISIT MOD MDM: CPT | Mod: 25,HCWC

## 2020-06-11 PROCEDURE — 70486 CT MAXILLOFACIAL WITHOUT CONTRAST: ICD-10-PCS | Mod: 26,HCWC,, | Performed by: RADIOLOGY

## 2020-06-11 PROCEDURE — 70486 CT MAXILLOFACIAL W/O DYE: CPT | Mod: 26,HCWC,, | Performed by: RADIOLOGY

## 2020-06-11 PROCEDURE — 70486 CT MAXILLOFACIAL W/O DYE: CPT | Mod: TC,HCWC

## 2020-06-11 NOTE — ED TRIAGE NOTES
Patient fell walking her dog, complaining of facial pain and abrasions, right knee abrasions and laceration to right big toe. No LOC.

## 2020-06-11 NOTE — ED NOTES
Discharge instructions reviewed with pt. Pt voiced understanding. Pt denies any complaints. Pt refused w/c. Gait noted steady. Assisted pt to transportation. No further needs noted at this time. resp even and unlabored. nad noted. Cl,rn

## 2020-06-11 NOTE — DISCHARGE INSTRUCTIONS
Take your home [place] as needed for pain as previously prescribed.  Please remember that you had a visit to the emergency room today and this does not substitute as primary care services for ongoing management because emergency services is a snap shot in time.  Should you have any worsening condition that requires emergency services do not hesitate to return to the ER.    COVID-19 TESTING  IF YOU DESIRE TO BE TESTED, CALL TO SCHEDULE AN APPOINTMENT:  Hot Line [phone]  [address] [place] [address], [place] [address]  [facility]  Hours 8am-5pm Monday Through Friday

## 2020-06-11 NOTE — ED PROVIDER NOTES
Encounter Date: 6/11/2020       History     Chief Complaint   Patient presents with    Fall     Patient fell walking her dog, complaining of facial pain and abrasions, right knee abrasions and laceration to right big toe. No LOC.     74-year-old female presents to ER with nasal pain and swelling, right knee abrasion and right great toe abrasion; patient explains she tripped on her concrete step falling onto her right knee hitting her face, patient states the pain is tolerable declining pain medication currently citing that she has Norco 5mg at home, injury happened prior to arrival    Denies headache, head injury, loss of consciousness, dizziness, syncope, vision changes, neck pain, nausea/vomiting, tasting blood, biting tongue, loose teeth    Past medical/surgical history, allergies & current medications reviewed with patient    Known SARS-CoV2 exposure:  No    The history is provided by the patient. No  was used.     Review of patient's allergies indicates:   Allergen Reactions    Adhesive tape-silicones      Other reaction(s): Swelling  Other reaction(s): Itching    Codeine Itching    Metformin Itching    Morphine Itching    Penicillins Itching     Other reaction(s): Itching    Sulfa (sulfonamide antibiotics) Itching     Other reaction(s): Itching     Past Medical History:   Diagnosis Date    Anxiety     Basal cell carcinoma 2014    left upper arm     BCC (basal cell carcinoma of skin) 01/07/16    mid upper back    BCC (basal cell carcinoma)     left upper back    Calcium nephrolithiasis     Cataract     Colon polyp     Coronary artery disease     Cortical cataract - Both Eyes 4/29/2013    Depression     Diabetes mellitus     Displacement of lumbar intervertebral disc without myelopathy 5/6/2013    Diverticulosis     GERD (gastroesophageal reflux disease)     H. pylori infection     Hepatomegaly     History of melanoma in situ 2/2/2015    Mid upper back ; 5/2014     Hx  of colonic polyps 1/27/2015    Hyperlipidemia     Hypertension     Lactose intolerance     Lumbar spinal stenosis 5/6/2013    Melanoma     mid upper back- in situ 5/2014    Nuclear sclerosis - Both Eyes 4/29/2013    Osteopenia     Spinal stenosis, lumbar region, with neurogenic claudication 5/6/2013    Spondylosis without myelopathy 5/6/2013    Type 2 diabetes mellitus      Past Surgical History:   Procedure Laterality Date    APPENDECTOMY      BROW LIFT AND BLEPHAROPLASTY      BUNIONECTOMY Right     CARPAL TUNNEL RELEASE Bilateral     CHOLECYSTECTOMY      COLONOSCOPY  02/05/2015    Dr. Blas, repeat in 3-5 years    HEMORRHOID SURGERY      HYSTERECTOMY      ovaries remain    LUMBAR LAMINECTOMY      RHINOPLASTY TIP      SKIN CANCER EXCISION      TUBAL LIGATION      UPPER GASTROINTESTINAL ENDOSCOPY  2015    Dr. Blas    UPPER GASTROINTESTINAL ENDOSCOPY  03/30/2017    Dr. Dickson     Family History   Problem Relation Age of Onset    Heart disease Mother         MI    Cancer Mother         SQ carcinoma of lung    Heart attack Mother     Skin cancer Mother     Cancer Father         prostate    Ovarian cancer Cousin     Colon cancer Cousin     Skin cancer Daughter     Cancer Sister         uterine sarcoma    Colon cancer Brother 60    No Known Problems Son     No Known Problems Sister     Diabetes Sister     Hypertension Sister     Arthritis Sister     No Known Problems Daughter     Breast cancer Maternal Aunt     Amblyopia Neg Hx     Blindness Neg Hx     Cataracts Neg Hx     Glaucoma Neg Hx     Retinal detachment Neg Hx     Strabismus Neg Hx     Stroke Neg Hx     Thyroid disease Neg Hx     Melanoma Neg Hx     Psoriasis Neg Hx     Lupus Neg Hx     Eczema Neg Hx     Crohn's disease Neg Hx     Ulcerative colitis Neg Hx      Social History     Tobacco Use    Smoking status: Never Smoker    Smokeless tobacco: Never Used   Substance Use Topics    Alcohol use: Yes      Comment: rare glass - less than one monthly    Drug use: No     Review of Systems   Constitutional: Negative for fever.   HENT: Positive for facial swelling. Negative for sore throat.    Respiratory: Negative for shortness of breath.    Cardiovascular: Negative for chest pain.   Gastrointestinal: Negative for nausea.   Genitourinary: Negative for dysuria.   Musculoskeletal: Positive for myalgias. Negative for back pain, gait problem and neck pain.   Skin: Positive for color change and wound. Negative for rash.   Neurological: Negative for weakness and headaches.   Hematological: Does not bruise/bleed easily.   All other systems reviewed and are negative.      Physical Exam     Initial Vitals [06/11/20 1659]   BP Pulse Resp Temp SpO2   (!) 183/102 82 20 98.3 °F (36.8 °C) 95 %      MAP       --         Physical Exam    Nursing note and vitals reviewed.  Constitutional: She appears well-developed. She does not appear ill. No distress.   AF, /102, VSS   HENT:   Head: Normocephalic. Head is with abrasion and with contusion.       Right Ear: Tympanic membrane, external ear and ear canal normal. No hemotympanum.   Left Ear: Tympanic membrane, external ear and ear canal normal. No hemotympanum.   Nose: Sinus tenderness and nasal deformity present. No epistaxis. Right sinus exhibits no maxillary sinus tenderness and no frontal sinus tenderness. Left sinus exhibits maxillary sinus tenderness. Left sinus exhibits no frontal sinus tenderness.   Mouth/Throat: Uvula is midline, oropharynx is clear and moist and mucous membranes are normal.   Eyes: Lids are normal.   Neck: Neck supple. No muscular tenderness present.   Patient is able to turn her head left and right 45° without pain, denies pain with palpation of the C-spine, we do not appreciate crepitus or step-offs   Cardiovascular: Normal rate.   Pulmonary/Chest: Effort normal and breath sounds normal. No respiratory distress.   Patient denies pain with work of  breathing, chest wall exam is atraumatic and unimpressive   Abdominal: Soft. Bowel sounds are normal. She exhibits no distension. There is no tenderness.   Atraumatic exam   Musculoskeletal:        Right knee: She exhibits normal range of motion, no swelling, no effusion, no ecchymosis, no deformity, no laceration, no erythema, normal alignment, no LCL laxity, normal patellar mobility, no bony tenderness, normal meniscus and no MCL laxity. Tenderness (mild over the patella associated with the abrasion) found.        Right ankle: Normal.        Cervical back: Normal.        Thoracic back: Normal.        Lumbar back: Normal.        Right foot: There is tenderness (associated with abrasion, bleeding stopped). There is normal range of motion, no bony tenderness, no swelling, normal capillary refill, no crepitus, no deformity and no laceration.   Other than positive findings of the right foot and knee extremities examination is atraumatic and unimpressive   Neurological: She is alert and oriented to person, place, and time. GCS eye subscore is 4. GCS verbal subscore is 5. GCS motor subscore is 6.   Skin: Skin is warm. Capillary refill takes less than 2 seconds. Abrasion and bruising noted. No rash noted.   Psychiatric: She has a normal mood and affect.         ED Course   Procedures  Labs Reviewed - No data to display       Imaging Results          CT Maxillofacial Without Contrast (Final result)  Result time 06/11/20 17:29:36    Final result by Claude Troncoso MD (06/11/20 17:29:36)                 Impression:      No definite acute facial fracture this patient reported history of prior nasal surgery and nasal soft tissue swelling.    There is deviation nasal septum right of midline with bony spurring projecting right nasal cavity.    Prominent upper cervical spondylosis present.    There is extensive prior dental surgery a small mucous retention cyst formation is seen in the right maxillary sinus.      Electronically  signed by: Claude Troncoso MD  Date:    06/11/2020  Time:    17:29             Narrative:    EXAMINATION:  CT MAXILLOFACIAL WITHOUT CONTRAST    CLINICAL HISTORY:  Nasal fracture, suspected;    TECHNIQUE:  Low dose axial images, sagittal and coronal reformations were obtained through the face.  Contrast was not administered.    COMPARISON:  None    FINDINGS:  There is evidence of prior extensive dental work.  This is produce some artifacts particularly region axilla.  A mucous retention cyst is seen posterior right maxillary sinus measuring 9 mm.  Second mucous right maxillary sinus measuring 12 mm.  There is deviation of the nasal septum right of midline.  There is carotid intracranial atherosclerotic calcification.  No definite acute nasal bone fractures identified.  There is some incomplete of the distal bony bridge likely related prior procedure.  There is soft tissue swelling about nose.  The mastoid air cells and orbits appear intact.  The globes and extraocular muscles are unremarkable.                                 Medical Decision Making:   ED Management:  Facial CT image/report review:  No definite acute facial fracture this patient reported history of prior nasal surgery and nasal soft tissue swelling.  There is deviation nasal septum right of midline with bony spurring projecting right nasal cavity.  Prominent upper cervical spondylosis present.  There is extensive prior dental surgery a small mucous retention cyst formation is seen in the right maxillary sinus. -- we appreciate left nasal fracture upon review of the CT image    Findings and plan of care discussed with patient:  Nasal fracture, right knee abrasion, right foot abrasion; patient to take home [place] as needed for pain, care instructions given -- we will refer patient to ENT     All questions answered, strict return precautions given, patient verbalized understanding to all instructions, pleasant visit -- vital signs stable, patient is in no  distress at discharge    Disclaimer:  This note was prepared with [employer] Naturally Speaking voice recognition transcription software. Garbled syntax, mangled pronouns, and other bizarre constructions may be attributed to that software system.                                   Clinical Impression:       ICD-10-CM ICD-9-CM   1. Closed fracture of nasal bone, initial encounter S02.2XXA 802.0   2. Abrasion of right knee, initial encounter S80.211A 916.0   3. Abrasion of right foot, initial encounter S90.811A 917.0             ED Disposition Condition    Discharge Stable        ED Prescriptions     None        Follow-up Information     Follow up With Specialties Details Why Contact Info    Marcy Staley MD Internal Medicine Go to  For any further pain management concerns [address]  [place] [address]  [phone]      ENT  Go to  When schedule; you should be contacted within 2-3 business days with appointment date/time                                      Moe Jensen NP  06/11/20 6439

## 2020-06-15 ENCOUNTER — TELEPHONE (OUTPATIENT)
Dept: PRIMARY CARE CLINIC | Facility: CLINIC | Age: 75
End: 2020-06-15

## 2020-06-15 NOTE — TELEPHONE ENCOUNTER
----- Message from Sailaja Cruz sent at 6/15/2020  3:25 PM CDT -----  Regarding: Pt self Mobile/Home [phone]  Patient is calling in regards to her saying that she had a fall on Thursday and she busted her face, and busted her nose. Patient would like to speak with you about this please.

## 2020-06-15 NOTE — TELEPHONE ENCOUNTER
FYI she had a fall on 6/11/20 inclosed nose fracture she did go to the ED and was referred to ENT for a follow up   Pt state she is feeling fine

## 2020-06-18 DIAGNOSIS — I25.84 CORONARY ATHEROSCLEROSIS DUE TO CALCIFIED CORONARY LESION (CODE): ICD-10-CM

## 2020-06-18 RX ORDER — NITROGLYCERIN 0.4 MG/1
TABLET SUBLINGUAL
Qty: 25 TABLET | Refills: 3 | Status: SHIPPED | OUTPATIENT
Start: 2020-06-18 | End: 2023-08-11

## 2020-06-21 ENCOUNTER — PATIENT OUTREACH (OUTPATIENT)
Dept: ADMINISTRATIVE | Facility: OTHER | Age: 75
End: 2020-06-21

## 2020-07-15 ENCOUNTER — LAB VISIT (OUTPATIENT)
Dept: LAB | Facility: HOSPITAL | Age: 75
End: 2020-07-15
Attending: INTERNAL MEDICINE
Payer: MEDICARE

## 2020-07-15 DIAGNOSIS — E11.59 TYPE 2 DIABETES MELLITUS WITH OTHER CIRCULATORY COMPLICATION, WITHOUT LONG-TERM CURRENT USE OF INSULIN: ICD-10-CM

## 2020-07-15 LAB
ANION GAP SERPL CALC-SCNC: 9 MMOL/L (ref 8–16)
BUN SERPL-MCNC: 14 MG/DL (ref 8–23)
CALCIUM SERPL-MCNC: 9 MG/DL (ref 8.7–10.5)
CHLORIDE SERPL-SCNC: 105 MMOL/L (ref 95–110)
CO2 SERPL-SCNC: 25 MMOL/L (ref 23–29)
CREAT SERPL-MCNC: 0.7 MG/DL (ref 0.5–1.4)
EST. GFR  (AFRICAN AMERICAN): >60 ML/MIN/1.73 M^2
EST. GFR  (NON AFRICAN AMERICAN): >60 ML/MIN/1.73 M^2
ESTIMATED AVG GLUCOSE: 151 MG/DL (ref 68–131)
GLUCOSE SERPL-MCNC: 185 MG/DL (ref 70–110)
HBA1C MFR BLD HPLC: 6.9 % (ref 4.5–6.2)
POTASSIUM SERPL-SCNC: 4.1 MMOL/L (ref 3.5–5.1)
SODIUM SERPL-SCNC: 139 MMOL/L (ref 136–145)

## 2020-07-15 PROCEDURE — 80048 BASIC METABOLIC PNL TOTAL CA: CPT | Mod: HCWC

## 2020-07-15 PROCEDURE — 36415 COLL VENOUS BLD VENIPUNCTURE: CPT | Mod: HCWC

## 2020-07-15 PROCEDURE — 83036 HEMOGLOBIN GLYCOSYLATED A1C: CPT | Mod: HCWC

## 2020-07-17 ENCOUNTER — OFFICE VISIT (OUTPATIENT)
Dept: PRIMARY CARE CLINIC | Facility: CLINIC | Age: 75
End: 2020-07-17
Payer: MEDICARE

## 2020-07-17 VITALS
HEART RATE: 71 BPM | WEIGHT: 202.81 LBS | OXYGEN SATURATION: 98 % | DIASTOLIC BLOOD PRESSURE: 64 MMHG | TEMPERATURE: 97 F | BODY MASS INDEX: 31.83 KG/M2 | HEIGHT: 67 IN | SYSTOLIC BLOOD PRESSURE: 124 MMHG | RESPIRATION RATE: 18 BRPM

## 2020-07-17 DIAGNOSIS — M48.061 DEGENERATIVE LUMBAR SPINAL STENOSIS: ICD-10-CM

## 2020-07-17 DIAGNOSIS — I11.9 HYPERTENSIVE HEART DISEASE WITHOUT HEART FAILURE: ICD-10-CM

## 2020-07-17 DIAGNOSIS — I70.0 AORTIC ATHEROSCLEROSIS: ICD-10-CM

## 2020-07-17 DIAGNOSIS — K21.9 GASTROESOPHAGEAL REFLUX DISEASE WITHOUT ESOPHAGITIS: ICD-10-CM

## 2020-07-17 DIAGNOSIS — E78.5 HYPERLIPIDEMIA ASSOCIATED WITH TYPE 2 DIABETES MELLITUS: ICD-10-CM

## 2020-07-17 DIAGNOSIS — E11.69 HYPERLIPIDEMIA ASSOCIATED WITH TYPE 2 DIABETES MELLITUS: ICD-10-CM

## 2020-07-17 DIAGNOSIS — N90.89 VULVAR LESION: ICD-10-CM

## 2020-07-17 DIAGNOSIS — F33.9 MAJOR DEPRESSION, RECURRENT, CHRONIC: ICD-10-CM

## 2020-07-17 DIAGNOSIS — E11.59 TYPE 2 DIABETES MELLITUS WITH OTHER CIRCULATORY COMPLICATION, WITHOUT LONG-TERM CURRENT USE OF INSULIN: Primary | ICD-10-CM

## 2020-07-17 DIAGNOSIS — Z12.11 COLON CANCER SCREENING: ICD-10-CM

## 2020-07-17 DIAGNOSIS — I25.10 CORONARY ARTERY DISEASE INVOLVING NATIVE CORONARY ARTERY OF NATIVE HEART WITHOUT ANGINA PECTORIS: ICD-10-CM

## 2020-07-17 DIAGNOSIS — Z23 NEED FOR SHINGLES VACCINE: ICD-10-CM

## 2020-07-17 PROCEDURE — 99999 PR PBB SHADOW E&M-EST. PATIENT-LVL V: ICD-10-PCS | Mod: PBBFAC,HCNC,, | Performed by: INTERNAL MEDICINE

## 2020-07-17 PROCEDURE — 3074F PR MOST RECENT SYSTOLIC BLOOD PRESSURE < 130 MM HG: ICD-10-PCS | Mod: HCNC,CPTII,S$GLB, | Performed by: INTERNAL MEDICINE

## 2020-07-17 PROCEDURE — 3044F HG A1C LEVEL LT 7.0%: CPT | Mod: HCNC,CPTII,S$GLB, | Performed by: INTERNAL MEDICINE

## 2020-07-17 PROCEDURE — 3008F PR BODY MASS INDEX (BMI) DOCUMENTED: ICD-10-PCS | Mod: HCNC,CPTII,S$GLB, | Performed by: INTERNAL MEDICINE

## 2020-07-17 PROCEDURE — 3074F SYST BP LT 130 MM HG: CPT | Mod: HCNC,CPTII,S$GLB, | Performed by: INTERNAL MEDICINE

## 2020-07-17 PROCEDURE — 3008F BODY MASS INDEX DOCD: CPT | Mod: HCNC,CPTII,S$GLB, | Performed by: INTERNAL MEDICINE

## 2020-07-17 PROCEDURE — 99499 UNLISTED E&M SERVICE: CPT | Mod: HCNC,S$GLB,, | Performed by: INTERNAL MEDICINE

## 2020-07-17 PROCEDURE — 99214 OFFICE O/P EST MOD 30 MIN: CPT | Mod: HCNC,S$GLB,, | Performed by: INTERNAL MEDICINE

## 2020-07-17 PROCEDURE — 3078F PR MOST RECENT DIASTOLIC BLOOD PRESSURE < 80 MM HG: ICD-10-PCS | Mod: HCNC,CPTII,S$GLB, | Performed by: INTERNAL MEDICINE

## 2020-07-17 PROCEDURE — 99214 PR OFFICE/OUTPT VISIT, EST, LEVL IV, 30-39 MIN: ICD-10-PCS | Mod: HCNC,S$GLB,, | Performed by: INTERNAL MEDICINE

## 2020-07-17 PROCEDURE — 99999 PR PBB SHADOW E&M-EST. PATIENT-LVL V: CPT | Mod: PBBFAC,HCNC,, | Performed by: INTERNAL MEDICINE

## 2020-07-17 PROCEDURE — 3044F PR MOST RECENT HEMOGLOBIN A1C LEVEL <7.0%: ICD-10-PCS | Mod: HCNC,CPTII,S$GLB, | Performed by: INTERNAL MEDICINE

## 2020-07-17 PROCEDURE — 1126F AMNT PAIN NOTED NONE PRSNT: CPT | Mod: HCNC,S$GLB,, | Performed by: INTERNAL MEDICINE

## 2020-07-17 PROCEDURE — 99499 RISK ADDL DX/OHS AUDIT: ICD-10-PCS | Mod: HCNC,S$GLB,, | Performed by: INTERNAL MEDICINE

## 2020-07-17 PROCEDURE — 1126F PR PAIN SEVERITY QUANTIFIED, NO PAIN PRESENT: ICD-10-PCS | Mod: HCNC,S$GLB,, | Performed by: INTERNAL MEDICINE

## 2020-07-17 PROCEDURE — 1101F PR PT FALLS ASSESS DOC 0-1 FALLS W/OUT INJ PAST YR: ICD-10-PCS | Mod: HCNC,CPTII,S$GLB, | Performed by: INTERNAL MEDICINE

## 2020-07-17 PROCEDURE — 1159F MED LIST DOCD IN RCRD: CPT | Mod: HCNC,S$GLB,, | Performed by: INTERNAL MEDICINE

## 2020-07-17 PROCEDURE — 1101F PT FALLS ASSESS-DOCD LE1/YR: CPT | Mod: HCNC,CPTII,S$GLB, | Performed by: INTERNAL MEDICINE

## 2020-07-17 PROCEDURE — 3078F DIAST BP <80 MM HG: CPT | Mod: HCNC,CPTII,S$GLB, | Performed by: INTERNAL MEDICINE

## 2020-07-17 PROCEDURE — 1159F PR MEDICATION LIST DOCUMENTED IN MEDICAL RECORD: ICD-10-PCS | Mod: HCNC,S$GLB,, | Performed by: INTERNAL MEDICINE

## 2020-07-17 RX ORDER — OXYCODONE AND ACETAMINOPHEN 5; 325 MG/1; MG/1
1 TABLET ORAL 2 TIMES DAILY PRN
Qty: 60 TABLET | Refills: 0 | Status: SHIPPED | OUTPATIENT
Start: 2020-07-17 | End: 2020-10-05 | Stop reason: SDUPTHER

## 2020-07-17 RX ORDER — ATORVASTATIN CALCIUM 80 MG/1
80 TABLET, FILM COATED ORAL DAILY
Qty: 90 TABLET | Refills: 1 | Status: SHIPPED | OUTPATIENT
Start: 2020-07-17 | End: 2020-12-10 | Stop reason: SDUPTHER

## 2020-07-17 RX ORDER — VENLAFAXINE 100 MG/1
100 TABLET ORAL DAILY
Qty: 90 TABLET | Refills: 1 | Status: SHIPPED | OUTPATIENT
Start: 2020-07-17 | End: 2020-12-10 | Stop reason: SDUPTHER

## 2020-07-17 RX ORDER — CARVEDILOL 25 MG/1
25 TABLET ORAL 2 TIMES DAILY
Qty: 180 TABLET | Refills: 1 | Status: SHIPPED | OUTPATIENT
Start: 2020-07-17 | End: 2020-12-10 | Stop reason: SDUPTHER

## 2020-07-17 RX ORDER — OMEPRAZOLE 20 MG/1
20 CAPSULE, DELAYED RELEASE ORAL EVERY MORNING
Qty: 90 CAPSULE | Refills: 1 | Status: SHIPPED | OUTPATIENT
Start: 2020-07-17 | End: 2020-12-09 | Stop reason: SDUPTHER

## 2020-07-17 RX ORDER — LOSARTAN POTASSIUM 100 MG/1
100 TABLET ORAL DAILY
Qty: 90 TABLET | Refills: 1 | Status: SHIPPED | OUTPATIENT
Start: 2020-07-17 | End: 2020-12-09 | Stop reason: SDUPTHER

## 2020-07-17 NOTE — PROGRESS NOTES
Subjective:       Patient ID: Jovana Fall is a 74 y.o. female.    Chief Complaint: Follow-up    Last seen 6 months ago. Returns for f/u chronic medical conditions. ER visit about 5 weeks ago for injuries sustained in a fall upon tripping while walking her dog. Blunt trauma to face with nasal injury, questionable fracture vs surgical changes on imaging. Abrasion to right knee, laceration right great toe have healed. Verbally reports glucose running high, often greater than 200. Doesn't have actual fasting values because she snacks in the middle of the night. Admits to an indulgent diet, comfort foods due to stress. Compliant with medications as prescribed.     PMH: , misc x 1.   Diabetes Type 2. HbA1c HbA1c 6.8% .  Hypertensive heart disease without heart failure.   Mixed Hyperlipidemia,  .  Nephrolithiasis.   Urinary incontinence.  GERD. EGD 9/15, 3/17 - mild chronic non-active gastritis, no dysplasia, H pylori negative.  Depression/Anxiety.  Non-obstructive Coronary artery disease, angiogram , Cardiology .  Osteopenia.   Mild Vitamin D deficiency, up to 68.  Lumbar Spinal Stenosis. Cervical Spine DJD.   Skin Cancer, Basal Cell and Melanoma.     Mammogram normal  - ordered, not done. Pelvic exam . BMD 3/19 stable. Colonoscopy 1/15 - sigmoid diverticuli, one tubular adenoma 2-3mm, 5 yr f/u due to family history. Eye exam . Exercise Stress Echo negative 3/18.  Pneumovax 10/12. Zostavax 10/13. Prevnar 7/15. Tdap . Flu shot . Received first Shingrix at [facility] in [place].    PSH:   Cholecystectomy.   Bilateral carpal tunnel release.   Hysterectomy. Ovaries remain.  Lumbar laminectomy  Appendectomy  Tubal ligation.  Hemorrhoidectomy  Rhinoplasty  Blepharoplasty, ears pinned.  Cystoscopy, ureteral stent.  Benign tumor removed left knee.   Right Bunionectomy and Hammertoe surgery.  Skin Cancer excision.    Social: Nonsmoker. Occas. alcohol. .  "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    FMH: Colon and ovarian cancer in cousin. Colon cancer in younger brother. Mother had heart disease with MI at age 71. MGM with cervical cancer. Two aunts with diabetes.     Allergies: multiple, see Med Card.     Medications: list reviewed and reconciled. Uses Percocet sparingly. Weaning off Clonazepam.             Review of Systems   Constitutional: Negative for activity change, appetite change, fatigue, fever and unexpected weight change.   HENT: Negative for nasal congestion, ear pain, hearing loss, rhinorrhea, sneezing, sore throat, trouble swallowing and voice change.    Eyes: Negative for pain and visual disturbance.   Respiratory: Negative for cough, chest tightness, shortness of breath and wheezing.    Cardiovascular: Negative for palpitations and leg swelling.        Has an unusual chest pain in left pectoral area near the axilla.    Gastrointestinal: Negative for abdominal pain, blood in stool, nausea and vomiting.        "IBS" with chronic abdominal pain and alternating constipation and diarrhea. No blood in stool.    Genitourinary: Negative for dysuria, frequency, pelvic pain and vaginal bleeding.        Thinks she may have a small growth in the vaginal area, needs to see Gyn.    Musculoskeletal: Positive for back pain. Negative for arthralgias, gait problem, joint swelling and myalgias.   Integumentary:  Negative for color change and rash.   Neurological: Negative for dizziness, syncope, facial asymmetry, speech difficulty, weakness, numbness and headaches.   Hematological: Negative for adenopathy. Does not bruise/bleed easily.   Psychiatric/Behavioral: Negative for dysphoric mood and sleep disturbance. The patient is not nervous/anxious.          Objective:    /64, Pulse 71, Temp 97.3, O2 Sat 98%, Ht 5' 7", Wt 202.8 lbs, BMI=31.8  Physical Exam  Vitals signs reviewed.   Constitutional:       General: She is not in acute distress.     " Appearance: Normal appearance. She is well-developed. She is not ill-appearing or diaphoretic.   HENT:      Head: Normocephalic and atraumatic.      Right Ear: Tympanic membrane and ear canal normal.      Left Ear: Tympanic membrane and ear canal normal.      Nose: Nose normal.   Eyes:      General: No scleral icterus.     Extraocular Movements: Extraocular movements intact.      Conjunctiva/sclera: Conjunctivae normal.      Right eye: Right conjunctiva is not injected.      Left eye: Left conjunctiva is not injected.      Pupils: Pupils are equal, round, and reactive to light.   Neck:      Musculoskeletal: Normal range of motion and neck supple.      Thyroid: No thyromegaly.      Vascular: No carotid bruit or JVD.   Cardiovascular:      Rate and Rhythm: Normal rate and regular rhythm.      Pulses: Normal pulses.      Heart sounds: Normal heart sounds. No murmur. No friction rub. No gallop.    Pulmonary:      Effort: Pulmonary effort is normal. No respiratory distress.      Breath sounds: Normal breath sounds. No wheezing, rhonchi or rales.   Chest:      Chest wall: No tenderness.   Abdominal:      General: Bowel sounds are normal. There is no distension.      Palpations: Abdomen is soft. There is no mass.      Tenderness: There is no abdominal tenderness.   Musculoskeletal: Normal range of motion.         General: No tenderness or deformity.      Right lower leg: No edema.      Left lower leg: No edema.      Comments: Protective Sensation:  Right: Intact  Left: Intact    Visual Inspection:  Normal -  Bilateral    Pedal Pulses:   Right: Present  Left: Present    Posterior tibialis:   Right:Present  Left: Present     Lymphadenopathy:      Cervical: No cervical adenopathy.   Skin:     General: Skin is warm and dry.      Coloration: Skin is not pale.      Findings: No erythema or rash.      Nails: There is no clubbing.     Neurological:      General: No focal deficit present.      Mental Status: She is alert and  oriented to person, place, and time.      Cranial Nerves: No cranial nerve deficit.      Motor: No abnormal muscle tone.      Coordination: Coordination normal.      Gait: Gait normal.      Deep Tendon Reflexes: Reflexes are normal and symmetric.   Psychiatric:         Mood and Affect: Mood normal.         Behavior: Behavior normal.         Thought Content: Thought content normal.      7/15/20: BMP normal except Glucose 185, HbA1c 6.9%.     Assessment:       1. Type 2 diabetes mellitus with other circulatory complication, without long-term current use of insulin    2. Hypertensive heart disease without heart failure    3. Hyperlipidemia associated with type 2 diabetes mellitus    4. Coronary artery disease involving native coronary artery of native heart without angina pectoris    5. Aortic atherosclerosis    6. Degenerative lumbar spinal stenosis    7. Major depression, recurrent, chronic    8. Gastroesophageal reflux disease without esophagitis        Plan:       Type 2 diabetes mellitus with other circulatory complication, without long-term current use of insulin - not at goal.   -     ADD Empagliflozin (JARDIANCE) 10 mg tablet; Take 1 tablet (10 mg total) by mouth once daily. For Diabetes.  Dispense: 30 tablet; Refill: 2  -     Continue Januvia 100mg daily.   -     Eye exam is scheduled next month.  -     Compliance with diet is encouraged.      Hypertensive heart disease without heart failure  -     losartan (COZAAR) 100 MG tablet; Take 1 tablet (100 mg total) by mouth once daily.  Dispense: 90 tablet; Refill: 1  -     carvediloL (COREG) 25 MG tablet; Take 1 tablet (25 mg total) by mouth 2 (two) times daily.  Dispense: 180 tablet; Refill: 1    Hyperlipidemia associated with type 2 diabetes mellitus  -     atorvastatin (LIPITOR) 80 MG tablet; Take 1 tablet (80 mg total) by mouth once daily.  Dispense: 90 tablet; Refill: 1    Coronary artery disease involving native coronary artery of native heart without angina  pectoris  -     Ambulatory referral/consult to Cardiology; Future; Expected date: 07/24/2020    Aortic atherosclerosis - continue high dose statin.     Degenerative lumbar spinal stenosis  -     oxyCODONE-acetaminophen (PERCOCET) 5-325 mg per tablet; Take 1 tablet by mouth 2 (two) times daily as needed for Pain.  Dispense: 60 tablet; Refill: 0    Major depression, recurrent, chronic - stable.   -     venlafaxine (EFFEXOR) 100 MG Tab; Take 1 tablet (100 mg total) by mouth once daily.  Dispense: 90 tablet; Refill: 1    Gastroesophageal reflux disease without esophagitis  -     omeprazole (PRILOSEC) 20 MG capsule; Take 1 capsule (20 mg total) by mouth every morning.  Dispense: 90 capsule; Refill: 1    Vulvar lesion  -     Ambulatory referral/consult to Gynecology; Future; Expected date: 07/24/2020    Colon cancer screening        -     Five year f/u Colonoscopy due this year, previously ordered.     Need for shingles vaccine - she will get Shingrix #2 at [facility] where she got the first one.   Flu shot when available.

## 2020-08-05 ENCOUNTER — PATIENT OUTREACH (OUTPATIENT)
Dept: ADMINISTRATIVE | Facility: OTHER | Age: 75
End: 2020-08-05

## 2020-08-05 NOTE — PROGRESS NOTES
Chart reviewed.   Immunizations: updated  Orders placed: n/a  Upcoming appts to satisfy NELIA topics: Optometry 8/12, Mammogram 8/20  Open case request for Colonoscopy.

## 2020-08-10 ENCOUNTER — TELEPHONE (OUTPATIENT)
Dept: PRIMARY CARE CLINIC | Facility: CLINIC | Age: 75
End: 2020-08-10

## 2020-08-10 NOTE — TELEPHONE ENCOUNTER
----- Message from Maddi Bran sent at 8/10/2020  9:55 AM CDT -----  Contact: Self [phone]  Would like to receive medical advice.    Pharmacy name/number (copy/paste from chart):  [facility]    Would they like a call back or a response via [facility]:  call back    Additional information:  Calling to speak with the nurse regarding medication empagliflozin (JARDIANCE) 10 mg tablet. Patient states she seems the medication is not working due to numbers still being 170, 180 and the highest 213. Patient also would like to know can the colonoscopy test  be scheduled for [place]. Patient is requesting a call back regarding message.

## 2020-08-10 NOTE — TELEPHONE ENCOUNTER
Pt called to report blood glucose reading while on Jardiance   7/,213 off Jardiance  193 ( after statrting the  Jardiance )   7/, 176  7/,118 and 162  7/,186,196 and 128 ( nausea)   7/24 148, 175  7/, 192, 153  7/, 167  7/, 196, 175  7/28 blood glucose ranging as high as 180's and one low reading of 126  This morning blood glucose reading was 180  Jardiance was started on July 18th

## 2020-08-11 ENCOUNTER — TELEPHONE (OUTPATIENT)
Dept: GASTROENTEROLOGY | Facility: CLINIC | Age: 75
End: 2020-08-11

## 2020-08-11 NOTE — TELEPHONE ENCOUNTER
----- Message from Kaelyn Curry sent at 8/11/2020  8:39 AM CDT -----  Regarding: Colonoscopy Order  Contact: PT  PT called to schedule her colonoscopy       Callback: [phone]

## 2020-08-21 NOTE — TELEPHONE ENCOUNTER
empagliflozin (JARDIANCE) 10 mg tablet is causing her stomach pains and diarrhea. This is a side affects of it and it's not working for her either. Please call her.   Pt c/o diarrhea/ abd pain  since starting Jardiance 10 mg   No other symptoms  She will monitor symptoms and call back on Monday

## 2020-08-21 NOTE — TELEPHONE ENCOUNTER
No answer, left voicemail: stop Jardiance, send updated message with glucose readings in 1-2 weeks.

## 2020-08-28 ENCOUNTER — PES CALL (OUTPATIENT)
Dept: ADMINISTRATIVE | Facility: CLINIC | Age: 75
End: 2020-08-28

## 2020-09-08 ENCOUNTER — PATIENT OUTREACH (OUTPATIENT)
Dept: ADMINISTRATIVE | Facility: OTHER | Age: 75
End: 2020-09-08

## 2020-09-08 NOTE — PROGRESS NOTES
Chart was reviewed for overdue [facility] Encounters ([facility]EL[facility])  topics  Updates were requested from care everywhere  Health Maintenance has been updated  LINKS immunization registry triggered

## 2020-09-17 ENCOUNTER — TELEPHONE (OUTPATIENT)
Dept: GASTROENTEROLOGY | Facility: CLINIC | Age: 75
End: 2020-09-17

## 2020-09-17 NOTE — TELEPHONE ENCOUNTER
----- Message from Salud Mcdonnell sent at 9/17/2020 12:36 PM CDT -----  Type: Needs Medical Advice  Who Called:  pt    Best Call Back Number: [phone]  Additional Information: Pt wants to speak to YISSEL to discuss appointments.Please call to advise. Please call before 2:30.
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
## 2020-09-21 ENCOUNTER — TELEPHONE (OUTPATIENT)
Dept: GASTROENTEROLOGY | Facility: CLINIC | Age: 75
End: 2020-09-21

## 2020-09-29 ENCOUNTER — PATIENT MESSAGE (OUTPATIENT)
Dept: OTHER | Facility: OTHER | Age: 75
End: 2020-09-29

## 2020-10-05 DIAGNOSIS — E11.21 TYPE 2 DIABETES MELLITUS WITH DIABETIC NEPHROPATHY, WITHOUT LONG-TERM CURRENT USE OF INSULIN: ICD-10-CM

## 2020-10-05 DIAGNOSIS — M48.061 DEGENERATIVE LUMBAR SPINAL STENOSIS: ICD-10-CM

## 2020-10-05 RX ORDER — OXYCODONE AND ACETAMINOPHEN 5; 325 MG/1; MG/1
1 TABLET ORAL 2 TIMES DAILY PRN
Qty: 60 TABLET | Refills: 0 | Status: SHIPPED | OUTPATIENT
Start: 2020-10-05 | End: 2020-11-11 | Stop reason: SDUPTHER

## 2020-10-05 NOTE — TELEPHONE ENCOUNTER
----- Message from Rianna Scott sent at 10/5/2020  9:34 AM CDT -----  Contact: Pt [phone]  Requesting an RX refill or new RX.  Is this a refill or new RX:  refill  RX name and strength: JANUVIA 100 mg Tab  Directions (copy/paste from chart):    Is this a 30 day or 90 day RX:    Local pharmacy or mail order pharmacy:    Pharmacy name and phone # (copy/paste from chart):   [facility] - [facility] [address] [phone] (Phone)  [phone] (Fax)      Comments:  Please send a 30 day supply to [facility] - [place] - [address] [phone] (Phone)  [phone] (Fax)    Requesting an RX refill or new RX.  Is this a refill or new RX:  refill  RX name and strength: oxyCODONE-acetaminophen (PERCOCET) 5-325 mg per tablet  Directions (copy/paste from chart):    Is this a 30 day or 90 day RX:    Local pharmacy or mail order pharmacy:  [facility]  Pharmacy name and phone # (copy/paste from chart):   [facility] - [place] - [address] [phone] (Phone)  [phone] (Fax)    Comments:  Patient is also requesting a call from the office.

## 2020-10-07 DIAGNOSIS — G47.00 INSOMNIA, UNSPECIFIED TYPE: Primary | ICD-10-CM

## 2020-10-09 RX ORDER — EMPAGLIFLOZIN 10 MG/1
10 TABLET, FILM COATED ORAL DAILY
Qty: 30 TABLET | Refills: 1 | Status: SHIPPED | OUTPATIENT
Start: 2020-10-09 | End: 2020-12-03 | Stop reason: SDUPTHER

## 2020-10-09 RX ORDER — TRAZODONE HYDROCHLORIDE 50 MG/1
50 TABLET ORAL NIGHTLY
Qty: 30 TABLET | Refills: 1 | Status: SHIPPED | OUTPATIENT
Start: 2020-10-09 | End: 2020-12-03 | Stop reason: SDUPTHER

## 2020-10-09 RX ORDER — EMPAGLIFLOZIN 10 MG/1
TABLET, FILM COATED ORAL
COMMUNITY
Start: 2020-09-18 | End: 2020-10-09 | Stop reason: SDUPTHER

## 2020-10-26 ENCOUNTER — PATIENT OUTREACH (OUTPATIENT)
Dept: ADMINISTRATIVE | Facility: OTHER | Age: 75
End: 2020-10-26

## 2020-10-26 NOTE — PROGRESS NOTES
Chart was reviewed for overdue Proactive [facility] Encounters (NELIA)  topics  Updates were requested from care everywhere  Health Maintenance has been updated  LINKS immunization registry triggered  Eye exam 10/29/20

## 2020-11-09 ENCOUNTER — PES CALL (OUTPATIENT)
Dept: ADMINISTRATIVE | Facility: CLINIC | Age: 75
End: 2020-11-09

## 2020-11-11 DIAGNOSIS — M48.061 DEGENERATIVE LUMBAR SPINAL STENOSIS: ICD-10-CM

## 2020-11-11 NOTE — TELEPHONE ENCOUNTER
----- Message from Juana Meyer sent at 11/11/2020  4:21 PM CST -----  Regarding: Refills for Percocet 5-325 mg per tab  Contact: [phone]  Requesting an RX refill or new RX. RX Refill   Is this a refill or new RX: Refill   RX name and strength: Percocet 5-325 mg per tab   Is this a 30 day or 90 day RX: 30 day   Pharmacy name and phone # (copy/paste from chart):  [facility] [address] [place] [address] [phone] (Phone)  [phone] (Fax)      Comments: Patient said that she would also like to speak with Phillip regarding her falling and hurting her knee.

## 2020-11-11 NOTE — TELEPHONE ENCOUNTER
Pt had a fall in the bathroom on 10/18 hurt her left  knee it is still hurting so she will be seeing Orthopedic for her left  knee pain .

## 2020-11-12 RX ORDER — OXYCODONE AND ACETAMINOPHEN 5; 325 MG/1; MG/1
1 TABLET ORAL 2 TIMES DAILY PRN
Qty: 60 TABLET | Refills: 0 | Status: SHIPPED | OUTPATIENT
Start: 2020-11-12 | End: 2021-01-13 | Stop reason: SDUPTHER

## 2020-12-03 DIAGNOSIS — G47.00 INSOMNIA, UNSPECIFIED TYPE: ICD-10-CM

## 2020-12-03 NOTE — TELEPHONE ENCOUNTER
----- Message from Ewa Haddad sent at 12/3/2020  2:21 PM CST -----  Contact: Self   Requesting an RX refill or new RX.  Is this a refill or new RX:   RX name and strength:traZODone (DESYREL) 50 MG tablet  Route: Take 1 tablet (50 mg total) by mouth every evening. - Oral   Is this a 30 day or 90 day RX:   Pharmacy name and phone # (copy/paste from chart):  [place] [address] [facility] [phone] (Phone)  [phone] (Fax)      Comments:         Requesting an RX refill or new RX.  Is this a refill or new RX:   RX name and strength:JARDIANCE 10 mg tablet   Sig - Route: Take 1 tablet (10 mg total) by mouth once daily. - Oral   Is this a 30 day or 90 day RX:   Pharmacy name and phone # (copy/paste from chart):  [place] [address] [facility] [phone] (Phone)  [phone] (Fax)      Comments:       Pt would like future rx be sent to mail pharmacy

## 2020-12-04 RX ORDER — TRAZODONE HYDROCHLORIDE 50 MG/1
50 TABLET ORAL NIGHTLY
Qty: 30 TABLET | Refills: 5 | Status: SHIPPED | OUTPATIENT
Start: 2020-12-04 | End: 2021-10-07

## 2020-12-04 RX ORDER — EMPAGLIFLOZIN 10 MG/1
10 TABLET, FILM COATED ORAL DAILY
Qty: 30 TABLET | Refills: 5 | Status: SHIPPED | OUTPATIENT
Start: 2020-12-04 | End: 2021-06-02

## 2020-12-04 NOTE — TELEPHONE ENCOUNTER
Spoke with patient, informed refill requests approved to her pharmacy.  Patient verbalized understanding.

## 2020-12-09 DIAGNOSIS — E78.5 HYPERLIPIDEMIA ASSOCIATED WITH TYPE 2 DIABETES MELLITUS: ICD-10-CM

## 2020-12-09 DIAGNOSIS — E11.69 HYPERLIPIDEMIA ASSOCIATED WITH TYPE 2 DIABETES MELLITUS: ICD-10-CM

## 2020-12-09 DIAGNOSIS — K21.9 GASTROESOPHAGEAL REFLUX DISEASE WITHOUT ESOPHAGITIS: ICD-10-CM

## 2020-12-09 DIAGNOSIS — E11.21 TYPE 2 DIABETES MELLITUS WITH DIABETIC NEPHROPATHY, WITHOUT LONG-TERM CURRENT USE OF INSULIN: ICD-10-CM

## 2020-12-09 DIAGNOSIS — F33.9 MAJOR DEPRESSION, RECURRENT, CHRONIC: ICD-10-CM

## 2020-12-09 DIAGNOSIS — M48.061 DEGENERATIVE LUMBAR SPINAL STENOSIS: ICD-10-CM

## 2020-12-09 DIAGNOSIS — I11.9 HYPERTENSIVE HEART DISEASE WITHOUT HEART FAILURE: ICD-10-CM

## 2020-12-09 NOTE — TELEPHONE ENCOUNTER
----- Message from Gemini Bran MA sent at 12/9/2020 12:13 PM CST -----  Requesting an RX refill or new RX.  Is this a refill or new RX:   RX name and strength: SITagliptin (JANUVIA) 100 MG Tab  Is this a 30 day or 90 day RX: 90  Pharmacy name and phone # (copy/paste from chart):  [facility] - [facility] [address] [phone] (Phone)  [phone] (Fax)  Comments:   Requesting an RX refill or new RX.  Is this a refill or new RX: refill  RX name and strength: gabapentin (NEURONTIN) 300 MG capsule  Is this a 30 day or 90 day RX: 90  Pharmacy name and phone # (copy/paste from chart):   [facility] [address] [phone] (Phone)  [phone] (Fax)  Comments:   Requesting an RX refill or new RX.  Is this a refill or new RX:   RX name and strength: losartan (COZAAR) 100 MG tablet  Is this a 30 day or 90 day RX: 90  Pharmacy name and phone # (copy/paste from chart):  [facility] [address] [phone] (Phone)  [phone] (Fax)  Comments:   Requesting an RX refill or new RX.  Is this a refill or new RX:   RX name and strength: omeprazole (PRILOSEC) 20 MG capsule  Is this a 30 day or 90 day RX: 90  Pharmacy name and phone # (copy/paste from chart):  [facility] [address] [phone] (Phone)  [phone] (Fax)  Comments:

## 2020-12-10 RX ORDER — ATORVASTATIN CALCIUM 80 MG/1
80 TABLET, FILM COATED ORAL DAILY
Qty: 90 TABLET | Refills: 1 | Status: SHIPPED | OUTPATIENT
Start: 2020-12-10 | End: 2021-04-07 | Stop reason: SDUPTHER

## 2020-12-10 RX ORDER — OMEPRAZOLE 20 MG/1
20 CAPSULE, DELAYED RELEASE ORAL EVERY MORNING
Qty: 90 CAPSULE | Refills: 1 | Status: SHIPPED | OUTPATIENT
Start: 2020-12-10 | End: 2021-01-21

## 2020-12-10 RX ORDER — CARVEDILOL 25 MG/1
25 TABLET ORAL 2 TIMES DAILY
Qty: 180 TABLET | Refills: 1 | Status: SHIPPED | OUTPATIENT
Start: 2020-12-10 | End: 2021-04-07 | Stop reason: SDUPTHER

## 2020-12-10 RX ORDER — LOSARTAN POTASSIUM 100 MG/1
100 TABLET ORAL DAILY
Qty: 90 TABLET | Refills: 1 | Status: SHIPPED | OUTPATIENT
Start: 2020-12-10 | End: 2021-04-07 | Stop reason: SDUPTHER

## 2020-12-10 RX ORDER — GABAPENTIN 300 MG/1
300 CAPSULE ORAL NIGHTLY
Qty: 90 CAPSULE | Refills: 1 | Status: SHIPPED | OUTPATIENT
Start: 2020-12-10 | End: 2021-04-07 | Stop reason: SDUPTHER

## 2020-12-10 RX ORDER — VENLAFAXINE 100 MG/1
100 TABLET ORAL DAILY
Qty: 90 TABLET | Refills: 1 | Status: SHIPPED | OUTPATIENT
Start: 2020-12-10 | End: 2021-04-07 | Stop reason: SDUPTHER

## 2020-12-11 ENCOUNTER — PATIENT MESSAGE (OUTPATIENT)
Dept: OTHER | Facility: OTHER | Age: 75
End: 2020-12-11

## 2020-12-21 ENCOUNTER — PATIENT OUTREACH (OUTPATIENT)
Dept: ADMINISTRATIVE | Facility: OTHER | Age: 75
End: 2020-12-21

## 2020-12-21 NOTE — PROGRESS NOTES
Chart was reviewed for overdue [facility] Encounters (NELIA)  topics  Updates were requested from care everywhere  Health Maintenance has been updated  LINKS immunization registry triggered  Eye exam appt 12/22/20

## 2020-12-22 ENCOUNTER — OFFICE VISIT (OUTPATIENT)
Dept: OPTOMETRY | Facility: CLINIC | Age: 75
End: 2020-12-22
Payer: MEDICARE

## 2020-12-22 DIAGNOSIS — H26.9 CORTICAL CATARACT: ICD-10-CM

## 2020-12-22 DIAGNOSIS — Z12.11 SPECIAL SCREENING FOR MALIGNANT NEOPLASMS, COLON: Primary | ICD-10-CM

## 2020-12-22 DIAGNOSIS — E11.36 DIABETIC CATARACT: ICD-10-CM

## 2020-12-22 DIAGNOSIS — Z46.0 CONTACT LENS/GLASSES FITTING: Primary | ICD-10-CM

## 2020-12-22 DIAGNOSIS — H52.7 REFRACTIVE ERROR: ICD-10-CM

## 2020-12-22 DIAGNOSIS — H25.13 NUCLEAR SCLEROSIS, BILATERAL: ICD-10-CM

## 2020-12-22 DIAGNOSIS — E11.9 DIABETES MELLITUS TYPE 2 WITHOUT RETINOPATHY: Primary | ICD-10-CM

## 2020-12-22 PROCEDURE — 92014 PR EYE EXAM, EST PATIENT,COMPREHESV: ICD-10-PCS | Mod: HCNC,S$GLB,, | Performed by: OPTOMETRIST

## 2020-12-22 PROCEDURE — 99499 NO LOS: ICD-10-PCS | Mod: HCNC,S$GLB,, | Performed by: OPTOMETRIST

## 2020-12-22 PROCEDURE — 99999 PR PBB SHADOW E&M-EST. PATIENT-LVL III: ICD-10-PCS | Mod: PBBFAC,HCNC,, | Performed by: OPTOMETRIST

## 2020-12-22 PROCEDURE — 92310 CONTACT LENS FITTING OU: CPT | Mod: CSM,,, | Performed by: OPTOMETRIST

## 2020-12-22 PROCEDURE — 99999 PR PBB SHADOW E&M-EST. PATIENT-LVL III: CPT | Mod: PBBFAC,HCNC,, | Performed by: OPTOMETRIST

## 2020-12-22 PROCEDURE — 92015 DETERMINE REFRACTIVE STATE: CPT | Mod: HCNC,S$GLB,, | Performed by: OPTOMETRIST

## 2020-12-22 PROCEDURE — 99499 UNLISTED E&M SERVICE: CPT | Mod: HCNC,S$GLB,, | Performed by: OPTOMETRIST

## 2020-12-22 PROCEDURE — 99999 PR PBB SHADOW E&M-EST. PATIENT-LVL II: CPT | Mod: PBBFAC,HCNC,, | Performed by: OPTOMETRIST

## 2020-12-22 PROCEDURE — 92310 PR CONTACT LENS FITTING (NO CHANGE): ICD-10-PCS | Mod: CSM,,, | Performed by: OPTOMETRIST

## 2020-12-22 PROCEDURE — 92014 COMPRE OPH EXAM EST PT 1/>: CPT | Mod: HCNC,S$GLB,, | Performed by: OPTOMETRIST

## 2020-12-22 PROCEDURE — 92015 PR REFRACTION: ICD-10-PCS | Mod: HCNC,S$GLB,, | Performed by: OPTOMETRIST

## 2020-12-22 PROCEDURE — 99999 PR PBB SHADOW E&M-EST. PATIENT-LVL II: ICD-10-PCS | Mod: PBBFAC,HCNC,, | Performed by: OPTOMETRIST

## 2020-12-22 NOTE — PROGRESS NOTES
HPI     Annual Exam     Comments: DLE 7-18 (darci)              Contact Lens Fit     Comments: wears rgps              Diabetic Eye Exam     Comments: BSL fluctuates, uncontrolled              Comments     Pt here today for annual DM exam with contacts.    Pt states no visual   changes or complaints other then trouble w/ night driving w/ contacts.    Hemoglobin A1C       Date                     Value               Ref Range             Status                07/15/2020               6.9 (H)             4.5 - 6.2 %           Final            01/17/2020               6.8 (H)             4.0 - 5.6 %           Final                 08/08/2019               6.7 (H)             4.0 - 5.6 %           Final                          Denies any headaches or eye pain.    (+) dry eyes --- ATS OU prn    (-) floaters --- hx of arc of light outer peripheral OS x 2 yrs          Last edited by Brad Trimble, OD on 12/22/2020  2:38 PM. (History)            Assessment /Plan     For exam results, see Encounter Report.    Diabetes mellitus type 2 without retinopathy    Diabetic cataract    Nuclear sclerosis, bilateral    Cortical cataract    Refractive error      1. Diabetes mellitus type 2 without retinopathy  No retinopathy, no csme OU. Discussed possible ocular affects of uncontrolled blood sugar with patient. Recommended continued strong blood sugar control and continued care with PCP. Monitor yearly.     2. Diabetic cataract  3. Nuclear sclerosis, bilateral  4. Cortical cataract  Moderate, not yet visually significant. Discussed possible ocular affects of cataracts. Acceptable BCVA OU. Discussed treatment options. Surgery not recommended at this time. Monitor yearly.     5. Refractive error  Dispensed updated spectacle Rx. Discussed various spectacle lens options. Discussed adaptation period to new specs.   Discussed cls options, will try switching to soft lens for more accurate measurements when time for cataract surgery.  Ordered biofinity in monovision (od distance  os intermediate). Return in 1 week for cls dispense and I&R.      RTC in 1 week for cls dispense  I&R.

## 2020-12-22 NOTE — PROGRESS NOTES
Assessment /Plan     For exam results, see Encounter Report.    Contact lens/glasses fitting      Patient is here for a comprehensive eye exam and contact lens fit. See other exam visit with same encounter date 12/22/20 for detailed exam information.

## 2021-01-04 ENCOUNTER — PATIENT MESSAGE (OUTPATIENT)
Dept: ADMINISTRATIVE | Facility: HOSPITAL | Age: 76
End: 2021-01-04

## 2021-01-13 ENCOUNTER — TELEPHONE (OUTPATIENT)
Dept: GASTROENTEROLOGY | Facility: CLINIC | Age: 76
End: 2021-01-13

## 2021-01-13 ENCOUNTER — TELEPHONE (OUTPATIENT)
Dept: PRIMARY CARE CLINIC | Facility: CLINIC | Age: 76
End: 2021-01-13

## 2021-01-13 DIAGNOSIS — K57.32 DIVERTICULITIS LARGE INTESTINE W/O PERFORATION OR ABSCESS W/O BLEEDING: Primary | ICD-10-CM

## 2021-01-13 DIAGNOSIS — M48.061 DEGENERATIVE LUMBAR SPINAL STENOSIS: ICD-10-CM

## 2021-01-13 RX ORDER — METRONIDAZOLE 500 MG/1
500 TABLET ORAL 3 TIMES DAILY
Qty: 21 TABLET | Refills: 0 | Status: SHIPPED | OUTPATIENT
Start: 2021-01-13 | End: 2021-01-20

## 2021-01-13 RX ORDER — CIPROFLOXACIN 500 MG/1
500 TABLET ORAL 2 TIMES DAILY
Qty: 14 TABLET | Refills: 0 | Status: SHIPPED | OUTPATIENT
Start: 2021-01-13 | End: 2021-01-20

## 2021-01-13 RX ORDER — OXYCODONE AND ACETAMINOPHEN 5; 325 MG/1; MG/1
1 TABLET ORAL 2 TIMES DAILY PRN
Qty: 60 TABLET | Refills: 0 | Status: SHIPPED | OUTPATIENT
Start: 2021-01-13 | End: 2021-03-10 | Stop reason: SDUPTHER

## 2021-01-15 ENCOUNTER — OFFICE VISIT (OUTPATIENT)
Dept: OPTOMETRY | Facility: CLINIC | Age: 76
End: 2021-01-15
Payer: MEDICARE

## 2021-01-15 DIAGNOSIS — Z46.0 CONTACT LENS/GLASSES FITTING: Primary | ICD-10-CM

## 2021-01-15 PROCEDURE — 99499 UNLISTED E&M SERVICE: CPT | Mod: S$GLB,,, | Performed by: OPTOMETRIST

## 2021-01-15 PROCEDURE — 99499 NO LOS: ICD-10-PCS | Mod: S$GLB,,, | Performed by: OPTOMETRIST

## 2021-01-21 ENCOUNTER — OFFICE VISIT (OUTPATIENT)
Dept: GASTROENTEROLOGY | Facility: CLINIC | Age: 76
End: 2021-01-21
Payer: MEDICARE

## 2021-01-21 ENCOUNTER — LAB VISIT (OUTPATIENT)
Dept: LAB | Facility: HOSPITAL | Age: 76
End: 2021-01-21
Attending: NURSE PRACTITIONER
Payer: MEDICARE

## 2021-01-21 ENCOUNTER — OFFICE VISIT (OUTPATIENT)
Dept: OPTOMETRY | Facility: CLINIC | Age: 76
End: 2021-01-21
Payer: MEDICARE

## 2021-01-21 ENCOUNTER — TELEPHONE (OUTPATIENT)
Dept: OPTOMETRY | Facility: CLINIC | Age: 76
End: 2021-01-21

## 2021-01-21 VITALS — WEIGHT: 195.13 LBS | BODY MASS INDEX: 30.63 KG/M2 | HEIGHT: 67 IN

## 2021-01-21 DIAGNOSIS — Z86.010 HISTORY OF COLON POLYPS: ICD-10-CM

## 2021-01-21 DIAGNOSIS — Z46.0 CONTACT LENS/GLASSES FITTING: Primary | ICD-10-CM

## 2021-01-21 DIAGNOSIS — Z80.0 FAMILY HISTORY OF COLON CANCER: ICD-10-CM

## 2021-01-21 DIAGNOSIS — R19.7 INTERMITTENT DIARRHEA: Primary | ICD-10-CM

## 2021-01-21 DIAGNOSIS — Z87.19 HISTORY OF IBS: ICD-10-CM

## 2021-01-21 DIAGNOSIS — R14.0 ABDOMINAL BLOATING: ICD-10-CM

## 2021-01-21 DIAGNOSIS — K21.9 GASTROESOPHAGEAL REFLUX DISEASE WITHOUT ESOPHAGITIS: ICD-10-CM

## 2021-01-21 DIAGNOSIS — Z90.49 S/P CHOLECYSTECTOMY: ICD-10-CM

## 2021-01-21 DIAGNOSIS — R19.7 INTERMITTENT DIARRHEA: ICD-10-CM

## 2021-01-21 LAB
OB PNL STL: POSITIVE
WBC #/AREA STL HPF: ABNORMAL /[HPF]

## 2021-01-21 PROCEDURE — 83986 ASSAY PH BODY FLUID NOS: CPT

## 2021-01-21 PROCEDURE — 99214 PR OFFICE/OUTPT VISIT, EST, LEVL IV, 30-39 MIN: ICD-10-PCS | Mod: S$GLB,,, | Performed by: NURSE PRACTITIONER

## 2021-01-21 PROCEDURE — 87329 GIARDIA AG IA: CPT

## 2021-01-21 PROCEDURE — 3072F LOW RISK FOR RETINOPATHY: CPT | Mod: S$GLB,,, | Performed by: NURSE PRACTITIONER

## 2021-01-21 PROCEDURE — 99499 UNLISTED E&M SERVICE: CPT | Mod: S$GLB,,, | Performed by: OPTOMETRIST

## 2021-01-21 PROCEDURE — 1159F PR MEDICATION LIST DOCUMENTED IN MEDICAL RECORD: ICD-10-PCS | Mod: S$GLB,,, | Performed by: NURSE PRACTITIONER

## 2021-01-21 PROCEDURE — 87046 STOOL CULTR AEROBIC BACT EA: CPT

## 2021-01-21 PROCEDURE — 82272 OCCULT BLD FECES 1-3 TESTS: CPT

## 2021-01-21 PROCEDURE — 99999 PR PBB SHADOW E&M-EST. PATIENT-LVL IV: ICD-10-PCS | Mod: PBBFAC,,, | Performed by: NURSE PRACTITIONER

## 2021-01-21 PROCEDURE — 99214 OFFICE O/P EST MOD 30 MIN: CPT | Mod: S$GLB,,, | Performed by: NURSE PRACTITIONER

## 2021-01-21 PROCEDURE — 1126F PR PAIN SEVERITY QUANTIFIED, NO PAIN PRESENT: ICD-10-PCS | Mod: S$GLB,,, | Performed by: NURSE PRACTITIONER

## 2021-01-21 PROCEDURE — 3008F BODY MASS INDEX DOCD: CPT | Mod: CPTII,S$GLB,, | Performed by: NURSE PRACTITIONER

## 2021-01-21 PROCEDURE — 87209 SMEAR COMPLEX STAIN: CPT

## 2021-01-21 PROCEDURE — 89055 LEUKOCYTE ASSESSMENT FECAL: CPT

## 2021-01-21 PROCEDURE — 1126F AMNT PAIN NOTED NONE PRSNT: CPT | Mod: S$GLB,,, | Performed by: NURSE PRACTITIONER

## 2021-01-21 PROCEDURE — 3072F PR LOW RISK FOR RETINOPATHY: ICD-10-PCS | Mod: S$GLB,,, | Performed by: NURSE PRACTITIONER

## 2021-01-21 PROCEDURE — 87045 FECES CULTURE AEROBIC BACT: CPT

## 2021-01-21 PROCEDURE — 1159F MED LIST DOCD IN RCRD: CPT | Mod: S$GLB,,, | Performed by: NURSE PRACTITIONER

## 2021-01-21 PROCEDURE — 99499 NO LOS: ICD-10-PCS | Mod: S$GLB,,, | Performed by: OPTOMETRIST

## 2021-01-21 PROCEDURE — 87427 SHIGA-LIKE TOXIN AG IA: CPT

## 2021-01-21 PROCEDURE — 3008F PR BODY MASS INDEX (BMI) DOCUMENTED: ICD-10-PCS | Mod: CPTII,S$GLB,, | Performed by: NURSE PRACTITIONER

## 2021-01-21 PROCEDURE — 99999 PR PBB SHADOW E&M-EST. PATIENT-LVL IV: CPT | Mod: PBBFAC,,, | Performed by: NURSE PRACTITIONER

## 2021-01-21 RX ORDER — OMEPRAZOLE 40 MG/1
40 CAPSULE, DELAYED RELEASE ORAL DAILY
Qty: 30 CAPSULE | Refills: 2 | Status: SHIPPED | OUTPATIENT
Start: 2021-01-21 | End: 2021-01-21

## 2021-01-21 RX ORDER — OMEPRAZOLE 40 MG/1
40 CAPSULE, DELAYED RELEASE ORAL DAILY
Qty: 90 CAPSULE | Refills: 1 | Status: SHIPPED | OUTPATIENT
Start: 2021-01-21 | End: 2021-08-02

## 2021-01-22 DIAGNOSIS — Z01.818 PRE-OP TESTING: ICD-10-CM

## 2021-01-22 DIAGNOSIS — K21.9 GASTROESOPHAGEAL REFLUX DISEASE, UNSPECIFIED WHETHER ESOPHAGITIS PRESENT: Primary | ICD-10-CM

## 2021-01-22 DIAGNOSIS — R19.7 DIARRHEA, UNSPECIFIED TYPE: Primary | ICD-10-CM

## 2021-01-24 LAB
CRYPTOSP AG STL QL IA: NEGATIVE
G LAMBLIA AG STL QL IA: NEGATIVE

## 2021-01-25 ENCOUNTER — TELEPHONE (OUTPATIENT)
Dept: GASTROENTEROLOGY | Facility: CLINIC | Age: 76
End: 2021-01-25

## 2021-01-25 ENCOUNTER — LAB VISIT (OUTPATIENT)
Dept: FAMILY MEDICINE | Facility: CLINIC | Age: 76
End: 2021-01-25
Payer: MEDICARE

## 2021-01-25 DIAGNOSIS — Z01.818 PRE-OP TESTING: ICD-10-CM

## 2021-01-25 LAB
BACTERIA STL CULT: NORMAL
BACTERIA STL CULT: NORMAL
O+P STL MICRO: NORMAL

## 2021-01-25 PROCEDURE — U0003 INFECTIOUS AGENT DETECTION BY NUCLEIC ACID (DNA OR RNA); SEVERE ACUTE RESPIRATORY SYNDROME CORONAVIRUS 2 (SARS-COV-2) (CORONAVIRUS DISEASE [COVID-19]), AMPLIFIED PROBE TECHNIQUE, MAKING USE OF HIGH THROUGHPUT TECHNOLOGIES AS DESCRIBED BY CMS-2020-01-R: HCPCS

## 2021-01-26 LAB
PH STL: NORMAL [PH]
SARS-COV-2 RNA RESP QL NAA+PROBE: NOT DETECTED

## 2021-01-27 ENCOUNTER — PATIENT OUTREACH (OUTPATIENT)
Dept: ADMINISTRATIVE | Facility: OTHER | Age: 76
End: 2021-01-27

## 2021-01-27 ENCOUNTER — ANESTHESIA (OUTPATIENT)
Dept: ENDOSCOPY | Facility: HOSPITAL | Age: 76
End: 2021-01-27
Payer: MEDICARE

## 2021-01-27 ENCOUNTER — ANESTHESIA EVENT (OUTPATIENT)
Dept: ENDOSCOPY | Facility: HOSPITAL | Age: 76
End: 2021-01-27
Payer: MEDICARE

## 2021-01-27 ENCOUNTER — HOSPITAL ENCOUNTER (OUTPATIENT)
Facility: HOSPITAL | Age: 76
Discharge: HOME | End: 2021-01-27
Attending: INTERNAL MEDICINE | Admitting: INTERNAL MEDICINE
Payer: MEDICARE

## 2021-01-27 DIAGNOSIS — E11.9 TYPE 2 DIABETES MELLITUS WITHOUT COMPLICATION: ICD-10-CM

## 2021-01-27 DIAGNOSIS — K29.70 GASTRITIS, PRESENCE OF BLEEDING UNSPECIFIED, UNSPECIFIED CHRONICITY, UNSPECIFIED GASTRITIS TYPE: Primary | ICD-10-CM

## 2021-01-27 DIAGNOSIS — K22.2 SCHATZKI'S RING: ICD-10-CM

## 2021-01-27 DIAGNOSIS — K44.9 HIATAL HERNIA: ICD-10-CM

## 2021-01-27 DIAGNOSIS — K21.9 ACID REFLUX: ICD-10-CM

## 2021-01-27 PROCEDURE — 43239 EGD BIOPSY SINGLE/MULTIPLE: CPT | Performed by: INTERNAL MEDICINE

## 2021-01-27 PROCEDURE — 88342 IMHCHEM/IMCYTCHM 1ST ANTB: CPT | Mod: 26,,, | Performed by: PATHOLOGY

## 2021-01-27 PROCEDURE — 43248 EGD GUIDE WIRE INSERTION: CPT | Mod: ,,, | Performed by: INTERNAL MEDICINE

## 2021-01-27 PROCEDURE — 88305 TISSUE EXAM BY PATHOLOGIST: CPT | Mod: 26,,, | Performed by: PATHOLOGY

## 2021-01-27 PROCEDURE — 43239 PR EGD, FLEX, W/BIOPSY, SGL/MULTI: ICD-10-PCS | Mod: 59,,, | Performed by: INTERNAL MEDICINE

## 2021-01-27 PROCEDURE — 63600175 PHARM REV CODE 636 W HCPCS: Performed by: NURSE ANESTHETIST, CERTIFIED REGISTERED

## 2021-01-27 PROCEDURE — D9220A PRA ANESTHESIA: Mod: CRNA,,, | Performed by: NURSE ANESTHETIST, CERTIFIED REGISTERED

## 2021-01-27 PROCEDURE — 88342 IMHCHEM/IMCYTCHM 1ST ANTB: CPT | Performed by: PATHOLOGY

## 2021-01-27 PROCEDURE — D9220A PRA ANESTHESIA: Mod: ANES,,, | Performed by: ANESTHESIOLOGY

## 2021-01-27 PROCEDURE — 37000008 HC ANESTHESIA 1ST 15 MINUTES: Performed by: INTERNAL MEDICINE

## 2021-01-27 PROCEDURE — D9220A PRA ANESTHESIA: ICD-10-PCS | Mod: ANES,,, | Performed by: ANESTHESIOLOGY

## 2021-01-27 PROCEDURE — 25000003 PHARM REV CODE 250: Performed by: INTERNAL MEDICINE

## 2021-01-27 PROCEDURE — C1769 GUIDE WIRE: HCPCS | Performed by: INTERNAL MEDICINE

## 2021-01-27 PROCEDURE — 27201012 HC FORCEPS, HOT/COLD, DISP: Performed by: INTERNAL MEDICINE

## 2021-01-27 PROCEDURE — 43239 EGD BIOPSY SINGLE/MULTIPLE: CPT | Mod: 59,,, | Performed by: INTERNAL MEDICINE

## 2021-01-27 PROCEDURE — 25000003 PHARM REV CODE 250: Performed by: NURSE ANESTHETIST, CERTIFIED REGISTERED

## 2021-01-27 PROCEDURE — 88305 TISSUE EXAM BY PATHOLOGIST: ICD-10-PCS | Mod: 26,,, | Performed by: PATHOLOGY

## 2021-01-27 PROCEDURE — 43248 EGD GUIDE WIRE INSERTION: CPT | Performed by: INTERNAL MEDICINE

## 2021-01-27 PROCEDURE — D9220A PRA ANESTHESIA: ICD-10-PCS | Mod: CRNA,,, | Performed by: NURSE ANESTHETIST, CERTIFIED REGISTERED

## 2021-01-27 PROCEDURE — 88305 TISSUE EXAM BY PATHOLOGIST: CPT | Performed by: PATHOLOGY

## 2021-01-27 PROCEDURE — 88342 CHG IMMUNOCYTOCHEMISTRY: ICD-10-PCS | Mod: 26,,, | Performed by: PATHOLOGY

## 2021-01-27 PROCEDURE — 43248 PR EGD, FLEX, W/DILATION OVER GUIDEWIRE: ICD-10-PCS | Mod: ,,, | Performed by: INTERNAL MEDICINE

## 2021-01-27 RX ORDER — CYANOCOBALAMIN (VITAMIN B-12) 250 MCG
250 TABLET ORAL DAILY
COMMUNITY

## 2021-01-27 RX ORDER — LIDOCAINE HCL/PF 100 MG/5ML
SYRINGE (ML) INTRAVENOUS
Status: DISCONTINUED | OUTPATIENT
Start: 2021-01-27 | End: 2021-01-27

## 2021-01-27 RX ORDER — PROPOFOL 10 MG/ML
VIAL (ML) INTRAVENOUS
Status: DISCONTINUED | OUTPATIENT
Start: 2021-01-27 | End: 2021-01-27

## 2021-01-27 RX ORDER — SODIUM CHLORIDE 9 MG/ML
INJECTION, SOLUTION INTRAVENOUS CONTINUOUS
Status: DISCONTINUED | OUTPATIENT
Start: 2021-01-27 | End: 2021-01-27 | Stop reason: HOSPADM

## 2021-01-27 RX ADMIN — PROPOFOL 50 MG: 10 INJECTION, EMULSION INTRAVENOUS at 12:01

## 2021-01-27 RX ADMIN — SODIUM CHLORIDE: 0.9 INJECTION, SOLUTION INTRAVENOUS at 11:01

## 2021-01-27 RX ADMIN — PROPOFOL 100 MG: 10 INJECTION, EMULSION INTRAVENOUS at 12:01

## 2021-01-27 RX ADMIN — LIDOCAINE HYDROCHLORIDE 100 MG: 20 INJECTION INTRAVENOUS at 12:01

## 2021-01-28 ENCOUNTER — TELEPHONE (OUTPATIENT)
Dept: OPTOMETRY | Facility: CLINIC | Age: 76
End: 2021-01-28

## 2021-01-28 VITALS
RESPIRATION RATE: 20 BRPM | SYSTOLIC BLOOD PRESSURE: 154 MMHG | BODY MASS INDEX: 26.68 KG/M2 | WEIGHT: 170 LBS | OXYGEN SATURATION: 97 % | HEART RATE: 62 BPM | TEMPERATURE: 97 F | HEIGHT: 67 IN | DIASTOLIC BLOOD PRESSURE: 89 MMHG

## 2021-02-01 ENCOUNTER — LAB VISIT (OUTPATIENT)
Dept: FAMILY MEDICINE | Facility: CLINIC | Age: 76
End: 2021-02-01
Payer: MEDICARE

## 2021-02-01 DIAGNOSIS — Z01.818 PRE-OP TESTING: ICD-10-CM

## 2021-02-01 PROCEDURE — U0003 INFECTIOUS AGENT DETECTION BY NUCLEIC ACID (DNA OR RNA); SEVERE ACUTE RESPIRATORY SYNDROME CORONAVIRUS 2 (SARS-COV-2) (CORONAVIRUS DISEASE [COVID-19]), AMPLIFIED PROBE TECHNIQUE, MAKING USE OF HIGH THROUGHPUT TECHNOLOGIES AS DESCRIBED BY CMS-2020-01-R: HCPCS

## 2021-02-02 ENCOUNTER — OFFICE VISIT (OUTPATIENT)
Dept: OPTOMETRY | Facility: CLINIC | Age: 76
End: 2021-02-02
Payer: MEDICARE

## 2021-02-02 DIAGNOSIS — Z46.0 CONTACT LENS/GLASSES FITTING: Primary | ICD-10-CM

## 2021-02-02 LAB
FINAL PATHOLOGIC DIAGNOSIS: NORMAL
GROSS: NORMAL
Lab: NORMAL
MICROSCOPIC EXAM: NORMAL
SARS-COV-2 RNA RESP QL NAA+PROBE: NOT DETECTED

## 2021-02-02 PROCEDURE — 99499 UNLISTED E&M SERVICE: CPT | Mod: S$GLB,,, | Performed by: OPTOMETRIST

## 2021-02-02 PROCEDURE — 1126F AMNT PAIN NOTED NONE PRSNT: CPT | Mod: S$GLB,,, | Performed by: OPTOMETRIST

## 2021-02-02 PROCEDURE — 1126F PR PAIN SEVERITY QUANTIFIED, NO PAIN PRESENT: ICD-10-PCS | Mod: S$GLB,,, | Performed by: OPTOMETRIST

## 2021-02-02 PROCEDURE — 99499 NO LOS: ICD-10-PCS | Mod: S$GLB,,, | Performed by: OPTOMETRIST

## 2021-02-03 ENCOUNTER — PATIENT MESSAGE (OUTPATIENT)
Dept: GASTROENTEROLOGY | Facility: CLINIC | Age: 76
End: 2021-02-03

## 2021-02-03 ENCOUNTER — PATIENT MESSAGE (OUTPATIENT)
Dept: PRIMARY CARE CLINIC | Facility: CLINIC | Age: 76
End: 2021-02-03

## 2021-02-03 ENCOUNTER — ANESTHESIA (OUTPATIENT)
Dept: ENDOSCOPY | Facility: HOSPITAL | Age: 76
End: 2021-02-03
Payer: MEDICARE

## 2021-02-03 ENCOUNTER — ANESTHESIA EVENT (OUTPATIENT)
Dept: ENDOSCOPY | Facility: HOSPITAL | Age: 76
End: 2021-02-03
Payer: MEDICARE

## 2021-02-03 ENCOUNTER — HOSPITAL ENCOUNTER (OUTPATIENT)
Facility: HOSPITAL | Age: 76
Discharge: HOME | End: 2021-02-03
Attending: INTERNAL MEDICINE | Admitting: INTERNAL MEDICINE
Payer: MEDICARE

## 2021-02-03 DIAGNOSIS — K57.90 DIVERTICULOSIS: ICD-10-CM

## 2021-02-03 DIAGNOSIS — R19.7 DIARRHEA, UNSPECIFIED: ICD-10-CM

## 2021-02-03 DIAGNOSIS — K64.8 INTERNAL HEMORRHOIDS: ICD-10-CM

## 2021-02-03 DIAGNOSIS — K63.5 POLYP OF COLON, UNSPECIFIED PART OF COLON, UNSPECIFIED TYPE: Primary | ICD-10-CM

## 2021-02-03 PROCEDURE — 27201012 HC FORCEPS, HOT/COLD, DISP: Performed by: INTERNAL MEDICINE

## 2021-02-03 PROCEDURE — 45385 COLONOSCOPY W/LESION REMOVAL: CPT | Performed by: INTERNAL MEDICINE

## 2021-02-03 PROCEDURE — 45385 PR COLONOSCOPY,REMV LESN,SNARE: ICD-10-PCS | Mod: ,,, | Performed by: INTERNAL MEDICINE

## 2021-02-03 PROCEDURE — 88305 TISSUE EXAM BY PATHOLOGIST: CPT | Mod: 26,,, | Performed by: PATHOLOGY

## 2021-02-03 PROCEDURE — 45385 COLONOSCOPY W/LESION REMOVAL: CPT | Mod: ,,, | Performed by: INTERNAL MEDICINE

## 2021-02-03 PROCEDURE — 45380 PR COLONOSCOPY,BIOPSY: ICD-10-PCS | Mod: 59,,, | Performed by: INTERNAL MEDICINE

## 2021-02-03 PROCEDURE — D9220A PRA ANESTHESIA: ICD-10-PCS | Mod: ANES,,, | Performed by: ANESTHESIOLOGY

## 2021-02-03 PROCEDURE — 37000008 HC ANESTHESIA 1ST 15 MINUTES: Performed by: INTERNAL MEDICINE

## 2021-02-03 PROCEDURE — 45380 COLONOSCOPY AND BIOPSY: CPT | Mod: 59,,, | Performed by: INTERNAL MEDICINE

## 2021-02-03 PROCEDURE — 37000009 HC ANESTHESIA EA ADD 15 MINS: Performed by: INTERNAL MEDICINE

## 2021-02-03 PROCEDURE — 25000003 PHARM REV CODE 250: Performed by: INTERNAL MEDICINE

## 2021-02-03 PROCEDURE — D9220A PRA ANESTHESIA: ICD-10-PCS | Mod: CRNA,,, | Performed by: NURSE ANESTHETIST, CERTIFIED REGISTERED

## 2021-02-03 PROCEDURE — 88305 TISSUE EXAM BY PATHOLOGIST: ICD-10-PCS | Mod: 26,,, | Performed by: PATHOLOGY

## 2021-02-03 PROCEDURE — 25000003 PHARM REV CODE 250: Performed by: NURSE ANESTHETIST, CERTIFIED REGISTERED

## 2021-02-03 PROCEDURE — 63600175 PHARM REV CODE 636 W HCPCS: Performed by: NURSE ANESTHETIST, CERTIFIED REGISTERED

## 2021-02-03 PROCEDURE — 88305 TISSUE EXAM BY PATHOLOGIST: CPT | Performed by: PATHOLOGY

## 2021-02-03 PROCEDURE — 45380 COLONOSCOPY AND BIOPSY: CPT | Performed by: INTERNAL MEDICINE

## 2021-02-03 PROCEDURE — D9220A PRA ANESTHESIA: Mod: CRNA,,, | Performed by: NURSE ANESTHETIST, CERTIFIED REGISTERED

## 2021-02-03 PROCEDURE — D9220A PRA ANESTHESIA: Mod: ANES,,, | Performed by: ANESTHESIOLOGY

## 2021-02-03 PROCEDURE — 27201089 HC SNARE, DISP (ANY): Performed by: INTERNAL MEDICINE

## 2021-02-03 RX ORDER — PROPOFOL 10 MG/ML
VIAL (ML) INTRAVENOUS
Status: DISCONTINUED | OUTPATIENT
Start: 2021-02-03 | End: 2021-02-03

## 2021-02-03 RX ORDER — LIDOCAINE HCL/PF 100 MG/5ML
SYRINGE (ML) INTRAVENOUS
Status: DISCONTINUED | OUTPATIENT
Start: 2021-02-03 | End: 2021-02-03

## 2021-02-03 RX ORDER — ONDANSETRON 2 MG/ML
INJECTION INTRAMUSCULAR; INTRAVENOUS
Status: DISCONTINUED | OUTPATIENT
Start: 2021-02-03 | End: 2021-02-03

## 2021-02-03 RX ORDER — SODIUM CHLORIDE 9 MG/ML
INJECTION, SOLUTION INTRAVENOUS CONTINUOUS
Status: DISCONTINUED | OUTPATIENT
Start: 2021-02-03 | End: 2021-02-03 | Stop reason: HOSPADM

## 2021-02-03 RX ADMIN — PROPOFOL 50 MG: 10 INJECTION, EMULSION INTRAVENOUS at 11:02

## 2021-02-03 RX ADMIN — PROPOFOL 20 MG: 10 INJECTION, EMULSION INTRAVENOUS at 12:02

## 2021-02-03 RX ADMIN — SODIUM CHLORIDE: 0.9 INJECTION, SOLUTION INTRAVENOUS at 10:02

## 2021-02-03 RX ADMIN — PROPOFOL 100 MG: 10 INJECTION, EMULSION INTRAVENOUS at 11:02

## 2021-02-03 RX ADMIN — GLYCOPYRROLATE 0.2 MG: 0.2 INJECTION, SOLUTION INTRAMUSCULAR; INTRAVITREAL at 11:02

## 2021-02-03 RX ADMIN — LIDOCAINE HYDROCHLORIDE 100 MG: 20 INJECTION INTRAVENOUS at 11:02

## 2021-02-03 RX ADMIN — ONDANSETRON 4 MG: 2 INJECTION, SOLUTION INTRAMUSCULAR; INTRAVENOUS at 11:02

## 2021-02-04 VITALS
RESPIRATION RATE: 18 BRPM | OXYGEN SATURATION: 97 % | TEMPERATURE: 98 F | DIASTOLIC BLOOD PRESSURE: 84 MMHG | BODY MASS INDEX: 29.82 KG/M2 | HEIGHT: 67 IN | WEIGHT: 190 LBS | SYSTOLIC BLOOD PRESSURE: 151 MMHG | HEART RATE: 68 BPM

## 2021-02-05 LAB
FINAL PATHOLOGIC DIAGNOSIS: NORMAL
GROSS: NORMAL
Lab: NORMAL

## 2021-03-10 DIAGNOSIS — M48.061 DEGENERATIVE LUMBAR SPINAL STENOSIS: ICD-10-CM

## 2021-03-10 RX ORDER — OXYCODONE AND ACETAMINOPHEN 5; 325 MG/1; MG/1
1 TABLET ORAL 2 TIMES DAILY PRN
Qty: 60 TABLET | Refills: 0 | Status: SHIPPED | OUTPATIENT
Start: 2021-03-10 | End: 2021-05-04 | Stop reason: SDUPTHER

## 2021-03-25 ENCOUNTER — TELEPHONE (OUTPATIENT)
Dept: PRIMARY CARE CLINIC | Facility: CLINIC | Age: 76
End: 2021-03-25

## 2021-03-25 DIAGNOSIS — M25.562 LEFT KNEE PAIN, UNSPECIFIED CHRONICITY: Primary | ICD-10-CM

## 2021-04-05 ENCOUNTER — PATIENT MESSAGE (OUTPATIENT)
Dept: ADMINISTRATIVE | Facility: HOSPITAL | Age: 76
End: 2021-04-05

## 2021-04-06 ENCOUNTER — OFFICE VISIT (OUTPATIENT)
Dept: PRIMARY CARE CLINIC | Facility: CLINIC | Age: 76
End: 2021-04-06
Payer: MEDICARE

## 2021-04-06 VITALS
TEMPERATURE: 98 F | BODY MASS INDEX: 29.93 KG/M2 | WEIGHT: 190.69 LBS | SYSTOLIC BLOOD PRESSURE: 122 MMHG | OXYGEN SATURATION: 99 % | DIASTOLIC BLOOD PRESSURE: 74 MMHG | RESPIRATION RATE: 19 BRPM | HEIGHT: 67 IN | HEART RATE: 74 BPM

## 2021-04-06 DIAGNOSIS — E78.5 HYPERLIPIDEMIA ASSOCIATED WITH TYPE 2 DIABETES MELLITUS: ICD-10-CM

## 2021-04-06 DIAGNOSIS — E11.59 TYPE 2 DIABETES MELLITUS WITH OTHER CIRCULATORY COMPLICATION, WITHOUT LONG-TERM CURRENT USE OF INSULIN: Primary | ICD-10-CM

## 2021-04-06 DIAGNOSIS — E11.69 HYPERLIPIDEMIA ASSOCIATED WITH TYPE 2 DIABETES MELLITUS: ICD-10-CM

## 2021-04-06 DIAGNOSIS — F32.9 MAJOR DEPRESSION, CHRONIC: ICD-10-CM

## 2021-04-06 DIAGNOSIS — I11.9 HYPERTENSIVE HEART DISEASE WITHOUT HEART FAILURE: ICD-10-CM

## 2021-04-06 DIAGNOSIS — Z12.31 ENCOUNTER FOR SCREENING MAMMOGRAM FOR BREAST CANCER: ICD-10-CM

## 2021-04-06 DIAGNOSIS — I70.0 AORTIC ATHEROSCLEROSIS: ICD-10-CM

## 2021-04-06 DIAGNOSIS — M48.061 DEGENERATIVE LUMBAR SPINAL STENOSIS: ICD-10-CM

## 2021-04-06 DIAGNOSIS — M17.0 PRIMARY OSTEOARTHRITIS OF BOTH KNEES: ICD-10-CM

## 2021-04-06 DIAGNOSIS — I25.10 CORONARY ARTERY DISEASE INVOLVING NATIVE CORONARY ARTERY OF NATIVE HEART WITHOUT ANGINA PECTORIS: ICD-10-CM

## 2021-04-06 PROCEDURE — 3044F PR MOST RECENT HEMOGLOBIN A1C LEVEL <7.0%: ICD-10-PCS | Mod: CPTII,S$GLB,, | Performed by: INTERNAL MEDICINE

## 2021-04-06 PROCEDURE — 3074F SYST BP LT 130 MM HG: CPT | Mod: CPTII,S$GLB,, | Performed by: INTERNAL MEDICINE

## 2021-04-06 PROCEDURE — 1101F PT FALLS ASSESS-DOCD LE1/YR: CPT | Mod: CPTII,S$GLB,, | Performed by: INTERNAL MEDICINE

## 2021-04-06 PROCEDURE — 99214 PR OFFICE/OUTPT VISIT, EST, LEVL IV, 30-39 MIN: ICD-10-PCS | Mod: S$GLB,,, | Performed by: INTERNAL MEDICINE

## 2021-04-06 PROCEDURE — 1125F PR PAIN SEVERITY QUANTIFIED, PAIN PRESENT: ICD-10-PCS | Mod: S$GLB,,, | Performed by: INTERNAL MEDICINE

## 2021-04-06 PROCEDURE — 1101F PR PT FALLS ASSESS DOC 0-1 FALLS W/OUT INJ PAST YR: ICD-10-PCS | Mod: CPTII,S$GLB,, | Performed by: INTERNAL MEDICINE

## 2021-04-06 PROCEDURE — 3008F BODY MASS INDEX DOCD: CPT | Mod: CPTII,S$GLB,, | Performed by: INTERNAL MEDICINE

## 2021-04-06 PROCEDURE — 3072F PR LOW RISK FOR RETINOPATHY: ICD-10-PCS | Mod: S$GLB,,, | Performed by: INTERNAL MEDICINE

## 2021-04-06 PROCEDURE — 82570 ASSAY OF URINE CREATININE: CPT | Performed by: INTERNAL MEDICINE

## 2021-04-06 PROCEDURE — 3078F PR MOST RECENT DIASTOLIC BLOOD PRESSURE < 80 MM HG: ICD-10-PCS | Mod: CPTII,S$GLB,, | Performed by: INTERNAL MEDICINE

## 2021-04-06 PROCEDURE — 1159F MED LIST DOCD IN RCRD: CPT | Mod: S$GLB,,, | Performed by: INTERNAL MEDICINE

## 2021-04-06 PROCEDURE — 3074F PR MOST RECENT SYSTOLIC BLOOD PRESSURE < 130 MM HG: ICD-10-PCS | Mod: CPTII,S$GLB,, | Performed by: INTERNAL MEDICINE

## 2021-04-06 PROCEDURE — 3078F DIAST BP <80 MM HG: CPT | Mod: CPTII,S$GLB,, | Performed by: INTERNAL MEDICINE

## 2021-04-06 PROCEDURE — 99214 OFFICE O/P EST MOD 30 MIN: CPT | Mod: S$GLB,,, | Performed by: INTERNAL MEDICINE

## 2021-04-06 PROCEDURE — 99499 UNLISTED E&M SERVICE: CPT | Mod: S$GLB,,, | Performed by: INTERNAL MEDICINE

## 2021-04-06 PROCEDURE — 82043 UR ALBUMIN QUANTITATIVE: CPT | Performed by: INTERNAL MEDICINE

## 2021-04-06 PROCEDURE — 3072F LOW RISK FOR RETINOPATHY: CPT | Mod: S$GLB,,, | Performed by: INTERNAL MEDICINE

## 2021-04-06 PROCEDURE — 99999 PR PBB SHADOW E&M-EST. PATIENT-LVL V: ICD-10-PCS | Mod: PBBFAC,,, | Performed by: INTERNAL MEDICINE

## 2021-04-06 PROCEDURE — 3288F PR FALLS RISK ASSESSMENT DOCUMENTED: ICD-10-PCS | Mod: CPTII,S$GLB,, | Performed by: INTERNAL MEDICINE

## 2021-04-06 PROCEDURE — 1159F PR MEDICATION LIST DOCUMENTED IN MEDICAL RECORD: ICD-10-PCS | Mod: S$GLB,,, | Performed by: INTERNAL MEDICINE

## 2021-04-06 PROCEDURE — 99499 RISK ADDL DX/OHS AUDIT: ICD-10-PCS | Mod: S$GLB,,, | Performed by: INTERNAL MEDICINE

## 2021-04-06 PROCEDURE — 3044F HG A1C LEVEL LT 7.0%: CPT | Mod: CPTII,S$GLB,, | Performed by: INTERNAL MEDICINE

## 2021-04-06 PROCEDURE — 1125F AMNT PAIN NOTED PAIN PRSNT: CPT | Mod: S$GLB,,, | Performed by: INTERNAL MEDICINE

## 2021-04-06 PROCEDURE — 3288F FALL RISK ASSESSMENT DOCD: CPT | Mod: CPTII,S$GLB,, | Performed by: INTERNAL MEDICINE

## 2021-04-06 PROCEDURE — 3008F PR BODY MASS INDEX (BMI) DOCUMENTED: ICD-10-PCS | Mod: CPTII,S$GLB,, | Performed by: INTERNAL MEDICINE

## 2021-04-06 PROCEDURE — 99999 PR PBB SHADOW E&M-EST. PATIENT-LVL V: CPT | Mod: PBBFAC,,, | Performed by: INTERNAL MEDICINE

## 2021-04-07 LAB
ALBUMIN/CREAT UR: 7.4 UG/MG (ref 0–30)
CREAT UR-MCNC: 122 MG/DL (ref 15–325)
MICROALBUMIN UR DL<=1MG/L-MCNC: 9 UG/ML

## 2021-04-07 RX ORDER — VENLAFAXINE 100 MG/1
100 TABLET ORAL DAILY
Qty: 90 TABLET | Refills: 1 | Status: SHIPPED | OUTPATIENT
Start: 2021-04-07 | End: 2021-10-07 | Stop reason: SDUPTHER

## 2021-04-07 RX ORDER — CARVEDILOL 25 MG/1
25 TABLET ORAL 2 TIMES DAILY
Qty: 180 TABLET | Refills: 1 | Status: SHIPPED | OUTPATIENT
Start: 2021-04-07 | End: 2021-10-07 | Stop reason: SDUPTHER

## 2021-04-07 RX ORDER — ATORVASTATIN CALCIUM 80 MG/1
80 TABLET, FILM COATED ORAL DAILY
Qty: 90 TABLET | Refills: 1 | Status: SHIPPED | OUTPATIENT
Start: 2021-04-07 | End: 2021-10-06

## 2021-04-07 RX ORDER — LOSARTAN POTASSIUM 100 MG/1
100 TABLET ORAL DAILY
Qty: 90 TABLET | Refills: 1 | Status: SHIPPED | OUTPATIENT
Start: 2021-04-07 | End: 2021-10-07 | Stop reason: SDUPTHER

## 2021-04-07 RX ORDER — GABAPENTIN 300 MG/1
300 CAPSULE ORAL NIGHTLY
Qty: 90 CAPSULE | Refills: 1 | Status: SHIPPED | OUTPATIENT
Start: 2021-04-07 | End: 2021-10-07 | Stop reason: SDUPTHER

## 2021-04-12 ENCOUNTER — LAB VISIT (OUTPATIENT)
Dept: LAB | Facility: HOSPITAL | Age: 76
End: 2021-04-12
Attending: INTERNAL MEDICINE
Payer: MEDICARE

## 2021-04-12 DIAGNOSIS — E11.59 TYPE 2 DIABETES MELLITUS WITH OTHER CIRCULATORY COMPLICATION, WITHOUT LONG-TERM CURRENT USE OF INSULIN: ICD-10-CM

## 2021-04-12 DIAGNOSIS — E78.5 HYPERLIPIDEMIA ASSOCIATED WITH TYPE 2 DIABETES MELLITUS: ICD-10-CM

## 2021-04-12 DIAGNOSIS — E11.69 HYPERLIPIDEMIA ASSOCIATED WITH TYPE 2 DIABETES MELLITUS: ICD-10-CM

## 2021-04-12 LAB
ALBUMIN SERPL BCP-MCNC: 4.3 G/DL (ref 3.5–5.2)
ALP SERPL-CCNC: 107 U/L (ref 55–135)
ALT SERPL W/O P-5'-P-CCNC: 19 U/L (ref 10–44)
ANION GAP SERPL CALC-SCNC: 10 MMOL/L (ref 8–16)
AST SERPL-CCNC: 18 U/L (ref 10–40)
BILIRUB SERPL-MCNC: 1 MG/DL (ref 0.1–1)
BUN SERPL-MCNC: 15 MG/DL (ref 8–23)
CALCIUM SERPL-MCNC: 9.4 MG/DL (ref 8.7–10.5)
CHLORIDE SERPL-SCNC: 107 MMOL/L (ref 95–110)
CHOLEST SERPL-MCNC: 163 MG/DL (ref 120–199)
CHOLEST/HDLC SERPL: 4.8 {RATIO} (ref 2–5)
CO2 SERPL-SCNC: 24 MMOL/L (ref 23–29)
CREAT SERPL-MCNC: 0.6 MG/DL (ref 0.5–1.4)
ERYTHROCYTE [DISTWIDTH] IN BLOOD BY AUTOMATED COUNT: 12.2 % (ref 11.5–14.5)
EST. GFR  (AFRICAN AMERICAN): >60 ML/MIN/1.73 M^2
EST. GFR  (NON AFRICAN AMERICAN): >60 ML/MIN/1.73 M^2
ESTIMATED AVG GLUCOSE: 140 MG/DL (ref 68–131)
GLUCOSE SERPL-MCNC: 183 MG/DL (ref 70–110)
HBA1C MFR BLD: 6.5 % (ref 4.5–6.2)
HCT VFR BLD AUTO: 46.4 % (ref 37–48.5)
HDLC SERPL-MCNC: 34 MG/DL (ref 40–75)
HDLC SERPL: 20.9 % (ref 20–50)
HGB BLD-MCNC: 15.4 G/DL (ref 12–16)
LDLC SERPL CALC-MCNC: 90.2 MG/DL (ref 63–159)
MCH RBC QN AUTO: 30.1 PG (ref 27–31)
MCHC RBC AUTO-ENTMCNC: 33.2 G/DL (ref 32–36)
MCV RBC AUTO: 91 FL (ref 82–98)
NONHDLC SERPL-MCNC: 129 MG/DL
PLATELET # BLD AUTO: 253 K/UL (ref 150–450)
PMV BLD AUTO: 9.3 FL (ref 9.2–12.9)
POTASSIUM SERPL-SCNC: 4.4 MMOL/L (ref 3.5–5.1)
PROT SERPL-MCNC: 7.3 G/DL (ref 6–8.4)
RBC # BLD AUTO: 5.11 M/UL (ref 4–5.4)
SODIUM SERPL-SCNC: 141 MMOL/L (ref 136–145)
TRIGL SERPL-MCNC: 194 MG/DL (ref 30–150)
WBC # BLD AUTO: 7.51 K/UL (ref 3.9–12.7)

## 2021-04-12 PROCEDURE — 83036 HEMOGLOBIN GLYCOSYLATED A1C: CPT | Performed by: INTERNAL MEDICINE

## 2021-04-12 PROCEDURE — 36415 COLL VENOUS BLD VENIPUNCTURE: CPT | Performed by: INTERNAL MEDICINE

## 2021-04-12 PROCEDURE — 80053 COMPREHEN METABOLIC PANEL: CPT | Performed by: INTERNAL MEDICINE

## 2021-04-12 PROCEDURE — 85027 COMPLETE CBC AUTOMATED: CPT | Performed by: INTERNAL MEDICINE

## 2021-04-12 PROCEDURE — 80061 LIPID PANEL: CPT | Performed by: INTERNAL MEDICINE

## 2021-04-29 DIAGNOSIS — M25.569 KNEE PAIN, UNSPECIFIED CHRONICITY, UNSPECIFIED LATERALITY: Primary | ICD-10-CM

## 2021-05-02 ENCOUNTER — PATIENT OUTREACH (OUTPATIENT)
Dept: ADMINISTRATIVE | Facility: OTHER | Age: 76
End: 2021-05-02

## 2021-05-04 ENCOUNTER — HOSPITAL ENCOUNTER (OUTPATIENT)
Dept: RADIOLOGY | Facility: HOSPITAL | Age: 76
Discharge: HOME | End: 2021-05-04
Attending: ORTHOPAEDIC SURGERY
Payer: MEDICARE

## 2021-05-04 ENCOUNTER — OFFICE VISIT (OUTPATIENT)
Dept: ORTHOPEDICS | Facility: CLINIC | Age: 76
End: 2021-05-04
Payer: MEDICARE

## 2021-05-04 VITALS — HEIGHT: 67 IN | WEIGHT: 190 LBS | RESPIRATION RATE: 16 BRPM | BODY MASS INDEX: 29.82 KG/M2

## 2021-05-04 DIAGNOSIS — M25.569 KNEE PAIN, UNSPECIFIED CHRONICITY, UNSPECIFIED LATERALITY: ICD-10-CM

## 2021-05-04 DIAGNOSIS — M17.0 LOCALIZED OSTEOARTHRITIS OF KNEES, BILATERAL: Primary | ICD-10-CM

## 2021-05-04 DIAGNOSIS — M48.061 DEGENERATIVE LUMBAR SPINAL STENOSIS: ICD-10-CM

## 2021-05-04 PROCEDURE — 73564 X-RAY EXAM KNEE 4 OR MORE: CPT | Mod: 26,RT,, | Performed by: RADIOLOGY

## 2021-05-04 PROCEDURE — 73564 X-RAY EXAM KNEE 4 OR MORE: CPT | Mod: 26,LT,, | Performed by: RADIOLOGY

## 2021-05-04 PROCEDURE — 3072F PR LOW RISK FOR RETINOPATHY: ICD-10-PCS | Mod: S$GLB,,, | Performed by: ORTHOPAEDIC SURGERY

## 2021-05-04 PROCEDURE — 99204 OFFICE O/P NEW MOD 45 MIN: CPT | Mod: 25,S$GLB,, | Performed by: ORTHOPAEDIC SURGERY

## 2021-05-04 PROCEDURE — 3288F FALL RISK ASSESSMENT DOCD: CPT | Mod: CPTII,S$GLB,, | Performed by: ORTHOPAEDIC SURGERY

## 2021-05-04 PROCEDURE — 20610 DRAIN/INJ JOINT/BURSA W/O US: CPT | Mod: LT,S$GLB,, | Performed by: ORTHOPAEDIC SURGERY

## 2021-05-04 PROCEDURE — 1125F PR PAIN SEVERITY QUANTIFIED, PAIN PRESENT: ICD-10-PCS | Mod: S$GLB,,, | Performed by: ORTHOPAEDIC SURGERY

## 2021-05-04 PROCEDURE — 3008F BODY MASS INDEX DOCD: CPT | Mod: CPTII,S$GLB,, | Performed by: ORTHOPAEDIC SURGERY

## 2021-05-04 PROCEDURE — 99999 PR PBB SHADOW E&M-EST. PATIENT-LVL IV: CPT | Mod: PBBFAC,,, | Performed by: ORTHOPAEDIC SURGERY

## 2021-05-04 PROCEDURE — 3288F PR FALLS RISK ASSESSMENT DOCUMENTED: ICD-10-PCS | Mod: CPTII,S$GLB,, | Performed by: ORTHOPAEDIC SURGERY

## 2021-05-04 PROCEDURE — 73564 X-RAY EXAM KNEE 4 OR MORE: CPT | Mod: TC,50,PN

## 2021-05-04 PROCEDURE — 1125F AMNT PAIN NOTED PAIN PRSNT: CPT | Mod: S$GLB,,, | Performed by: ORTHOPAEDIC SURGERY

## 2021-05-04 PROCEDURE — 20610 LARGE JOINT ASPIRATION/INJECTION: L KNEE: ICD-10-PCS | Mod: LT,S$GLB,, | Performed by: ORTHOPAEDIC SURGERY

## 2021-05-04 PROCEDURE — 99204 PR OFFICE/OUTPT VISIT, NEW, LEVL IV, 45-59 MIN: ICD-10-PCS | Mod: 25,S$GLB,, | Performed by: ORTHOPAEDIC SURGERY

## 2021-05-04 PROCEDURE — 73564 PR  X-RAY KNEE 4+ VIEW: ICD-10-PCS | Mod: 26,LT,, | Performed by: RADIOLOGY

## 2021-05-04 PROCEDURE — 1101F PT FALLS ASSESS-DOCD LE1/YR: CPT | Mod: CPTII,S$GLB,, | Performed by: ORTHOPAEDIC SURGERY

## 2021-05-04 PROCEDURE — 99999 PR PBB SHADOW E&M-EST. PATIENT-LVL IV: ICD-10-PCS | Mod: PBBFAC,,, | Performed by: ORTHOPAEDIC SURGERY

## 2021-05-04 PROCEDURE — 1159F PR MEDICATION LIST DOCUMENTED IN MEDICAL RECORD: ICD-10-PCS | Mod: S$GLB,,, | Performed by: ORTHOPAEDIC SURGERY

## 2021-05-04 PROCEDURE — 3008F PR BODY MASS INDEX (BMI) DOCUMENTED: ICD-10-PCS | Mod: CPTII,S$GLB,, | Performed by: ORTHOPAEDIC SURGERY

## 2021-05-04 PROCEDURE — 1101F PR PT FALLS ASSESS DOC 0-1 FALLS W/OUT INJ PAST YR: ICD-10-PCS | Mod: CPTII,S$GLB,, | Performed by: ORTHOPAEDIC SURGERY

## 2021-05-04 PROCEDURE — 3072F LOW RISK FOR RETINOPATHY: CPT | Mod: S$GLB,,, | Performed by: ORTHOPAEDIC SURGERY

## 2021-05-04 PROCEDURE — 1159F MED LIST DOCD IN RCRD: CPT | Mod: S$GLB,,, | Performed by: ORTHOPAEDIC SURGERY

## 2021-05-04 RX ORDER — METHYLPREDNISOLONE ACETATE 40 MG/ML
40 INJECTION, SUSPENSION INTRA-ARTICULAR; INTRALESIONAL; INTRAMUSCULAR; SOFT TISSUE
Status: DISCONTINUED | OUTPATIENT
Start: 2021-05-04 | End: 2021-05-04 | Stop reason: HOSPADM

## 2021-05-04 RX ADMIN — METHYLPREDNISOLONE ACETATE 40 MG: 40 INJECTION, SUSPENSION INTRA-ARTICULAR; INTRALESIONAL; INTRAMUSCULAR; SOFT TISSUE at 01:05

## 2021-05-05 RX ORDER — OXYCODONE AND ACETAMINOPHEN 5; 325 MG/1; MG/1
1 TABLET ORAL 2 TIMES DAILY PRN
Qty: 60 TABLET | Refills: 0 | Status: SHIPPED | OUTPATIENT
Start: 2021-05-05 | End: 2021-06-29 | Stop reason: SDUPTHER

## 2021-05-08 ENCOUNTER — HOSPITAL ENCOUNTER (OUTPATIENT)
Dept: RADIOLOGY | Facility: HOSPITAL | Age: 76
Discharge: HOME | End: 2021-05-08
Attending: INTERNAL MEDICINE
Payer: MEDICARE

## 2021-05-08 VITALS — HEIGHT: 67 IN | BODY MASS INDEX: 30.13 KG/M2 | WEIGHT: 192 LBS

## 2021-05-08 DIAGNOSIS — Z12.31 ENCOUNTER FOR SCREENING MAMMOGRAM FOR BREAST CANCER: ICD-10-CM

## 2021-05-08 PROCEDURE — 77067 SCR MAMMO BI INCL CAD: CPT | Mod: 26,,, | Performed by: RADIOLOGY

## 2021-05-08 PROCEDURE — 77067 SCR MAMMO BI INCL CAD: CPT | Mod: TC

## 2021-05-08 PROCEDURE — 77063 BREAST TOMOSYNTHESIS BI: CPT | Mod: 26,,, | Performed by: RADIOLOGY

## 2021-05-08 PROCEDURE — 77063 MAMMO DIGITAL SCREENING BILAT WITH TOMO: ICD-10-PCS | Mod: 26,,, | Performed by: RADIOLOGY

## 2021-05-08 PROCEDURE — 77067 MAMMO DIGITAL SCREENING BILAT WITH TOMO: ICD-10-PCS | Mod: 26,,, | Performed by: RADIOLOGY

## 2021-05-10 ENCOUNTER — OFFICE VISIT (OUTPATIENT)
Dept: CARDIOLOGY | Facility: CLINIC | Age: 76
End: 2021-05-10
Payer: MEDICARE

## 2021-05-10 VITALS
DIASTOLIC BLOOD PRESSURE: 84 MMHG | HEIGHT: 67 IN | OXYGEN SATURATION: 97 % | BODY MASS INDEX: 29.9 KG/M2 | SYSTOLIC BLOOD PRESSURE: 155 MMHG | HEART RATE: 92 BPM | WEIGHT: 190.5 LBS

## 2021-05-10 DIAGNOSIS — R06.09 DOE (DYSPNEA ON EXERTION): ICD-10-CM

## 2021-05-10 DIAGNOSIS — I70.0 ABDOMINAL AORTIC ATHEROSCLEROSIS: ICD-10-CM

## 2021-05-10 DIAGNOSIS — I15.2 HYPERTENSION ASSOCIATED WITH DIABETES: ICD-10-CM

## 2021-05-10 DIAGNOSIS — E11.59 HYPERTENSION ASSOCIATED WITH DIABETES: ICD-10-CM

## 2021-05-10 DIAGNOSIS — I25.10 CORONARY ARTERY CALCIFICATION SEEN ON CAT SCAN: Primary | ICD-10-CM

## 2021-05-10 DIAGNOSIS — I65.21 STENOSIS OF RIGHT CAROTID ARTERY: ICD-10-CM

## 2021-05-10 PROCEDURE — 99999 PR PBB SHADOW E&M-EST. PATIENT-LVL V: ICD-10-PCS | Mod: PBBFAC,,, | Performed by: NURSE PRACTITIONER

## 2021-05-10 PROCEDURE — 1159F PR MEDICATION LIST DOCUMENTED IN MEDICAL RECORD: ICD-10-PCS | Mod: S$GLB,,, | Performed by: NURSE PRACTITIONER

## 2021-05-10 PROCEDURE — 3072F PR LOW RISK FOR RETINOPATHY: ICD-10-PCS | Mod: S$GLB,,, | Performed by: NURSE PRACTITIONER

## 2021-05-10 PROCEDURE — 99999 PR PBB SHADOW E&M-EST. PATIENT-LVL V: CPT | Mod: PBBFAC,,, | Performed by: NURSE PRACTITIONER

## 2021-05-10 PROCEDURE — 99214 PR OFFICE/OUTPT VISIT, EST, LEVL IV, 30-39 MIN: ICD-10-PCS | Mod: 25,S$GLB,, | Performed by: NURSE PRACTITIONER

## 2021-05-10 PROCEDURE — 93000 ELECTROCARDIOGRAM COMPLETE: CPT | Mod: S$GLB,,, | Performed by: INTERNAL MEDICINE

## 2021-05-10 PROCEDURE — 3008F BODY MASS INDEX DOCD: CPT | Mod: CPTII,S$GLB,, | Performed by: NURSE PRACTITIONER

## 2021-05-10 PROCEDURE — 3008F PR BODY MASS INDEX (BMI) DOCUMENTED: ICD-10-PCS | Mod: CPTII,S$GLB,, | Performed by: NURSE PRACTITIONER

## 2021-05-10 PROCEDURE — 3072F LOW RISK FOR RETINOPATHY: CPT | Mod: S$GLB,,, | Performed by: NURSE PRACTITIONER

## 2021-05-10 PROCEDURE — 1126F AMNT PAIN NOTED NONE PRSNT: CPT | Mod: S$GLB,,, | Performed by: NURSE PRACTITIONER

## 2021-05-10 PROCEDURE — 1159F MED LIST DOCD IN RCRD: CPT | Mod: S$GLB,,, | Performed by: NURSE PRACTITIONER

## 2021-05-10 PROCEDURE — 93000 EKG 12-LEAD: ICD-10-PCS | Mod: S$GLB,,, | Performed by: INTERNAL MEDICINE

## 2021-05-10 PROCEDURE — 1126F PR PAIN SEVERITY QUANTIFIED, NO PAIN PRESENT: ICD-10-PCS | Mod: S$GLB,,, | Performed by: NURSE PRACTITIONER

## 2021-05-10 PROCEDURE — 99214 OFFICE O/P EST MOD 30 MIN: CPT | Mod: 25,S$GLB,, | Performed by: NURSE PRACTITIONER

## 2021-05-24 ENCOUNTER — TELEPHONE (OUTPATIENT)
Dept: ADMINISTRATIVE | Facility: HOSPITAL | Age: 76
End: 2021-05-24

## 2021-06-11 ENCOUNTER — HOSPITAL ENCOUNTER (OUTPATIENT)
Dept: CARDIOLOGY | Facility: HOSPITAL | Age: 76
Discharge: HOME | End: 2021-06-11
Attending: NURSE PRACTITIONER
Payer: MEDICARE

## 2021-06-11 VITALS
WEIGHT: 190 LBS | DIASTOLIC BLOOD PRESSURE: 76 MMHG | SYSTOLIC BLOOD PRESSURE: 120 MMHG | HEART RATE: 64 BPM | HEIGHT: 67 IN | BODY MASS INDEX: 29.82 KG/M2

## 2021-06-11 DIAGNOSIS — I65.21 STENOSIS OF RIGHT CAROTID ARTERY: ICD-10-CM

## 2021-06-11 DIAGNOSIS — R06.09 DOE (DYSPNEA ON EXERTION): ICD-10-CM

## 2021-06-11 LAB
ASCENDING AORTA: 3.09 CM
AV INDEX (PROSTH): 0.98
AV MEAN GRADIENT: 3 MMHG
AV PEAK GRADIENT: 5 MMHG
AV VALVE AREA: 4.17 CM2
AV VELOCITY RATIO: 0.92
BSA FOR ECHO PROCEDURE: 2.02 M2
CV ECHO LV RWT: 0.63 CM
DOP CALC AO PEAK VEL: 1.17 M/S
DOP CALC AO VTI: 28.6 CM
DOP CALC LVOT AREA: 4.3 CM2
DOP CALC LVOT DIAMETER: 2.33 CM
DOP CALC LVOT PEAK VEL: 1.08 M/S
DOP CALC LVOT STROKE VOLUME: 119.16 CM3
DOP CALCLVOT PEAK VEL VTI: 27.96 CM
E WAVE DECELERATION TIME: 246.99 MSEC
E/A RATIO: 0.79
E/E' RATIO: 12 M/S
ECHO LV POSTERIOR WALL: 1.15 CM (ref 0.6–1.1)
EJECTION FRACTION: 60 %
FRACTIONAL SHORTENING: 32 % (ref 28–44)
INTERVENTRICULAR SEPTUM: 1.12 CM (ref 0.6–1.1)
LA MAJOR: 4.39 CM
LA MINOR: 4.5 CM
LA WIDTH: 3.25 CM
LEFT ARM DIASTOLIC BLOOD PRESSURE: 84 MMHG
LEFT ARM SYSTOLIC BLOOD PRESSURE: 155 MMHG
LEFT ATRIUM SIZE: 3.56 CM
LEFT ATRIUM VOLUME INDEX MOD: 18.1 ML/M2
LEFT ATRIUM VOLUME INDEX: 22.1 ML/M2
LEFT ATRIUM VOLUME MOD: 35.79 CM3
LEFT ATRIUM VOLUME: 43.71 CM3
LEFT CBA DIAS: 22 CM/S
LEFT CBA SYS: 82 CM/S
LEFT CCA DIST DIAS: 23 CM/S
LEFT CCA DIST SYS: 86 CM/S
LEFT CCA MID DIAS: 22 CM/S
LEFT CCA MID SYS: 102 CM/S
LEFT CCA PROX DIAS: 20 CM/S
LEFT CCA PROX SYS: 108 CM/S
LEFT ECA DIAS: 17 CM/S
LEFT ECA SYS: 105 CM/S
LEFT ICA DIST DIAS: 28 CM/S
LEFT ICA DIST SYS: 88 CM/S
LEFT ICA MID DIAS: 32 CM/S
LEFT ICA MID SYS: 99 CM/S
LEFT ICA PROX DIAS: 15 CM/S
LEFT ICA PROX SYS: 47 CM/S
LEFT INTERNAL DIMENSION IN SYSTOLE: 2.48 CM (ref 2.1–4)
LEFT VENTRICLE DIASTOLIC VOLUME INDEX: 28.4 ML/M2
LEFT VENTRICLE DIASTOLIC VOLUME: 56.24 ML
LEFT VENTRICLE MASS INDEX: 67 G/M2
LEFT VENTRICLE SYSTOLIC VOLUME INDEX: 11 ML/M2
LEFT VENTRICLE SYSTOLIC VOLUME: 21.78 ML
LEFT VENTRICULAR INTERNAL DIMENSION IN DIASTOLE: 3.65 CM (ref 3.5–6)
LEFT VENTRICULAR MASS: 132.76 G
LEFT VERTEBRAL DIAS: 15 CM/S
LEFT VERTEBRAL SYS: 50 CM/S
LV LATERAL E/E' RATIO: 11 M/S
LV SEPTAL E/E' RATIO: 13.2 M/S
MV PEAK A VEL: 0.84 M/S
MV PEAK E VEL: 0.66 M/S
MV STENOSIS PRESSURE HALF TIME: 71.63 MS
MV VALVE AREA P 1/2 METHOD: 3.07 CM2
OHS CV CAROTID RIGHT ICA EDV HIGHEST: 22
OHS CV CAROTID ULTRASOUND LEFT ICA/CCA RATIO: 1.15
OHS CV CAROTID ULTRASOUND RIGHT ICA/CCA RATIO: 1.21
OHS CV PV CAROTID LEFT HIGHEST CCA: 108
OHS CV PV CAROTID LEFT HIGHEST ICA: 99
OHS CV PV CAROTID RIGHT HIGHEST CCA: 90
OHS CV PV CAROTID RIGHT HIGHEST ICA: 81
OHS CV US CAROTID LEFT HIGHEST EDV: 32
PISA TR MAX VEL: 2.54 M/S
RA MAJOR: 4.23 CM
RA PRESSURE: 3 MMHG
RA WIDTH: 3.08 CM
RIGHT ARM DIASTOLIC BLOOD PRESSURE: 80 MMHG
RIGHT ARM SYSTOLIC BLOOD PRESSURE: 151 MMHG
RIGHT CBA DIAS: 19 CM/S
RIGHT CBA SYS: 67 CM/S
RIGHT CCA DIST DIAS: 14 CM/S
RIGHT CCA DIST SYS: 67 CM/S
RIGHT CCA MID DIAS: 19 CM/S
RIGHT CCA MID SYS: 90 CM/S
RIGHT CCA PROX DIAS: 12 CM/S
RIGHT ECA DIAS: 23 CM/S
RIGHT ECA SYS: 130 CM/S
RIGHT ICA DIST DIAS: 13 CM/S
RIGHT ICA DIST SYS: 39 CM/S
RIGHT ICA MID DIAS: 22 CM/S
RIGHT ICA MID SYS: 61 CM/S
RIGHT ICA PROX DIAS: 22 CM/S
RIGHT ICA PROX SYS: 81 CM/S
RIGHT VENTRICULAR END-DIASTOLIC DIMENSION: 3.35 CM
RIGHT VERTEBRAL DIAS: 9 CM/S
RIGHT VERTEBRAL SYS: 28 CM/S
RV TISSUE DOPPLER FREE WALL SYSTOLIC VELOCITY 1 (APICAL 4 CHAMBER VIEW): 9.41 CM/S
SINUS: 2.78 CM
STJ: 2.5 CM
TDI LATERAL: 0.06 M/S
TDI SEPTAL: 0.05 M/S
TDI: 0.06 M/S
TR MAX PG: 26 MMHG
TRICUSPID ANNULAR PLANE SYSTOLIC EXCURSION: 1.72 CM
TV REST PULMONARY ARTERY PRESSURE: 29 MMHG

## 2021-06-11 PROCEDURE — 93306 ECHO (CUPID ONLY): ICD-10-PCS | Mod: 26,,, | Performed by: INTERNAL MEDICINE

## 2021-06-11 PROCEDURE — 93880 CV US DOPPLER CAROTID (CUPID ONLY): ICD-10-PCS | Mod: 26,,, | Performed by: INTERNAL MEDICINE

## 2021-06-11 PROCEDURE — 93880 EXTRACRANIAL BILAT STUDY: CPT

## 2021-06-11 PROCEDURE — 93880 EXTRACRANIAL BILAT STUDY: CPT | Mod: 26,,, | Performed by: INTERNAL MEDICINE

## 2021-06-11 PROCEDURE — 93306 TTE W/DOPPLER COMPLETE: CPT

## 2021-06-11 PROCEDURE — 93306 TTE W/DOPPLER COMPLETE: CPT | Mod: 26,,, | Performed by: INTERNAL MEDICINE

## 2021-06-16 ENCOUNTER — PATIENT MESSAGE (OUTPATIENT)
Dept: CARDIOLOGY | Facility: CLINIC | Age: 76
End: 2021-06-16

## 2021-06-17 ENCOUNTER — PATIENT MESSAGE (OUTPATIENT)
Dept: CARDIOLOGY | Facility: CLINIC | Age: 76
End: 2021-06-17

## 2021-06-29 DIAGNOSIS — G89.29 CHRONIC SHOULDER PAIN, UNSPECIFIED LATERALITY: Primary | ICD-10-CM

## 2021-06-29 DIAGNOSIS — M48.061 DEGENERATIVE LUMBAR SPINAL STENOSIS: ICD-10-CM

## 2021-06-29 DIAGNOSIS — M25.519 CHRONIC SHOULDER PAIN, UNSPECIFIED LATERALITY: Primary | ICD-10-CM

## 2021-06-29 DIAGNOSIS — G89.29 CHRONIC KNEE PAIN, UNSPECIFIED LATERALITY: ICD-10-CM

## 2021-06-29 DIAGNOSIS — M25.569 CHRONIC KNEE PAIN, UNSPECIFIED LATERALITY: ICD-10-CM

## 2021-06-30 RX ORDER — OXYCODONE AND ACETAMINOPHEN 5; 325 MG/1; MG/1
1 TABLET ORAL 2 TIMES DAILY PRN
Qty: 60 TABLET | Refills: 0 | Status: SHIPPED | OUTPATIENT
Start: 2021-06-30 | End: 2021-08-23 | Stop reason: SDUPTHER

## 2021-07-02 ENCOUNTER — PATIENT OUTREACH (OUTPATIENT)
Dept: ADMINISTRATIVE | Facility: OTHER | Age: 76
End: 2021-07-02

## 2021-07-06 ENCOUNTER — TELEPHONE (OUTPATIENT)
Dept: ORTHOPEDICS | Facility: CLINIC | Age: 76
End: 2021-07-06

## 2021-07-06 DIAGNOSIS — M25.512 BILATERAL SHOULDER PAIN, UNSPECIFIED CHRONICITY: Primary | ICD-10-CM

## 2021-07-06 DIAGNOSIS — M25.511 BILATERAL SHOULDER PAIN, UNSPECIFIED CHRONICITY: Primary | ICD-10-CM

## 2021-07-06 DIAGNOSIS — M25.562 PAIN IN BOTH KNEES, UNSPECIFIED CHRONICITY: ICD-10-CM

## 2021-07-06 DIAGNOSIS — M25.561 PAIN IN BOTH KNEES, UNSPECIFIED CHRONICITY: ICD-10-CM

## 2021-07-08 ENCOUNTER — OFFICE VISIT (OUTPATIENT)
Dept: ORTHOPEDICS | Facility: CLINIC | Age: 76
End: 2021-07-08
Payer: MEDICARE

## 2021-07-08 ENCOUNTER — HOSPITAL ENCOUNTER (OUTPATIENT)
Dept: RADIOLOGY | Facility: HOSPITAL | Age: 76
Discharge: HOME | End: 2021-07-08
Attending: ORTHOPAEDIC SURGERY
Payer: MEDICARE

## 2021-07-08 VITALS — RESPIRATION RATE: 16 BRPM | HEIGHT: 67 IN | WEIGHT: 190 LBS | BODY MASS INDEX: 29.82 KG/M2

## 2021-07-08 DIAGNOSIS — M25.511 BILATERAL SHOULDER PAIN, UNSPECIFIED CHRONICITY: ICD-10-CM

## 2021-07-08 DIAGNOSIS — M25.512 BILATERAL SHOULDER PAIN, UNSPECIFIED CHRONICITY: ICD-10-CM

## 2021-07-08 DIAGNOSIS — M25.569 CHRONIC KNEE PAIN, UNSPECIFIED LATERALITY: ICD-10-CM

## 2021-07-08 DIAGNOSIS — M19.011 LOCALIZED PRIMARY OSTEOARTHRITIS OF SHOULDER REGIONS, BILATERAL: Primary | ICD-10-CM

## 2021-07-08 DIAGNOSIS — G89.29 CHRONIC KNEE PAIN, UNSPECIFIED LATERALITY: ICD-10-CM

## 2021-07-08 DIAGNOSIS — M19.012 LOCALIZED PRIMARY OSTEOARTHRITIS OF SHOULDER REGIONS, BILATERAL: Primary | ICD-10-CM

## 2021-07-08 PROCEDURE — 1159F PR MEDICATION LIST DOCUMENTED IN MEDICAL RECORD: ICD-10-PCS | Mod: S$GLB,,, | Performed by: ORTHOPAEDIC SURGERY

## 2021-07-08 PROCEDURE — 99999 PR PBB SHADOW E&M-EST. PATIENT-LVL IV: ICD-10-PCS | Mod: PBBFAC,,, | Performed by: ORTHOPAEDIC SURGERY

## 2021-07-08 PROCEDURE — 73030 XR SHOULDER TRAUMA 3 VIEW BILATERAL: ICD-10-PCS | Mod: 26,50,, | Performed by: RADIOLOGY

## 2021-07-08 PROCEDURE — 73030 X-RAY EXAM OF SHOULDER: CPT | Mod: 26,50,, | Performed by: RADIOLOGY

## 2021-07-08 PROCEDURE — 1101F PT FALLS ASSESS-DOCD LE1/YR: CPT | Mod: CPTII,S$GLB,, | Performed by: ORTHOPAEDIC SURGERY

## 2021-07-08 PROCEDURE — 73030 X-RAY EXAM OF SHOULDER: CPT | Mod: TC,50,PN

## 2021-07-08 PROCEDURE — 1159F MED LIST DOCD IN RCRD: CPT | Mod: S$GLB,,, | Performed by: ORTHOPAEDIC SURGERY

## 2021-07-08 PROCEDURE — 99214 OFFICE O/P EST MOD 30 MIN: CPT | Mod: S$GLB,,, | Performed by: ORTHOPAEDIC SURGERY

## 2021-07-08 PROCEDURE — 1125F PR PAIN SEVERITY QUANTIFIED, PAIN PRESENT: ICD-10-PCS | Mod: S$GLB,,, | Performed by: ORTHOPAEDIC SURGERY

## 2021-07-08 PROCEDURE — 3008F BODY MASS INDEX DOCD: CPT | Mod: CPTII,S$GLB,, | Performed by: ORTHOPAEDIC SURGERY

## 2021-07-08 PROCEDURE — 99999 PR PBB SHADOW E&M-EST. PATIENT-LVL IV: CPT | Mod: PBBFAC,,, | Performed by: ORTHOPAEDIC SURGERY

## 2021-07-08 PROCEDURE — 3072F PR LOW RISK FOR RETINOPATHY: ICD-10-PCS | Mod: S$GLB,,, | Performed by: ORTHOPAEDIC SURGERY

## 2021-07-08 PROCEDURE — 1125F AMNT PAIN NOTED PAIN PRSNT: CPT | Mod: S$GLB,,, | Performed by: ORTHOPAEDIC SURGERY

## 2021-07-08 PROCEDURE — 3288F FALL RISK ASSESSMENT DOCD: CPT | Mod: CPTII,S$GLB,, | Performed by: ORTHOPAEDIC SURGERY

## 2021-07-08 PROCEDURE — 99214 PR OFFICE/OUTPT VISIT, EST, LEVL IV, 30-39 MIN: ICD-10-PCS | Mod: S$GLB,,, | Performed by: ORTHOPAEDIC SURGERY

## 2021-07-08 PROCEDURE — 3288F PR FALLS RISK ASSESSMENT DOCUMENTED: ICD-10-PCS | Mod: CPTII,S$GLB,, | Performed by: ORTHOPAEDIC SURGERY

## 2021-07-08 PROCEDURE — 1101F PR PT FALLS ASSESS DOC 0-1 FALLS W/OUT INJ PAST YR: ICD-10-PCS | Mod: CPTII,S$GLB,, | Performed by: ORTHOPAEDIC SURGERY

## 2021-07-08 PROCEDURE — 3008F PR BODY MASS INDEX (BMI) DOCUMENTED: ICD-10-PCS | Mod: CPTII,S$GLB,, | Performed by: ORTHOPAEDIC SURGERY

## 2021-07-08 PROCEDURE — 3072F LOW RISK FOR RETINOPATHY: CPT | Mod: S$GLB,,, | Performed by: ORTHOPAEDIC SURGERY

## 2021-08-23 DIAGNOSIS — M48.061 DEGENERATIVE LUMBAR SPINAL STENOSIS: ICD-10-CM

## 2021-08-23 NOTE — TELEPHONE ENCOUNTER
----- Message from Patricia Driscoll sent at 8/23/2021 10:05 AM CDT -----  Contact: self [phone]  Requesting an RX refill or new RX.  Is this a refill or new RX: NEW  RX name and strength:oxyCODONE-acetaminophen (PERCOCET) 5-325 mg per tablet  Is this a 30 day or 90 day RX: 60 qty  Patient advised that in the future they can use their [facility] account to request a refill?:    Pharmacy name and phone #   [facility] [address] [place], [place] - [address] [address]  Phone: [phone] Fax: [phone]  Comments:

## 2021-08-24 RX ORDER — OXYCODONE AND ACETAMINOPHEN 5; 325 MG/1; MG/1
1 TABLET ORAL 2 TIMES DAILY PRN
Qty: 60 TABLET | Refills: 0 | Status: SHIPPED | OUTPATIENT
Start: 2021-08-24 | End: 2021-11-26 | Stop reason: SDUPTHER

## 2021-09-08 DIAGNOSIS — E11.59 TYPE 2 DIABETES MELLITUS WITH OTHER CIRCULATORY COMPLICATION, WITHOUT LONG-TERM CURRENT USE OF INSULIN: Primary | ICD-10-CM

## 2021-09-08 RX ORDER — EMPAGLIFLOZIN 10 MG/1
10 TABLET, FILM COATED ORAL DAILY
Qty: 90 TABLET | Refills: 1 | Status: SHIPPED | OUTPATIENT
Start: 2021-09-08 | End: 2021-10-14 | Stop reason: DRUGHIGH

## 2021-09-08 NOTE — TELEPHONE ENCOUNTER
----- Message from Debbie Shine sent at 9/8/2021  2:36 PM CDT -----  Regarding: Pharmacy change  Contact: Patient  [phone]  Requesting an RX refill or new RX.  Is this a refill or new RX: new  RX name and strength JARDIANCE 10 mg tablet   Is this a 30 day or 90 day RX: 90  Patient advised that in the future they can use their [facility] account to request a refill?:    Pharmacy name and phone #    [facility] - [place], [place] - [address]  [address] [facility] [address] [address]  Phone: [phone] Fax: [phone]     Comments:  Please forward to Jackie.  Jackie is also sending request.

## 2021-09-10 DIAGNOSIS — E11.59 TYPE 2 DIABETES MELLITUS WITH OTHER CIRCULATORY COMPLICATION, WITHOUT LONG-TERM CURRENT USE OF INSULIN: ICD-10-CM

## 2021-09-10 RX ORDER — EMPAGLIFLOZIN 10 MG/1
10 TABLET, FILM COATED ORAL DAILY
Qty: 90 TABLET | Refills: 1 | OUTPATIENT
Start: 2021-09-10

## 2021-10-07 ENCOUNTER — LAB VISIT (OUTPATIENT)
Dept: LAB | Facility: HOSPITAL | Age: 76
End: 2021-10-07
Attending: INTERNAL MEDICINE
Payer: MEDICARE

## 2021-10-07 ENCOUNTER — OFFICE VISIT (OUTPATIENT)
Dept: PRIMARY CARE CLINIC | Facility: CLINIC | Age: 76
End: 2021-10-07
Payer: MEDICARE

## 2021-10-07 VITALS
WEIGHT: 193.56 LBS | TEMPERATURE: 98 F | OXYGEN SATURATION: 99 % | BODY MASS INDEX: 30.38 KG/M2 | HEART RATE: 92 BPM | DIASTOLIC BLOOD PRESSURE: 78 MMHG | HEIGHT: 67 IN | SYSTOLIC BLOOD PRESSURE: 127 MMHG

## 2021-10-07 DIAGNOSIS — E11.59 TYPE 2 DIABETES MELLITUS WITH OTHER CIRCULATORY COMPLICATION, WITHOUT LONG-TERM CURRENT USE OF INSULIN: ICD-10-CM

## 2021-10-07 DIAGNOSIS — I25.10 CORONARY ARTERY DISEASE INVOLVING NATIVE CORONARY ARTERY OF NATIVE HEART WITHOUT ANGINA PECTORIS: ICD-10-CM

## 2021-10-07 DIAGNOSIS — M48.061 DEGENERATIVE LUMBAR SPINAL STENOSIS: ICD-10-CM

## 2021-10-07 DIAGNOSIS — E11.59 TYPE 2 DIABETES MELLITUS WITH OTHER CIRCULATORY COMPLICATION, WITHOUT LONG-TERM CURRENT USE OF INSULIN: Primary | ICD-10-CM

## 2021-10-07 DIAGNOSIS — Z23 INFLUENZA VACCINE NEEDED: ICD-10-CM

## 2021-10-07 DIAGNOSIS — E11.69 HYPERLIPIDEMIA ASSOCIATED WITH TYPE 2 DIABETES MELLITUS: ICD-10-CM

## 2021-10-07 DIAGNOSIS — I11.9 HYPERTENSIVE HEART DISEASE WITHOUT HEART FAILURE: ICD-10-CM

## 2021-10-07 DIAGNOSIS — Z23 NEED FOR PNEUMOCOCCAL VACCINE: ICD-10-CM

## 2021-10-07 DIAGNOSIS — E78.5 HYPERLIPIDEMIA ASSOCIATED WITH TYPE 2 DIABETES MELLITUS: ICD-10-CM

## 2021-10-07 DIAGNOSIS — F32.9 MAJOR DEPRESSION, CHRONIC: ICD-10-CM

## 2021-10-07 LAB
ANION GAP SERPL CALC-SCNC: 13 MMOL/L (ref 8–16)
BUN SERPL-MCNC: 13 MG/DL (ref 8–23)
CALCIUM SERPL-MCNC: 9.3 MG/DL (ref 8.7–10.5)
CHLORIDE SERPL-SCNC: 105 MMOL/L (ref 95–110)
CO2 SERPL-SCNC: 22 MMOL/L (ref 23–29)
CREAT SERPL-MCNC: 0.8 MG/DL (ref 0.5–1.4)
EST. GFR  (AFRICAN AMERICAN): >60 ML/MIN/1.73 M^2
EST. GFR  (NON AFRICAN AMERICAN): >60 ML/MIN/1.73 M^2
ESTIMATED AVG GLUCOSE: 154 MG/DL (ref 68–131)
GLUCOSE SERPL-MCNC: 150 MG/DL (ref 70–110)
HBA1C MFR BLD: 7 % (ref 4–5.6)
POTASSIUM SERPL-SCNC: 4.2 MMOL/L (ref 3.5–5.1)
SODIUM SERPL-SCNC: 140 MMOL/L (ref 136–145)

## 2021-10-07 PROCEDURE — 3078F DIAST BP <80 MM HG: CPT | Mod: HCNC,CPTII,S$GLB, | Performed by: INTERNAL MEDICINE

## 2021-10-07 PROCEDURE — 3072F LOW RISK FOR RETINOPATHY: CPT | Mod: HCNC,CPTII,S$GLB, | Performed by: INTERNAL MEDICINE

## 2021-10-07 PROCEDURE — 3061F PR NEG MICROALBUMINURIA RESULT DOCUMENTED/REVIEW: ICD-10-PCS | Mod: HCNC,CPTII,S$GLB, | Performed by: INTERNAL MEDICINE

## 2021-10-07 PROCEDURE — G0008 ADMIN INFLUENZA VIRUS VAC: HCPCS | Mod: HCNC,S$GLB,, | Performed by: INTERNAL MEDICINE

## 2021-10-07 PROCEDURE — 3008F BODY MASS INDEX DOCD: CPT | Mod: HCNC,CPTII,S$GLB, | Performed by: INTERNAL MEDICINE

## 2021-10-07 PROCEDURE — 90694 FLU VACCINE - QUADRIVALENT - ADJUVANTED: ICD-10-PCS | Mod: HCNC,S$GLB,, | Performed by: INTERNAL MEDICINE

## 2021-10-07 PROCEDURE — 3061F NEG MICROALBUMINURIA REV: CPT | Mod: HCNC,CPTII,S$GLB, | Performed by: INTERNAL MEDICINE

## 2021-10-07 PROCEDURE — 3044F HG A1C LEVEL LT 7.0%: CPT | Mod: HCNC,CPTII,S$GLB, | Performed by: INTERNAL MEDICINE

## 2021-10-07 PROCEDURE — 99499 UNLISTED E&M SERVICE: CPT | Mod: S$GLB,,, | Performed by: INTERNAL MEDICINE

## 2021-10-07 PROCEDURE — 3288F FALL RISK ASSESSMENT DOCD: CPT | Mod: HCNC,CPTII,S$GLB, | Performed by: INTERNAL MEDICINE

## 2021-10-07 PROCEDURE — G0008 FLU VACCINE - QUADRIVALENT - ADJUVANTED: ICD-10-PCS | Mod: HCNC,S$GLB,, | Performed by: INTERNAL MEDICINE

## 2021-10-07 PROCEDURE — 3066F PR DOCUMENTATION OF TREATMENT FOR NEPHROPATHY: ICD-10-PCS | Mod: HCNC,CPTII,S$GLB, | Performed by: INTERNAL MEDICINE

## 2021-10-07 PROCEDURE — 99499 RISK ADDL DX/OHS AUDIT: ICD-10-PCS | Mod: S$GLB,,, | Performed by: INTERNAL MEDICINE

## 2021-10-07 PROCEDURE — 3074F SYST BP LT 130 MM HG: CPT | Mod: HCNC,CPTII,S$GLB, | Performed by: INTERNAL MEDICINE

## 2021-10-07 PROCEDURE — 3078F PR MOST RECENT DIASTOLIC BLOOD PRESSURE < 80 MM HG: ICD-10-PCS | Mod: HCNC,CPTII,S$GLB, | Performed by: INTERNAL MEDICINE

## 2021-10-07 PROCEDURE — 99214 PR OFFICE/OUTPT VISIT, EST, LEVL IV, 30-39 MIN: ICD-10-PCS | Mod: HCNC,S$GLB,, | Performed by: INTERNAL MEDICINE

## 2021-10-07 PROCEDURE — 3044F PR MOST RECENT HEMOGLOBIN A1C LEVEL <7.0%: ICD-10-PCS | Mod: HCNC,CPTII,S$GLB, | Performed by: INTERNAL MEDICINE

## 2021-10-07 PROCEDURE — 1101F PR PT FALLS ASSESS DOC 0-1 FALLS W/OUT INJ PAST YR: ICD-10-PCS | Mod: HCNC,CPTII,S$GLB, | Performed by: INTERNAL MEDICINE

## 2021-10-07 PROCEDURE — 90694 VACC AIIV4 NO PRSRV 0.5ML IM: CPT | Mod: HCNC,S$GLB,, | Performed by: INTERNAL MEDICINE

## 2021-10-07 PROCEDURE — 3072F PR LOW RISK FOR RETINOPATHY: ICD-10-PCS | Mod: HCNC,CPTII,S$GLB, | Performed by: INTERNAL MEDICINE

## 2021-10-07 PROCEDURE — 3074F PR MOST RECENT SYSTOLIC BLOOD PRESSURE < 130 MM HG: ICD-10-PCS | Mod: HCNC,CPTII,S$GLB, | Performed by: INTERNAL MEDICINE

## 2021-10-07 PROCEDURE — 4010F ACE/ARB THERAPY RXD/TAKEN: CPT | Mod: HCNC,CPTII,S$GLB, | Performed by: INTERNAL MEDICINE

## 2021-10-07 PROCEDURE — 1159F PR MEDICATION LIST DOCUMENTED IN MEDICAL RECORD: ICD-10-PCS | Mod: HCNC,CPTII,S$GLB, | Performed by: INTERNAL MEDICINE

## 2021-10-07 PROCEDURE — 4010F PR ACE/ARB THEARPY RXD/TAKEN: ICD-10-PCS | Mod: HCNC,CPTII,S$GLB, | Performed by: INTERNAL MEDICINE

## 2021-10-07 PROCEDURE — 3288F PR FALLS RISK ASSESSMENT DOCUMENTED: ICD-10-PCS | Mod: HCNC,CPTII,S$GLB, | Performed by: INTERNAL MEDICINE

## 2021-10-07 PROCEDURE — 1159F MED LIST DOCD IN RCRD: CPT | Mod: HCNC,CPTII,S$GLB, | Performed by: INTERNAL MEDICINE

## 2021-10-07 PROCEDURE — 99999 PR PBB SHADOW E&M-EST. PATIENT-LVL IV: CPT | Mod: PBBFAC,HCNC,, | Performed by: INTERNAL MEDICINE

## 2021-10-07 PROCEDURE — 99214 OFFICE O/P EST MOD 30 MIN: CPT | Mod: HCNC,S$GLB,, | Performed by: INTERNAL MEDICINE

## 2021-10-07 PROCEDURE — 1160F RVW MEDS BY RX/DR IN RCRD: CPT | Mod: HCNC,CPTII,S$GLB, | Performed by: INTERNAL MEDICINE

## 2021-10-07 PROCEDURE — 80048 BASIC METABOLIC PNL TOTAL CA: CPT | Mod: HCNC | Performed by: INTERNAL MEDICINE

## 2021-10-07 PROCEDURE — 83036 HEMOGLOBIN GLYCOSYLATED A1C: CPT | Mod: HCNC | Performed by: INTERNAL MEDICINE

## 2021-10-07 PROCEDURE — 3066F NEPHROPATHY DOC TX: CPT | Mod: HCNC,CPTII,S$GLB, | Performed by: INTERNAL MEDICINE

## 2021-10-07 PROCEDURE — 99999 PR PBB SHADOW E&M-EST. PATIENT-LVL IV: ICD-10-PCS | Mod: PBBFAC,HCNC,, | Performed by: INTERNAL MEDICINE

## 2021-10-07 PROCEDURE — 1126F AMNT PAIN NOTED NONE PRSNT: CPT | Mod: HCNC,CPTII,S$GLB, | Performed by: INTERNAL MEDICINE

## 2021-10-07 PROCEDURE — 1160F PR REVIEW ALL MEDS BY PRESCRIBER/CLIN PHARMACIST DOCUMENTED: ICD-10-PCS | Mod: HCNC,CPTII,S$GLB, | Performed by: INTERNAL MEDICINE

## 2021-10-07 PROCEDURE — 36415 COLL VENOUS BLD VENIPUNCTURE: CPT | Mod: HCNC,PN | Performed by: INTERNAL MEDICINE

## 2021-10-07 PROCEDURE — 1126F PR PAIN SEVERITY QUANTIFIED, NO PAIN PRESENT: ICD-10-PCS | Mod: HCNC,CPTII,S$GLB, | Performed by: INTERNAL MEDICINE

## 2021-10-07 PROCEDURE — 1101F PT FALLS ASSESS-DOCD LE1/YR: CPT | Mod: HCNC,CPTII,S$GLB, | Performed by: INTERNAL MEDICINE

## 2021-10-07 PROCEDURE — 3008F PR BODY MASS INDEX (BMI) DOCUMENTED: ICD-10-PCS | Mod: HCNC,CPTII,S$GLB, | Performed by: INTERNAL MEDICINE

## 2021-10-07 RX ORDER — GABAPENTIN 300 MG/1
300 CAPSULE ORAL NIGHTLY
Qty: 90 CAPSULE | Refills: 1 | Status: SHIPPED | OUTPATIENT
Start: 2021-10-07 | End: 2022-03-04

## 2021-10-07 RX ORDER — VENLAFAXINE 100 MG/1
100 TABLET ORAL DAILY
Qty: 90 TABLET | Refills: 1 | Status: SHIPPED | OUTPATIENT
Start: 2021-10-07 | End: 2022-04-22

## 2021-10-07 RX ORDER — CARVEDILOL 25 MG/1
25 TABLET ORAL 2 TIMES DAILY
Qty: 180 TABLET | Refills: 1 | Status: SHIPPED | OUTPATIENT
Start: 2021-10-07 | End: 2022-04-22

## 2021-10-07 RX ORDER — LOSARTAN POTASSIUM 100 MG/1
100 TABLET ORAL DAILY
Qty: 90 TABLET | Refills: 1 | Status: SHIPPED | OUTPATIENT
Start: 2021-10-07 | End: 2022-03-04

## 2021-10-07 RX ORDER — METHYLPREDNISOLONE ACETATE 40 MG/ML
INJECTION, SUSPENSION INTRA-ARTICULAR; INTRALESIONAL; INTRAMUSCULAR; SOFT TISSUE
COMMUNITY
Start: 2021-05-04 | End: 2021-10-07

## 2021-10-07 RX ORDER — MUPIROCIN 20 MG/G
OINTMENT TOPICAL
COMMUNITY
Start: 2021-07-22 | End: 2021-10-07

## 2021-10-07 RX ORDER — SULFAMETHOXAZOLE AND TRIMETHOPRIM 800; 160 MG/1; MG/1
TABLET ORAL
COMMUNITY
Start: 2021-07-21 | End: 2021-10-07

## 2021-10-14 ENCOUNTER — PATIENT MESSAGE (OUTPATIENT)
Dept: PRIMARY CARE CLINIC | Facility: CLINIC | Age: 76
End: 2021-10-14

## 2021-10-14 DIAGNOSIS — E11.59 TYPE 2 DIABETES MELLITUS WITH OTHER CIRCULATORY COMPLICATION, WITHOUT LONG-TERM CURRENT USE OF INSULIN: ICD-10-CM

## 2021-10-14 RX ORDER — EMPAGLIFLOZIN 25 MG/1
25 TABLET, FILM COATED ORAL DAILY
Qty: 90 TABLET | Refills: 1 | Status: SHIPPED | OUTPATIENT
Start: 2021-10-14 | End: 2022-03-04

## 2021-11-26 DIAGNOSIS — M48.061 DEGENERATIVE LUMBAR SPINAL STENOSIS: ICD-10-CM

## 2021-11-26 RX ORDER — OXYCODONE AND ACETAMINOPHEN 5; 325 MG/1; MG/1
1 TABLET ORAL 2 TIMES DAILY PRN
Qty: 60 TABLET | Refills: 0 | Status: SHIPPED | OUTPATIENT
Start: 2021-11-26 | End: 2022-02-11 | Stop reason: SDUPTHER

## 2022-01-14 DIAGNOSIS — M48.061 DEGENERATIVE LUMBAR SPINAL STENOSIS: ICD-10-CM

## 2022-01-14 RX ORDER — OXYCODONE AND ACETAMINOPHEN 5; 325 MG/1; MG/1
1 TABLET ORAL 2 TIMES DAILY PRN
Qty: 60 TABLET | Refills: 0 | OUTPATIENT
Start: 2022-01-14

## 2022-01-14 NOTE — TELEPHONE ENCOUNTER
She has filled orders for Hydrocodone and Alprazolam twice in the past two months from another provider not in [facility] records. Oxycodone refill request is denied.

## 2022-01-14 NOTE — TELEPHONE ENCOUNTER
----- Message from Luh Hall sent at 1/14/2022 11:59 AM CST -----  Contact: Self/[phone]  Requesting an RX refill or new RX.  Is this a refill or new RX: New  RX name and strength :  oxyCODONE-acetaminophen (PERCOCET) 5-325 mg per tablet  Is this a 30 day or 90 day RX:   Patient advised that in the future they can use their [facility] account to request a refill?:  na  Patient advised that RX refills can take up to 72 hours?:  yes  [facility] [address] [place] [address] - [address] [address]  Phone: [phone] Fax: [phone]

## 2022-01-14 NOTE — TELEPHONE ENCOUNTER
Care Due:                  Date            Visit Type   Department     Provider  --------------------------------------------------------------------------------                                             [facility]   Marcy Fraser  Last Visit: 10-      None         [facility]  Next Visit: 04-      None         [facility]                                                            Last  Test          Frequency    Reason                     Performed    Due Date  --------------------------------------------------------------------------------    CMP.........  12 months..  atorvastatin.............  Not Found    Overdue    HBA1C.......  6 months...  SITagliptin,               10-   04-                             empagliflozin............    Lipid Panel.  12 months..  atorvastatin.............  Not Found    Overdue    Powered by [employer] by [employer]. Reference number: [id number].   1/14/2022 1:56:46 PM CST

## 2022-02-09 DIAGNOSIS — M48.061 DEGENERATIVE LUMBAR SPINAL STENOSIS: ICD-10-CM

## 2022-02-09 NOTE — TELEPHONE ENCOUNTER
Care Due:                  Date            Visit Type   Department     Provider  --------------------------------------------------------------------------------                                EP -                              PRIMARY      [facility]   Marcy Camaradonna  Last Visit: 10-      CARE ([facility])   CARE           [place]                              EP -                              PRIMARY      [facility]   Marcy Bria  Next Visit: 04-      CARE ([facility])   CARE           [place]                                                            Last  Test          Frequency    Reason                     Performed    Due Date  --------------------------------------------------------------------------------    CMP.........  12 months..  atorvastatin.............  04- 04-    Lipid Panel.  12 months..  atorvastatin.............  04- 04-    Powered by [employer] by [employer]. Reference number: [id number].   2/09/2022 1:07:09 PM CST

## 2022-02-09 NOTE — TELEPHONE ENCOUNTER
----- Message from April Driscoll sent at 2/9/2022 12:04 PM CST -----  Contact: [phone]  Requesting an RX refill or new RX.  Is this a refill or new RX: refill  RX name and strength (copy/paste from chart):  oxyCODONE-acetaminophen (PERCOCET) 5-325 mg per tablet  Is this a 30 day or 90 day RX: 30  Pharmacy name and phone # (copy/paste from chart):    [facility] [address] [facility], [place] - [address] [address]  Phone: [phone] Fax: [phone]     The doctors have asked that we provide their patients with the following 2 reminders -- prescription refills can take up to 72 hours, and a friendly reminder that in the future you can use your [facility] account to request refills: yes

## 2022-02-11 RX ORDER — OXYCODONE AND ACETAMINOPHEN 5; 325 MG/1; MG/1
1 TABLET ORAL 2 TIMES DAILY PRN
Qty: 60 TABLET | Refills: 0 | OUTPATIENT
Start: 2022-02-11

## 2022-02-27 DIAGNOSIS — I11.9 HYPERTENSIVE HEART DISEASE WITHOUT HEART FAILURE: ICD-10-CM

## 2022-02-27 DIAGNOSIS — M48.061 DEGENERATIVE LUMBAR SPINAL STENOSIS: ICD-10-CM

## 2022-02-27 DIAGNOSIS — E11.59 TYPE 2 DIABETES MELLITUS WITH OTHER CIRCULATORY COMPLICATION, WITHOUT LONG-TERM CURRENT USE OF INSULIN: ICD-10-CM

## 2022-02-27 NOTE — TELEPHONE ENCOUNTER
No new care gaps identified.  Powered by [employer]. Reference number: [id number].   2/27/2022 2:20:44 AM CST

## 2022-03-04 RX ORDER — LOSARTAN POTASSIUM 100 MG/1
100 TABLET ORAL DAILY
Qty: 90 TABLET | Refills: 2 | Status: SHIPPED | OUTPATIENT
Start: 2022-03-04 | End: 2022-06-30 | Stop reason: DRUGHIGH

## 2022-03-04 RX ORDER — GABAPENTIN 300 MG/1
300 CAPSULE ORAL NIGHTLY
Qty: 90 CAPSULE | Refills: 1 | Status: SHIPPED | OUTPATIENT
Start: 2022-03-04 | End: 2022-07-20 | Stop reason: SDUPTHER

## 2022-03-04 RX ORDER — EMPAGLIFLOZIN 25 MG/1
25 TABLET, FILM COATED ORAL DAILY
Qty: 90 TABLET | Refills: 0 | Status: SHIPPED | OUTPATIENT
Start: 2022-03-04 | End: 2022-05-11

## 2022-03-04 NOTE — TELEPHONE ENCOUNTER
Refill Routing Note   Medication(s) are not appropriate for processing by [facility] for the following reason(s):      - Outside of protocol    [facility] action(s):  Defer  Approve       Medication Therapy Plan: DEFER gabapentin APPROVE Jardiance, Januvia, Losartan  --->Care Gap information included in message below if applicable.   Medication reconciliation completed: No   Automatic Epic Generated Protocol Data:    Orders Placed This Encounter    SITagliptin (JANUVIA) 100 MG Tab    losartan (COZAAR) 100 MG tablet    empagliflozin (JARDIANCE) 25 mg tablet      Requested Prescriptions   Pending Prescriptions Disp Refills    gabapentin (NEURONTIN) 300 MG capsule [Pharmacy Med Name: GABAPENTIN 300 MG Capsule] 90 capsule 1     Sig: TAKE 1 CAPSULE (300 MG TOTAL) BY MOUTH EVERY EVENING.       Neurology: Anticonvulsants - gabapentin Failed - 2/27/2022  2:20 AM        Failed - This refill cannot be delegated        Passed - Valid encounter within last 12 months     Recent Visits  Date Type Provider Dept   10/07/21 Office Visit Marcy Staley MD [facility]   04/06/21 Office Visit Marcy Staley MD [facility]   07/17/20 Office Visit Marcy Staley MD [facility]   Showing recent visits within past 720 days and meeting all other requirements  Future Appointments  No visits were found meeting these conditions.  Showing future appointments within next 150 days and meeting all other requirements      Future Appointments              In 1 month Marcy Staley MD [facility] - Primary Care, [place]                Passed - Cr within 360 days     Lab Results   Component Value Date    CREATININE 0.8 10/07/2021    CREATININE 0.6 04/12/2021    CREATININE 0.7 07/15/2020              Passed - eGFR within 360 days     Lab Results   Component Value Date    EGFRNONAA >60.0 10/07/2021    EGFRNONAA >60.0 04/12/2021    EGFRNONAA >60.0 07/15/2020                 Signed Prescriptions  Disp Refills    SITagliptin (JANUVIA) 100 MG Tab 90 tablet 0     Sig: Take 1 tablet (100 mg total) by mouth once daily.       Endocrinology:  Diabetes - DPP-4 Inhibitors Passed - 2/27/2022  2:20 AM        Passed - Patient is at least 18 years old        Passed - Valid encounter within last 15 months     Recent Visits  Date Type Provider Dept   10/07/21 Office Visit Marcy Staley MD [facility]   04/06/21 Office Visit Marcy Staley MD [facility]   07/17/20 Office Visit Marcy Staley MD [facility]   Showing recent visits within past 720 days and meeting all other requirements  Future Appointments  No visits were found meeting these conditions.  Showing future appointments within next 150 days and meeting all other requirements      Future Appointments              In 1 month Marcy Staley MD [facility] - Primary Care, [place]                Passed - HBA1C within 180 days     Lab Results   Component Value Date    HGBA1C 7.0 (H) 10/07/2021    HGBA1C 6.5 (H) 04/12/2021    HGBA1C 6.9 (H) 07/15/2020              Passed - Cr is 1.39 or below and within 360 days     Lab Results   Component Value Date    CREATININE 0.8 10/07/2021    CREATININE 0.6 04/12/2021    CREATININE 0.7 07/15/2020              Passed - eGFR is 45 or above and within 360 days     Lab Results   Component Value Date    EGFRNONAA >60.0 10/07/2021    EGFRNONAA >60.0 04/12/2021    EGFRNONAA >60.0 07/15/2020                  losartan (COZAAR) 100 MG tablet 90 tablet 2     Sig: Take 1 tablet (100 mg total) by mouth once daily.       Cardiovascular:  Angiotensin Receptor Blockers Passed - 2/27/2022  2:20 AM        Passed - Patient is at least 18 years old        Passed - Last BP in normal range within 360 days     BP Readings from Last 1 Encounters:   10/07/21 127/78               Passed - Valid encounter within last 15 months     Recent Visits  Date Type Provider Dept   10/07/21 Office Visit Marcy DAMON  MD [place] [facility]   04/06/21 Office Visit Marcy Staley MD [facility]   07/17/20 Office Visit Marcy Staley MD [facility]   Showing recent visits within past 720 days and meeting all other requirements  Future Appointments  No visits were found meeting these conditions.  Showing future appointments within next 150 days and meeting all other requirements      Future Appointments              In 1 month Marcy Staley MD [facility] - [facility], [place]                Passed - Cr is 1.39 or below and within 360 days     Lab Results   Component Value Date    CREATININE 0.8 10/07/2021    CREATININE 0.6 04/12/2021    CREATININE 0.7 07/15/2020              Passed - K is 5.2 or below and within 360 days     Potassium   Date Value Ref Range Status   10/07/2021 4.2 3.5 - 5.1 mmol/L Final   04/12/2021 4.4 3.5 - 5.1 mmol/L Final   07/15/2020 4.1 3.5 - 5.1 mmol/L Final              Passed - eGFR within 360 days     Lab Results   Component Value Date    EGFRNONAA >60.0 10/07/2021    EGFRNONAA >60.0 04/12/2021    EGFRNONAA >60.0 07/15/2020                  empagliflozin (JARDIANCE) 25 mg tablet 90 tablet 0     Sig: Take 1 tablet (25 mg total) by mouth once daily.       Endocrinology:  Diabetes - SGLT2 Inhibitors Passed - 2/27/2022  2:20 AM        Passed - Patient is at least 18 years old        Passed - BP > 90/50 mmH     BP Readings from Last 1 Encounters:   10/07/21 127/78               Passed - Valid encounter within last 15 months     Recent Visits  Date Type Provider Dept   10/07/21 Office Visit Marcy Staley MD [facility]   04/06/21 Office Visit Marcy Staley MD [facility]   07/17/20 Office Visit Marcy Staley MD [facility]   Showing recent visits within past 720 days and meeting all other requirements  Future Appointments  No visits were found meeting these conditions.  Showing future appointments within next 150 days  and meeting all other requirements      Future Appointments              In 1 month Marcy Staley MD [facility] - Primary Care, [place]                Passed - Cr is 1.39 or below and within 360 days     Lab Results   Component Value Date    CREATININE 0.8 10/07/2021    CREATININE 0.6 04/12/2021    CREATININE 0.7 07/15/2020              Passed - HBA1C within 180 days     Lab Results   Component Value Date    HGBA1C 7.0 (H) 10/07/2021    HGBA1C 6.5 (H) 04/12/2021    HGBA1C 6.9 (H) 07/15/2020              Passed - eGFR is 45 or above and within 360 days     Lab Results   Component Value Date    EGFRNONAA >60.0 10/07/2021    EGFRNONAA >60.0 04/12/2021    EGFRNONAA >60.0 07/15/2020                      Appointments  past 12m or future 3m with PCP    Date Provider   Last Visit   10/7/2021 Marcy Staley MD   Next Visit   4/19/2022 Marcy Staley MD   ED visits in past 90 days: 0        Note composed:8:26 AM 03/04/2022

## 2022-04-07 DIAGNOSIS — M48.061 DEGENERATIVE LUMBAR SPINAL STENOSIS: ICD-10-CM

## 2022-04-07 NOTE — TELEPHONE ENCOUNTER
----- Message from April Driscoll sent at 4/7/2022  3:15 PM CDT -----  Contact: [phone]  Patient is returning a phone call.  Who left a message for the patient: Megankjed  Does patient know what this is regarding:  yes  Would you like a call back, or a response through your [facility] portal?:   call back   Comments:

## 2022-04-07 NOTE — TELEPHONE ENCOUNTER
Unable to retrieve patient chart and identify care gaps.  Powered by [employer]. Reference number: [id number].   4/07/2022 3:34:48 PM CDT

## 2022-04-08 RX ORDER — OXYCODONE AND ACETAMINOPHEN 5; 325 MG/1; MG/1
1 TABLET ORAL 2 TIMES DAILY PRN
Qty: 60 TABLET | Refills: 0 | Status: SHIPPED | OUTPATIENT
Start: 2022-04-08 | End: 2022-06-13 | Stop reason: SDUPTHER

## 2022-04-19 ENCOUNTER — TELEPHONE (OUTPATIENT)
Dept: PRIMARY CARE CLINIC | Facility: CLINIC | Age: 77
End: 2022-04-19
Payer: MEDICARE

## 2022-04-19 NOTE — TELEPHONE ENCOUNTER
Pt called stating she will not make it to her 230 appt today due to her having Diarrhea pt state she will reschedule her appt for another day

## 2022-04-19 NOTE — TELEPHONE ENCOUNTER
----- Message from Patricia Driscoll sent at 4/19/2022 10:17 AM CDT -----  Contact: [phone]  Patient is requesting a call back from nurse regarding her 2:30 appt today. Please advise

## 2022-04-20 DIAGNOSIS — E11.9 TYPE 2 DIABETES MELLITUS WITHOUT COMPLICATION: ICD-10-CM

## 2022-04-21 DIAGNOSIS — I11.9 HYPERTENSIVE HEART DISEASE WITHOUT HEART FAILURE: ICD-10-CM

## 2022-04-21 DIAGNOSIS — E78.5 HYPERLIPIDEMIA ASSOCIATED WITH TYPE 2 DIABETES MELLITUS: ICD-10-CM

## 2022-04-21 DIAGNOSIS — F32.9 MAJOR DEPRESSION, CHRONIC: ICD-10-CM

## 2022-04-21 DIAGNOSIS — E11.69 HYPERLIPIDEMIA ASSOCIATED WITH TYPE 2 DIABETES MELLITUS: ICD-10-CM

## 2022-04-22 RX ORDER — CARVEDILOL 25 MG/1
TABLET ORAL
Qty: 180 TABLET | Refills: 1 | Status: SHIPPED | OUTPATIENT
Start: 2022-04-22 | End: 2022-07-20 | Stop reason: SDUPTHER

## 2022-04-22 RX ORDER — VENLAFAXINE 100 MG/1
TABLET ORAL
Qty: 90 TABLET | Refills: 1 | Status: SHIPPED | OUTPATIENT
Start: 2022-04-22 | End: 2022-07-20 | Stop reason: SDUPTHER

## 2022-04-22 RX ORDER — ATORVASTATIN CALCIUM 80 MG/1
TABLET, FILM COATED ORAL
Qty: 90 TABLET | Refills: 0 | Status: SHIPPED | OUTPATIENT
Start: 2022-04-22 | End: 2022-07-20 | Stop reason: SDUPTHER

## 2022-04-22 NOTE — TELEPHONE ENCOUNTER
Refill Routing Note   Medication(s) are not appropriate for processing by [facility] for the following reason(s):      - Required laboratory values are outdated    [facility] action(s):  Defer  Approve Medication-related problems identified: Requires labs     Medication Therapy Plan: Defer lipitor. Approve effexor and coreg.  Medication reconciliation completed: No     Appointments  past 12m or future 3m with PCP    Date Provider   Last Visit   10/7/2021 Marcy Staley MD   Next Visit   Visit date not found Marcy Staley MD   ED visits in past 90 days: 0        Note composed:12:12 AM 04/22/2022

## 2022-04-22 NOTE — TELEPHONE ENCOUNTER
Care Due:                  Date            Visit Type   Department     Provider  --------------------------------------------------------------------------------                                EP -                              PRIMARY      [facility]   Marcy Fraser  Last Visit: 10-      CARE ([facility])   CARE           [place]  Next Visit: None Scheduled  None         None Found                                                            Last  Test          Frequency    Reason                     Performed    Due Date  --------------------------------------------------------------------------------    CMP.........  12 months..  atorvastatin.............  04- 04-    HBA1C.......  6 months...  SITagliptin,               10-   04-                             empagliflozin............    Lipid Panel.  12 months..  atorvastatin.............  04- 04-    Powered by [employer] by [employer]. Reference number: [id number].   4/21/2022 10:27:30 PM CDT

## 2022-05-10 PROBLEM — E11.9 TYPE 2 DIABETES MELLITUS WITHOUT COMPLICATION, WITHOUT LONG-TERM CURRENT USE OF INSULIN: Status: ACTIVE | Noted: 2022-05-10

## 2022-05-11 ENCOUNTER — PATIENT MESSAGE (OUTPATIENT)
Dept: PRIMARY CARE CLINIC | Facility: CLINIC | Age: 77
End: 2022-05-11
Payer: MEDICARE

## 2022-05-11 ENCOUNTER — LAB VISIT (OUTPATIENT)
Dept: LAB | Facility: HOSPITAL | Age: 77
End: 2022-05-11
Attending: INTERNAL MEDICINE
Payer: MEDICARE

## 2022-05-11 DIAGNOSIS — Z12.31 ENCOUNTER FOR SCREENING MAMMOGRAM FOR BREAST CANCER: Primary | ICD-10-CM

## 2022-05-11 DIAGNOSIS — E11.9 TYPE 2 DIABETES MELLITUS WITHOUT COMPLICATION, WITHOUT LONG-TERM CURRENT USE OF INSULIN: ICD-10-CM

## 2022-05-11 DIAGNOSIS — E11.59 TYPE 2 DIABETES MELLITUS WITH OTHER CIRCULATORY COMPLICATION, WITHOUT LONG-TERM CURRENT USE OF INSULIN: ICD-10-CM

## 2022-05-11 LAB
ESTIMATED AVG GLUCOSE: 148 MG/DL (ref 68–131)
HBA1C MFR BLD: 6.8 % (ref 4–5.6)

## 2022-05-11 PROCEDURE — 83036 HEMOGLOBIN GLYCOSYLATED A1C: CPT | Performed by: INTERNAL MEDICINE

## 2022-05-11 PROCEDURE — 36415 COLL VENOUS BLD VENIPUNCTURE: CPT | Performed by: INTERNAL MEDICINE

## 2022-05-11 RX ORDER — EMPAGLIFLOZIN 25 MG/1
TABLET, FILM COATED ORAL
Qty: 90 TABLET | Refills: 0 | Status: SHIPPED | OUTPATIENT
Start: 2022-05-11 | End: 2022-07-20 | Stop reason: SDUPTHER

## 2022-05-11 NOTE — TELEPHONE ENCOUNTER
Refill Routing Note   Medication(s) are not appropriate for processing by [facility] for the following reason(s):      - Required laboratory values are abnormal    [facility] action(s):  Defer Medication-related problems identified: Requires labs        Medication reconciliation completed: No     Appointments  past 12m or future 3m with PCP    Date Provider   Last Visit   10/7/2021 Marcy Staley MD   Next Visit   7/19/2022 Marcy Staley MD   ED visits in past 90 days: 0        Note composed:7:40 AM 05/11/2022

## 2022-05-11 NOTE — TELEPHONE ENCOUNTER
This change just happened yesterday. If she plans to take the Trulicity injections, she has to stop Januvia.

## 2022-05-11 NOTE — TELEPHONE ENCOUNTER
No new care gaps identified.  [facility] Embedded Care Gaps. Reference number: [id number]. 5/11/2022   12:42:50 AM CDT

## 2022-05-11 NOTE — TELEPHONE ENCOUNTER
----- Message from Mariam Marjan sent at 5/11/2022 12:12 PM CDT -----  Contact: Pt [phone]  Medical advice    The digital med program put her on dulaglutide (TRULICITY) 0.75 mg/0.5 mL pen injector and to stop taking the SITagliptin (JANUVIA) 100 MG Tab. She also said she will still be taking the empagliflozin (JARDIANCE) 25 mg tablet. Although it's on the discontinued list, she said she never stopped taking the [place]    Please call and advise.    Thank you

## 2022-05-15 ENCOUNTER — PATIENT MESSAGE (OUTPATIENT)
Dept: PRIMARY CARE CLINIC | Facility: CLINIC | Age: 77
End: 2022-05-15
Payer: MEDICARE

## 2022-05-15 DIAGNOSIS — K21.9 GASTROESOPHAGEAL REFLUX DISEASE WITHOUT ESOPHAGITIS: ICD-10-CM

## 2022-05-16 RX ORDER — OMEPRAZOLE 40 MG/1
40 CAPSULE, DELAYED RELEASE ORAL EVERY MORNING
Qty: 90 CAPSULE | Refills: 1 | Status: SHIPPED | OUTPATIENT
Start: 2022-05-16 | End: 2022-07-20 | Stop reason: DRUGHIGH

## 2022-05-16 NOTE — TELEPHONE ENCOUNTER
No new care gaps identified.  [facility] Embedded Care Gaps. Reference number: [id number]. 5/16/2022   9:11:14 AM MONIT

## 2022-06-13 DIAGNOSIS — M48.061 DEGENERATIVE LUMBAR SPINAL STENOSIS: ICD-10-CM

## 2022-06-13 NOTE — TELEPHONE ENCOUNTER
----- Message from Kaelyn Curry sent at 6/13/2022  2:00 PM CDT -----  Contact: PT - [phone]  Caller:  PT - [phone]    Reason:  regarding missed call / regarding refill requests

## 2022-06-13 NOTE — TELEPHONE ENCOUNTER
----- Message from Va Brizuela sent at 6/13/2022 12:31 PM CDT -----  Contact: Pt [phone]  Requesting an RX refill or new RX.  Is this a refill or new RX: Refill   RX name and strength (copy/paste from chart):  traZODone (DESYREL) 50 MG tablet  Is this a 30 day or 90 day RX: 90  Pharmacy name and phone # (copy/paste from chart):    [facility] [address] [place] [address]  Phone: [phone] Fax: [phone]    Requesting an RX refill or new RX.  Is this a refill or new RX: Refill   RX name and strength (copy/paste from chart):  oxyCODONE-acetaminophen (PERCOCET) 5-325 mg per tablet  Is this a 30 day or 90 day RX: 30  Pharmacy name and phone # (copy/paste from chart):    [facility] [address] [place] [address]  Phone: [phone] Fax: [phone]

## 2022-06-13 NOTE — TELEPHONE ENCOUNTER
No new care gaps identified.  [facility] Embedded Care Gaps. Reference number: [id number]. 6/13/2022   1:43:54 PM CDT

## 2022-06-14 RX ORDER — OXYCODONE AND ACETAMINOPHEN 5; 325 MG/1; MG/1
1 TABLET ORAL 2 TIMES DAILY PRN
Qty: 60 TABLET | Refills: 0 | Status: SHIPPED | OUTPATIENT
Start: 2022-06-14 | End: 2022-09-08 | Stop reason: SDUPTHER

## 2022-06-30 ENCOUNTER — TELEPHONE (OUTPATIENT)
Dept: OPHTHALMOLOGY | Facility: CLINIC | Age: 77
End: 2022-06-30
Payer: MEDICARE

## 2022-06-30 NOTE — TELEPHONE ENCOUNTER
----- Message from Kiki Cohen sent at 6/30/2022 11:13 AM CDT -----  Type:  Sooner Appointment Request    Caller is requesting a sooner appointment.  Caller declined first available appointment listed below.  Caller will not accept being placed on the waitlist and is requesting a message be sent to doctor.    Name of Caller:  pt  When is the first available appointment?  9/2    Best Call Back Number:  [phone] (home)     Additional Information:  pt said she need to be seen before then--please advise-thank you

## 2022-07-06 ENCOUNTER — TELEPHONE (OUTPATIENT)
Dept: PRIMARY CARE CLINIC | Facility: CLINIC | Age: 77
End: 2022-07-06
Payer: MEDICARE

## 2022-07-06 DIAGNOSIS — E11.21 TYPE 2 DIABETES MELLITUS WITH DIABETIC NEPHROPATHY, WITHOUT LONG-TERM CURRENT USE OF INSULIN: ICD-10-CM

## 2022-07-06 RX ORDER — BLOOD-GLUCOSE CONTROL, NORMAL
EACH MISCELLANEOUS
Qty: 100 EACH | Refills: 3 | Status: SHIPPED | OUTPATIENT
Start: 2022-07-06 | End: 2023-07-11

## 2022-07-06 NOTE — TELEPHONE ENCOUNTER
----- Message from Kaden William sent at 7/6/2022 10:25 AM CDT -----  Contact: self [phone]  Requesting an RX refill or new RX.  Is this a refill or new RX: refill  RX name and strength (copy/paste from chart):  TRUE METRIX GLUCOSE TEST STRIP Strp  Is this a 30 day or 90 day RX:   Pharmacy name and phone # (copy/paste from chart):    [facility] (Now [facility]) - [facility] [address] [facility]  [address] [id number]  Phone: [phone] Fax: [phone]      The doctors have asked that we provide their patients with the following 2 reminders -- prescription refills can take up to 72 hours, and a friendly reminder that in the future you can use your [facility] account to request refills:yes    Requesting an RX refill or new RX.  Is this a refill or new RX: refill  RX name and strength (copy/paste from chart):  TRUEPLUS LANCETS 30 gauge Misc  Is this a 30 day or 90 day RX:   Pharmacy name and phone # (copy/paste from chart):    [facility] ([facility]) - [place] [id number] [facility]  [address] [address]  Phone: [phone] Fax: [phone]        The doctors have asked that we provide their patients with the following 2 reminders -- prescription refills can take up to 72 hours, and a friendly reminder that in the future you can use your [facility] account to request refills: yes    Please call and advise

## 2022-07-08 NOTE — TELEPHONE ENCOUNTER
Care Due:                  Date            Visit Type   Department     Provider  --------------------------------------------------------------------------------                                EP -                              PRIMARY      [facility] PRIMARY   Marcy Fraser  Last Visit: 10-      CARE ([facility])   CARE           [place]                              EP -                              PRIMARY      [facility] PRIMARY   Marcyzahida Fraser  Next Visit: 07-      CARE ([facility])   CARE           [place]                                                            Last  Test          Frequency    Reason                     Performed    Due Date  --------------------------------------------------------------------------------    CMP.........  12 months..  JARDIANCE, atorvastatin,   04- 04-                             telmisartan, venlafaxine.    Lipid Panel.  12 months..  atorvastatin.............  04- 04-    [facility] Embedded Care Gaps. Reference number: [id number]. 7/08/2022   3:35:38 PM CDT  
LOV 10/7/2021  
no

## 2022-07-09 RX ORDER — INSULIN PUMP SYRINGE, 3 ML
EACH MISCELLANEOUS
Qty: 3 EACH | Refills: 3 | Status: SHIPPED | OUTPATIENT
Start: 2022-07-09

## 2022-07-09 RX ORDER — DEXTROSE 4 G
TABLET,CHEWABLE ORAL
Qty: 1 EACH | Refills: 0 | Status: SHIPPED | OUTPATIENT
Start: 2022-07-09 | End: 2022-07-20

## 2022-07-15 ENCOUNTER — PATIENT MESSAGE (OUTPATIENT)
Dept: OTHER | Facility: OTHER | Age: 77
End: 2022-07-15
Payer: MEDICARE

## 2022-07-19 ENCOUNTER — OFFICE VISIT (OUTPATIENT)
Dept: PRIMARY CARE CLINIC | Facility: CLINIC | Age: 77
End: 2022-07-19
Payer: MEDICARE

## 2022-07-19 ENCOUNTER — PATIENT MESSAGE (OUTPATIENT)
Dept: PHARMACY | Facility: CLINIC | Age: 77
End: 2022-07-19
Payer: MEDICARE

## 2022-07-19 VITALS
TEMPERATURE: 99 F | SYSTOLIC BLOOD PRESSURE: 130 MMHG | BODY MASS INDEX: 30 KG/M2 | DIASTOLIC BLOOD PRESSURE: 80 MMHG | WEIGHT: 191.13 LBS | HEIGHT: 67 IN | OXYGEN SATURATION: 98 % | HEART RATE: 74 BPM

## 2022-07-19 DIAGNOSIS — I25.10 CORONARY ARTERY DISEASE INVOLVING NATIVE CORONARY ARTERY OF NATIVE HEART WITHOUT ANGINA PECTORIS: ICD-10-CM

## 2022-07-19 DIAGNOSIS — E78.5 HYPERLIPIDEMIA ASSOCIATED WITH TYPE 2 DIABETES MELLITUS: ICD-10-CM

## 2022-07-19 DIAGNOSIS — E11.69 HYPERLIPIDEMIA ASSOCIATED WITH TYPE 2 DIABETES MELLITUS: ICD-10-CM

## 2022-07-19 DIAGNOSIS — I70.0 ABDOMINAL AORTIC ATHEROSCLEROSIS: ICD-10-CM

## 2022-07-19 DIAGNOSIS — I11.9 HYPERTENSIVE HEART DISEASE WITHOUT HEART FAILURE: ICD-10-CM

## 2022-07-19 DIAGNOSIS — E11.21 TYPE 2 DIABETES MELLITUS WITH DIABETIC NEPHROPATHY, WITHOUT LONG-TERM CURRENT USE OF INSULIN: Primary | ICD-10-CM

## 2022-07-19 DIAGNOSIS — F33.0 MILD EPISODE OF RECURRENT MAJOR DEPRESSIVE DISORDER: ICD-10-CM

## 2022-07-19 DIAGNOSIS — Z12.31 ENCOUNTER FOR SCREENING MAMMOGRAM FOR BREAST CANCER: ICD-10-CM

## 2022-07-19 DIAGNOSIS — M81.0 OSTEOPOROSIS WITHOUT CURRENT PATHOLOGICAL FRACTURE, UNSPECIFIED OSTEOPOROSIS TYPE: ICD-10-CM

## 2022-07-19 DIAGNOSIS — Z23 NEED FOR SHINGLES VACCINE: ICD-10-CM

## 2022-07-19 DIAGNOSIS — K21.9 GASTROESOPHAGEAL REFLUX DISEASE WITHOUT ESOPHAGITIS: ICD-10-CM

## 2022-07-19 DIAGNOSIS — M48.061 DEGENERATIVE LUMBAR SPINAL STENOSIS: ICD-10-CM

## 2022-07-19 PROCEDURE — 99999 PR PBB SHADOW E&M-EST. PATIENT-LVL V: CPT | Mod: PBBFAC,,, | Performed by: INTERNAL MEDICINE

## 2022-07-19 PROCEDURE — 4010F PR ACE/ARB THEARPY RXD/TAKEN: ICD-10-PCS | Mod: CPTII,S$GLB,, | Performed by: INTERNAL MEDICINE

## 2022-07-19 PROCEDURE — 3008F BODY MASS INDEX DOCD: CPT | Mod: CPTII,S$GLB,, | Performed by: INTERNAL MEDICINE

## 2022-07-19 PROCEDURE — 1159F PR MEDICATION LIST DOCUMENTED IN MEDICAL RECORD: ICD-10-PCS | Mod: CPTII,S$GLB,, | Performed by: INTERNAL MEDICINE

## 2022-07-19 PROCEDURE — 1159F MED LIST DOCD IN RCRD: CPT | Mod: CPTII,S$GLB,, | Performed by: INTERNAL MEDICINE

## 2022-07-19 PROCEDURE — 3061F PR NEG MICROALBUMINURIA RESULT DOCUMENTED/REVIEW: ICD-10-PCS | Mod: CPTII,S$GLB,, | Performed by: INTERNAL MEDICINE

## 2022-07-19 PROCEDURE — 3008F PR BODY MASS INDEX (BMI) DOCUMENTED: ICD-10-PCS | Mod: CPTII,S$GLB,, | Performed by: INTERNAL MEDICINE

## 2022-07-19 PROCEDURE — 1101F PT FALLS ASSESS-DOCD LE1/YR: CPT | Mod: CPTII,S$GLB,, | Performed by: INTERNAL MEDICINE

## 2022-07-19 PROCEDURE — 3288F PR FALLS RISK ASSESSMENT DOCUMENTED: ICD-10-PCS | Mod: CPTII,S$GLB,, | Performed by: INTERNAL MEDICINE

## 2022-07-19 PROCEDURE — 1160F PR REVIEW ALL MEDS BY PRESCRIBER/CLIN PHARMACIST DOCUMENTED: ICD-10-PCS | Mod: CPTII,S$GLB,, | Performed by: INTERNAL MEDICINE

## 2022-07-19 PROCEDURE — 3061F NEG MICROALBUMINURIA REV: CPT | Mod: CPTII,S$GLB,, | Performed by: INTERNAL MEDICINE

## 2022-07-19 PROCEDURE — 4010F ACE/ARB THERAPY RXD/TAKEN: CPT | Mod: CPTII,S$GLB,, | Performed by: INTERNAL MEDICINE

## 2022-07-19 PROCEDURE — 3044F PR MOST RECENT HEMOGLOBIN A1C LEVEL <7.0%: ICD-10-PCS | Mod: CPTII,S$GLB,, | Performed by: INTERNAL MEDICINE

## 2022-07-19 PROCEDURE — 99999 PR PBB SHADOW E&M-EST. PATIENT-LVL V: ICD-10-PCS | Mod: PBBFAC,,, | Performed by: INTERNAL MEDICINE

## 2022-07-19 PROCEDURE — 3079F DIAST BP 80-89 MM HG: CPT | Mod: CPTII,S$GLB,, | Performed by: INTERNAL MEDICINE

## 2022-07-19 PROCEDURE — 3066F PR DOCUMENTATION OF TREATMENT FOR NEPHROPATHY: ICD-10-PCS | Mod: CPTII,S$GLB,, | Performed by: INTERNAL MEDICINE

## 2022-07-19 PROCEDURE — 1101F PR PT FALLS ASSESS DOC 0-1 FALLS W/OUT INJ PAST YR: ICD-10-PCS | Mod: CPTII,S$GLB,, | Performed by: INTERNAL MEDICINE

## 2022-07-19 PROCEDURE — 3079F PR MOST RECENT DIASTOLIC BLOOD PRESSURE 80-89 MM HG: ICD-10-PCS | Mod: CPTII,S$GLB,, | Performed by: INTERNAL MEDICINE

## 2022-07-19 PROCEDURE — 99215 OFFICE O/P EST HI 40 MIN: CPT | Mod: S$GLB,,, | Performed by: INTERNAL MEDICINE

## 2022-07-19 PROCEDURE — 1160F RVW MEDS BY RX/DR IN RCRD: CPT | Mod: CPTII,S$GLB,, | Performed by: INTERNAL MEDICINE

## 2022-07-19 PROCEDURE — 1126F PR PAIN SEVERITY QUANTIFIED, NO PAIN PRESENT: ICD-10-PCS | Mod: CPTII,S$GLB,, | Performed by: INTERNAL MEDICINE

## 2022-07-19 PROCEDURE — 3066F NEPHROPATHY DOC TX: CPT | Mod: CPTII,S$GLB,, | Performed by: INTERNAL MEDICINE

## 2022-07-19 PROCEDURE — 3288F FALL RISK ASSESSMENT DOCD: CPT | Mod: CPTII,S$GLB,, | Performed by: INTERNAL MEDICINE

## 2022-07-19 PROCEDURE — 3044F HG A1C LEVEL LT 7.0%: CPT | Mod: CPTII,S$GLB,, | Performed by: INTERNAL MEDICINE

## 2022-07-19 PROCEDURE — 99215 PR OFFICE/OUTPT VISIT, EST, LEVL V, 40-54 MIN: ICD-10-PCS | Mod: S$GLB,,, | Performed by: INTERNAL MEDICINE

## 2022-07-19 PROCEDURE — 1126F AMNT PAIN NOTED NONE PRSNT: CPT | Mod: CPTII,S$GLB,, | Performed by: INTERNAL MEDICINE

## 2022-07-19 PROCEDURE — 3075F PR MOST RECENT SYSTOLIC BLOOD PRESS GE 130-139MM HG: ICD-10-PCS | Mod: CPTII,S$GLB,, | Performed by: INTERNAL MEDICINE

## 2022-07-19 PROCEDURE — 3075F SYST BP GE 130 - 139MM HG: CPT | Mod: CPTII,S$GLB,, | Performed by: INTERNAL MEDICINE

## 2022-07-20 RX ORDER — CARVEDILOL 25 MG/1
25 TABLET ORAL 2 TIMES DAILY
Qty: 180 TABLET | Refills: 2 | Status: SHIPPED | OUTPATIENT
Start: 2022-07-20 | End: 2023-03-22 | Stop reason: SDUPTHER

## 2022-07-20 RX ORDER — VENLAFAXINE 100 MG/1
100 TABLET ORAL DAILY
Qty: 90 TABLET | Refills: 2 | Status: SHIPPED | OUTPATIENT
Start: 2022-07-20 | End: 2023-03-22 | Stop reason: SDUPTHER

## 2022-07-20 RX ORDER — EMPAGLIFLOZIN 25 MG/1
25 TABLET, FILM COATED ORAL DAILY
Qty: 90 TABLET | Refills: 2 | Status: SHIPPED | OUTPATIENT
Start: 2022-07-20 | End: 2023-03-22 | Stop reason: SDUPTHER

## 2022-07-20 RX ORDER — OMEPRAZOLE 20 MG/1
20 CAPSULE, DELAYED RELEASE ORAL EVERY MORNING
Qty: 90 CAPSULE | Refills: 2 | Status: SHIPPED | OUTPATIENT
Start: 2022-07-20 | End: 2023-03-14

## 2022-07-20 RX ORDER — ATORVASTATIN CALCIUM 80 MG/1
80 TABLET, FILM COATED ORAL DAILY
Qty: 90 TABLET | Refills: 2 | Status: SHIPPED | OUTPATIENT
Start: 2022-07-20 | End: 2023-03-22 | Stop reason: SDUPTHER

## 2022-07-20 RX ORDER — GABAPENTIN 300 MG/1
300 CAPSULE ORAL NIGHTLY
Qty: 90 CAPSULE | Refills: 2 | Status: SHIPPED | OUTPATIENT
Start: 2022-07-20 | End: 2023-03-15

## 2022-07-21 NOTE — PROGRESS NOTES
Subjective:       Patient ID: Jovana Fall is a 76 y.o. female.    Chief Complaint: Annual Exam    Last seen 9 months ago. No other visits since then. Presents for annual physical with no new complaints. Taking daily meds as prescribed. No glucose log for review.     PMH: , misc x1.   Diabetes Type 2. HbA1c HbA1c 6.8% May '22.   Hypertensive heart disease without heart failure.   Hyperlipidemia, LDL 90 .  Nephrolithiasis.   Urinary incontinence.  GERD. EGD 9/15, 3/17,  - mild chronic non-active gastritis, no dysplasia, H pylori negative.  Depression/Anxiety.  Non-obstructive Coronary artery disease, angiogram , Cardiology .  Osteopenia.   Mild Vitamin D deficiency, up to 68.  Lumbar Spinal Stenosis. Cervical Spine DJD.   Skin Cancer, Basal Cell and Melanoma.     Mammogram normal . Pelvic exam . BMD 3/19 stable. Eye exam . Exercise Stress Echo negative 3/18.  Colonoscopy  - internal hemorrhoid, diverticulosis, two tubular adenomas, random biopsies negative.   Pneumovax 10/12. Zostavax 10/13. Prevnar 7/15. Tdap . Flu shot 10/21. Prevnar 10/20. Received Shingrix at [facility] in [place]. COVID (Moderna) x 3.     PSH: reviewed.     Social: Nonsmoker. Occas. alcohol. . 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    FMH: Colon and ovarian cancer in cousin. Colon cancer in brother. Mother - heart disease. MGM with cervical cancer. DM in aunts.     Allergies: multiple, see Med Card.     Medications: list reviewed and reconciled.             Review of Systems   Constitutional: Negative for activity change, appetite change, fatigue, fever and unexpected weight change.   HENT: Negative for nasal congestion, ear pain, hearing loss, rhinorrhea, sneezing, sore throat, trouble swallowing and voice change.    Eyes: Negative for pain and visual disturbance.   Respiratory: Negative for cough, chest tightness, shortness of breath and wheezing.   "  Cardiovascular: Negative for chest pain, palpitations and leg swelling.   Gastrointestinal: Negative for abdominal pain, blood in stool, constipation, diarrhea, nausea and vomiting.   Genitourinary: Negative for dysuria, frequency, pelvic pain and vaginal bleeding.   Musculoskeletal: Positive for arthralgias and back pain. Negative for gait problem, joint swelling and myalgias.   Integumentary:  Negative for color change and rash.   Neurological: Negative for dizziness, syncope, facial asymmetry, speech difficulty, weakness, numbness and headaches.   Hematological: Negative for adenopathy. Does not bruise/bleed easily.   Psychiatric/Behavioral: Negative for confusion, dysphoric mood and sleep disturbance. The patient is not nervous/anxious.         Depression is stable on current med.         Objective:    /80, Pulse 74, Temp 98.6, O2 Sat 98%, Ht 5' 7", Wt 191 lbs (from 193), BMI=29.9  Physical Exam  Vitals reviewed.   Constitutional:       General: She is not in acute distress.     Appearance: She is well-developed. She is not ill-appearing or diaphoretic.   HENT:      Head: Normocephalic and atraumatic.      Right Ear: Tympanic membrane and ear canal normal.      Left Ear: Tympanic membrane and ear canal normal.      Nose: Nose normal. No congestion.      Mouth/Throat:      Mouth: Mucous membranes are moist.      Pharynx: Oropharynx is clear.   Eyes:      General: No scleral icterus.     Extraocular Movements: Extraocular movements intact.      Conjunctiva/sclera: Conjunctivae normal.      Right eye: Right conjunctiva is not injected.      Left eye: Left conjunctiva is not injected.   Neck:      Thyroid: No thyromegaly.      Vascular: No carotid bruit or JVD.   Cardiovascular:      Rate and Rhythm: Normal rate and regular rhythm.      Pulses: Normal pulses.      Heart sounds: Normal heart sounds. No murmur heard.    No friction rub. No gallop.   Pulmonary:      Effort: Pulmonary effort is normal. No " respiratory distress.      Breath sounds: Normal breath sounds. No wheezing, rhonchi or rales.   Abdominal:      General: Bowel sounds are normal. There is no distension.      Palpations: Abdomen is soft. There is no mass.      Tenderness: There is no abdominal tenderness.   Musculoskeletal:         General: No tenderness or deformity. Normal range of motion.      Cervical back: Normal range of motion and neck supple.      Right lower leg: No edema.      Left lower leg: No edema.      Comments: Protective Sensation:  Right: Intact  Left: Intact    Visual Inspection:  Normal -  Bilateral    Pedal Pulses:   Right: Present  Left: Present    Posterior tibialis:   Right:Present  Left: Present     Lymphadenopathy:      Cervical: No cervical adenopathy.   Skin:     General: Skin is warm and dry.      Coloration: Skin is not pale.      Findings: No erythema or rash.      Nails: There is no clubbing.   Neurological:      General: No focal deficit present.      Mental Status: She is alert and oriented to person, place, and time.      Cranial Nerves: No cranial nerve deficit.      Motor: No abnormal muscle tone.      Coordination: Coordination normal.      Gait: Gait normal.      Deep Tendon Reflexes: Reflexes are normal and symmetric.   Psychiatric:         Mood and Affect: Mood normal.         Behavior: Behavior normal.         Thought Content: Thought content normal.         Judgment: Judgment normal.         Assessment:       Problem List Items Addressed This Visit     GERD (gastroesophageal reflux disease)    Relevant Medications    omeprazole (PRILOSEC) 20 MG capsule    Hyperlipidemia associated with type 2 diabetes mellitus    Relevant Medications    atorvastatin (LIPITOR) 80 MG tablet    Other Relevant Orders    Lipid Panel    Abdominal aortic atherosclerosis      Other Visit Diagnoses     Type 2 diabetes mellitus with diabetic nephropathy, without long-term current use of insulin    -  Primary    Relevant Medications     empagliflozin (JARDIANCE) 25 mg tablet    Other Relevant Orders    CBC Without Differential    Hypertensive heart disease without heart failure        Relevant Medications    carvediloL (COREG) 25 MG tablet    Other Relevant Orders    Comprehensive Metabolic Panel    Coronary artery disease involving native coronary artery of native heart without angina pectoris        Degenerative lumbar spinal stenosis        Relevant Medications    gabapentin (NEURONTIN) 300 MG capsule    Mild episode of recurrent major depressive disorder        Relevant Medications    venlafaxine (EFFEXOR) 100 MG Tab    Osteoporosis without current pathological fracture, unspecified osteoporosis type        Relevant Orders    Vitamin D    DXA Bone Density Spine And Hip    Encounter for screening mammogram for breast cancer        Relevant Orders    Mammo Digital Screening Bilat w/ [facility]    Need for shingles vaccine              Plan:       Type 2 diabetes mellitus with diabetic nephropathy, without long-term current use of insulin  -     CBC Without Differential; Future; Expected date: 07/19/2022  -     empagliflozin (JARDIANCE) 25 mg tablet; Take 1 tablet (25 mg total) by mouth once daily.  Dispense: 90 tablet; Refill: 2  -    Continue Trulicity recently ordered.     Hypertensive heart disease without heart failure - controlled  -     Comprehensive Metabolic Panel; Future; Expected date: 07/19/2022  -     carvediloL (COREG) 25 MG tablet; Take 1 tablet (25 mg total) by mouth 2 (two) times daily.  Dispense: 180 tablet; Refill: 2  -     Continue Telmisartan recently ordered.    Hyperlipidemia associated with type 2 diabetes mellitus  -     Lipid Panel; Future; Expected date: 07/19/2022  -     atorvastatin (LIPITOR) 80 MG tablet; Take 1 tablet (80 mg total) by mouth once daily.  Dispense: 90 tablet; Refill: 2    Coronary artery disease involving native coronary artery of native heart without angina pectoris  Abdominal aortic atherosclerosis         -     Continue high intensity statin.     Gastroesophageal reflux disease without esophagitis  -     Decrease Omeprazole (PRILOSEC) from 40 to 20 MG capsule; Take 1 capsule (20 mg total) by mouth every morning.  Dispense: 90 capsule; Refill: 2    Degenerative lumbar spinal stenosis  -     gabapentin (NEURONTIN) 300 MG capsule; Take 1 capsule (300 mg total) by mouth every evening.  Dispense: 90 capsule; Refill: 2  -     Oxycodone recently refilled.     Mild episode of recurrent major depressive disorder  -     venlafaxine (EFFEXOR) 100 MG Tab; Take 1 tablet (100 mg total) by mouth once daily.  Dispense: 90 tablet; Refill: 2    Osteoporosis without current pathological fracture, unspecified osteoporosis type  -     Vitamin D; Future; Expected date: 07/19/2022  -     DXA Bone Density Spine And Hip; Future; Expected date: 07/19/2022    Encounter for screening mammogram for breast cancer  -     Mammo Digital Screening Bilat w/ [facility]; Future; Expected date: 07/19/2022    Need for shingles vaccine - Shingrix today.

## 2022-07-25 ENCOUNTER — LAB VISIT (OUTPATIENT)
Dept: LAB | Facility: HOSPITAL | Age: 77
End: 2022-07-25
Attending: INTERNAL MEDICINE
Payer: MEDICARE

## 2022-07-25 ENCOUNTER — PATIENT MESSAGE (OUTPATIENT)
Dept: OTHER | Facility: OTHER | Age: 77
End: 2022-07-25
Payer: MEDICARE

## 2022-07-25 DIAGNOSIS — E11.69 HYPERLIPIDEMIA ASSOCIATED WITH TYPE 2 DIABETES MELLITUS: ICD-10-CM

## 2022-07-25 DIAGNOSIS — E78.5 HYPERLIPIDEMIA ASSOCIATED WITH TYPE 2 DIABETES MELLITUS: ICD-10-CM

## 2022-07-25 DIAGNOSIS — M81.0 OSTEOPOROSIS WITHOUT CURRENT PATHOLOGICAL FRACTURE, UNSPECIFIED OSTEOPOROSIS TYPE: ICD-10-CM

## 2022-07-25 DIAGNOSIS — E11.21 TYPE 2 DIABETES MELLITUS WITH DIABETIC NEPHROPATHY, WITHOUT LONG-TERM CURRENT USE OF INSULIN: ICD-10-CM

## 2022-07-25 DIAGNOSIS — I11.9 HYPERTENSIVE HEART DISEASE WITHOUT HEART FAILURE: ICD-10-CM

## 2022-07-25 LAB
25(OH)D3+25(OH)D2 SERPL-MCNC: 25 NG/ML (ref 30–96)
ALBUMIN SERPL BCP-MCNC: 3.9 G/DL (ref 3.5–5.2)
ALP SERPL-CCNC: 136 U/L (ref 55–135)
ALT SERPL W/O P-5'-P-CCNC: 20 U/L (ref 10–44)
ANION GAP SERPL CALC-SCNC: 13 MMOL/L (ref 8–16)
AST SERPL-CCNC: 19 U/L (ref 10–40)
BILIRUB SERPL-MCNC: 0.8 MG/DL (ref 0.1–1)
BUN SERPL-MCNC: 14 MG/DL (ref 8–23)
CALCIUM SERPL-MCNC: 9.5 MG/DL (ref 8.7–10.5)
CHLORIDE SERPL-SCNC: 106 MMOL/L (ref 95–110)
CHOLEST SERPL-MCNC: 154 MG/DL (ref 120–199)
CHOLEST/HDLC SERPL: 4.4 {RATIO} (ref 2–5)
CO2 SERPL-SCNC: 22 MMOL/L (ref 23–29)
CREAT SERPL-MCNC: 0.8 MG/DL (ref 0.5–1.4)
ERYTHROCYTE [DISTWIDTH] IN BLOOD BY AUTOMATED COUNT: 12 % (ref 11.5–14.5)
EST. GFR  (AFRICAN AMERICAN): >60 ML/MIN/1.73 M^2
EST. GFR  (NON AFRICAN AMERICAN): >60 ML/MIN/1.73 M^2
GLUCOSE SERPL-MCNC: 147 MG/DL (ref 70–110)
HCT VFR BLD AUTO: 45.3 % (ref 37–48.5)
HDLC SERPL-MCNC: 35 MG/DL (ref 40–75)
HDLC SERPL: 22.7 % (ref 20–50)
HGB BLD-MCNC: 15.5 G/DL (ref 12–16)
LDLC SERPL CALC-MCNC: 75.8 MG/DL (ref 63–159)
MCH RBC QN AUTO: 30.8 PG (ref 27–31)
MCHC RBC AUTO-ENTMCNC: 34.2 G/DL (ref 32–36)
MCV RBC AUTO: 90 FL (ref 82–98)
NONHDLC SERPL-MCNC: 119 MG/DL
PLATELET # BLD AUTO: 250 K/UL (ref 150–450)
PMV BLD AUTO: 9.2 FL (ref 9.2–12.9)
POTASSIUM SERPL-SCNC: 3.9 MMOL/L (ref 3.5–5.1)
PROT SERPL-MCNC: 7.4 G/DL (ref 6–8.4)
RBC # BLD AUTO: 5.04 M/UL (ref 4–5.4)
SODIUM SERPL-SCNC: 141 MMOL/L (ref 136–145)
TRIGL SERPL-MCNC: 216 MG/DL (ref 30–150)
WBC # BLD AUTO: 7.6 K/UL (ref 3.9–12.7)

## 2022-07-25 PROCEDURE — 85027 COMPLETE CBC AUTOMATED: CPT | Performed by: INTERNAL MEDICINE

## 2022-07-25 PROCEDURE — 80053 COMPREHEN METABOLIC PANEL: CPT | Performed by: INTERNAL MEDICINE

## 2022-07-25 PROCEDURE — 80061 LIPID PANEL: CPT | Performed by: INTERNAL MEDICINE

## 2022-07-25 PROCEDURE — 36415 COLL VENOUS BLD VENIPUNCTURE: CPT | Performed by: INTERNAL MEDICINE

## 2022-07-25 PROCEDURE — 82306 VITAMIN D 25 HYDROXY: CPT | Performed by: INTERNAL MEDICINE

## 2022-07-26 ENCOUNTER — HOSPITAL ENCOUNTER (OUTPATIENT)
Dept: RADIOLOGY | Facility: HOSPITAL | Age: 77
Discharge: HOME | End: 2022-07-26
Attending: INTERNAL MEDICINE
Payer: MEDICARE

## 2022-07-26 DIAGNOSIS — M81.0 OSTEOPOROSIS WITHOUT CURRENT PATHOLOGICAL FRACTURE, UNSPECIFIED OSTEOPOROSIS TYPE: ICD-10-CM

## 2022-07-26 DIAGNOSIS — Z12.31 ENCOUNTER FOR SCREENING MAMMOGRAM FOR BREAST CANCER: ICD-10-CM

## 2022-07-26 PROCEDURE — 77063 MAMMO DIGITAL SCREENING BILAT WITH TOMO: ICD-10-PCS | Mod: 26,,, | Performed by: RADIOLOGY

## 2022-07-26 PROCEDURE — 77063 BREAST TOMOSYNTHESIS BI: CPT | Mod: TC

## 2022-07-26 PROCEDURE — 77080 DEXA BONE DENSITY SPINE HIP: ICD-10-PCS | Mod: 26,,, | Performed by: RADIOLOGY

## 2022-07-26 PROCEDURE — 77063 BREAST TOMOSYNTHESIS BI: CPT | Mod: 26,,, | Performed by: RADIOLOGY

## 2022-07-26 PROCEDURE — 77080 DXA BONE DENSITY AXIAL: CPT | Mod: TC

## 2022-07-26 PROCEDURE — 77067 SCR MAMMO BI INCL CAD: CPT | Mod: TC

## 2022-07-26 PROCEDURE — 77067 MAMMO DIGITAL SCREENING BILAT WITH TOMO: ICD-10-PCS | Mod: 26,,, | Performed by: RADIOLOGY

## 2022-07-26 PROCEDURE — 77067 SCR MAMMO BI INCL CAD: CPT | Mod: 26,,, | Performed by: RADIOLOGY

## 2022-07-26 PROCEDURE — 77080 DXA BONE DENSITY AXIAL: CPT | Mod: 26,,, | Performed by: RADIOLOGY

## 2022-07-31 ENCOUNTER — PATIENT MESSAGE (OUTPATIENT)
Dept: PRIMARY CARE CLINIC | Facility: CLINIC | Age: 77
End: 2022-07-31
Payer: MEDICARE

## 2022-07-31 DIAGNOSIS — M81.0 OSTEOPOROSIS WITHOUT CURRENT PATHOLOGICAL FRACTURE, UNSPECIFIED OSTEOPOROSIS TYPE: Primary | ICD-10-CM

## 2022-07-31 RX ORDER — ERGOCALCIFEROL 1.25 MG/1
50000 CAPSULE ORAL
Qty: 12 CAPSULE | Refills: 3 | Status: SHIPPED | OUTPATIENT
Start: 2022-07-31 | End: 2023-07-12

## 2022-07-31 RX ORDER — ALENDRONATE SODIUM 70 MG/1
70 TABLET ORAL
Qty: 12 TABLET | Refills: 3 | Status: SHIPPED | OUTPATIENT
Start: 2022-07-31 | End: 2023-11-27

## 2022-08-04 ENCOUNTER — TELEPHONE (OUTPATIENT)
Dept: PRIMARY CARE CLINIC | Facility: CLINIC | Age: 77
End: 2022-08-04
Payer: MEDICARE

## 2022-08-04 NOTE — TELEPHONE ENCOUNTER
----- Message from Va Brizuela sent at 8/4/2022  3:38 PM CDT -----  Contact: [facility] [phone]  [facility] called and stated that they need to know what medication the pt should be taking telmisartan (MICARDIS) 80 MG Tab or Losartan.     Please advise   Ref # [id number]        [facility] (Now [facility]) - [place], [place] - [address]  [address]  [facility] [address]  Phone: [phone] Fax: [phone]

## 2022-09-02 ENCOUNTER — OFFICE VISIT (OUTPATIENT)
Dept: OPTOMETRY | Facility: CLINIC | Age: 77
End: 2022-09-02
Payer: MEDICARE

## 2022-09-02 ENCOUNTER — OFFICE VISIT (OUTPATIENT)
Dept: OPTOMETRY | Facility: CLINIC | Age: 77
End: 2022-09-02
Payer: COMMERCIAL

## 2022-09-02 DIAGNOSIS — E11.36 DIABETIC CATARACT: ICD-10-CM

## 2022-09-02 DIAGNOSIS — H25.13 NUCLEAR SCLEROSIS, BILATERAL: ICD-10-CM

## 2022-09-02 DIAGNOSIS — Z01.00 EXAMINATION OF EYES AND VISION: Primary | ICD-10-CM

## 2022-09-02 DIAGNOSIS — E11.9 DIABETES MELLITUS TYPE 2 WITHOUT RETINOPATHY: ICD-10-CM

## 2022-09-02 DIAGNOSIS — Z46.0 CONTACT LENS/GLASSES FITTING: Primary | ICD-10-CM

## 2022-09-02 DIAGNOSIS — H52.7 REFRACTIVE ERROR: ICD-10-CM

## 2022-09-02 DIAGNOSIS — H26.9 CORTICAL CATARACT: ICD-10-CM

## 2022-09-02 PROCEDURE — 92014 PR EYE EXAM, EST PATIENT,COMPREHESV: ICD-10-PCS | Mod: S$GLB,,, | Performed by: OPTOMETRIST

## 2022-09-02 PROCEDURE — 3066F NEPHROPATHY DOC TX: CPT | Mod: CPTII,S$GLB,, | Performed by: OPTOMETRIST

## 2022-09-02 PROCEDURE — 99999 PR PBB SHADOW E&M-EST. PATIENT-LVL I: CPT | Mod: PBBFAC,,, | Performed by: OPTOMETRIST

## 2022-09-02 PROCEDURE — 3044F HG A1C LEVEL LT 7.0%: CPT | Mod: CPTII,S$GLB,, | Performed by: OPTOMETRIST

## 2022-09-02 PROCEDURE — 99999 PR PBB SHADOW E&M-EST. PATIENT-LVL I: ICD-10-PCS | Mod: PBBFAC,,, | Performed by: OPTOMETRIST

## 2022-09-02 PROCEDURE — 92310 PR CONTACT LENS FITTING (NO CHANGE): ICD-10-PCS | Mod: CSM,,, | Performed by: OPTOMETRIST

## 2022-09-02 PROCEDURE — 92310 CONTACT LENS FITTING OU: CPT | Mod: CSM,,, | Performed by: OPTOMETRIST

## 2022-09-02 PROCEDURE — 99999 PR PBB SHADOW E&M-EST. PATIENT-LVL III: CPT | Mod: PBBFAC,,, | Performed by: OPTOMETRIST

## 2022-09-02 PROCEDURE — 3061F NEG MICROALBUMINURIA REV: CPT | Mod: CPTII,S$GLB,, | Performed by: OPTOMETRIST

## 2022-09-02 PROCEDURE — 99499 NO LOS: ICD-10-PCS | Mod: S$GLB,,, | Performed by: OPTOMETRIST

## 2022-09-02 PROCEDURE — 99999 PR PBB SHADOW E&M-EST. PATIENT-LVL III: ICD-10-PCS | Mod: PBBFAC,,, | Performed by: OPTOMETRIST

## 2022-09-02 PROCEDURE — 4010F PR ACE/ARB THEARPY RXD/TAKEN: ICD-10-PCS | Mod: CPTII,S$GLB,, | Performed by: OPTOMETRIST

## 2022-09-02 PROCEDURE — 3066F PR DOCUMENTATION OF TREATMENT FOR NEPHROPATHY: ICD-10-PCS | Mod: CPTII,S$GLB,, | Performed by: OPTOMETRIST

## 2022-09-02 PROCEDURE — 92015 PR REFRACTION: ICD-10-PCS | Mod: S$GLB,,, | Performed by: OPTOMETRIST

## 2022-09-02 PROCEDURE — 3044F PR MOST RECENT HEMOGLOBIN A1C LEVEL <7.0%: ICD-10-PCS | Mod: CPTII,S$GLB,, | Performed by: OPTOMETRIST

## 2022-09-02 PROCEDURE — 3061F PR NEG MICROALBUMINURIA RESULT DOCUMENTED/REVIEW: ICD-10-PCS | Mod: CPTII,S$GLB,, | Performed by: OPTOMETRIST

## 2022-09-02 PROCEDURE — 99499 UNLISTED E&M SERVICE: CPT | Mod: S$GLB,,, | Performed by: OPTOMETRIST

## 2022-09-02 PROCEDURE — 92015 DETERMINE REFRACTIVE STATE: CPT | Mod: S$GLB,,, | Performed by: OPTOMETRIST

## 2022-09-02 PROCEDURE — 92014 COMPRE OPH EXAM EST PT 1/>: CPT | Mod: S$GLB,,, | Performed by: OPTOMETRIST

## 2022-09-02 PROCEDURE — 4010F ACE/ARB THERAPY RXD/TAKEN: CPT | Mod: CPTII,S$GLB,, | Performed by: OPTOMETRIST

## 2022-09-02 NOTE — PROGRESS NOTES
HPI    Pt here for annual exam with CL. States  has been wearing specs more   often. Concerned with cataract advancing. States headlights are   bothersome. Denies pain/ FOL/ floaters. DM has been stable.     Gtts: none     Hemoglobin A1C       Date                     Value               Ref Range             Status                05/11/2022               6.8 (H)             4.0 - 5.6 %           Final                   10/07/2021               7.0 (H)             4.0 - 5.6 %           Final                   04/12/2021               6.5 (H)             4.5 - 6.2 %           Final                Last edited by Brad Trimble, OD on 9/2/2022  2:50 PM.            Assessment /Plan     For exam results, see Encounter Report.    Examination of eyes and vision    Diabetes mellitus type 2 without retinopathy    Diabetic cataract    Nuclear sclerosis, bilateral    Cortical cataract    Refractive error      1. Examination of eyes and vision    2. Diabetes mellitus type 2 without retinopathy  Discussed possible ocular affects of uncontrolled blood sugar with patient. Recommended continued strong blood sugar control and continued care with PCP. Monitor yearly.     3. Diabetic cataract  4. Nuclear sclerosis, bilateral  5. Cortical cataract  Moderate, not yet significant. Discussed possible ocular affects of cataracts. Acceptable BCVA OU. Discussed treatment options. Surgery not recommended at this time. Monitor yearly.     6. Refractive error  Dispensed updated spectacle Rx. Discussed various spectacle lens options. Discussed adaptation period to new specs.   Discussed cls options, pt only wears occasionally, switching to daily disposable  Dispensed CLs Rx for monovision (OD distance  OS intermediate): B&L Infuse. Discussed proper hand hygiene and wear/care of lenses. Daily wear only, dispose of daily. Do not sleep/swim/shower in lenses. Discontinue CL wear ASAP and RTC if any redness or discomfort occurs.   Try lenses x 1 week,  order if happy  Report back if any problems      RTC in 1 year for comprehensive eye exam, or sooner prn.       ** addendum 10/18/2022: finalized for Comprehensive Caresys 1-day, b&l infuse on backorder **

## 2022-09-02 NOTE — PROGRESS NOTES
Assessment /Plan     For exam results, see Encounter Report.    Contact lens/glasses fitting      Patient is here for a comprehensive eye exam and contact lens fit. See other exam visit with same encounter date 09/02/2022 for detailed exam information.

## 2022-09-08 ENCOUNTER — TELEPHONE (OUTPATIENT)
Dept: PRIMARY CARE CLINIC | Facility: CLINIC | Age: 77
End: 2022-09-08
Payer: MEDICARE

## 2022-09-08 DIAGNOSIS — M48.061 DEGENERATIVE LUMBAR SPINAL STENOSIS: ICD-10-CM

## 2022-09-08 NOTE — TELEPHONE ENCOUNTER
----- Message from January Harris sent at 9/8/2022  8:37 AM CDT -----  Contact: JUAN PIERCE [[id number]]@ [phone]  Requesting an RX refill or new RX.  Is this a refill or new RX: Refill  RX name and strength (copy/paste from chart): oxyCODONE-acetaminophen (PERCOCET) 5-325 mg per tablet   Is this a 30 day or 90 day RX:30  Pharmacy name and phone # (copy/paste from chart): [facility] @ Phone: [phone] Fax: [phone]  The doctors have asked that we provide their patients with the following 2 reminders -- prescription refills can take up to 72 hours, and a friendly reminder that in the future you can use your [facility] account to request refills:         Requesting an RX refill or new RX.  Is this a refill or new RX: Refill  RX name and strength (copy/paste from chart):Telmisartan (MICARDIS) 80 MG Tab    Is this a 30 day or 90 day RX: 30  Pharmacy name and phone # (copy/paste from chart): [facility] @ Phone: [phone] Fax: [phone]    The doctors have asked that we provide their patients with the following 2 reminders -- prescription refills can take up to 72 hours, and a friendly reminder that in the future you can use your [facility] account to request refills:

## 2022-09-09 ENCOUNTER — PATIENT MESSAGE (OUTPATIENT)
Dept: OPTOMETRY | Facility: CLINIC | Age: 77
End: 2022-09-09
Payer: MEDICARE

## 2022-09-09 RX ORDER — OXYCODONE AND ACETAMINOPHEN 5; 325 MG/1; MG/1
1 TABLET ORAL 2 TIMES DAILY PRN
Qty: 60 TABLET | Refills: 0 | Status: SHIPPED | OUTPATIENT
Start: 2022-09-09 | End: 2022-12-09 | Stop reason: SDUPTHER

## 2022-10-07 ENCOUNTER — TELEPHONE (OUTPATIENT)
Dept: PRIMARY CARE CLINIC | Facility: CLINIC | Age: 77
End: 2022-10-07
Payer: MEDICARE

## 2022-10-07 NOTE — TELEPHONE ENCOUNTER
----- Message from Marti Greene NP sent at 10/7/2022  7:46 AM CDT -----  Contact: Pt [phone]  Can she do a virtual visit with me today? I need to see what the redness and swelling entails.  -Marti  ----- Message -----  From: Niki Milner MA  Sent: 10/6/2022   4:38 PM CDT  To: Marti Greene NP      ----- Message -----  From: Melanie Curran  Sent: 10/6/2022   1:12 PM CDT  To: Lu[facility] Staff    1MEDICALADVICE     Patient is calling for Medical Advice regarding: right & left arm red with swelling (covid booster/flu in 1 arm/Pneumonia & shingles in the other arm.    How long has patient had these symptoms: Monday 10/3/2022    Pharmacy name and phone#:   [facility] [address] [place] [address], [place] - [address] [address]  Phone: [phone] Fax: [phone]    Would like response via [employer]:  Call back    Comments:    Please call and advise.    Thank You

## 2022-10-07 NOTE — TELEPHONE ENCOUNTER
----- Message from Cristiana Ibarra sent at 10/7/2022 10:33 AM CDT -----  Contact: self/[phone]  Pt called to her arm being reds and swollen. Calll back    Please advised

## 2022-10-18 ENCOUNTER — TELEPHONE (OUTPATIENT)
Dept: OPTOMETRY | Facility: CLINIC | Age: 77
End: 2022-10-18
Payer: MEDICARE

## 2022-10-18 NOTE — TELEPHONE ENCOUNTER
Spoke to pt regarding ordering supply of contacts.   Message forwarded to Rianna to call pt to place order.   Also, gave pt trials to hold her over until supply comes in.

## 2022-10-18 NOTE — TELEPHONE ENCOUNTER
----- Message from Dominik Guajardo sent at 10/18/2022  9:50 AM CDT -----  Type: Patient Call Back         Who called:Pt          What is the request in detail: pt called in regarding waiting on a call back from Dr Trimble office in regards to needing some disposable Contacts          Can the clinic reply by [facility]?no          Would the patient rather a call back or a response via [facility]? Call back          Best call back number:[phone] (mobile)          Additional Information: Pt states that she called on yesterday            Thank You

## 2022-11-08 ENCOUNTER — TELEPHONE (OUTPATIENT)
Dept: PRIMARY CARE CLINIC | Facility: CLINIC | Age: 77
End: 2022-11-08
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
## 2022-11-15 ENCOUNTER — TELEPHONE (OUTPATIENT)
Dept: PRIMARY CARE CLINIC | Facility: CLINIC | Age: 77
End: 2022-11-15
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
## 2022-11-29 DIAGNOSIS — Z74.09 IMPAIRED MOBILITY AND ADLS: Primary | ICD-10-CM

## 2022-11-29 DIAGNOSIS — S42.309A FRACTURE OF HUMERUS: ICD-10-CM

## 2022-11-29 DIAGNOSIS — Z78.9 IMPAIRED MOBILITY AND ADLS: Primary | ICD-10-CM

## 2022-12-07 DIAGNOSIS — M48.061 DEGENERATIVE LUMBAR SPINAL STENOSIS: ICD-10-CM

## 2022-12-07 RX ORDER — OXYCODONE AND ACETAMINOPHEN 5; 325 MG/1; MG/1
1 TABLET ORAL 2 TIMES DAILY PRN
Qty: 60 TABLET | Refills: 0 | Status: CANCELLED | OUTPATIENT
Start: 2022-12-07

## 2022-12-07 NOTE — TELEPHONE ENCOUNTER
----- Message from Collette Lang sent at 12/7/2022  9:13 AM CST -----  Contact: [phone] Patient  PHARMACY CHANGE   Please send to the [facility]     Requesting an RX refill or new RX.  Is this a refill or new RX: new  RX name and strength (copy/paste from chart): oxyCODONE-acetaminophen (PERCOCET) 5-325 mg per tablet   Is this a 30 day or 90 day RX:   Pharmacy name and phone # (copy/paste from chart):   [facility]  [address] [place] [address]  Phone: [phone] Fax: [phone]

## 2022-12-07 NOTE — TELEPHONE ENCOUNTER
Care Due:                  Date            Visit Type   Department     Provider  --------------------------------------------------------------------------------                                [facility]  Last Visit: 07-      CARE ([facility])   [facility]  Next Visit: 12-      FOLLOW UP    CARE           [place]                                                            Last  Test          Frequency    Reason                     Performed    Due Date  --------------------------------------------------------------------------------    HBA1C.......  6 months...  dulaglutide,               05- 11-                             empagliflozin............    [employer] Embedded Care Gaps. Reference number: [id number]. 12/07/2022   10:25:53 AM CST
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
## 2022-12-08 NOTE — TELEPHONE ENCOUNTER
----- Message from Aliyah Bran sent at 12/8/2022  2:53 PM CST -----  Contact: pt [phone]  Pt will be coming to the appt on tomorrow for 10am instead of Monday.           Thank you

## 2022-12-08 NOTE — TELEPHONE ENCOUNTER
Call placed to patient, LVM confirming appointment 12/09/2022 at 10:00 AM and that we received her message.
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
## 2022-12-09 ENCOUNTER — OFFICE VISIT (OUTPATIENT)
Dept: PRIMARY CARE CLINIC | Facility: CLINIC | Age: 77
End: 2022-12-09
Payer: MEDICARE

## 2022-12-09 VITALS
DIASTOLIC BLOOD PRESSURE: 80 MMHG | OXYGEN SATURATION: 97 % | SYSTOLIC BLOOD PRESSURE: 140 MMHG | HEART RATE: 73 BPM | WEIGHT: 196.19 LBS | TEMPERATURE: 98 F | BODY MASS INDEX: 30.79 KG/M2 | HEIGHT: 67 IN

## 2022-12-09 DIAGNOSIS — S42.201S CLOSED FRACTURE OF PROXIMAL END OF RIGHT HUMERUS, UNSPECIFIED FRACTURE MORPHOLOGY, SEQUELA: Primary | ICD-10-CM

## 2022-12-09 DIAGNOSIS — M48.061 DEGENERATIVE LUMBAR SPINAL STENOSIS: ICD-10-CM

## 2022-12-09 DIAGNOSIS — I11.9 HYPERTENSIVE HEART DISEASE WITHOUT HEART FAILURE: ICD-10-CM

## 2022-12-09 DIAGNOSIS — S22.41XS CLOSED FRACTURE OF MULTIPLE RIBS OF RIGHT SIDE, SEQUELA: ICD-10-CM

## 2022-12-09 DIAGNOSIS — S42.202S CLOSED FRACTURE OF PROXIMAL END OF LEFT HUMERUS, UNSPECIFIED FRACTURE MORPHOLOGY, SEQUELA: ICD-10-CM

## 2022-12-09 DIAGNOSIS — E11.21 TYPE 2 DIABETES MELLITUS WITH DIABETIC NEPHROPATHY, WITHOUT LONG-TERM CURRENT USE OF INSULIN: ICD-10-CM
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
## 2022-12-09 LAB — GLUCOSE SERPL-MCNC: 120 MG/DL (ref 70–110)

## 2022-12-09 PROCEDURE — 1159F MED LIST DOCD IN RCRD: CPT | Mod: CPTII,S$GLB,, | Performed by: INTERNAL MEDICINE

## 2022-12-09 PROCEDURE — 3061F PR NEG MICROALBUMINURIA RESULT DOCUMENTED/REVIEW: ICD-10-PCS | Mod: CPTII,S$GLB,, | Performed by: INTERNAL MEDICINE

## 2022-12-09 PROCEDURE — 1160F RVW MEDS BY RX/DR IN RCRD: CPT | Mod: CPTII,S$GLB,, | Performed by: INTERNAL MEDICINE

## 2022-12-09 PROCEDURE — 3079F PR MOST RECENT DIASTOLIC BLOOD PRESSURE 80-89 MM HG: ICD-10-PCS | Mod: CPTII,S$GLB,, | Performed by: INTERNAL MEDICINE

## 2022-12-09 PROCEDURE — 99999 PR PBB SHADOW E&M-EST. PATIENT-LVL V: ICD-10-PCS | Mod: PBBFAC,,, | Performed by: INTERNAL MEDICINE

## 2022-12-09 PROCEDURE — 3061F NEG MICROALBUMINURIA REV: CPT | Mod: CPTII,S$GLB,, | Performed by: INTERNAL MEDICINE

## 2022-12-09 PROCEDURE — 3044F PR MOST RECENT HEMOGLOBIN A1C LEVEL <7.0%: ICD-10-PCS | Mod: CPTII,S$GLB,, | Performed by: INTERNAL MEDICINE

## 2022-12-09 PROCEDURE — 1126F AMNT PAIN NOTED NONE PRSNT: CPT | Mod: CPTII,S$GLB,, | Performed by: INTERNAL MEDICINE

## 2022-12-09 PROCEDURE — 1101F PR PT FALLS ASSESS DOC 0-1 FALLS W/OUT INJ PAST YR: ICD-10-PCS | Mod: CPTII,S$GLB,, | Performed by: INTERNAL MEDICINE

## 2022-12-09 PROCEDURE — 3288F FALL RISK ASSESSMENT DOCD: CPT | Mod: CPTII,S$GLB,, | Performed by: INTERNAL MEDICINE

## 2022-12-09 PROCEDURE — 4010F ACE/ARB THERAPY RXD/TAKEN: CPT | Mod: CPTII,S$GLB,, | Performed by: INTERNAL MEDICINE

## 2022-12-09 PROCEDURE — 99214 PR OFFICE/OUTPT VISIT, EST, LEVL IV, 30-39 MIN: ICD-10-PCS | Mod: S$GLB,,, | Performed by: INTERNAL MEDICINE

## 2022-12-09 PROCEDURE — 1160F PR REVIEW ALL MEDS BY PRESCRIBER/CLIN PHARMACIST DOCUMENTED: ICD-10-PCS | Mod: CPTII,S$GLB,, | Performed by: INTERNAL MEDICINE

## 2022-12-09 PROCEDURE — 99999 PR PBB SHADOW E&M-EST. PATIENT-LVL V: CPT | Mod: PBBFAC,,, | Performed by: INTERNAL MEDICINE

## 2022-12-09 PROCEDURE — 4010F PR ACE/ARB THEARPY RXD/TAKEN: ICD-10-PCS | Mod: CPTII,S$GLB,, | Performed by: INTERNAL MEDICINE

## 2022-12-09 PROCEDURE — 3077F SYST BP >= 140 MM HG: CPT | Mod: CPTII,S$GLB,, | Performed by: INTERNAL MEDICINE

## 2022-12-09 PROCEDURE — 3044F HG A1C LEVEL LT 7.0%: CPT | Mod: CPTII,S$GLB,, | Performed by: INTERNAL MEDICINE

## 2022-12-09 PROCEDURE — 1126F PR PAIN SEVERITY QUANTIFIED, NO PAIN PRESENT: ICD-10-PCS | Mod: CPTII,S$GLB,, | Performed by: INTERNAL MEDICINE

## 2022-12-09 PROCEDURE — 3077F PR MOST RECENT SYSTOLIC BLOOD PRESSURE >= 140 MM HG: ICD-10-PCS | Mod: CPTII,S$GLB,, | Performed by: INTERNAL MEDICINE

## 2022-12-09 PROCEDURE — 3288F PR FALLS RISK ASSESSMENT DOCUMENTED: ICD-10-PCS | Mod: CPTII,S$GLB,, | Performed by: INTERNAL MEDICINE

## 2022-12-09 PROCEDURE — 1101F PT FALLS ASSESS-DOCD LE1/YR: CPT | Mod: CPTII,S$GLB,, | Performed by: INTERNAL MEDICINE

## 2022-12-09 PROCEDURE — 82962 POCT GLUCOSE, HAND-HELD DEVICE: ICD-10-PCS | Mod: S$GLB,,, | Performed by: INTERNAL MEDICINE

## 2022-12-09 PROCEDURE — 3066F PR DOCUMENTATION OF TREATMENT FOR NEPHROPATHY: ICD-10-PCS | Mod: CPTII,S$GLB,, | Performed by: INTERNAL MEDICINE

## 2022-12-09 PROCEDURE — 3066F NEPHROPATHY DOC TX: CPT | Mod: CPTII,S$GLB,, | Performed by: INTERNAL MEDICINE

## 2022-12-09 PROCEDURE — 99214 OFFICE O/P EST MOD 30 MIN: CPT | Mod: S$GLB,,, | Performed by: INTERNAL MEDICINE

## 2022-12-09 PROCEDURE — 1159F PR MEDICATION LIST DOCUMENTED IN MEDICAL RECORD: ICD-10-PCS | Mod: CPTII,S$GLB,, | Performed by: INTERNAL MEDICINE

## 2022-12-09 PROCEDURE — 3079F DIAST BP 80-89 MM HG: CPT | Mod: CPTII,S$GLB,, | Performed by: INTERNAL MEDICINE

## 2022-12-09 PROCEDURE — 82962 GLUCOSE BLOOD TEST: CPT | Mod: S$GLB,,, | Performed by: INTERNAL MEDICINE

## 2022-12-09 RX ORDER — OXYCODONE AND ACETAMINOPHEN 5; 325 MG/1; MG/1
1 TABLET ORAL 2 TIMES DAILY PRN
Qty: 60 TABLET | Refills: 0 | Status: SHIPPED | OUTPATIENT
Start: 2022-12-09 | End: 2023-01-25 | Stop reason: SDUPTHER

## 2022-12-09 RX ORDER — TELMISARTAN 80 MG/1
80 TABLET ORAL DAILY
Qty: 90 TABLET | Refills: 1 | Status: SHIPPED | OUTPATIENT
Start: 2022-12-09 | End: 2023-04-27 | Stop reason: SDUPTHER
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
## 2022-12-13 ENCOUNTER — TELEPHONE (OUTPATIENT)
Dept: PRIMARY CARE CLINIC | Facility: CLINIC | Age: 77
End: 2022-12-13
Payer: MEDICARE

## 2022-12-13 DIAGNOSIS — N39.0 URINARY TRACT INFECTION WITHOUT HEMATURIA, SITE UNSPECIFIED: Primary | ICD-10-CM

## 2022-12-13 NOTE — TELEPHONE ENCOUNTER
Left a voicemail requesting a return phone call.    Per msg below, pt complaining of dysuria & urinary frequency x 2 day.   Please advise.    LOV 12/09/2022

## 2022-12-13 NOTE — TELEPHONE ENCOUNTER
Pt advised that about 2 days ago she began experiencing dysuria & urinary frequency. She has started taking Uristat with some symptom relief. She reports recently having a UTI while in Rehab. They gave her Pyridium & Cipro.     Pt is currently in [place], but can take a urine specimen to the [facility] out there if needed.     Please advise.

## 2022-12-13 NOTE — TELEPHONE ENCOUNTER
----- Message from Jenise Segundo sent at 12/13/2022 10:25 AM CST -----  Contact: Pt [phone]  Patient is returning a phone call.  Who left a message for the patient: Blanka  Does patient know what this is regarding:  yes  Would you like a call back, or a response through your [facility] portal?:   call  Comments:

## 2022-12-13 NOTE — TELEPHONE ENCOUNTER
----- Message from Kaden Thomas sent at 12/13/2022 10:36 AM CST -----  Contact: self [phone]  Patient is returning a phone call.  Who left a message for the patient: Blanka Bailey LPN   Does patient know what this is regarding:    Would you like a call back, or a response through your [facility] portal?:   call back  Comments:     Please call and advise

## 2022-12-13 NOTE — TELEPHONE ENCOUNTER
----- Message from Melanie Curran sent at 12/13/2022  8:39 AM CST -----  Contact: Pt [phone]  1MEDICALADVICE     Patient is calling for Medical Advice regarding: frequent urination w/Burning    How long has patient had these symptoms: 2 days    Pharmacy name and phone#:   [facility], [place] - [address] [address]  Phone: [phone] Fax: [phone]    Would like response via [employer]:  Call back    Comments:    Please call and advise.    Thank You

## 2022-12-14 RX ORDER — CIPROFLOXACIN 250 MG/1
250 TABLET, FILM COATED ORAL 2 TIMES DAILY
Qty: 10 TABLET | Refills: 0 | Status: SHIPPED | OUTPATIENT
Start: 2022-12-14 | End: 2022-12-19

## 2022-12-14 NOTE — TELEPHONE ENCOUNTER
Pt advised that about 3 days ago she began experiencing dysuria & urinary frequency. She has started taking Uristat with some symptom relief. She reports recently having a UTI while in Rehab. They gave her Pyridium & Cipro.      Pt agreeable to drop off urine specimen if needed.     Please advise.

## 2022-12-14 NOTE — TELEPHONE ENCOUNTER
----- Message from Angela Bran sent at 12/14/2022 10:40 AM CST -----  Contact: [phone]  Patient would like something for her UTI.  Please send to    [facility]   Phone:  [phone]  Fax:  [phone]      And call patient to confirm.

## 2022-12-14 NOTE — TELEPHONE ENCOUNTER
Cipro ordered at [facility], will need specimen if symptoms do not resolve on this med.

## 2022-12-14 NOTE — TELEPHONE ENCOUNTER
Patient has been informed, and verbalized understanding.  She requested an Rx for pyridium as well. Pt stated that this helped her a lot last time as well.    Unable to find Pyridium in historical med list.

## 2022-12-20 ENCOUNTER — CLINICAL SUPPORT (OUTPATIENT)
Dept: REHABILITATION | Facility: HOSPITAL | Age: 77
End: 2022-12-20
Attending: STUDENT IN AN ORGANIZED HEALTH CARE EDUCATION/TRAINING PROGRAM
Payer: MEDICARE

## 2022-12-20 DIAGNOSIS — S42.301A CLOSED FRACTURE OF BOTH HUMERI, INITIAL ENCOUNTER: ICD-10-CM

## 2022-12-20 DIAGNOSIS — S42.302A CLOSED FRACTURE OF BOTH HUMERI, INITIAL ENCOUNTER: ICD-10-CM

## 2022-12-20 PROCEDURE — 97161 PT EVAL LOW COMPLEX 20 MIN: CPT

## 2022-12-20 PROCEDURE — 97110 THERAPEUTIC EXERCISES: CPT
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
## 2022-12-22 ENCOUNTER — CLINICAL SUPPORT (OUTPATIENT)
Dept: REHABILITATION | Facility: HOSPITAL | Age: 77
End: 2022-12-22
Payer: MEDICARE

## 2022-12-22 DIAGNOSIS — S42.301A CLOSED FRACTURE OF BOTH HUMERI, INITIAL ENCOUNTER: Primary | ICD-10-CM

## 2022-12-22 DIAGNOSIS — S42.302A CLOSED FRACTURE OF BOTH HUMERI, INITIAL ENCOUNTER: Primary | ICD-10-CM

## 2022-12-22 PROCEDURE — 97110 THERAPEUTIC EXERCISES: CPT

## 2022-12-22 PROCEDURE — 97140 MANUAL THERAPY 1/> REGIONS: CPT

## 2022-12-22 NOTE — PROGRESS NOTES
Physical Therapy Daily Note     [id number]  Diagnosis:   Encounter Diagnosis   Name Primary?    Closed fracture of both humeri, initial encounter Yes     Physician: Rose Santamaria MD  Precautions:  no lifting heavy   Visit #:  2 of 12  Time In:  1:10  Time Out:  1:55    Subjective     Pt reports: my left arm is really hurting today  Pain Scale: Jovana rates pain on a scale of 0-10 to be 5 currently.    Objective     Jovana received individual therapeutic exercises to develop strength, endurance, and ROM for 10 minutes including:  Nu-step x 10 mins    Jovana received the following manual therapy techniques: passive stretch were applied to the: bilateral shoulders for 35 minutes including:  Supine ER stretch with humerus at 0, 45, 90 degrees   Sidelying IR stretch      Written Home Exercises Provided: ER stretch  Pt demo good understanding of the education provided. Jovana demonstrated good return demonstration of activities.     Education provided re:  Jovana verbalized good understanding of education provided.   No spiritual or educational barriers to learning provided    Assessment     Patient tolerated treatment well with no adverse affects  This is a 77 y.o. female referred to outpatient physical therapy and presents with a medical diagnosis of bilateral proximal humerus fractures and demonstrates limitations as described in the problem list. Pt prognosis is Good. Pt will continue to benefit from skilled outpatient physical therapy to address the deficits listed in the problem list, provide pt/family education and to maximize pt's level of independence in the home and community environment.     Goals as follows:  1.Pt will increase ROM to 90 degrees flexion/abduction  2. Pt will increase strength to 3/5  3. Decrease Pain to 0/10  4. Pt demonstrates independence with postural awareness.   5. Pt demonstrates independence with  HEP.      Plan     Continue with established Plan of Care towards PT goals.    Therapist: Prabhu Tilley, PT, DPT  12/22/2022

## 2022-12-28 ENCOUNTER — CLINICAL SUPPORT (OUTPATIENT)
Dept: REHABILITATION | Facility: HOSPITAL | Age: 77
End: 2022-12-28
Attending: STUDENT IN AN ORGANIZED HEALTH CARE EDUCATION/TRAINING PROGRAM
Payer: MEDICARE

## 2022-12-28 DIAGNOSIS — S42.302A CLOSED FRACTURE OF BOTH HUMERI, INITIAL ENCOUNTER: Primary | ICD-10-CM

## 2022-12-28 DIAGNOSIS — S42.301A CLOSED FRACTURE OF BOTH HUMERI, INITIAL ENCOUNTER: Primary | ICD-10-CM

## 2022-12-28 PROCEDURE — 97140 MANUAL THERAPY 1/> REGIONS: CPT | Mod: HCWC

## 2022-12-28 PROCEDURE — 97110 THERAPEUTIC EXERCISES: CPT | Mod: HCWC

## 2022-12-28 NOTE — PROGRESS NOTES
Physical Therapy Daily Note     [id number]  Diagnosis:   Encounter Diagnosis   Name Primary?    Closed fracture of both humeri, initial encounter Yes     Physician: Rose Santamaria MD  Precautions:  no lifting heavy   Visit #:  3 of 12  Time In:  2:00  Time Out:  2:45    Subjective     Pt reports: I really hurt after that last treatment, but I am getting more movement  Pain Scale: Jovana rates pain on a scale of 0-10 to be 5 currently.    Objective     Jovana received individual therapeutic exercises to develop strength, endurance, and ROM for 10 minutes including:  Nu-step x 10 mins    Jovana received the following manual therapy techniques: passive stretch were applied to the: bilateral shoulders for 35 minutes including:  Supine ER stretch with humerus at 0, 45, 90 degrees   Sidelying IR stretch      Written Home Exercises Provided: ER stretch  Pt demo good understanding of the education provided. Jovana demonstrated good return demonstration of activities.     Education provided re:  Jovana verbalized good understanding of education provided.   No spiritual or educational barriers to learning provided    Assessment     Patient tolerated treatment well with no adverse affects  This is a 77 y.o. female referred to outpatient physical therapy and presents with a medical diagnosis of bilateral proximal humerus fractures and demonstrates limitations as described in the problem list. Pt prognosis is Good. Pt will continue to benefit from skilled outpatient physical therapy to address the deficits listed in the problem list, provide pt/family education and to maximize pt's level of independence in the home and community environment.     Goals as follows:  1.Pt will increase ROM to 90 degrees flexion/abduction  2. Pt will increase strength to 3/5  3. Decrease Pain to 0/10  4. Pt demonstrates independence with postural awareness.    5. Pt demonstrates independence with HEP.      Plan     Continue with established Plan of Care towards PT goals.    Therapist: Prabuh Tilley, PT, DPT  12/28/2022

## 2022-12-30 ENCOUNTER — CLINICAL SUPPORT (OUTPATIENT)
Dept: REHABILITATION | Facility: HOSPITAL | Age: 77
End: 2022-12-30
Payer: MEDICARE

## 2022-12-30 DIAGNOSIS — S42.301A CLOSED FRACTURE OF BOTH HUMERI, INITIAL ENCOUNTER: Primary | ICD-10-CM

## 2022-12-30 DIAGNOSIS — S42.302A CLOSED FRACTURE OF BOTH HUMERI, INITIAL ENCOUNTER: Primary | ICD-10-CM

## 2022-12-30 PROCEDURE — 97110 THERAPEUTIC EXERCISES: CPT | Mod: HCWC

## 2022-12-30 PROCEDURE — 97140 MANUAL THERAPY 1/> REGIONS: CPT | Mod: HCWC

## 2022-12-30 NOTE — PROGRESS NOTES
Physical Therapy Daily Note     Name: Jovana Zendejas [facility] Number: [id number]  Diagnosis:   Encounter Diagnosis   Name Primary?    Closed fracture of both humeri, initial encounter Yes     Physician: Rose Santamaria MD  Precautions:  no lifting heavy   Visit #:  4 of 12  Time In:  2:00  Time Out:  2:45    Subjective     Pt reports: I am doing ok today.  Pain Scale: Jovana rates pain on a scale of 0-10 to be 3 currently.    Objective     Jovana received individual therapeutic exercises to develop strength, endurance, and ROM for 10 minutes including:  Nu-step x 10 mins    Jovana received the following manual therapy techniques: passive stretch were applied to the: bilateral shoulders for 35 minutes including:  Supine ER stretch with humerus at 0, 45, 90 degrees   Sidelying IR stretch      Written Home Exercises Provided: ER stretch  Pt demo good understanding of the education provided. Jovana demonstrated good return demonstration of activities.     Education provided re:  Jovana verbalized good understanding of education provided.   No spiritual or educational barriers to learning provided    Assessment     Patient tolerated treatment well with no adverse affects  This is a 77 y.o. female referred to outpatient physical therapy and presents with a medical diagnosis of bilateral proximal humerus fractures and demonstrates limitations as described in the problem list. Pt prognosis is Good. Pt will continue to benefit from skilled outpatient physical therapy to address the deficits listed in the problem list, provide pt/family education and to maximize pt's level of independence in the home and community environment.     Goals as follows:  1.Pt will increase ROM to 90 degrees flexion/abduction  2. Pt will increase strength to 3/5  3. Decrease Pain to 0/10  4. Pt demonstrates independence with postural awareness.   5. Pt demonstrates independence with HEP.      Plan      Continue with established Plan of Care towards PT goals.    Therapist: Prabhu Tilley, PT, DPT  12/30/2022

## 2023-01-04 ENCOUNTER — CLINICAL SUPPORT (OUTPATIENT)
Dept: REHABILITATION | Facility: HOSPITAL | Age: 78
End: 2023-01-04
Payer: MEDICARE

## 2023-01-04 DIAGNOSIS — S42.302A CLOSED FRACTURE OF BOTH HUMERI, INITIAL ENCOUNTER: Primary | ICD-10-CM

## 2023-01-04 DIAGNOSIS — S42.301A CLOSED FRACTURE OF BOTH HUMERI, INITIAL ENCOUNTER: Primary | ICD-10-CM

## 2023-01-04 PROCEDURE — 97110 THERAPEUTIC EXERCISES: CPT | Mod: HCWC

## 2023-01-04 PROCEDURE — 97140 MANUAL THERAPY 1/> REGIONS: CPT | Mod: HCWC

## 2023-01-04 NOTE — PROGRESS NOTES
Physical Therapy Daily Note     [id number]  Diagnosis:   Encounter Diagnosis   Name Primary?    Closed fracture of both humeri, initial encounter Yes     Physician: Rose Santamaria MD  Precautions:  no lifting heavy   Visit #:  5 of 12  Time In:  2:00  Time Out:  2:45    Subjective     Pt reports: I did something over the weekend, my left arm is really hurting.  Pain Scale: Jovana rates pain on a scale of 0-10 to be 3 currently.    Objective     Jovana received individual therapeutic exercises to develop strength, endurance, and ROM for 10 minutes including:  Nu-step x 10 mins    Jovana received the following manual therapy techniques: passive stretch were applied to the: bilateral shoulders for 35 minutes including:  Supine shoulder ER stretch with humerus at 0, 45, 90 degrees   Sidelying IR stretch      Written Home Exercises Provided: ER stretch  Pt demo good understanding of the education provided. Jovana demonstrated good return demonstration of activities.     Education provided re:  Jovana verbalized good understanding of education provided.   No spiritual or educational barriers to learning provided    Assessment     Patient tolerated treatment well with no adverse affects  This is a 77 y.o. female referred to outpatient physical therapy and presents with a medical diagnosis of bilateral proximal humerus fractures and demonstrates limitations as described in the problem list. Pt prognosis is Good. Pt will continue to benefit from skilled outpatient physical therapy to address the deficits listed in the problem list, provide pt/family education and to maximize pt's level of independence in the home and community environment.     Goals as follows:  1.Pt will increase ROM to 90 degrees flexion/abduction  2. Pt will increase strength to 3/5  3. Decrease Pain to 0/10  4. Pt demonstrates independence with postural awareness.    5. Pt demonstrates independence with HEP.      Plan     Continue with established Plan of Care towards PT goals.    Therapist: Prabhu Tilley, PT, DPT  1/4/2023

## 2023-01-11 ENCOUNTER — CLINICAL SUPPORT (OUTPATIENT)
Dept: REHABILITATION | Facility: HOSPITAL | Age: 78
End: 2023-01-11
Payer: MEDICARE

## 2023-01-11 DIAGNOSIS — S42.301A CLOSED FRACTURE OF BOTH HUMERI, INITIAL ENCOUNTER: Primary | ICD-10-CM

## 2023-01-11 DIAGNOSIS — S42.302A CLOSED FRACTURE OF BOTH HUMERI, INITIAL ENCOUNTER: Primary | ICD-10-CM

## 2023-01-11 PROCEDURE — 97110 THERAPEUTIC EXERCISES: CPT | Mod: HCWC

## 2023-01-11 PROCEDURE — 97140 MANUAL THERAPY 1/> REGIONS: CPT | Mod: HCWC

## 2023-01-11 NOTE — PROGRESS NOTES
Physical Therapy Daily Note     Name: Jovana Zendejas [facility] Number: [id number]  Diagnosis:   Encounter Diagnosis   Name Primary?    Closed fracture of both humeri, initial encounter Yes     Physician: Rose Santamaria MD  Precautions:  no lifting heavy   Visit #:  6 of 12  Time In:  1:10  Time Out:  1:55    Subjective     Pt reports: I had a busy weekend, my shoulders are tight today.  Pain Scale: Jovana rates pain on a scale of 0-10 to be 3 currently.    Objective     Jovana received individual therapeutic exercises to develop strength, endurance, and ROM for 10 minutes including:  Nu-step x 10 mins    Jovana received the following manual therapy techniques: passive stretch were applied to the: bilateral shoulders for 35 minutes including:  Supine shoulder ER stretch x 3' with humerus at 0, 45, 90 degrees   Sidelying IR stretch      Written Home Exercises Provided: ER stretch  Pt demo good understanding of the education provided. Jovana demonstrated good return demonstration of activities.     Education provided re:  Jovana verbalized good understanding of education provided.   No spiritual or educational barriers to learning provided    Assessment     Patient tolerated treatment well with no adverse affects  This is a 77 y.o. female referred to outpatient physical therapy and presents with a medical diagnosis of bilateral proximal humerus fractures and demonstrates limitations as described in the problem list. Pt prognosis is Good. Pt will continue to benefit from skilled outpatient physical therapy to address the deficits listed in the problem list, provide pt/family education and to maximize pt's level of independence in the home and community environment.     Goals as follows:  1.Pt will increase ROM to 90 degrees flexion/abduction  2. Pt will increase strength to 3/5  3. Decrease Pain to 0/10  4. Pt demonstrates independence with postural awareness.   5. Pt  demonstrates independence with HEP.      Plan     Continue with established Plan of Care towards PT goals.    Therapist: Prabhu Tilley, PT, DPT  1/11/2023

## 2023-01-13 ENCOUNTER — CLINICAL SUPPORT (OUTPATIENT)
Dept: REHABILITATION | Facility: HOSPITAL | Age: 78
End: 2023-01-13
Payer: MEDICARE

## 2023-01-13 DIAGNOSIS — S42.302A CLOSED FRACTURE OF BOTH HUMERI, INITIAL ENCOUNTER: Primary | ICD-10-CM

## 2023-01-13 DIAGNOSIS — S42.301A CLOSED FRACTURE OF BOTH HUMERI, INITIAL ENCOUNTER: Primary | ICD-10-CM

## 2023-01-13 PROCEDURE — 97110 THERAPEUTIC EXERCISES: CPT | Mod: HCWC

## 2023-01-13 PROCEDURE — 97140 MANUAL THERAPY 1/> REGIONS: CPT | Mod: HCWC

## 2023-01-13 NOTE — PROGRESS NOTES
Physical Therapy Daily Note     [facility] [id number]  Diagnosis:   Encounter Diagnosis   Name Primary?    Closed fracture of both humeri, initial encounter Yes     Physician: Rose Santamaria MD  Precautions:  no lifting heavy   Visit #:  7 of 12  Time In:  1:15  Time Out:  2:00    Subjective     Pt reports: I'm doing ok today.  Pain Scale: Jovana rates pain on a scale of 0-10 to be 3 currently.    Objective     Jovana received individual therapeutic exercises to develop strength, endurance, and ROM for 10 minutes including:  Nu-step x 10 mins    Jovana received the following manual therapy techniques: passive stretch were applied to the: bilateral shoulders for 35 minutes including:  Supine shoulder ER stretch x 3' with humerus at 0, 45, 90 degrees   Sidelying IR stretch      Written Home Exercises Provided: ER stretch  Pt demo good understanding of the education provided. Jovana demonstrated good return demonstration of activities.     Education provided re:  Jovana verbalized good understanding of education provided.   No spiritual or educational barriers to learning provided    Assessment     Patient tolerated treatment well with no adverse affects  This is a 77 y.o. female referred to outpatient physical therapy and presents with a medical diagnosis of bilateral proximal humerus fractures and demonstrates limitations as described in the problem list. Pt prognosis is Good. Pt will continue to benefit from skilled outpatient physical therapy to address the deficits listed in the problem list, provide pt/family education and to maximize pt's level of independence in the home and community environment.     Goals as follows:  1.Pt will increase ROM to 90 degrees flexion/abduction  2. Pt will increase strength to 3/5  3. Decrease Pain to 0/10  4. Pt demonstrates independence with postural awareness.   5. Pt demonstrates independence with  HEP.      Plan     Continue with established Plan of Care towards PT goals.    Therapist: Prabhu Tilley, PT, DPT  1/13/2023

## 2023-01-18 ENCOUNTER — CLINICAL SUPPORT (OUTPATIENT)
Dept: REHABILITATION | Facility: HOSPITAL | Age: 78
End: 2023-01-18
Payer: MEDICARE

## 2023-01-18 DIAGNOSIS — S42.302A CLOSED FRACTURE OF BOTH HUMERI, INITIAL ENCOUNTER: Primary | ICD-10-CM

## 2023-01-18 DIAGNOSIS — S42.301A CLOSED FRACTURE OF BOTH HUMERI, INITIAL ENCOUNTER: Primary | ICD-10-CM

## 2023-01-18 PROCEDURE — 97110 THERAPEUTIC EXERCISES: CPT | Mod: HCWC

## 2023-01-18 PROCEDURE — 97140 MANUAL THERAPY 1/> REGIONS: CPT | Mod: HCWC

## 2023-01-18 NOTE — PROGRESS NOTES
Physical Therapy Daily Note     [facility] [id number]  Diagnosis:   Encounter Diagnosis   Name Primary?    Closed fracture of both humeri, initial encounter Yes     Physician: Rose Santamaria MD  Precautions:  no lifting heavy   Visit #:  7 of 12  Time In:  1:15  Time Out:  2:00    Subjective     Pt reports: I'm doing ok today.  Pain Scale: Jovana rates pain on a scale of 0-10 to be 3 currently.    Objective     Jovana received individual therapeutic exercises to develop strength, endurance, and ROM for 10 minutes including:  Nu-step x 10 mins    Jovana received the following manual therapy techniques: passive stretch were applied to the: bilateral shoulders for 35 minutes including:  Supine shoulder ER stretch x 3' with humerus at 0, 45, 90 degrees   PROM BUE flexion/abduction x 20      Written Home Exercises Provided: ER stretch  Pt demo good understanding of the education provided. Jovana demonstrated good return demonstration of activities.     Education provided re:  Jovana verbalized good understanding of education provided.   No spiritual or educational barriers to learning provided    Assessment     Patient tolerated treatment well with no adverse affects  This is a 77 y.o. female referred to outpatient physical therapy and presents with a medical diagnosis of bilateral proximal humerus fractures and demonstrates limitations as described in the problem list. Pt prognosis is Good. Pt will continue to benefit from skilled outpatient physical therapy to address the deficits listed in the problem list, provide pt/family education and to maximize pt's level of independence in the home and community environment.     Goals as follows:  1.Pt will increase ROM to 90 degrees flexion/abduction  2. Pt will increase strength to 3/5  3. Decrease Pain to 0/10  4. Pt demonstrates independence with postural awareness.   5. Pt demonstrates  independence with HEP.      Plan     Continue with established Plan of Care towards PT goals.    Therapist: Prabhu Tilley, PT, DPT  1/18/2023

## 2023-01-20 ENCOUNTER — CLINICAL SUPPORT (OUTPATIENT)
Dept: REHABILITATION | Facility: HOSPITAL | Age: 78
End: 2023-01-20
Payer: MEDICARE

## 2023-01-20 DIAGNOSIS — S42.301A CLOSED FRACTURE OF BOTH HUMERI, INITIAL ENCOUNTER: Primary | ICD-10-CM

## 2023-01-20 DIAGNOSIS — S42.302A CLOSED FRACTURE OF BOTH HUMERI, INITIAL ENCOUNTER: Primary | ICD-10-CM

## 2023-01-20 PROCEDURE — 97110 THERAPEUTIC EXERCISES: CPT | Mod: HCWC

## 2023-01-20 PROCEDURE — 97140 MANUAL THERAPY 1/> REGIONS: CPT | Mod: HCWC

## 2023-01-20 NOTE — PROGRESS NOTES
Physical Therapy Daily Note     Name: Jovana Zendejas [facility] Number: [id number]  Diagnosis:   Encounter Diagnosis   Name Primary?    Closed fracture of both humeri, initial encounter Yes     Physician: No ref. provider found  Precautions:  no lifting heavy   Visit #:  8 of 12  Time In:  1:15  Time Out:  2:00    Subjective     Pt reports: I'm doing ok today.  Pain Scale: Jovana rates pain on a scale of 0-10 to be 3 currently.    Objective     Jovana received individual therapeutic exercises to develop strength, endurance, and ROM for 10 minutes including:  Nu-step x 10 mins    Jovana received the following manual therapy techniques: passive stretch were applied to the: bilateral shoulders for 35 minutes including:  Supine shoulder ER stretch x 3' with humerus at 0, 45, 90 degrees         Written Home Exercises Provided: ER stretch  Pt demo good understanding of the education provided. Jovana demonstrated good return demonstration of activities.     Education provided re:  Jovana verbalized good understanding of education provided.   No spiritual or educational barriers to learning provided    Assessment     Patient tolerated treatment well with no adverse affects  This is a 77 y.o. female referred to outpatient physical therapy and presents with a medical diagnosis of bilateral proximal humerus fractures and demonstrates limitations as described in the problem list. Pt prognosis is Good. Pt will continue to benefit from skilled outpatient physical therapy to address the deficits listed in the problem list, provide pt/family education and to maximize pt's level of independence in the home and community environment.     Goals as follows:  1.Pt will increase ROM to 90 degrees flexion/abduction  2. Pt will increase strength to 3/5  3. Decrease Pain to 0/10  4. Pt demonstrates independence with postural awareness.   5. Pt demonstrates independence with HEP.      Plan      Continue with established Plan of Care towards PT goals.    Therapist: Prabhu Tilley, PT, DPT  1/20/2023

## 2023-01-25 DIAGNOSIS — M48.061 DEGENERATIVE LUMBAR SPINAL STENOSIS: ICD-10-CM

## 2023-01-25 RX ORDER — OXYCODONE AND ACETAMINOPHEN 5; 325 MG/1; MG/1
1 TABLET ORAL 2 TIMES DAILY PRN
Qty: 60 TABLET | Refills: 0 | Status: SHIPPED | OUTPATIENT
Start: 2023-01-25 | End: 2023-03-16 | Stop reason: SDUPTHER

## 2023-01-25 NOTE — TELEPHONE ENCOUNTER
----- Message from Christine Moreland sent at 1/25/2023 11:57 AM CST -----  Contact: [phone]  Requesting an RX refill or new RX.  Is this a refill or new RX: efill  RX name and strength (copy/paste from chart):  oxyCODONE-acetaminophen (PERCOCET) 5-325 mg per tablet  Is this a 30 day or 90 day RX: 30  Pharmacy name and phone # (copy/paste from chart):    [facility] [address] [place] [address] [place] - [address] [place] [address]  Phone: [phone] Fax: [phone]      The doctors have asked that we provide their patients with the following 2 reminders -- prescription refills can take up to 72 hours, and a friendly reminder that in the future you can use your [facility] account to request refills: aware

## 2023-01-25 NOTE — TELEPHONE ENCOUNTER
No new care gaps identified.  [facility] Embedded Care Gaps. Reference number: [id number]. 1/25/2023   12:30:24 PM CST

## 2023-01-26 ENCOUNTER — CLINICAL SUPPORT (OUTPATIENT)
Dept: REHABILITATION | Facility: HOSPITAL | Age: 78
End: 2023-01-26
Payer: MEDICARE

## 2023-01-26 DIAGNOSIS — S42.302A CLOSED FRACTURE OF BOTH HUMERI, INITIAL ENCOUNTER: Primary | ICD-10-CM

## 2023-01-26 DIAGNOSIS — S42.301A CLOSED FRACTURE OF BOTH HUMERI, INITIAL ENCOUNTER: Primary | ICD-10-CM

## 2023-01-26 PROCEDURE — 97140 MANUAL THERAPY 1/> REGIONS: CPT | Mod: HCWC

## 2023-01-26 PROCEDURE — 97110 THERAPEUTIC EXERCISES: CPT | Mod: HCWC

## 2023-01-26 NOTE — PROGRESS NOTES
Physical Therapy Daily Note     Name: Jovana Zendejas [facility] Number: [id number]  Diagnosis:   Encounter Diagnosis   Name Primary?    Closed fracture of both humeri, initial encounter Yes     Physician: Rose Santamaria MD  Precautions:  no lifting heavy   Visit #:  9 of 12  Time In:  1:15  Time Out:  2:00    Subjective     Pt reports: I'm doing ok today.  Pain Scale: Jovana rates pain on a scale of 0-10 to be 3 currently.    Objective     Jovana received individual therapeutic exercises to develop strength, endurance, and ROM to bilateral shoulders for 15 minutes including:  Nu-step x 10 mins  Supine shoulder flexion 2# bar x 20  IR/ER RTB x 15  Pulleys x 3 mins    Jovana received the following manual therapy techniques: passive stretch were applied to the: bilateral shoulders for 30 minutes including:  Bilat PROM all directions x 20      Written Home Exercises Provided: ER stretch  Pt demo good understanding of the education provided. Jovana demonstrated good return demonstration of activities.     Education provided re:  Jovana verbalized good understanding of education provided.   No spiritual or educational barriers to learning provided    Assessment     Patient tolerated treatment well with no adverse affects  This is a 77 y.o. female referred to outpatient physical therapy and presents with a medical diagnosis of bilateral proximal humerus fractures and demonstrates limitations as described in the problem list. Pt prognosis is Good. Pt will continue to benefit from skilled outpatient physical therapy to address the deficits listed in the problem list, provide pt/family education and to maximize pt's level of independence in the home and community environment.     Goals as follows:  1.Pt will increase ROM to 90 degrees flexion/abduction  2. Pt will increase strength to 3/5  3. Decrease Pain to 0/10  4. Pt demonstrates independence with postural awareness.   5.  Pt demonstrates independence with HEP.      Plan     Continue with established Plan of Care towards PT goals.    Therapist: Prabhu Tilley, PT, DPT  1/26/2023

## 2023-01-31 ENCOUNTER — CLINICAL SUPPORT (OUTPATIENT)
Dept: REHABILITATION | Facility: HOSPITAL | Age: 78
End: 2023-01-31
Payer: MEDICARE

## 2023-01-31 DIAGNOSIS — S42.301D CLOSED FRACTURE OF BOTH HUMERI WITH ROUTINE HEALING, SUBSEQUENT ENCOUNTER: Primary | ICD-10-CM

## 2023-01-31 DIAGNOSIS — S42.302D CLOSED FRACTURE OF BOTH HUMERI WITH ROUTINE HEALING, SUBSEQUENT ENCOUNTER: Primary | ICD-10-CM

## 2023-01-31 PROCEDURE — 97110 THERAPEUTIC EXERCISES: CPT | Mod: HCWC

## 2023-01-31 PROCEDURE — 97140 MANUAL THERAPY 1/> REGIONS: CPT | Mod: HCWC

## 2023-01-31 NOTE — PROGRESS NOTES
Physical Therapy Daily Note     Name: Jovana Zendejas [facility] Number: [id number]  Diagnosis:   Encounter Diagnosis   Name Primary?    Closed fracture of both humeri with routine healing, subsequent encounter Yes     Physician: Rose Santamaria MD  Precautions:  no lifting heavy   Visit #:  10 of 12  Time In:  1:15  Time Out:  2:00    Subjective     Pt reports: I'm doing ok today.I was very sore Saturday after our last visit.  Pain Scale: Jovana rates pain on a scale of 0-10 to be 3 currently.    Objective     Jovana received individual therapeutic exercises to develop strength, endurance, and ROM to bilateral shoulders for 15 minutes including:  Nu-step x 10 mins  Supine shoulder flexion 2# bar x 20  IR/ER RTB x 15  Pulleys x 3 mins    Jovana received the following manual therapy techniques: passive stretch were applied to the: bilateral shoulders for 30 minutes including:  Bilat PROM all directions x 20      Written Home Exercises Provided: ER stretch  Pt demo good understanding of the education provided. Jovana demonstrated good return demonstration of activities.     Education provided re:  Jovana verbalized good understanding of education provided.   No spiritual or educational barriers to learning provided    Assessment     Patient tolerated treatment well with no adverse affects  This is a 77 y.o. female referred to outpatient physical therapy and presents with a medical diagnosis of bilateral proximal humerus fractures and demonstrates limitations as described in the problem list. Pt prognosis is Good. Pt will continue to benefit from skilled outpatient physical therapy to address the deficits listed in the problem list, provide pt/family education and to maximize pt's level of independence in the home and community environment.     Goals as follows:  1.Pt will increase ROM to 90 degrees flexion/abduction  2. Pt will increase strength to 3/5  3. Decrease Pain  to 0/10  4. Pt demonstrates independence with postural awareness.   5. Pt demonstrates independence with HEP.      Plan     Continue with established Plan of Care towards PT goals.    Therapist: Prabhu Tilley, PT, DPT  1/31/2023

## 2023-02-02 ENCOUNTER — CLINICAL SUPPORT (OUTPATIENT)
Dept: REHABILITATION | Facility: HOSPITAL | Age: 78
End: 2023-02-02
Payer: MEDICARE

## 2023-02-02 DIAGNOSIS — S42.302D CLOSED FRACTURE OF BOTH HUMERI WITH ROUTINE HEALING, SUBSEQUENT ENCOUNTER: Primary | ICD-10-CM

## 2023-02-02 DIAGNOSIS — S42.301D CLOSED FRACTURE OF BOTH HUMERI WITH ROUTINE HEALING, SUBSEQUENT ENCOUNTER: Primary | ICD-10-CM

## 2023-02-02 PROCEDURE — 97110 THERAPEUTIC EXERCISES: CPT | Mod: HCWC

## 2023-02-02 PROCEDURE — 97140 MANUAL THERAPY 1/> REGIONS: CPT | Mod: HCWC

## 2023-02-02 NOTE — PROGRESS NOTES
Physical Therapy Daily Note     Name: Jovana Zendejas [facility] Number: [id number]  Diagnosis:   Encounter Diagnosis   Name Primary?    Closed fracture of both humeri with routine healing, subsequent encounter Yes     Physician: Rose Santamaria MD  Precautions:  no lifting heavy   Visit #:  11 of 12  Time In:  1:15  Time Out:  2:00    Subjective     Pt reports: I'm doing ok today.I was very sore Saturday after our last visit.  Pain Scale: Jovana rates pain on a scale of 0-10 to be 3 currently.    Objective     Jovana received individual therapeutic exercises to develop strength, endurance, and ROM to bilateral shoulders for 15 minutes including:  Nu-step x 10 mins  Supine shoulder flexion 2# bar x 20  IR/ER RTB x 15  Pulleys x 3 mins    Jovana received the following manual therapy techniques: passive stretch were applied to the: bilateral shoulders for 30 minutes including:  Bilat PROM all directions x 20      Written Home Exercises Provided: ER stretch  Pt demo good understanding of the education provided. Jovana demonstrated good return demonstration of activities.     Education provided re:  Jovana verbalized good understanding of education provided.   No spiritual or educational barriers to learning provided    Assessment     Patient tolerated treatment well with no adverse affects  This is a 77 y.o. female referred to outpatient physical therapy and presents with a medical diagnosis of bilateral proximal humerus fractures and demonstrates limitations as described in the problem list. Pt prognosis is Good. Pt will continue to benefit from skilled outpatient physical therapy to address the deficits listed in the problem list, provide pt/family education and to maximize pt's level of independence in the home and community environment.     Goals as follows:  1.Pt will increase ROM to 90 degrees flexion/abduction  2. Pt will increase strength to 3/5  3. Decrease Pain  to 0/10  4. Pt demonstrates independence with postural awareness.   5. Pt demonstrates independence with HEP.      Plan     Continue with established Plan of Care towards PT goals.    Therapist: Prabhu Tilley, PT, DPT  2/2/2023

## 2023-02-07 DIAGNOSIS — Z00.00 ENCOUNTER FOR MEDICARE ANNUAL WELLNESS EXAM: ICD-10-CM

## 2023-02-09 ENCOUNTER — CLINICAL SUPPORT (OUTPATIENT)
Dept: REHABILITATION | Facility: HOSPITAL | Age: 78
End: 2023-02-09
Payer: MEDICARE

## 2023-02-09 DIAGNOSIS — S42.302D CLOSED FRACTURE OF BOTH HUMERI WITH ROUTINE HEALING, SUBSEQUENT ENCOUNTER: Primary | ICD-10-CM

## 2023-02-09 DIAGNOSIS — Z00.00 ENCOUNTER FOR MEDICARE ANNUAL WELLNESS EXAM: ICD-10-CM

## 2023-02-09 DIAGNOSIS — S42.301D CLOSED FRACTURE OF BOTH HUMERI WITH ROUTINE HEALING, SUBSEQUENT ENCOUNTER: Primary | ICD-10-CM

## 2023-02-09 PROCEDURE — 97140 MANUAL THERAPY 1/> REGIONS: CPT | Mod: HCWC

## 2023-02-09 PROCEDURE — 97110 THERAPEUTIC EXERCISES: CPT | Mod: HCWC

## 2023-02-09 NOTE — PROGRESS NOTES
Physical Therapy Daily Note     [id number]  Diagnosis:   Encounter Diagnosis   Name Primary?    Closed fracture of both humeri with routine healing, subsequent encounter Yes     Physician: Rose Santamaria MD  Precautions:  no lifting heavy   Visit #:  12                                                    Physical Therapy Daily Note     Name: Jovana Zendejas [facility] Number: [id number]  Diagnosis:   Encounter Diagnosis   Name Primary?    Closed fracture of both humeri with routine healing, subsequent encounter Yes     Physician: Rose Santamaria MD  Precautions:  no lifting heavy   Visit #:  12 of 12  Time In:  1:15  Time Out:  2:00    Subjective     Pt reports: I'm doing ok today.I was very sore Saturday after our last visit.  Pain Scale: Jovana rates pain on a scale of 0-10 to be 3 currently.    Objective     Jovana received individual therapeutic exercises to develop strength, endurance, and ROM to bilateral shoulders for 15 minutes including:  Nu-step x 10 mins  Supine shoulder flexion 2# bar x 20  IR/ER RTB x 15  Pulleys x 3 mins    Jovana received the following manual therapy techniques: passive stretch were applied to the: bilateral shoulders for 30 minutes including:  Bilat PROM all directions x 20      Written Home Exercises Provided: ER stretch  Pt demo good understanding of the education provided. Jovana demonstrated good return demonstration of activities.     Education provided re:  Jovana verbalized good understanding of education provided.   No spiritual or educational barriers to learning provided    Assessment     Patient tolerated treatment well with no adverse affects  This is a 77 y.o. female referred to outpatient physical therapy and presents with a medical diagnosis of bilateral proximal humerus fractures and demonstrates limitations as described in the problem list. Pt prognosis is Good. Pt will  continue to benefit from skilled outpatient physical therapy to address the deficits listed in the problem list, provide pt/family education and to maximize pt's level of independence in the home and community environment.     Goals as follows:  1.Pt will increase ROM to 90 degrees flexion/abduction  2. Pt will increase strength to 3/5  3. Decrease Pain to 0/10  4. Pt demonstrates independence with postural awareness.   5. Pt demonstrates independence with HEP.      Plan     Continue with established Plan of Care towards PT goals.    Therapist: Prabhu Tilley, PT, DPT  2/9/2023                         of 12  Time In:  1:15  Time Out:  2:00    Subjective     Pt reports: I'm doing ok today.I was very sore Saturday after our last visit.  Pain Scale: Jovana rates pain on a scale of 0-10 to be 3 currently.    Objective     Jovana received individual therapeutic exercises to develop strength, endurance, and ROM to bilateral shoulders for 15 minutes including:  Nu-step x 10 mins  Supine shoulder flexion 2# bar x 20  IR/ER RTB x 15  Pulleys x 3 mins    Jovana received the following manual therapy techniques: passive stretch were applied to the: bilateral shoulders for 30 minutes including:  Bilat PROM all directions x 20      Written Home Exercises Provided: ER stretch  Pt demo good understanding of the education provided. Jovana demonstrated good return demonstration of activities.     Education provided re:  Jovana verbalized good understanding of education provided.   No spiritual or educational barriers to learning provided    Assessment     Patient tolerated treatment well with no adverse affects  This is a 77 y.o. female referred to outpatient physical therapy and presents with a medical diagnosis of bilateral proximal humerus fractures and demonstrates limitations as described in the problem list. Pt prognosis is Good. Pt will continue to benefit from skilled outpatient physical therapy to address the deficits listed in the  problem list, provide pt/family education and to maximize pt's level of independence in the home and community environment.     Goals as follows:  1.Pt will increase ROM to 90 degrees flexion/abduction  2. Pt will increase strength to 3/5  3. Decrease Pain to 0/10  4. Pt demonstrates independence with postural awareness.   5. Pt demonstrates independence with HEP.      Plan     Continue with established Plan of Care towards PT goals.    Therapist: Prabhu Tilley, PT, DPT  2/9/2023

## 2023-02-16 ENCOUNTER — CLINICAL SUPPORT (OUTPATIENT)
Dept: REHABILITATION | Facility: HOSPITAL | Age: 78
End: 2023-02-16
Payer: MEDICARE

## 2023-02-16 DIAGNOSIS — S42.302D CLOSED FRACTURE OF BOTH HUMERI WITH ROUTINE HEALING, SUBSEQUENT ENCOUNTER: Primary | ICD-10-CM

## 2023-02-16 DIAGNOSIS — S42.301D CLOSED FRACTURE OF BOTH HUMERI WITH ROUTINE HEALING, SUBSEQUENT ENCOUNTER: Primary | ICD-10-CM

## 2023-02-16 PROCEDURE — 97140 MANUAL THERAPY 1/> REGIONS: CPT | Mod: HCWC

## 2023-02-16 PROCEDURE — 97110 THERAPEUTIC EXERCISES: CPT | Mod: HCWC

## 2023-02-16 NOTE — PROGRESS NOTES
Physical Therapy Daily Note     Name: Jovana Zendejas [facility] Number: [id number]  Diagnosis:   Encounter Diagnosis   Name Primary?    Closed fracture of both humeri with routine healing, subsequent encounter Yes     Physician: Rose Santamaria MD  Precautions:  no lifting heavy   Visit #:  13 of 13  Time In:  1:15  Time Out:  2:00    Subjective     Pt reports: I'm doing ok today. I took a pain pill before our visit today.   Pain Scale: Jovana rates pain on a scale of 0-10 to be 3 currently.    Objective     Jovana received individual therapeutic exercises to develop strength, endurance, and ROM to bilateral shoulders for 15 minutes including:  Nu-step x 10 mins  Supine shoulder flexion 2# bar x 20  IR/ER RTB x 15  Pulleys x 3 mins    Jovana received the following manual therapy techniques: passive stretch were applied to the: bilateral shoulders for 30 minutes including:  Bilat PROM all directions x 20      Written Home Exercises Provided: ER stretch  Pt demo good understanding of the education provided. Jovana demonstrated good return demonstration of activities.     Education provided re:  Jovana verbalized good understanding of education provided.   No spiritual or educational barriers to learning provided    Assessment     Patient tolerated treatment well with no adverse affects  This is a 77 y.o. female referred to outpatient physical therapy and presents with a medical diagnosis of bilateral proximal humerus fractures and demonstrates limitations as described in the problem list. Pt prognosis is Good. Pt will continue to benefit from skilled outpatient physical therapy to address the deficits listed in the problem list, provide pt/family education and to maximize pt's level of independence in the home and community environment.     Goals as follows:  1.Pt will increase ROM to 90 degrees flexion/abduction  2. Pt will increase strength to 3/5  3. Decrease Pain  to 0/10  4. Pt demonstrates independence with postural awareness.   5. Pt demonstrates independence with HEP.      Plan     Continue with established Plan of Care towards PT goals.    Therapist: Prabhu Tilley, PT, DPT  2/16/2023

## 2023-02-17 ENCOUNTER — CLINICAL SUPPORT (OUTPATIENT)
Dept: REHABILITATION | Facility: HOSPITAL | Age: 78
End: 2023-02-17
Payer: MEDICARE

## 2023-02-17 DIAGNOSIS — S42.301D CLOSED FRACTURE OF BOTH HUMERI WITH ROUTINE HEALING, SUBSEQUENT ENCOUNTER: Primary | ICD-10-CM

## 2023-02-17 DIAGNOSIS — S42.302D CLOSED FRACTURE OF BOTH HUMERI WITH ROUTINE HEALING, SUBSEQUENT ENCOUNTER: Primary | ICD-10-CM

## 2023-02-17 PROCEDURE — 97140 MANUAL THERAPY 1/> REGIONS: CPT | Mod: HCWC

## 2023-02-17 PROCEDURE — 97110 THERAPEUTIC EXERCISES: CPT | Mod: HCWC

## 2023-02-17 NOTE — PROGRESS NOTES
Physical Therapy Daily Note     Name: Jovana Zendejas [facility] Number: [id number]  Diagnosis:   Encounter Diagnosis   Name Primary?    Closed fracture of both humeri with routine healing, subsequent encounter Yes     Physician: Rose Santamaria MD  Precautions:  no lifting heavy   Visit #:  14 of 14  Time In:  11:00  Time Out:  11:45    Subjective     Pt reports: I'm doing ok today.   Pain Scale: Jovana rates pain on a scale of 0-10 to be 3 currently.    Objective     Jovana received individual therapeutic exercises to develop strength, endurance, and ROM to bilateral shoulders for 15 minutes including:  Nu-step x 10 mins  Supine shoulder flexion 2# bar x 20  IR/ER RTB x 15  Pulleys x 3 mins    Jovana received the following manual therapy techniques: passive stretch were applied to the: bilateral shoulders for 30 minutes including:  Bilat PROM all directions x 20      Written Home Exercises Provided: ER stretch  Pt demo good understanding of the education provided. Jovana demonstrated good return demonstration of activities.     Education provided re:  Jovana verbalized good understanding of education provided.   No spiritual or educational barriers to learning provided    Assessment     Patient tolerated treatment well with no adverse affects  This is a 77 y.o. female referred to outpatient physical therapy and presents with a medical diagnosis of bilateral proximal humerus fractures and demonstrates limitations as described in the problem list. Pt prognosis is Good. Pt will continue to benefit from skilled outpatient physical therapy to address the deficits listed in the problem list, provide pt/family education and to maximize pt's level of independence in the home and community environment.     Goals as follows:  1.Pt will increase ROM to 90 degrees flexion/abduction  2. Pt will increase strength to 3/5  3. Decrease Pain to 0/10  4. Pt demonstrates independence  with postural awareness.   5. Pt demonstrates independence with HEP.      Plan     Continue with established Plan of Care towards PT goals.    Therapist: Prabhu Tilley, PT, DPT  2/17/2023

## 2023-02-23 ENCOUNTER — CLINICAL SUPPORT (OUTPATIENT)
Dept: REHABILITATION | Facility: HOSPITAL | Age: 78
End: 2023-02-23
Payer: MEDICARE

## 2023-02-23 ENCOUNTER — PATIENT OUTREACH (OUTPATIENT)
Dept: ADMINISTRATIVE | Facility: HOSPITAL | Age: 78
End: 2023-02-23
Payer: MEDICARE

## 2023-02-23 DIAGNOSIS — S42.302D CLOSED FRACTURE OF BOTH HUMERI WITH ROUTINE HEALING, SUBSEQUENT ENCOUNTER: Primary | ICD-10-CM

## 2023-02-23 DIAGNOSIS — S42.301D CLOSED FRACTURE OF BOTH HUMERI WITH ROUTINE HEALING, SUBSEQUENT ENCOUNTER: Primary | ICD-10-CM

## 2023-02-23 PROCEDURE — 97110 THERAPEUTIC EXERCISES: CPT | Mod: HCWC

## 2023-02-23 PROCEDURE — 97140 MANUAL THERAPY 1/> REGIONS: CPT | Mod: HCWC

## 2023-02-23 NOTE — PROGRESS NOTES
Physical Therapy Daily Note     Name: Jovana Zendejas [facility] Number: [id number]  Diagnosis:   Encounter Diagnosis   Name Primary?    Closed fracture of both humeri with routine healing, subsequent encounter Yes     Physician: Rose Santamaria MD  Precautions:  no lifting heavy   Visit #:  15 of 19  Time In:  1:00  Time Out:  2:00    Subjective     Pt reports: I'm doing ok today.   Pain Scale: Jovana rates pain on a scale of 0-10 to be 3 currently.    Objective     Jovana received individual therapeutic exercises to develop strength, endurance, and ROM to bilateral shoulders for 30 minutes including:  Nu-step x 10 mins  UBE x 10 mins  Supine shoulder flexion 2# bar x 20  IR/ER RTB x 15  Pulleys x 3 mins    Jovana received the following manual therapy techniques: passive stretch were applied to the: bilateral shoulders for 30 minutes including:  Bilat PROM all directions x 30 mins      Written Home Exercises Provided: ER stretch  Pt demo good understanding of the education provided. Jovana demonstrated good return demonstration of activities.     Education provided re:  Jovana verbalized good understanding of education provided.   No spiritual or educational barriers to learning provided    Assessment     Patient tolerated treatment well with no adverse affects  This is a 77 y.o. female referred to outpatient physical therapy and presents with a medical diagnosis of bilateral proximal humerus fractures and demonstrates limitations as described in the problem list. Pt prognosis is Good. Pt will continue to benefit from skilled outpatient physical therapy to address the deficits listed in the problem list, provide pt/family education and to maximize pt's level of independence in the home and community environment.     Goals as follows:  1.Pt will increase ROM to 90 degrees flexion/abduction  2. Pt will increase strength to 3/5  3. Decrease Pain to 0/10  4. Pt  demonstrates independence with postural awareness.   5. Pt demonstrates independence with HEP.      Plan     Continue with established Plan of Care towards PT goals. 4 scheduled visits remaining, Feb 28, Mar 7, 9, 14.     Therapist: Prabhu Tilley, PT, DPT  2/23/2023

## 2023-02-23 NOTE — PROGRESS NOTES
Patient due for the following    Influenza Vaccine (1)    Shingles Vaccine (2 of 2)    COVID-19 Vaccine (5 - Booster for Moderna series)      Immunizations: reviewed and updated  Care Everywhere: triggered  Care Teams: up to date  Outreach: completed

## 2023-02-28 ENCOUNTER — PATIENT OUTREACH (OUTPATIENT)
Dept: ADMINISTRATIVE | Facility: HOSPITAL | Age: 78
End: 2023-02-28
Payer: MEDICARE

## 2023-02-28 ENCOUNTER — TELEPHONE (OUTPATIENT)
Dept: PRIMARY CARE CLINIC | Facility: CLINIC | Age: 78
End: 2023-02-28
Payer: MEDICARE

## 2023-02-28 VITALS — SYSTOLIC BLOOD PRESSURE: 136 MMHG | DIASTOLIC BLOOD PRESSURE: 81 MMHG

## 2023-02-28 NOTE — PROGRESS NOTES
Patient due for the following    Influenza Vaccine (1)    Shingles Vaccine (2 of 2)    COVID-19 Vaccine (5 - Booster for Moderna series)      Received remote blood pressure reading 136/81    Immunizations: reviewed and updated  Care Everywhere: triggered  Care Teams: up to date  Outreach: none needed

## 2023-03-02 ENCOUNTER — CLINICAL SUPPORT (OUTPATIENT)
Dept: REHABILITATION | Facility: HOSPITAL | Age: 78
End: 2023-03-02
Payer: MEDICARE

## 2023-03-02 DIAGNOSIS — S42.301D CLOSED FRACTURE OF BOTH HUMERI WITH ROUTINE HEALING, SUBSEQUENT ENCOUNTER: Primary | ICD-10-CM

## 2023-03-02 DIAGNOSIS — S42.302D CLOSED FRACTURE OF BOTH HUMERI WITH ROUTINE HEALING, SUBSEQUENT ENCOUNTER: Primary | ICD-10-CM

## 2023-03-02 PROCEDURE — 97110 THERAPEUTIC EXERCISES: CPT | Mod: HCWC

## 2023-03-02 PROCEDURE — 97140 MANUAL THERAPY 1/> REGIONS: CPT | Mod: HCWC

## 2023-03-02 NOTE — PROGRESS NOTES
Physical Therapy Daily Note     Name: Jovana Zendejas [facility] Number: [id number]  Diagnosis:   Encounter Diagnosis   Name Primary?    Closed fracture of both humeri with routine healing, subsequent encounter Yes     Physician: Rose Santamaria MD  Precautions:  no lifting heavy   Visit #:  15 of 19  Time In:  1:00  Time Out:  2:00    Subjective     Pt reports: I'm doing ok today.   Pain Scale: Jovana rates pain on a scale of 0-10 to be 3 currently.    Objective     Jovana received individual therapeutic exercises to develop strength, endurance, and ROM to bilateral shoulders for 30 minutes including:  Nu-step x 10 mins  UBE x 10 mins  Supine shoulder flexion 2# bar x 20  IR/ER RTB x 15  Pulleys x 3 mins    Jovana received the following manual therapy techniques: passive stretch were applied to the: bilateral shoulders for 30 minutes including:  Bilat PROM all directions x 30 mins      Written Home Exercises Provided: ER stretch  Pt demo good understanding of the education provided. Jovana demonstrated good return demonstration of activities.     Education provided re:  Jovana verbalized good understanding of education provided.   No spiritual or educational barriers to learning provided    Assessment     Patient tolerated treatment well with no adverse affects  This is a 77 y.o. female referred to outpatient physical therapy and presents with a medical diagnosis of bilateral proximal humerus fractures and demonstrates limitations as described in the problem list. Pt prognosis is Good. Pt will continue to benefit from skilled outpatient physical therapy to address the deficits listed in the problem list, provide pt/family education and to maximize pt's level of independence in the home and community environment.     Goals as follows:  1.Pt will increase ROM to 90 degrees flexion/abduction  2. Pt will increase strength to 3/5  3. Decrease Pain to 0/10  4. Pt  demonstrates independence with postural awareness.   5. Pt demonstrates independence with HEP.      Plan     Continue with established Plan of Care towards PT goals. 3 scheduled visits remaining, Mar 7, 9, 14.     Therapist: Prabhu Tilley, PT, DPT  3/2/2023

## 2023-03-07 ENCOUNTER — CLINICAL SUPPORT (OUTPATIENT)
Dept: REHABILITATION | Facility: HOSPITAL | Age: 78
End: 2023-03-07
Attending: STUDENT IN AN ORGANIZED HEALTH CARE EDUCATION/TRAINING PROGRAM
Payer: MEDICARE

## 2023-03-07 DIAGNOSIS — S42.302D CLOSED FRACTURE OF BOTH HUMERI WITH ROUTINE HEALING, SUBSEQUENT ENCOUNTER: Primary | ICD-10-CM

## 2023-03-07 DIAGNOSIS — S42.301D CLOSED FRACTURE OF BOTH HUMERI WITH ROUTINE HEALING, SUBSEQUENT ENCOUNTER: Primary | ICD-10-CM

## 2023-03-07 PROCEDURE — 97110 THERAPEUTIC EXERCISES: CPT | Mod: HCWC

## 2023-03-07 PROCEDURE — 97140 MANUAL THERAPY 1/> REGIONS: CPT | Mod: HCWC

## 2023-03-07 NOTE — PROGRESS NOTES
Physical Therapy Daily Note     Name: Jovana Zendejas [facility] Number: [id number]  Diagnosis:   Encounter Diagnosis   Name Primary?    Closed fracture of both humeri with routine healing, subsequent encounter Yes     Physician: Rose Santamaria MD  Precautions:  no lifting heavy   Visit #:  16 of 19  Time In:  1:00  Time Out:  2:00    Subjective     Pt reports: I'm doing ok today.   Pain Scale: Jovana rates pain on a scale of 0-10 to be 3 currently.    Objective     Jovana received individual therapeutic exercises to develop strength, endurance, and ROM to bilateral shoulders for 30 minutes including:  Nu-step x 10 mins  UBE x 10 mins  Supine shoulder flexion 2# bar x 20  IR/ER RTB x 15  Pulleys x 3 mins    Jovana received the following manual therapy techniques: passive stretch were applied to the: bilateral shoulders for 30 minutes including:  Bilat PROM all directions x 30 mins      Written Home Exercises Provided: ER stretch  Pt demo good understanding of the education provided. Jovana demonstrated good return demonstration of activities.     Education provided re:  Jovana verbalized good understanding of education provided.   No spiritual or educational barriers to learning provided    Assessment     Patient tolerated treatment well with no adverse affects  This is a 77 y.o. female referred to outpatient physical therapy and presents with a medical diagnosis of bilateral proximal humerus fractures and demonstrates limitations as described in the problem list. Pt prognosis is Good. Pt will continue to benefit from skilled outpatient physical therapy to address the deficits listed in the problem list, provide pt/family education and to maximize pt's level of independence in the home and community environment.     Goals as follows:  1.Pt will increase ROM to 90 degrees flexion/abduction  2. Pt will increase strength to 3/5  3. Decrease Pain to 0/10  4. Pt  demonstrates independence with postural awareness.   5. Pt demonstrates independence with HEP.      Plan     Continue with established Plan of Care towards PT goals. 2 scheduled visits remaining, Mar 9, 14.     Therapist: Prabhu Tilley, PT, DPT  3/7/2023

## 2023-03-10 ENCOUNTER — CLINICAL SUPPORT (OUTPATIENT)
Dept: REHABILITATION | Facility: HOSPITAL | Age: 78
End: 2023-03-10
Payer: MEDICARE

## 2023-03-10 DIAGNOSIS — S42.302D CLOSED FRACTURE OF BOTH HUMERI WITH ROUTINE HEALING, SUBSEQUENT ENCOUNTER: Primary | ICD-10-CM

## 2023-03-10 DIAGNOSIS — S42.301D CLOSED FRACTURE OF BOTH HUMERI WITH ROUTINE HEALING, SUBSEQUENT ENCOUNTER: Primary | ICD-10-CM

## 2023-03-10 PROCEDURE — 97140 MANUAL THERAPY 1/> REGIONS: CPT | Mod: HCWC

## 2023-03-10 PROCEDURE — 97110 THERAPEUTIC EXERCISES: CPT | Mod: HCWC

## 2023-03-16 ENCOUNTER — CLINICAL SUPPORT (OUTPATIENT)
Dept: REHABILITATION | Facility: HOSPITAL | Age: 78
End: 2023-03-16
Payer: MEDICARE

## 2023-03-16 DIAGNOSIS — S42.301D CLOSED FRACTURE OF BOTH HUMERI WITH ROUTINE HEALING, SUBSEQUENT ENCOUNTER: Primary | ICD-10-CM

## 2023-03-16 DIAGNOSIS — S42.302D CLOSED FRACTURE OF BOTH HUMERI WITH ROUTINE HEALING, SUBSEQUENT ENCOUNTER: Primary | ICD-10-CM

## 2023-03-16 PROCEDURE — 97110 THERAPEUTIC EXERCISES: CPT | Mod: HCWC

## 2023-03-16 PROCEDURE — 97140 MANUAL THERAPY 1/> REGIONS: CPT | Mod: HCWC

## 2023-03-16 NOTE — PROGRESS NOTES
Physical Therapy Daily Note     [id number]  Diagnosis:   Encounter Diagnosis   Name Primary?    Closed fracture of both humeri with routine healing, subsequent encounter Yes     Physician: Rose Santamaria MD  Precautions:  no lifting heavy   Visit #:  20   Time In:  2:00  Time Out:  3:00    Subjective     Pt reports: Increased bilateral shoulder pain after last treatment.  Pain Scale: Jovana rates pain on a scale of 0-10 to be 3 currently.    Objective     Jovana received individual therapeutic exercises to develop strength, endurance, and ROM to bilateral shoulders for 30 minutes including:  Nu-step x 10 mins  UBE x 10 mins  Supine shoulder flexion 2# bar x 20  Supine shoulder press 2# bar x 20  Right sidelying left shoulder abduction 2# x 20  IR/ER RTB x 15  Pulleys x 3 mins    Jovana received the following manual therapy techniques: passive stretch were applied to the: bilateral shoulders for 30 minutes including:  Bilat PROM all directions x 30 mins  Joint mobs posterior glide to improve flexion and inferior glides to improve abduction bilat. shoulders    Written Home Exercises Provided: ER stretch  Pt demo good understanding of the education provided. Jovana demonstrated good return demonstration of activities.     Education provided re:  Jovana verbalized good understanding of education provided.   No spiritual or educational barriers to learning provided    Assessment     Patient tolerated treatment well with no adverse affects  This is a 77 y.o. female referred to outpatient physical therapy and presents with a medical diagnosis of bilateral proximal humerus fractures and demonstrates limitations as described in the problem list. Pt prognosis is Good. Pt will continue to benefit from skilled outpatient physical therapy to address the deficits listed in the problem list, provide pt/family education and to maximize pt's  level of independence in the home and community environment.     Goals as follows:  1.Pt will increase ROM to 90 degrees flexion/abduction  2. Pt will increase strength to 3/5  3. Decrease Pain to 0/10  4. Pt demonstrates independence with postural awareness.   5. Pt demonstrates independence with HEP.      Plan     Continue with established Plan of Care towards PT goals. 2 scheduled visits remaining, Mar 9, 14.     Therapist: Prabhu Tilley, PT, DPT  3/16/2023

## 2023-03-21 ENCOUNTER — OFFICE VISIT (OUTPATIENT)
Dept: PRIMARY CARE CLINIC | Facility: CLINIC | Age: 78
End: 2023-03-21
Payer: MEDICARE

## 2023-03-21 ENCOUNTER — LAB VISIT (OUTPATIENT)
Dept: LAB | Facility: HOSPITAL | Age: 78
End: 2023-03-21
Attending: INTERNAL MEDICINE
Payer: MEDICARE

## 2023-03-21 VITALS
SYSTOLIC BLOOD PRESSURE: 142 MMHG | BODY MASS INDEX: 30.1 KG/M2 | OXYGEN SATURATION: 96 % | WEIGHT: 191.81 LBS | DIASTOLIC BLOOD PRESSURE: 88 MMHG | HEART RATE: 75 BPM | HEIGHT: 67 IN | TEMPERATURE: 98 F

## 2023-03-21 DIAGNOSIS — I25.10 CORONARY ARTERY DISEASE INVOLVING NATIVE CORONARY ARTERY OF NATIVE HEART WITHOUT ANGINA PECTORIS: ICD-10-CM

## 2023-03-21 DIAGNOSIS — E11.69 HYPERLIPIDEMIA ASSOCIATED WITH TYPE 2 DIABETES MELLITUS: ICD-10-CM

## 2023-03-21 DIAGNOSIS — M48.061 DEGENERATIVE LUMBAR SPINAL STENOSIS: ICD-10-CM

## 2023-03-21 DIAGNOSIS — E11.21 TYPE 2 DIABETES MELLITUS WITH DIABETIC NEPHROPATHY, WITHOUT LONG-TERM CURRENT USE OF INSULIN: ICD-10-CM

## 2023-03-21 DIAGNOSIS — I70.0 THORACIC AORTA ATHEROSCLEROSIS: ICD-10-CM

## 2023-03-21 DIAGNOSIS — I11.9 HYPERTENSIVE HEART DISEASE WITHOUT HEART FAILURE: Primary | ICD-10-CM

## 2023-03-21 DIAGNOSIS — E78.5 HYPERLIPIDEMIA ASSOCIATED WITH TYPE 2 DIABETES MELLITUS: ICD-10-CM

## 2023-03-21 DIAGNOSIS — F33.9 MAJOR DEPRESSION, RECURRENT, CHRONIC: ICD-10-CM

## 2023-03-21 DIAGNOSIS — M81.0 OSTEOPOROSIS WITHOUT CURRENT PATHOLOGICAL FRACTURE, UNSPECIFIED OSTEOPOROSIS TYPE: ICD-10-CM

## 2023-03-21 LAB
ALBUMIN SERPL BCP-MCNC: 4 G/DL (ref 3.5–5.2)
ALP SERPL-CCNC: 154 U/L (ref 55–135)
ALT SERPL W/O P-5'-P-CCNC: 19 U/L (ref 10–44)
ANION GAP SERPL CALC-SCNC: 7 MMOL/L (ref 8–16)
AST SERPL-CCNC: 18 U/L (ref 10–40)
BILIRUB SERPL-MCNC: 0.7 MG/DL (ref 0.1–1)
BUN SERPL-MCNC: 15 MG/DL (ref 8–23)
CALCIUM SERPL-MCNC: 9.6 MG/DL (ref 8.7–10.5)
CHLORIDE SERPL-SCNC: 108 MMOL/L (ref 95–110)
CO2 SERPL-SCNC: 27 MMOL/L (ref 23–29)
CREAT SERPL-MCNC: 0.7 MG/DL (ref 0.5–1.4)
ERYTHROCYTE [DISTWIDTH] IN BLOOD BY AUTOMATED COUNT: 13.9 % (ref 11.5–14.5)
EST. GFR  (NO RACE VARIABLE): >60 ML/MIN/1.73 M^2
ESTIMATED AVG GLUCOSE: 154 MG/DL (ref 68–131)
GLUCOSE SERPL-MCNC: 96 MG/DL (ref 70–110)
HBA1C MFR BLD: 7 % (ref 4–5.6)
HCT VFR BLD AUTO: 45.6 % (ref 37–48.5)
HGB BLD-MCNC: 14.5 G/DL (ref 12–16)
MCH RBC QN AUTO: 29.3 PG (ref 27–31)
MCHC RBC AUTO-ENTMCNC: 31.8 G/DL (ref 32–36)
MCV RBC AUTO: 92 FL (ref 82–98)
PLATELET # BLD AUTO: 245 K/UL (ref 150–450)
PMV BLD AUTO: 10.6 FL (ref 9.2–12.9)
POTASSIUM SERPL-SCNC: 4 MMOL/L (ref 3.5–5.1)
PROT SERPL-MCNC: 7.2 G/DL (ref 6–8.4)
RBC # BLD AUTO: 4.95 M/UL (ref 4–5.4)
SODIUM SERPL-SCNC: 142 MMOL/L (ref 136–145)
WBC # BLD AUTO: 7.47 K/UL (ref 3.9–12.7)

## 2023-03-21 PROCEDURE — 3051F PR MOST RECENT HEMOGLOBIN A1C LEVEL 7.0 - < 8.0%: ICD-10-PCS | Mod: HCNC,CPTII,S$GLB, | Performed by: INTERNAL MEDICINE

## 2023-03-21 PROCEDURE — 1101F PT FALLS ASSESS-DOCD LE1/YR: CPT | Mod: HCNC,CPTII,S$GLB, | Performed by: INTERNAL MEDICINE

## 2023-03-21 PROCEDURE — 1159F MED LIST DOCD IN RCRD: CPT | Mod: HCNC,CPTII,S$GLB, | Performed by: INTERNAL MEDICINE

## 2023-03-21 PROCEDURE — 1126F AMNT PAIN NOTED NONE PRSNT: CPT | Mod: HCNC,CPTII,S$GLB, | Performed by: INTERNAL MEDICINE

## 2023-03-21 PROCEDURE — 99214 OFFICE O/P EST MOD 30 MIN: CPT | Mod: HCNC,S$GLB,, | Performed by: INTERNAL MEDICINE

## 2023-03-21 PROCEDURE — 83036 HEMOGLOBIN GLYCOSYLATED A1C: CPT | Mod: HCNC | Performed by: INTERNAL MEDICINE

## 2023-03-21 PROCEDURE — 3288F PR FALLS RISK ASSESSMENT DOCUMENTED: ICD-10-PCS | Mod: HCNC,CPTII,S$GLB, | Performed by: INTERNAL MEDICINE

## 2023-03-21 PROCEDURE — 3051F HG A1C>EQUAL 7.0%<8.0%: CPT | Mod: HCNC,CPTII,S$GLB, | Performed by: INTERNAL MEDICINE

## 2023-03-21 PROCEDURE — 1101F PR PT FALLS ASSESS DOC 0-1 FALLS W/OUT INJ PAST YR: ICD-10-PCS | Mod: HCNC,CPTII,S$GLB, | Performed by: INTERNAL MEDICINE

## 2023-03-21 PROCEDURE — 80053 COMPREHEN METABOLIC PANEL: CPT | Mod: HCNC | Performed by: INTERNAL MEDICINE

## 2023-03-21 PROCEDURE — 1160F RVW MEDS BY RX/DR IN RCRD: CPT | Mod: HCNC,CPTII,S$GLB, | Performed by: INTERNAL MEDICINE

## 2023-03-21 PROCEDURE — 3079F DIAST BP 80-89 MM HG: CPT | Mod: HCNC,CPTII,S$GLB, | Performed by: INTERNAL MEDICINE

## 2023-03-21 PROCEDURE — 99999 PR PBB SHADOW E&M-EST. PATIENT-LVL III: CPT | Mod: PBBFAC,HCNC,, | Performed by: INTERNAL MEDICINE

## 2023-03-21 PROCEDURE — 99214 PR OFFICE/OUTPT VISIT, EST, LEVL IV, 30-39 MIN: ICD-10-PCS | Mod: HCNC,S$GLB,, | Performed by: INTERNAL MEDICINE

## 2023-03-21 PROCEDURE — 3079F PR MOST RECENT DIASTOLIC BLOOD PRESSURE 80-89 MM HG: ICD-10-PCS | Mod: HCNC,CPTII,S$GLB, | Performed by: INTERNAL MEDICINE

## 2023-03-21 PROCEDURE — 1126F PR PAIN SEVERITY QUANTIFIED, NO PAIN PRESENT: ICD-10-PCS | Mod: HCNC,CPTII,S$GLB, | Performed by: INTERNAL MEDICINE

## 2023-03-21 PROCEDURE — 3077F PR MOST RECENT SYSTOLIC BLOOD PRESSURE >= 140 MM HG: ICD-10-PCS | Mod: HCNC,CPTII,S$GLB, | Performed by: INTERNAL MEDICINE

## 2023-03-21 PROCEDURE — 3077F SYST BP >= 140 MM HG: CPT | Mod: HCNC,CPTII,S$GLB, | Performed by: INTERNAL MEDICINE

## 2023-03-21 PROCEDURE — 1159F PR MEDICATION LIST DOCUMENTED IN MEDICAL RECORD: ICD-10-PCS | Mod: HCNC,CPTII,S$GLB, | Performed by: INTERNAL MEDICINE

## 2023-03-21 PROCEDURE — 1160F PR REVIEW ALL MEDS BY PRESCRIBER/CLIN PHARMACIST DOCUMENTED: ICD-10-PCS | Mod: HCNC,CPTII,S$GLB, | Performed by: INTERNAL MEDICINE

## 2023-03-21 PROCEDURE — 3288F FALL RISK ASSESSMENT DOCD: CPT | Mod: HCNC,CPTII,S$GLB, | Performed by: INTERNAL MEDICINE

## 2023-03-21 PROCEDURE — 85027 COMPLETE CBC AUTOMATED: CPT | Mod: HCNC | Performed by: INTERNAL MEDICINE

## 2023-03-21 PROCEDURE — 99999 PR PBB SHADOW E&M-EST. PATIENT-LVL III: ICD-10-PCS | Mod: PBBFAC,HCNC,, | Performed by: INTERNAL MEDICINE

## 2023-03-21 PROCEDURE — 36415 COLL VENOUS BLD VENIPUNCTURE: CPT | Mod: HCNC,PN | Performed by: INTERNAL MEDICINE

## 2023-03-21 RX ORDER — LOSARTAN POTASSIUM 100 MG/1
100 TABLET ORAL
COMMUNITY
Start: 2022-12-01 | End: 2023-03-21

## 2023-03-22 ENCOUNTER — CLINICAL SUPPORT (OUTPATIENT)
Dept: REHABILITATION | Facility: HOSPITAL | Age: 78
End: 2023-03-22
Payer: MEDICARE

## 2023-03-22 DIAGNOSIS — S42.302D CLOSED FRACTURE OF BOTH HUMERI WITH ROUTINE HEALING, SUBSEQUENT ENCOUNTER: Primary | ICD-10-CM

## 2023-03-22 DIAGNOSIS — S42.301D CLOSED FRACTURE OF BOTH HUMERI WITH ROUTINE HEALING, SUBSEQUENT ENCOUNTER: Primary | ICD-10-CM

## 2023-03-22 PROCEDURE — 97140 MANUAL THERAPY 1/> REGIONS: CPT | Mod: HCWC,CQ

## 2023-03-22 PROCEDURE — 97112 NEUROMUSCULAR REEDUCATION: CPT | Mod: HCWC,CQ

## 2023-03-22 RX ORDER — CARVEDILOL 25 MG/1
25 TABLET ORAL 2 TIMES DAILY
Qty: 180 TABLET | Refills: 2 | Status: SHIPPED | OUTPATIENT
Start: 2023-03-22 | End: 2023-12-12 | Stop reason: SDUPTHER

## 2023-03-22 RX ORDER — VENLAFAXINE 100 MG/1
100 TABLET ORAL DAILY
Qty: 90 TABLET | Refills: 2 | Status: SHIPPED | OUTPATIENT
Start: 2023-03-22 | End: 2023-12-12 | Stop reason: SDUPTHER

## 2023-03-22 RX ORDER — ATORVASTATIN CALCIUM 80 MG/1
80 TABLET, FILM COATED ORAL DAILY
Qty: 90 TABLET | Refills: 2 | Status: SHIPPED | OUTPATIENT
Start: 2023-03-22 | End: 2023-12-12 | Stop reason: SDUPTHER

## 2023-03-22 NOTE — PROGRESS NOTES
Subjective:       Patient ID: Jovana Fall is a 77 y.o. female.    Chief Complaint: Follow-up    Last seen 3 months ago. Returns for scheduled f/u chronic medical conditions. Taking daily meds as prescribed. Home Glucose ranges 108-142 fasting, -150/70's per [facility] logs. No new complaints. Has been in physical therapy for bilateral humeral fractures, range of motion is still limited. Uses pain medicine after PT, and sometimes for chronic back pain. Counseled on avoidance of all sedating meds before driving.     PMH: , misc x1.   Diabetes Type 2. HbA1c HbA1c 6.5% .  Hypertensive heart disease without heart failure.   Hyperlipidemia, LDL 76 .  Nephrolithiasis.   Urinary incontinence.  GERD. EGD 9/15, 3/17,  - mild chronic non-active gastritis, no dysplasia, H pylori negative.  Depression/Anxiety.  Non-obstructive Coronary artery disease, angiogram , Cardiology .  Osteoporosis.  Mild Vitamin D deficiency, up to 68.  Lumbar Spinal Stenosis. Cervical Spine DJD.   Skin Cancer, Basal Cell and Melanoma.     Mammogram normal . Pelvic exam . BMD . Eye exam . Exercise Stress Echo negative 3/18.  Colonoscopy  - internal hemorrhoid, diverticulosis, two tubular adenomas, random biopsies negative. Vaccines reviewed, not in LINKS due to received in [place] - had Prevnar 20, Flu shot, Shingrix and Bivalent COVID booster in 2022.    PSH: reviewed.     Social: Nonsmoker. Occas. alcohol. . 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    FMH: Colon and ovarian cancer in cousin. Colon cancer in brother. Mother - heart disease. MGM with cervical cancer. DM in aunts.     Allergies: multiple, see Med Card.     Medications: list reviewed and reconciled.             Review of Systems   Constitutional:  Negative for activity change, appetite change, fatigue, fever and unexpected weight change.   HENT:  Negative for nasal congestion,  "ear pain, hearing loss, rhinorrhea, sneezing, sore throat, trouble swallowing and voice change.    Eyes:  Negative for pain and visual disturbance.   Respiratory:  Negative for cough, chest tightness, shortness of breath and wheezing.    Cardiovascular:  Negative for chest pain, palpitations and leg swelling.   Gastrointestinal:  Negative for abdominal pain, blood in stool, constipation, diarrhea, nausea and vomiting.   Genitourinary:  Negative for dysuria and frequency.   Musculoskeletal:  Positive for arthralgias and back pain. Negative for gait problem, joint swelling and myalgias.   Integumentary:  Negative for color change and rash.   Neurological:  Negative for dizziness, syncope, facial asymmetry, speech difficulty, weakness, numbness and headaches.   Hematological:  Negative for adenopathy. Does not bruise/bleed easily.   Psychiatric/Behavioral:  Negative for dysphoric mood and sleep disturbance. The patient is not nervous/anxious.         Depression is stable on Effexor.        Objective:      Vitals:    03/21/23 1439   BP: (!) 142/88   Pulse: 75   Temp: 97.9 °F (36.6 °C)   SpO2: 96%   Weight: 87 kg (191 lb 12.8 oz)   Height: 5' 7" (1.702 m)   BMI=30  Physical Exam  Vitals reviewed.   Constitutional:       General: She is not in acute distress.     Appearance: She is well-developed. She is not diaphoretic.   HENT:      Head: Normocephalic and atraumatic.      Right Ear: Tympanic membrane and ear canal normal.      Left Ear: Tympanic membrane and ear canal normal.      Nose: Nose normal. No congestion.      Mouth/Throat:      Mouth: Mucous membranes are moist.      Pharynx: Oropharynx is clear.   Eyes:      General: No scleral icterus.     Extraocular Movements: Extraocular movements intact.      Conjunctiva/sclera: Conjunctivae normal.      Right eye: Right conjunctiva is not injected.      Left eye: Left conjunctiva is not injected.      Pupils: Pupils are equal, round, and reactive to light.   Neck:      " Thyroid: No thyromegaly.      Vascular: No carotid bruit or JVD.   Cardiovascular:      Rate and Rhythm: Normal rate and regular rhythm.      Pulses: Normal pulses.      Heart sounds: Normal heart sounds. No murmur heard.    No friction rub. No gallop.   Pulmonary:      Effort: Pulmonary effort is normal. No respiratory distress.      Breath sounds: Normal breath sounds. No wheezing, rhonchi or rales.   Abdominal:      General: Bowel sounds are normal. There is no distension.      Palpations: Abdomen is soft. There is no mass.      Tenderness: There is no abdominal tenderness.   Musculoskeletal:         General: No swelling, tenderness or deformity.      Cervical back: Normal range of motion and neck supple.      Right lower leg: No edema.      Left lower leg: No edema.   Lymphadenopathy:      Cervical: No cervical adenopathy.   Skin:     General: Skin is warm and dry.      Coloration: Skin is not pale.      Findings: No erythema or rash.      Nails: There is no clubbing.   Neurological:      General: No focal deficit present.      Mental Status: She is alert and oriented to person, place, and time.      Cranial Nerves: No cranial nerve deficit.      Motor: No weakness or abnormal muscle tone.      Coordination: Coordination normal.      Gait: Gait normal.   Psychiatric:         Mood and Affect: Mood and affect normal.         Speech: Speech normal.         Behavior: Behavior normal.         Thought Content: Thought content normal.         Judgment: Judgment normal.       Assessment:       Problem List Items Addressed This Visit       Major depression, recurrent, chronic    Relevant Medications    venlafaxine (EFFEXOR) 100 MG Tab    Hyperlipidemia associated with type 2 diabetes mellitus    Relevant Medications    atorvastatin (LIPITOR) 80 MG tablet    Thoracic aorta atherosclerosis     Other Visit Diagnoses       Hypertensive heart disease without heart failure    -  Primary    Relevant Medications    carvediloL  (COREG) 25 MG tablet    Type 2 diabetes mellitus with diabetic nephropathy, without long-term current use of insulin        Relevant Medications    empagliflozin (JARDIANCE) 25 mg tablet    Other Relevant Orders    CBC Without Differential (Completed)    Comprehensive Metabolic Panel (Completed)    Hemoglobin A1C (Completed)    Coronary artery disease involving native coronary artery of native heart without angina pectoris        Degenerative lumbar spinal stenosis        Osteoporosis without current pathological fracture, unspecified osteoporosis type                  Plan:       Hypertensive heart disease without heart failure  -     carvediloL (COREG) 25 MG tablet; Take 1 tablet (25 mg total) by mouth 2 (two) times daily.  Dispense: 180 tablet; Refill: 2  -     continue Telmisartan 80, refills available.    Type 2 diabetes mellitus with diabetic nephropathy, without long-term current use of insulin  -     CBC Without Differential; Future; Expected date: 03/21/2023  -     Comprehensive Metabolic Panel; Future; Expected date: 03/21/2023  -     Hemoglobin A1C; Future; Expected date: 03/21/2023  -     empagliflozin (JARDIANCE) 25 mg tablet; Take 1 tablet (25 mg total) by mouth once daily.  Dispense: 90 tablet; Refill: 2    Hyperlipidemia associated with type 2 diabetes mellitus  -     atorvastatin (LIPITOR) 80 MG tablet; Take 1 tablet (80 mg total) by mouth once daily.  Dispense: 90 tablet; Refill: 2    Coronary artery disease involving native coronary artery of native heart without angina pectoris        -     Cardiology follow up recommended.    Thoracic aorta atherosclerosis        -      continue high intensity statin.    Degenerative lumbar spinal stenosis        -      stable, Oxycodone recently refilled, Opiate Risk Tool completed and Pain Contract signed today.     Osteoporosis without current pathological fracture, unspecified osteoporosis type        -      continue Alendronate, Vitamin D; DEXA scan due  summer 2024.    Major depression, recurrent, chronic  -     venlafaxine (EFFEXOR) 100 MG Tab; Take 1 tablet (100 mg total) by mouth once daily.  Dispense: 90 tablet; Refill: 2

## 2023-03-22 NOTE — PROGRESS NOTES
Physical Therapy Daily Note     Name: Jovana Zendejas [facility] Number: [id number]  Diagnosis:   Encounter Diagnosis   Name Primary?    Closed fracture of both humeri with routine healing, subsequent encounter Yes     Physician: Rose Santamaria MD  Precautions: No heavy lefting  Visit #: 21  PTA Visit #: 1  Time In: 300  Time Out: 400    Subjective     Pt reports: I'm very tight like always, it's hard to move.  Pain Scale: Jovana rates pain on a scale of 0-10 to be 4 currently.    Objective     Jovana received individual therapeutic exercises (0 minutes) to develop strength, endurance, ROM, flexibility, posture, and core stabilization, which included:    Jovana received individual neuromuscular re-education (39 minutes) to facilitate muscular re-education to initiate improved range of motion, balance, and reduction of pain, which included:     NuStep x 10    Inhibitory techniques to bilateral cervical/thoracic region, SITS, scalenes, pecs, deltoids combined with prolonged static stretch and PROM    Jovana received individual therapeutic activities to initiate improved balance and functional mobility which included:       Jovana received the following manual therapy techniques (21 minutes): Myofacial release were applied to the: bilateral cervical/thoracic region, SITS, scalenes, pecs, deltoids to initiate increased ROM, reduce pain, and prepare soft tissue for inhibitory techniques. Scar tissue mobilization to increase ROM and aid in healing.       Written Home Exercises Provided: N/A  Pt demo good understanding of the education provided. Jovana demonstrated good return demonstration of activities.     Education provided re: Provided patient education correct posture and positioning to reduce pain  Jovana verbalized good understanding of education provided.   No spiritual or educational barriers to learning provided    Assessment     Observed patient demonstrates  decreased bilateral shoulder ROM and poor posture secondary to tension, pain, and diagnosis. Patient tolerated modalities well w/o adverse effects, modified to stay within patient's pain and ROM tolerance, patient demonstrates improved ROM/posture and reduced pain post session.      This is a 77 y.o. female referred to outpatient physical therapy and presents with a medical diagnosis of closed bilateral humeral fracture and demonstrates limitations as described in the problem list. Pt prognosis is Good. Pt will continue to benefit from skilled outpatient physical therapy to address the deficits listed in the problem list, provide pt/family education and to maximize pt's level of independence in the home and community environment.     Goals as follows:  1.Pt will increase ROM to 90 degrees flexion/abduction  2. Pt will increase strength to 3/5  3. Decrease Pain to 0/10  4. Pt demonstrates independence with postural awareness.   5. Pt demonstrates independence with HEP.         Plan     Continue with established Plan of Care towards PT goals.    Therapist: Jillian Boles, PTA  3/22/2023

## 2023-03-24 ENCOUNTER — CLINICAL SUPPORT (OUTPATIENT)
Dept: REHABILITATION | Facility: HOSPITAL | Age: 78
End: 2023-03-24
Payer: MEDICARE

## 2023-03-24 DIAGNOSIS — S42.301D CLOSED FRACTURE OF BOTH HUMERI WITH ROUTINE HEALING, SUBSEQUENT ENCOUNTER: Primary | ICD-10-CM

## 2023-03-24 DIAGNOSIS — S42.302D CLOSED FRACTURE OF BOTH HUMERI WITH ROUTINE HEALING, SUBSEQUENT ENCOUNTER: Primary | ICD-10-CM

## 2023-03-24 PROCEDURE — 97112 NEUROMUSCULAR REEDUCATION: CPT | Mod: HCWC,CQ

## 2023-03-24 PROCEDURE — 97140 MANUAL THERAPY 1/> REGIONS: CPT | Mod: HCWC,CQ

## 2023-03-24 NOTE — PROGRESS NOTES
Physical Therapy Daily Note     Name: Jovana Zendejas [facility] Number: [id number]  Diagnosis:   Encounter Diagnosis   Name Primary?    Closed fracture of both humeri with routine healing, subsequent encounter Yes     Physician: Rose Santamaria MD  Precautions: No heavy lefting  Visit #: 22  PTA Visit #: 2  Time In: 300  Time Out: 400    Subjective     Pt reports: The previous session was very helpful. My shoulders are not as tight as they usually are. I can move them much better but they are still pretty tight and it runs down into my back.  Pain Scale: Jovana rates pain on a scale of 0-10 to be 4 currently.    Objective     Jovana received individual therapeutic exercises (0 minutes) to develop strength, endurance, ROM, flexibility, posture, and core stabilization, which included:    Jovana received individual neuromuscular re-education (39 minutes) to facilitate muscular re-education to initiate improved range of motion, balance, and reduction of pain, which included:     NuStep x 10    CROM  Shoulder ROM    Inhibitory techniques to bilateral cervical/thoracic/lumbar/sacral region, SITS, scalenes combined with prolonged static stretch and PROM    Jovana received individual therapeutic activities to initiate improved balance and functional mobility which included:       Jovana received the following manual therapy techniques (21 minutes): Myofacial release were applied to the: bilateral cervical/thoracic/lumbar/sacral region, SITS, and scalenes to initiate increased ROM, reduce pain, and prepare soft tissue for inhibitory techniques.     Scar tissue mobilization to increase ROM.       Written Home Exercises Provided: N/A  Pt demo good understanding of the education provided. Jovana demonstrated good return demonstration of activities.     Education provided re: Provided patient education correct posture and positioning to reduce pain  Jovana verbalized good  understanding of education provided.   No spiritual or educational barriers to learning provided    Assessment     Patient tolerated modalities well w/o adverse effects, modified to stay within patient's pain and ROM tolerance, patient demonstrates improved ROM/posture and reduced pain post session.      This is a 77 y.o. female referred to outpatient physical therapy and presents with a medical diagnosis of closed bilateral humeral fracture and demonstrates limitations as described in the problem list. Pt prognosis is Good. Pt will continue to benefit from skilled outpatient physical therapy to address the deficits listed in the problem list, provide pt/family education and to maximize pt's level of independence in the home and community environment.     Goals as follows:  1.Pt will increase ROM to 90 degrees flexion/abduction  2. Pt will increase strength to 3/5  3. Decrease Pain to 0/10  4. Pt demonstrates independence with postural awareness.   5. Pt demonstrates independence with HEP.         Plan     Continue with established Plan of Care towards PT goals.    Therapist: Jillian Boles, PTA  3/24/2023

## 2023-03-31 ENCOUNTER — TELEPHONE (OUTPATIENT)
Dept: PRIMARY CARE CLINIC | Facility: CLINIC | Age: 78
End: 2023-03-31

## 2023-04-12 ENCOUNTER — PATIENT OUTREACH (OUTPATIENT)
Dept: ADMINISTRATIVE | Facility: HOSPITAL | Age: 78
End: 2023-04-12
Payer: MEDICARE

## 2023-04-12 ENCOUNTER — PATIENT MESSAGE (OUTPATIENT)
Dept: ADMINISTRATIVE | Facility: HOSPITAL | Age: 78
End: 2023-04-12
Payer: MEDICARE

## 2023-04-12 NOTE — PROGRESS NOTES
Patient due for the following    COVID-19 Vaccine (5 - Booster for Moderna series)    Diabetes Urine Screening       Immunizations: reviewed and updated  Care Everywhere: triggered  Care Teams: up to date  Outreach: none needed

## 2023-04-18 ENCOUNTER — PATIENT OUTREACH (OUTPATIENT)
Dept: ADMINISTRATIVE | Facility: HOSPITAL | Age: 78
End: 2023-04-18
Payer: MEDICARE

## 2023-04-18 ENCOUNTER — TELEPHONE (OUTPATIENT)
Dept: PRIMARY CARE CLINIC | Facility: CLINIC | Age: 78
End: 2023-04-18
Payer: MEDICARE

## 2023-04-18 VITALS — DIASTOLIC BLOOD PRESSURE: 73 MMHG | SYSTOLIC BLOOD PRESSURE: 119 MMHG

## 2023-04-18 NOTE — PROGRESS NOTES
Patient due for the following    COVID-19 Vaccine (5 - Booster for Moderna series)    Diabetes Urine Screening       Remote blood pressure reading 119/73    Immunizations: reviewed and updated  Care Everywhere: triggered  Care Teams: up to date  Outreach: none needed

## 2023-04-19 ENCOUNTER — TELEPHONE (OUTPATIENT)
Dept: PRIMARY CARE CLINIC | Facility: CLINIC | Age: 78
End: 2023-04-19
Payer: MEDICARE

## 2023-04-19 DIAGNOSIS — M48.061 DEGENERATIVE LUMBAR SPINAL STENOSIS: ICD-10-CM

## 2023-04-19 NOTE — TELEPHONE ENCOUNTER
Pt called stating she had another fall on March 26th she had surgery on March 30th at [facility]  she go some type of hard ware in her wrist for support. Pt state she is lost mobility in her right hand and she is taking more gabapentin and oxycodone than usual she is very upset because she cannot do daily routines with that hand her insurance is no longer covering PT visit she will be following up with orthopedic doctor tomorrow.

## 2023-04-19 NOTE — TELEPHONE ENCOUNTER
I'm very sorry she had another accident. But her symptoms need to be evaluated and managed by Orthopedics. I can see that she was prescribed some Percocet in addition to the 60 tabs I ordered a month ago, but I can't completely track it outside [facility] and possibly outside the state. She may get medicine from them even though I know she just signed a pain contract with me. Call back if they are not helpful tomorrow.

## 2023-04-19 NOTE — TELEPHONE ENCOUNTER
----- Message from Oksana Cortez sent at 4/19/2023 10:27 AM CDT -----  Contact: Jovana   Jovana would alxeander a call back. She had a fall & has a fractured arm. She would like to see about getting a pain medication. She would like a call back about numbness in the arm

## 2023-04-20 NOTE — TELEPHONE ENCOUNTER
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

## 2023-04-28 RX ORDER — OXYCODONE AND ACETAMINOPHEN 5; 325 MG/1; MG/1
1 TABLET ORAL 2 TIMES DAILY PRN
Qty: 60 TABLET | Refills: 0 | Status: SHIPPED | OUTPATIENT
Start: 2023-04-28 | End: 2023-06-14 | Stop reason: SDUPTHER

## 2023-04-28 NOTE — TELEPHONE ENCOUNTER
Records confirm no other meds filled since message sent. Percocet refilled at [facility] in [place], [place].

## 2023-05-03 ENCOUNTER — OFFICE VISIT (OUTPATIENT)
Dept: DERMATOLOGY | Facility: CLINIC | Age: 78
End: 2023-05-03
Payer: MEDICARE

## 2023-05-03 VITALS — WEIGHT: 191 LBS | BODY MASS INDEX: 29.98 KG/M2 | HEIGHT: 67 IN

## 2023-05-03 DIAGNOSIS — L73.8 SEBACEOUS HYPERPLASIA OF FACE: ICD-10-CM

## 2023-05-03 DIAGNOSIS — D22.9 MULTIPLE BENIGN NEVI: ICD-10-CM

## 2023-05-03 DIAGNOSIS — Z85.828 HISTORY OF NONMELANOMA SKIN CANCER: ICD-10-CM

## 2023-05-03 DIAGNOSIS — L82.1 SEBORRHEIC KERATOSES: ICD-10-CM

## 2023-05-03 DIAGNOSIS — D48.5 NEOPLASM OF UNCERTAIN BEHAVIOR OF SKIN: Primary | ICD-10-CM

## 2023-05-03 DIAGNOSIS — Z85.820 HISTORY OF MALIGNANT MELANOMA OF SKIN: ICD-10-CM

## 2023-05-03 DIAGNOSIS — D18.01 CHERRY ANGIOMA: ICD-10-CM

## 2023-05-03 DIAGNOSIS — L57.0 ACTINIC KERATOSES: ICD-10-CM

## 2023-05-03 PROCEDURE — 1159F MED LIST DOCD IN RCRD: CPT | Mod: CPTII,S$GLB,, | Performed by: DERMATOLOGY

## 2023-05-03 PROCEDURE — 3288F FALL RISK ASSESSMENT DOCD: CPT | Mod: CPTII,S$GLB,, | Performed by: DERMATOLOGY

## 2023-05-03 PROCEDURE — 17003 DESTRUCT PREMALG LES 2-14: CPT | Mod: S$GLB,,, | Performed by: DERMATOLOGY

## 2023-05-03 PROCEDURE — 88305 TISSUE EXAM BY PATHOLOGIST: CPT | Mod: 26,,, | Performed by: PATHOLOGY

## 2023-05-03 PROCEDURE — 88342 IMHCHEM/IMCYTCHM 1ST ANTB: CPT | Mod: 26,,, | Performed by: PATHOLOGY

## 2023-05-03 PROCEDURE — 11102 PR TANGENTIAL BIOPSY, SKIN, SINGLE LESION: ICD-10-PCS | Mod: S$GLB,,, | Performed by: DERMATOLOGY

## 2023-05-03 PROCEDURE — 88342 CHG IMMUNOCYTOCHEMISTRY: ICD-10-PCS | Mod: 26,,, | Performed by: PATHOLOGY

## 2023-05-03 PROCEDURE — 88341 IMHCHEM/IMCYTCHM EA ADD ANTB: CPT | Performed by: PATHOLOGY

## 2023-05-03 PROCEDURE — 99213 OFFICE O/P EST LOW 20 MIN: CPT | Mod: 25,S$GLB,, | Performed by: DERMATOLOGY

## 2023-05-03 PROCEDURE — 1100F PTFALLS ASSESS-DOCD GE2>/YR: CPT | Mod: CPTII,S$GLB,, | Performed by: DERMATOLOGY

## 2023-05-03 PROCEDURE — 88341 PR IHC OR ICC EACH ADD'L SINGLE ANTIBODY  STAINPR: ICD-10-PCS | Mod: 26,,, | Performed by: PATHOLOGY

## 2023-05-03 PROCEDURE — 1160F PR REVIEW ALL MEDS BY PRESCRIBER/CLIN PHARMACIST DOCUMENTED: ICD-10-PCS | Mod: CPTII,S$GLB,, | Performed by: DERMATOLOGY

## 2023-05-03 PROCEDURE — 11103 TANGNTL BX SKIN EA SEP/ADDL: CPT | Mod: S$GLB,,, | Performed by: DERMATOLOGY

## 2023-05-03 PROCEDURE — 1160F RVW MEDS BY RX/DR IN RCRD: CPT | Mod: CPTII,S$GLB,, | Performed by: DERMATOLOGY

## 2023-05-03 PROCEDURE — 3051F HG A1C>EQUAL 7.0%<8.0%: CPT | Mod: CPTII,S$GLB,, | Performed by: DERMATOLOGY

## 2023-05-03 PROCEDURE — 1100F PR PT FALLS ASSESS DOC 2+ FALLS/FALL W/INJURY/YR: ICD-10-PCS | Mod: CPTII,S$GLB,, | Performed by: DERMATOLOGY

## 2023-05-03 PROCEDURE — 88341 IMHCHEM/IMCYTCHM EA ADD ANTB: CPT | Mod: 26,,, | Performed by: PATHOLOGY

## 2023-05-03 PROCEDURE — 1159F PR MEDICATION LIST DOCUMENTED IN MEDICAL RECORD: ICD-10-PCS | Mod: CPTII,S$GLB,, | Performed by: DERMATOLOGY

## 2023-05-03 PROCEDURE — 99999 PR PBB SHADOW E&M-EST. PATIENT-LVL IV: ICD-10-PCS | Mod: PBBFAC,,, | Performed by: DERMATOLOGY

## 2023-05-03 PROCEDURE — 88342 IMHCHEM/IMCYTCHM 1ST ANTB: CPT | Performed by: PATHOLOGY

## 2023-05-03 PROCEDURE — 17000 PR DESTRUCTION(LASER SURGERY,CRYOSURGERY,CHEMOSURGERY),PREMALIGNANT LESIONS,FIRST LESION: ICD-10-PCS | Mod: XS,S$GLB,, | Performed by: DERMATOLOGY

## 2023-05-03 PROCEDURE — 17003 DESTRUCTION, PREMALIGNANT LESIONS; SECOND THROUGH 14 LESIONS: ICD-10-PCS | Mod: S$GLB,,, | Performed by: DERMATOLOGY

## 2023-05-03 PROCEDURE — 3288F PR FALLS RISK ASSESSMENT DOCUMENTED: ICD-10-PCS | Mod: CPTII,S$GLB,, | Performed by: DERMATOLOGY

## 2023-05-03 PROCEDURE — 3051F PR MOST RECENT HEMOGLOBIN A1C LEVEL 7.0 - < 8.0%: ICD-10-PCS | Mod: CPTII,S$GLB,, | Performed by: DERMATOLOGY

## 2023-05-03 PROCEDURE — 88305 TISSUE EXAM BY PATHOLOGIST: CPT | Mod: 59 | Performed by: PATHOLOGY

## 2023-05-03 PROCEDURE — 99999 PR PBB SHADOW E&M-EST. PATIENT-LVL IV: CPT | Mod: PBBFAC,,, | Performed by: DERMATOLOGY

## 2023-05-03 PROCEDURE — 17000 DESTRUCT PREMALG LESION: CPT | Mod: XS,S$GLB,, | Performed by: DERMATOLOGY

## 2023-05-03 PROCEDURE — 11103 PR TANGENTIAL BIOPSY, SKIN, EA ADDTL LESION: ICD-10-PCS | Mod: S$GLB,,, | Performed by: DERMATOLOGY

## 2023-05-03 PROCEDURE — 11102 TANGNTL BX SKIN SINGLE LES: CPT | Mod: S$GLB,,, | Performed by: DERMATOLOGY

## 2023-05-03 PROCEDURE — 88305 TISSUE EXAM BY PATHOLOGIST: ICD-10-PCS | Mod: 26,,, | Performed by: PATHOLOGY

## 2023-05-03 PROCEDURE — 99213 PR OFFICE/OUTPT VISIT, EST, LEVL III, 20-29 MIN: ICD-10-PCS | Mod: 25,S$GLB,, | Performed by: DERMATOLOGY

## 2023-05-03 NOTE — PATIENT INSTRUCTIONS
Shave Biopsy Wound Care    Your doctor has performed a shave biopsy today.  A band aid and vaseline ointment has been placed over the site.  This should remain in place for 24 hours.  It is recommended that you keep the area dry for the first 24 hours.  After 24 hours, you may remove the band aid and wash the area with warm soap and water and apply Vaseline jelly.  Many patients prefer to use Neosporin or Bacitracin ointment.  This is acceptable; however, know that you can develop an allergy to this medication even if you have used it safely for years.  It is important to keep the area moist.  Letting it dry out and get air slows healing time, and will worsen the scar.  Band aid is optional after first 24 hours.      If you notice increasing redness, tenderness, pain, or yellow drainage at the biopsy site, please notify your doctor.  These are signs of an infection.    If your biopsy site is bleeding, apply firm pressure for 15 minutes straight.  Repeat for another 15 minutes, if it is still bleeding.   If the surgical site continues to bleed, then please contact your doctor.       [facility] DERMATOLOGY  [address] [place] [address], [address] [address]  Dept: [phone]      CRYOSURGERY      Your doctor has used a method called cryosurgery to treat your skin condition. Cryosurgery refers to the use of very cold substances to treat a variety of skin conditions such as warts, pre-skin cancers, molluscum contagiosum, sun spots, and several benign growths. The substance we use in cryosurgery is liquid nitrogen and is so cold (-195 degrees Celsius) that is burns when administered.     Following treatment in the office, the skin may immediately burn and become red. You may find the area around the lesion is affected as well. It is sometimes necessary to treat not only the lesion, but a small area of the surrounding normal skin to achieve a good response.     A blister, and even a blood  filled blister, may form after treatment.   This is a normal response. If the blister is painful, it is acceptable to sterilize a needle and with rubbing alcohol and gently pop the blister. It is important that you gently wash the area with soap and warm water as the blister fluid may contain wart virus if a wart was treated. Do no remove the roof of the blister.     The area treated can take anywhere from 1-3 weeks to heal. Healing time depends on the kind skin lesion treated, the location, and how aggressively the lesion was treated. It is recommended that the areas treated are covered with Vaseline or bacitracin ointment and a band-aid. If a band-aid is not practical, just ointment applied several times per day will do. Keeping these areas moist will speed the healing time.    Treatment with liquid nitrogen can leave a scar. In dark skin, it may be a light or dark scar, in light skin it may be a white or pink scar. These will generally fade with time, but may never go away completely.     If you have any concerns after your treatment, please feel free to call the office.         [facility] - DERMATOLOGY  [address] [place] [id number]  Dept: [phone]

## 2023-05-03 NOTE — PROGRESS NOTES
"  Subjective:      Patient ID:  Jovana Fall is a 77 y.o. female who presents for   Chief Complaint   Patient presents with    Skin Check     TBSC     LOV 5/21/20- Hx of NMSC, Lentigo    Patient here today for TBSC  C/O spots to back x "unknown", itchy.    Recent fall with R arm fracture, recovering, radial palsy complication    Derm Hx:  H/o AKs treated with cryotherapy     H/o multiple BCCs  AK w/ focal transition to SCCIS- right cheek tx w/ imiquimod  Basal cell carcinoma 2014      left upper arm   BCC (basal cell carcinoma of skin) mid upper back  BCC (basal cell carcinoma)  left upper back  superficial BCC mid upper back 1/2016, E&S  BCC R mid infraorbital, 4/2014  Severely DN left mid upper back 4/2014     Prev under the care of Dr Fraser    Current Outpatient Medications:   ·  alendronate (FOSAMAX) 70 MG tablet, Take 1 tablet (70 mg total) by mouth every 7 days., Disp: 12 tablet, Rfl: 3  ·  aspirin 81 mg Tab, Take 81 mg by mouth. 1 Tablet Oral Every day, Disp: , Rfl:   ·  atorvastatin (LIPITOR) 80 MG tablet, Take 1 tablet (80 mg total) by mouth once daily., Disp: 90 tablet, Rfl: 2  ·  blood glucose control, normal Soln, Use as Instructed, Disp: 3 each, Rfl: 3  ·  blood sugar diagnostic (TRUE METRIX GLUCOSE TEST STRIP) Strp, TEST BLOOD SUGAR ONE TIME DAILY, Disp: 100 strip, Rfl: 3  ·  carvediloL (COREG) 25 MG tablet, Take 1 tablet (25 mg total) by mouth 2 (two) times daily., Disp: 180 tablet, Rfl: 2  ·  cyanocobalamin (VITAMIN B-12) 250 MCG tablet, Take 250 mcg by mouth once daily., Disp: , Rfl:   ·  dulaglutide (TRULICITY) 0.75 mg/0.5 mL pen injector, Inject 1.5 mg into the skin every 7 days., Disp: , Rfl:   ·  empagliflozin (JARDIANCE) 25 mg tablet, Take 1 tablet (25 mg total) by mouth once daily., Disp: 90 tablet, Rfl: 2  ·  ergocalciferol (ERGOCALCIFEROL) 50,000 unit Cap, Take 1 capsule (50,000 Units total) by mouth every 7 days., Disp: 12 capsule, Rfl: 3  ·  fish oil-omega-3 fatty acids 300-1,000 mg " capsule, , Disp: , Rfl:   ·  gabapentin (NEURONTIN) 300 MG capsule, TAKE 1 CAPSULE EVERY EVENING, Disp: 90 capsule, Rfl: 2  ·  lancets (TRUEPLUS LANCETS) 30 gauge Misc, USE ONE TIME DAILY, Disp: 100 each, Rfl: 3  ·  melatonin (MELATIN ORAL), Take 5 mg by mouth as needed., Disp: , Rfl:   ·  nitroGLYCERIN (NITROSTAT) 0.4 MG SL tablet, Take one tab under the tongue every 5 minutes for max of three doses for chest pain. If chest pain not resolved to to the ER., Disp: 25 tablet, Rfl: 3  ·  omeprazole (PRILOSEC) 20 MG capsule, TAKE 1 CAPSULE EVERY MORNING, Disp: 90 capsule, Rfl: 2  ·  oxyCODONE-acetaminophen (PERCOCET) 5-325 mg per tablet, Take 1 tablet by mouth 2 (two) times daily as needed for Pain., Disp: 60 tablet, Rfl: 0  ·  telmisartan (MICARDIS) 80 MG Tab, Take 1 tablet (80 mg total) by mouth once daily., Disp: 30 tablet, Rfl: 5  ·  telmisartan (MICARDIS) 80 MG Tab, Take 1 tablet (80 mg total) by mouth once daily., Disp: 90 tablet, Rfl: 1  ·  TRUEDRAW LANCING DEVICE Misc, USE ONE TIME DAILY, Disp: 1 each, Rfl: 0  ·  venlafaxine (EFFEXOR) 100 MG Tab, Take 1 tablet (100 mg total) by mouth once daily., Disp: 90 tablet, Rfl: 2  ·  vitamin E 100 UNIT capsule, Take 100 Units by mouth 2 (two) times daily., Disp: , Rfl:           Review of Systems   Constitutional:  Positive for fatigue and night sweats. Negative for fever, chills, weight loss, weight gain and malaise.   Skin:  Positive for daily sunscreen use, activity-related sunscreen use and wears hat.   Hematologic/Lymphatic: Bruises/bleeds easily.     Objective:   Physical Exam   Constitutional: She appears well-developed and well-nourished. No distress.   Neurological: She is alert and oriented to person, place, and time. She is not disoriented.   Psychiatric: She has a normal mood and affect.   Skin:   Areas Examined (abnormalities noted in diagram):   Scalp / Hair Palpated and Inspected  Head / Face Inspection Performed  Neck Inspection Performed  Chest / Axilla  Inspection Performed  Abdomen Inspection Performed  Genitals / Buttocks / Groin Inspection Performed  Back Inspection Performed  RUE Inspected  LUE Inspection Performed  RLE Inspected  LLE Inspection Performed  Nails and Digits Inspection Performed                       Diagram Legend     Erythematous scaling macule/papule c/w actinic keratosis       Vascular papule c/w angioma      Pigmented verrucoid papule/plaque c/w seborrheic keratosis      Yellow umbilicated papule c/w sebaceous hyperplasia      Irregularly shaped tan macule c/w lentigo     1-2 mm smooth white papules consistent with Milia      Movable subcutaneous cyst with punctum c/w epidermal inclusion cyst      Subcutaneous movable cyst c/w pilar cyst      Firm pink to brown papule c/w dermatofibroma      Pedunculated fleshy papule(s) c/w skin tag(s)      Evenly pigmented macule c/w junctional nevus     Mildly variegated pigmented, slightly irregular-bordered macule c/w mildly atypical nevus      Flesh colored to evenly pigmented papule c/w intradermal nevus       Pink pearly papule/plaque c/w basal cell carcinoma      Erythematous hyperkeratotic cursted plaque c/w SCC      Surgical scar with no sign of skin cancer recurrence      Open and closed comedones      Inflammatory papules and pustules      Verrucoid papule consistent consistent with wart     Erythematous eczematous patches and plaques     Dystrophic onycholytic nail with subungual debris c/w onychomycosis     Umbilicated papule    Erythematous-base heme-crusted tan verrucoid plaque consistent with inflamed seborrheic keratosis     Erythematous Silvery Scaling Plaque c/w Psoriasis     See annotation            Assessment / Plan:      Pathology Orders:       Normal Orders This Visit    Specimen to Pathology, Dermatology     Questions:    Procedure Type: Dermatology and skin neoplasms    Number of Specimens: 2    ------------------------: -------------------------    Spec 1 Procedure: Biopsy     Spec 1 Clinical Impression: invasive MM vs other    Spec 1 Source: midline posterior crown    ------------------------: -------------------------    Spec 2 Procedure: Biopsy    Spec 2 Clinical Impression: bcc vs BLK    Spec 2 Source: R lateral thigh    Release to patient:           Neoplasm of uncertain behavior of skin  -     Specimen to Pathology, Dermatology  Shave biopsy procedure note: x2    Shave biopsy performed after verbal consent including risk of infection, scar, recurrence, need for additional treatment of site. Area prepped with alcohol, anesthetized with approximately 1.0cc of 1% lidocaine with epinephrine. Lesional tissue shaved with razor blade. Hemostasis achieved with application of aluminum chloride followed by hyfrecation. No complications. Dressing applied. Wound care explained.    Actinic keratoses  Cryosurgery Procedure Note    Verbal consent from the patient is obtained and the patient is aware of the precancerous quality and need for treatment of these lesions. Liquid nitrogen cryosurgery is applied to the 3 actinic keratoses, as detailed in the physical exam, to produce a freeze injury. The patient is aware that blisters may form and is instructed on wound care with gentle cleansing and use of vaseline ointment to keep moist until healed. The patient is supplied a handout on cryosurgery and is instructed to call if lesions do not completely resolve.    History of nonmelanoma skin cancer  History of malignant melanoma of skin  Area of previous NMSCs and severely DN examined. Sites well healed with no signs of recurrence.    Total body skin examination performed today including at least 12 points as noted in physical examination. 2 lesions suspicious for malignancy noted.    Seborrheic keratoses  These are benign inherited growths without a malignant potential. Reassurance given to patient. No treatment is necessary.     Sebaceous hyperplasia of face  This is a common condition representing  benign enlargement of the sebaceous lobule. It typically occurs in adulthood. Reassurance given to patient.     Multiple benign nevi  Careful dermoscopy evaluation of nevi performed with none identified as needing biopsy today  Monitor for new mole or moles that are becoming bigger, darker, irritated, or developing irregular borders.     Cherry angioma  This is a benign vascular lesion. Reassurance given. No treatment required.     Patient instructed in importance in daily broad spectrum sun protection of at least spf 30. Mineral sunscreen ingredients preferred (Zinc +/- Titanium) and can be found OTC.   Recommend Elta MD for daily use on face and neck.  Patient encouraged to wear hat for all outdoor exposure.   Also discussed sun avoidance and use of protective clothing.           Follow up in about 3 months (around 8/3/2023).

## 2023-05-05 ENCOUNTER — HOSPITAL ENCOUNTER (OUTPATIENT)
Dept: RADIOLOGY | Facility: HOSPITAL | Age: 78
Discharge: HOME | End: 2023-05-05
Attending: ORTHOPAEDIC SURGERY
Payer: MEDICARE

## 2023-05-05 DIAGNOSIS — S42.201A CLOSED FRACTURE OF RIGHT PROXIMAL HUMERUS: ICD-10-CM

## 2023-05-08 ENCOUNTER — HOSPITAL ENCOUNTER (OUTPATIENT)
Dept: RADIOLOGY | Facility: HOSPITAL | Age: 78
Discharge: HOME | End: 2023-05-08
Attending: ORTHOPAEDIC SURGERY
Payer: MEDICARE

## 2023-05-08 DIAGNOSIS — S42.201A CLOSED FRACTURE OF RIGHT PROXIMAL HUMERUS: ICD-10-CM

## 2023-05-08 DIAGNOSIS — M85.811 OTHER SPECIFIED DISORDERS OF BONE DENSITY AND STRUCTURE, RIGHT SHOULDER: ICD-10-CM

## 2023-05-09 DIAGNOSIS — C43.4 MALIGNANT MELANOMA OF SCALP: Primary | ICD-10-CM

## 2023-05-10 LAB
FINAL PATHOLOGIC DIAGNOSIS: ABNORMAL
GROSS: ABNORMAL
Lab: ABNORMAL
MICROSCOPIC EXAM: ABNORMAL
SUPPLEMENTAL DIAGNOSIS: ABNORMAL

## 2023-05-22 ENCOUNTER — TELEPHONE (OUTPATIENT)
Dept: DERMATOLOGY | Facility: CLINIC | Age: 78
End: 2023-05-22
Payer: MEDICARE

## 2023-05-22 RX ORDER — GABAPENTIN 300 MG/1
300 CAPSULE ORAL DAILY
Qty: 7 CAPSULE | Refills: 0 | Status: SHIPPED | OUTPATIENT
Start: 2023-05-22 | End: 2023-05-29

## 2023-05-22 NOTE — TELEPHONE ENCOUNTER
----- Message from Amanda Adamson sent at 5/22/2023 10:24 AM CDT -----  Contact: Aracely [phone]  Requesting an RX refill or new RX.  Is this a refill or new RX: Refill  RX name and strength: gabapentin (NEURONTIN) 300 MG capsule  Is this a 30 day or 90 day RX: 7 days to the local pharmacy  Patient advised refills can take 72 hours and [facility] can be used for refills?:    Pharmacy name and phone #:   [facility] [place], [place] - [address] [address]  Phone: [phone] Fax: [phone]    Comments: Please send a 7 day supply while the patient is waiting on the mail order    Thank You

## 2023-05-22 NOTE — TELEPHONE ENCOUNTER
LOV 03/21/23  Appt scheduled for 10/12/23  Temporary Rx needed until mail order Rx received, pended.

## 2023-05-22 NOTE — TELEPHONE ENCOUNTER
Care Due:                  Date            Visit Type   Department     Provider  --------------------------------------------------------------------------------                                EP -                              PRIMARY      [facility] PRIMARY   Marcy Bria  Last Visit: 03-      CARE ([facility])   CARE           [place]                              EP -                              PRIMARY      [facility] PRIMARY   Marcy Fraser  Next Visit: 10-      CARE ([facility])   CARE           [place]                                                            Last  Test          Frequency    Reason                     Performed    Due Date  --------------------------------------------------------------------------------    Lipid Panel.  12 months..  atorvastatin.............  07- 07-    [employer] Embedded Care Due Messages. Reference number: [id number].   5/22/2023 1:37:13 PM CDT

## 2023-05-22 NOTE — TELEPHONE ENCOUNTER
----- Message from Seth García sent at 5/22/2023  1:03 PM CDT -----  Contact: pt [phone] ext [phone]  Type: Needs Medical Advice  Who Called:  pt  Best Call Back Number: [phone] ext [phone]  Additional Information: isadora is calling pt is there for procedure and just want to make sure Dr. Joiner was going to treat something that they noticed.Please call back and advise.

## 2023-06-01 NOTE — TELEPHONE ENCOUNTER
Addressed by Cardiology.    Topical Clindamycin Pregnancy And Lactation Text: This medication is Pregnancy Category B and is considered safe during pregnancy. It is unknown if it is excreted in breast milk.

## 2023-06-02 ENCOUNTER — TELEPHONE (OUTPATIENT)
Dept: OPHTHALMOLOGY | Facility: CLINIC | Age: 78
End: 2023-06-02
Payer: MEDICARE

## 2023-06-02 NOTE — TELEPHONE ENCOUNTER
----- Message from Ingrid Langley sent at 6/2/2023 11:27 AM CDT -----  Type: Needs Medical Advice  Who Called:  pt     Best Call Back Number: [phone]    Additional Information: pt is requesting a call back in regards to her contacts please advise

## 2023-06-06 DIAGNOSIS — M21.332 WRIST DROP, BILATERAL: Primary | ICD-10-CM

## 2023-06-06 DIAGNOSIS — M21.331 WRIST DROP, BILATERAL: Primary | ICD-10-CM

## 2023-06-14 DIAGNOSIS — M48.061 DEGENERATIVE LUMBAR SPINAL STENOSIS: ICD-10-CM

## 2023-06-14 NOTE — TELEPHONE ENCOUNTER
No care due was identified.  [facility] Embedded Care Due Messages. Reference number: [id number].   6/14/2023 1:48:23 PM CDT

## 2023-06-15 RX ORDER — OXYCODONE AND ACETAMINOPHEN 5; 325 MG/1; MG/1
1 TABLET ORAL 2 TIMES DAILY PRN
Qty: 60 TABLET | Refills: 0 | Status: SHIPPED | OUTPATIENT
Start: 2023-06-15 | End: 2023-08-09 | Stop reason: SDUPTHER

## 2023-06-20 ENCOUNTER — CLINICAL SUPPORT (OUTPATIENT)
Dept: REHABILITATION | Facility: HOSPITAL | Age: 78
End: 2023-06-20
Attending: STUDENT IN AN ORGANIZED HEALTH CARE EDUCATION/TRAINING PROGRAM
Payer: MEDICARE

## 2023-06-20 DIAGNOSIS — M21.331 RIGHT WRIST DROP: ICD-10-CM

## 2023-06-20 DIAGNOSIS — S42.302D CLOSED FRACTURE OF BOTH HUMERI WITH ROUTINE HEALING, SUBSEQUENT ENCOUNTER: Primary | ICD-10-CM

## 2023-06-20 DIAGNOSIS — S42.301D CLOSED FRACTURE OF BOTH HUMERI WITH ROUTINE HEALING, SUBSEQUENT ENCOUNTER: Primary | ICD-10-CM

## 2023-06-20 PROCEDURE — 97166 OT EVAL MOD COMPLEX 45 MIN: CPT

## 2023-06-20 PROCEDURE — 97110 THERAPEUTIC EXERCISES: CPT
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
## 2023-06-21 NOTE — PROGRESS NOTES
Occupational Therapy Custom Orthotic  Note     Date: 6/27/2023  Name: Jovana Zendejas [facility] Number: [id number]    Therapy Diagnosis:   Encounter Diagnosis   Name Primary?    Right wrist drop Yes     Physician: Carlitos Curry III, MD    Physician Orders: evaluate and tx, see epic  Medical Diagnosis: right wrist drop        Insurance Authorization Period Expiration: referral [id number] 6-20-23 thru 12-31-23 L code 3808          Time In:10  Time Out: 1105    Precautions:  universal, see epic, protocol if applicable    Subjective     Pt reports: an understanding of orthotic purpose, use, and precautions    Objective       Fabricated custom dorsal forearm based thermoplast orthotic to keep wrist and mcp joints 2 thru 5 at 0 and thumb in radial abd with mcp and ip joint at 0        Please note patient came here for orthotic fabrication only per request of primary therapist with [facility] at different therapy clinic (see secure chatting from roughly one week ago or so)    Home Exercises and Education Provided       Written Home Exercises/Information Provided: yes.          See EMR under patient instructions and/or media for HEP issued today or in past    Assessment       Orthotic with good fit    Home exercises issued today done with good accuracy        Pt's spiritual, cultural and educational needs considered and pt agreeable to plan of care and goals.      Plan: continue therapy with primary therapist    Madhu CHRIS CHT     I certify the need for the services furnished under this plan of care.    doctor's signature/referring provider's signature:______________________________________________________

## 2023-06-22 ENCOUNTER — CLINICAL SUPPORT (OUTPATIENT)
Dept: REHABILITATION | Facility: HOSPITAL | Age: 78
End: 2023-06-22
Payer: MEDICARE

## 2023-06-22 DIAGNOSIS — M21.331 RIGHT WRIST DROP: ICD-10-CM

## 2023-06-22 DIAGNOSIS — S42.302D: Primary | ICD-10-CM

## 2023-06-22 DIAGNOSIS — S42.301D: Primary | ICD-10-CM

## 2023-06-22 PROCEDURE — 97110 THERAPEUTIC EXERCISES: CPT | Mod: KX

## 2023-06-22 PROCEDURE — 97140 MANUAL THERAPY 1/> REGIONS: CPT | Mod: KX

## 2023-06-22 NOTE — PROGRESS NOTES
"  Occupational Therapy Daily Treatment Note     Date: 6/22/2023  Name: Jovana Zendejas [facility]  Clinic Number: [id number]    Therapy Diagnosis:   Encounter Diagnoses   Name Primary?    Open fracture of both humeri with routine healing, subsequent encounter Yes    Right wrist drop      Physician: Carlitos Curry III, MD    Therapy Diagnosis:  right wrist drop  ; B humeral fx's        Physician Orders: evaluate and treat  Medical Diagnosis:right wrist drop/ B humeral fx's    Surgical Procedure and Date: tbd  Evaluation Date: 6/20/2023  Insurance Authorization Period Expiration: 06/20/2023 to 12/31/2023  Plan of Care Certification Period: 6/20/2023 to 8/20/2023   Date of Return to MD: tbd  Visit # / Visits authorized: 2 / 12      Time In:7:00  Time Out: 8:05  Total Billable Time: 65 minutes    Precautions:  universal, see epic, protocol if applicable    Subjective     Pt reports: "I tried to do the stretch with the towel.   she was compliant with home exercise program.  Response to previous treatment:patient w/ some progress w/ pt for b shoulders prior to most recent re-frature on right   Functional change: decreased functional use for overhead activities    Pain: 2/10 rest; 2/10 light use  Side:right   Location: b shoulders  Objective   Jovana received therapeutic exercises to develop ROM and flexibility for 45 minutes including:  ER STRETCHES FOR B SHOULDERS FROM 0, 45, AND 90 DEGREES SHOULDER ABD (45&90 IN SUPINE) AND 0 IN STAND.  PATIENT PERFORMED UBE 10 MIN X 1 TRIAL. PATIENT performed gravity eliminated right wrist extension AAROM w/ use of unaffected left hand to assist.            Jovana received the following manual therapy techniques: Joint mobilizations were applied to B shoulders for 15  minutes.             Home Exercises and Education Provided     Education provided:   Education on new HEP for shoulder stretches and wrist drop    progress towards goals   likely tx progression  rationale of rehab " interventions    Written Home Exercises/Information Provided: yes.        previously issued exercises and/or other issued home therapy instructions were reviewed if still part of current tx plan as well as any issued today and Jovana was able to demonstrate them prior to the end of the session.  Jovana demonstrated Ind w/ understanding of the HEP provided.   See EMR under patient instructions and/or media for HEP issued today or in past    Assessment             Pt would continue to benefit from skilled OT in order to address ROM, PAIN, AND/OR DECREASED FUNCTIONAL STATUS SECONDARY TO CURRENT INJURY/DEBILITATING FACTOR.     Jovana is progressing well towards her goals and there are no updates to goals at this time unless goals noted below are different than goals on previous notes or evaluation. Pt prognosis is good  Pt will continue to benefit from skilled outpatient occupational therapy to address the deficits listed in the problem list on initial evaluation to provide pt/family education and to maximize pt's level of independence in the home and community environment.     Anticipated barriers to occupational therapy: none    Pt's spiritual, cultural and educational needs have been considered and pt is agreeable to plan of care and goals.      Goals:   stg to be met in 4 weeks   1. Patient will be I with HEP   2. Patient will have 2/10 pain with light use   3. Patient will have = arom of right wrist  to enhance affected arm use with ADL        ltg to be met by d/c   1. Patient will be I with d/c HEP   2. Patient will have 0/10 pain with all use   3. Patient will increase her arom for wrist extension to wfls to promote full functional use.   4.patient will demonstrate 4+/5 strength for b wrist extension.                   Any goals met today ?  (if any met see above)    Updates/Grading for next session: prn, will incorporate estim to wrist extensor musculature.     Plan: evaluate and tx    Briana DOT Wright

## 2023-06-27 ENCOUNTER — CLINICAL SUPPORT (OUTPATIENT)
Dept: REHABILITATION | Facility: HOSPITAL | Age: 78
End: 2023-06-27
Payer: MEDICARE

## 2023-06-27 DIAGNOSIS — M21.331 RIGHT WRIST DROP: Primary | ICD-10-CM

## 2023-06-27 PROCEDURE — L3808 WHFO, RIGID W/O JOINTS: HCPCS | Mod: PN

## 2023-06-27 NOTE — PATIENT INSTRUCTIONS
6-26-23   -wear splint all the time except for home therapy, hygiene, and to let skin breathe as needed  -do below exercises to discourage stiffness from being in the splint  -do below exercises one time, at least 2 x day, at least a 10 minute hold, mild discomfort only  *use left hand to push right wrist straight back  *use left index and long to push right thumb down and across palm

## 2023-06-28 ENCOUNTER — CLINICAL SUPPORT (OUTPATIENT)
Dept: REHABILITATION | Facility: HOSPITAL | Age: 78
End: 2023-06-28
Attending: STUDENT IN AN ORGANIZED HEALTH CARE EDUCATION/TRAINING PROGRAM
Payer: MEDICARE

## 2023-06-28 DIAGNOSIS — M21.331 RIGHT WRIST DROP: ICD-10-CM

## 2023-06-28 DIAGNOSIS — S42.302D CLOSED FRACTURE OF BOTH HUMERI WITH ROUTINE HEALING, SUBSEQUENT ENCOUNTER: Primary | ICD-10-CM

## 2023-06-28 DIAGNOSIS — S42.301D CLOSED FRACTURE OF BOTH HUMERI WITH ROUTINE HEALING, SUBSEQUENT ENCOUNTER: Primary | ICD-10-CM

## 2023-06-28 PROCEDURE — 97110 THERAPEUTIC EXERCISES: CPT | Mod: PN

## 2023-06-28 PROCEDURE — 97530 THERAPEUTIC ACTIVITIES: CPT | Mod: PN

## 2023-06-28 PROCEDURE — 97032 APPL MODALITY 1+ESTIM EA 15: CPT | Mod: PN

## 2023-06-28 NOTE — PROGRESS NOTES
"  Occupational Therapy Daily Treatment Note     Date: 6/28/2023  Name: Jovana Zendejas [facility]  Clinic Number: [id number]    Therapy Diagnosis:   No diagnosis found.    Physician: Carlitos Curry III, MD    Therapy Diagnosis:  right wrist drop  ; B humeral fx's        Physician Orders: evaluate and treat  Medical Diagnosis:right wrist drop/ B humeral fx's    Surgical Procedure and Date: tbd  Evaluation Date: 6/20/2023  Insurance Authorization Period Expiration: 06/20/2023 to 12/31/2023  Plan of Care Certification Period: 6/20/2023 to 8/20/2023   Date of Return to MD: tbd  Visit # / Visits authorized: 4/ 12      Time In:7:00  Time Out: 8:05  Total Billable Time: 65 minutes    Precautions:  universal, see epic, protocol if applicable    Subjective     Pt reports: "I  got my splint."   she was compliant with home exercise program.  Response to previous treatment:patient w/ some progress w/ pt for b shoulders prior to most recent re-frature on right   Functional change: decreased functional use for overhead activities    Pain: 2/10 rest; 2/10 light use  Side:right   Location: b shoulders  Objective   Jovana received therapeutic exercises to develop ROM and flexibility for 45 minutes including:  ER STRETCHES FOR B SHOULDERS FROM 0, 45, AND 90 DEGREES SHOULDER ABD (45&90 IN SUPINE) AND 0 IN STAND.  PATIENT PERFORMED UBE 10 MIN X 1 TRIAL. PATIENT performed gravity eliminated right wrist extension AAROM w/ use of unaffected left hand to assist.      Estim to wrist extensor mechanism in order to facilitate wrist extension.      Jovana received the following manual therapy techniques: Joint mobilizations were applied to B shoulders for 15  minutes.             Home Exercises and Education Provided     Education provided:   Education on new HEP for shoulder stretches and wrist drop    progress towards goals   likely tx progression  rationale of rehab interventions    Written Home Exercises/Information Provided: yes.        previously " issued exercises and/or other issued home therapy instructions were reviewed if still part of current tx plan as well as any issued today and Jovana was able to demonstrate them prior to the end of the session.  Jovana demonstrated Ind w/ understanding of the HEP provided.   See EMR under patient instructions and/or media for HEP issued today or in past    Assessment             Pt would continue to benefit from skilled OT in order to address ROM, PAIN, AND/OR DECREASED FUNCTIONAL STATUS SECONDARY TO CURRENT INJURY/DEBILITATING FACTOR.     Jovana is progressing well towards her goals and there are no updates to goals at this time unless goals noted below are different than goals on previous notes or evaluation. Pt prognosis is good  Pt will continue to benefit from skilled outpatient occupational therapy to address the deficits listed in the problem list on initial evaluation to provide pt/family education and to maximize pt's level of independence in the home and community environment.     Anticipated barriers to occupational therapy: none    Pt's spiritual, cultural and educational needs have been considered and pt is agreeable to plan of care and goals.      Goals:   stg to be met in 4 weeks   1. Patient will be I with HEP   2. Patient will have 2/10 pain with light use   3. Patient will have = arom of right wrist  to enhance affected arm use with ADL        ltg to be met by d/c   1. Patient will be I with d/c HEP   2. Patient will have 0/10 pain with all use   3. Patient will increase her arom for wrist extension to wfls to promote full functional use.   4.patient will demonstrate 4+/5 strength for b wrist extension.                   Any goals met today ?  (if any met see above)    Updates/Grading for next session: prn, will incorporate estim to wrist extensor musculature.     Plan: evaluate and tx    DOT Casiano

## 2023-06-29 ENCOUNTER — CLINICAL SUPPORT (OUTPATIENT)
Dept: REHABILITATION | Facility: HOSPITAL | Age: 78
End: 2023-06-29
Payer: MEDICARE

## 2023-06-29 DIAGNOSIS — S42.302D CLOSED FRACTURE OF BOTH HUMERI WITH ROUTINE HEALING, SUBSEQUENT ENCOUNTER: ICD-10-CM

## 2023-06-29 DIAGNOSIS — M21.331 RIGHT WRIST DROP: Primary | ICD-10-CM

## 2023-06-29 DIAGNOSIS — S42.301D CLOSED FRACTURE OF BOTH HUMERI WITH ROUTINE HEALING, SUBSEQUENT ENCOUNTER: ICD-10-CM

## 2023-06-29 PROCEDURE — 97032 APPL MODALITY 1+ESTIM EA 15: CPT

## 2023-06-29 PROCEDURE — 97530 THERAPEUTIC ACTIVITIES: CPT

## 2023-06-29 PROCEDURE — 97140 MANUAL THERAPY 1/> REGIONS: CPT

## 2023-06-29 PROCEDURE — 97110 THERAPEUTIC EXERCISES: CPT

## 2023-06-29 NOTE — PROGRESS NOTES
Physical Therapy Daily Note     [id number]  Diagnosis:   Encounter Diagnosis   Name Primary?    Closed fracture of both humeri with routine healing, subsequent encounter Yes     Physician: Rose Santamaria MD  Precautions:  no lifting heavy   Visit #:  17 of 19  Time In:  11:00  Time Out:  12:00    Subjective     Pt reports: I'm doing ok today.   Pain Scale: Jovana rates pain on a scale of 0-10 to be 3 currently.    Objective     Jovana received individual therapeutic exercises to develop strength, endurance, and ROM to bilateral shoulders for 30 minutes including:  Nu-step x 10 mins  UBE x 10 mins  Supine shoulder flexion 2# bar x 20  Supine shoulder press 2# bar x 20  Right sidelying left shoulder abduction 2# x 20  IR/ER RTB x 15  Pulleys x 3 mins    Jovana received the following manual therapy techniques: passive stretch were applied to the: bilateral shoulders for 30 minutes including:  Bilat PROM all directions x 30 mins  Joint mobs posterior and inferior glides bilat. shoulders    Written Home Exercises Provided: ER stretch  Pt demo good understanding of the education provided. Jovana demonstrated good return demonstration of activities.     Education provided re:  Jovana verbalized good understanding of education provided.   No spiritual or educational barriers to learning provided    Assessment     Patient tolerated treatment well with no adverse affects  This is a 77 y.o. female referred to outpatient physical therapy and presents with a medical diagnosis of bilateral proximal humerus fractures and demonstrates limitations as described in the problem list. Pt prognosis is Good. Pt will continue to benefit from skilled outpatient physical therapy to address the deficits listed in the problem list, provide pt/family education and to maximize pt's level of independence in the home and community environment.     Goals as  follows:  1.Pt will increase ROM to 90 degrees flexion/abduction  2. Pt will increase strength to 3/5  3. Decrease Pain to 0/10  4. Pt demonstrates independence with postural awareness.   5. Pt demonstrates independence with HEP.      Plan     Continue with established Plan of Care towards PT goals. 2 scheduled visits remaining, Mar 9, 14.     Therapist: Prabhu Tilley, PT, DPT  3/10/2023                                       Render Risk Assessment In Note?: no Other (Free Text): Patient has significantly oily skin leading to prominent pores and sebaceous hyperplasia.  She was educated on diagnosis and need for daily retinol therapy to dry up skin and reduce actinic damage.  She may consider electrocautery of SH since Fraxel laser did not help in the past. Detail Level: Simple Note Text (......Xxx Chief Complaint.): This diagnosis correlates with the

## 2023-06-29 NOTE — PROGRESS NOTES
"  Occupational Therapy Daily Treatment Note     Date: 6/29/2023  Name: Jovana Zendejas [facility]  Clinic Number: [id number]    Therapy Diagnosis:   Encounter Diagnoses   Name Primary?    Right wrist drop Yes    Closed fracture of both humeri with routine healing, subsequent encounter        Physician: Carlitos Curry III, MD    Therapy Diagnosis:  right wrist drop  ; B humeral fx's        Physician Orders: evaluate and treat  Medical Diagnosis:right wrist drop/ B humeral fx's    Surgical Procedure and Date: tbd  Evaluation Date: 6/20/2023  Insurance Authorization Period Expiration: 06/20/2023 to 12/31/2023  Plan of Care Certification Period: 6/20/2023 to 8/20/2023   Date of Return to MD: tbd  Visit # / Visits authorized: 4/ 12      Time In:7:00  Time Out: 8:05  Total Billable Time: 65 minutes    Precautions:  universal, see epic, protocol if applicable    Subjective     Pt reports: "I  did my stretches"   she was compliant with home exercise program.  Response to previous treatment:patient w/ some progress w/ pt for b shoulders prior to most recent re-frature on right   Functional change: decreased functional use for overhead activities    Pain: 2/10 rest; 2/10 light use  Side:right   Location: b shoulders  Objective   Jovana received therapeutic exercises to develop ROM and flexibility for 45 minutes including:  ER STRETCHES FOR B SHOULDERS FROM 0, 45, AND 90 DEGREES SHOULDER ABD (45&90 IN SUPINE) AND 0 IN STAND.  PATIENT PERFORMED UBE 10 MIN X 1 TRIAL. PATIENT performed gravity eliminated right wrist extension AAROM w/ use of unaffected left hand to assist.      Estim to wrist extensor mechanism in order to facilitate wrist extension.      Jovana received the following manual therapy techniques: Joint mobilizations were applied to B shoulders for 15  minutes.         Home Exercises and Education Provided     Education provided:   Education on new HEP for shoulder stretches and wrist drop    progress towards goals   likely tx " progression  rationale of rehab interventions    Written Home Exercises/Information Provided: yes.        previously issued exercises and/or other issued home therapy instructions were reviewed if still part of current tx plan as well as any issued today and Jovana was able to demonstrate them prior to the end of the session.  Jovana demonstrated Ind w/ understanding of the HEP provided.   See EMR under patient instructions and/or media for HEP issued today or in past    Assessment   Patient demonstrating an overall increase in arom for bue at shoulder level after stretches performed.      Pt would continue to benefit from skilled OT in order to address ROM, PAIN, AND/OR DECREASED FUNCTIONAL STATUS SECONDARY TO CURRENT INJURY/DEBILITATING FACTOR.     Jovana is progressing well towards her goals and there are no updates to goals at this time unless goals noted below are different than goals on previous notes or evaluation. Pt prognosis is good  Pt will continue to benefit from skilled outpatient occupational therapy to address the deficits listed in the problem list on initial evaluation to provide pt/family education and to maximize pt's level of independence in the home and community environment.     Anticipated barriers to occupational therapy: none    Pt's spiritual, cultural and educational needs have been considered and pt is agreeable to plan of care and goals.      Goals:   stg to be met in 4 weeks   1. Patient will be I with HEP   2. Patient will have 2/10 pain with light use   3. Patient will have = arom of right wrist  to enhance affected arm use with ADL        ltg to be met by d/c   1. Patient will be I with d/c HEP   2. Patient will have 0/10 pain with all use   3. Patient will increase her arom for wrist extension to wfls to promote full functional use.   4.patient will demonstrate 4+/5 strength for b wrist extension.                   Any goals met today ?  (if any met see above)    Updates/Grading for  next session: prn, will incorporate estim to wrist extensor musculature.     Plan: evaluate and tx    DOT Casiano

## 2023-07-05 ENCOUNTER — CLINICAL SUPPORT (OUTPATIENT)
Dept: REHABILITATION | Facility: HOSPITAL | Age: 78
End: 2023-07-05
Attending: STUDENT IN AN ORGANIZED HEALTH CARE EDUCATION/TRAINING PROGRAM
Payer: MEDICARE

## 2023-07-05 DIAGNOSIS — S42.301D CLOSED FRACTURE OF BOTH HUMERI WITH ROUTINE HEALING, SUBSEQUENT ENCOUNTER: ICD-10-CM

## 2023-07-05 DIAGNOSIS — M21.331 RIGHT WRIST DROP: Primary | ICD-10-CM

## 2023-07-05 DIAGNOSIS — S42.302D CLOSED FRACTURE OF BOTH HUMERI WITH ROUTINE HEALING, SUBSEQUENT ENCOUNTER: ICD-10-CM

## 2023-07-05 PROCEDURE — 97110 THERAPEUTIC EXERCISES: CPT | Mod: HCWC,PN

## 2023-07-05 PROCEDURE — 97140 MANUAL THERAPY 1/> REGIONS: CPT | Mod: HCWC,PN

## 2023-07-05 PROCEDURE — 97032 APPL MODALITY 1+ESTIM EA 15: CPT | Mod: HCWC,PN

## 2023-07-05 PROCEDURE — 97530 THERAPEUTIC ACTIVITIES: CPT | Mod: HCWC,PN

## 2023-07-05 NOTE — PROGRESS NOTES
"  Occupational Therapy Daily Treatment Note     Date: 7/5/2023  Name: Jovana Zendejas [facility]  Clinic Number: [id number]    Therapy Diagnosis:   Encounter Diagnoses   Name Primary?    Right wrist drop Yes    Closed fracture of both humeri with routine healing, subsequent encounter        Physician: Carlitos Curry III, MD    Therapy Diagnosis:  right wrist drop  ; B humeral fx's        Physician Orders: evaluate and treat  Medical Diagnosis:right wrist drop/ B humeral fx's    Surgical Procedure and Date: tbd  Evaluation Date: 6/20/2023  Insurance Authorization Period Expiration: 06/20/2023 to 12/31/2023  Plan of Care Certification Period: 6/20/2023 to 8/20/2023   Date of Return to MD: tbd  Visit # / Visits authorized: 5/12      Time In:7:00  Time Out: 8:05  Total Billable Time: 65 minutes    Precautions:  universal, see epic, protocol if applicable    Subjective     Pt reports: "My boyfriend helped me with my stretches. "  she was compliant with home exercise program.  Response to previous treatment:patient w/ some progress w/ pt for b shoulders prior to most recent re-frature on right   Functional change: decreased functional use for overhead activities    Pain: 2/10 rest; 2/10 light use  Side:right   Location: b shoulders  Objective   Jovana received therapeutic exercises to develop ROM and flexibility for 45 minutes including:  ER STRETCHES FOR B SHOULDERS FROM 0, 45, AND 90 DEGREES SHOULDER ABD (45&90 IN SUPINE) AND 0 IN STAND.  PATIENT PERFORMED UBE 10 MIN X 1 TRIAL. PATIENT performed gravity eliminated right wrist extension AAROM w/ use of unaffected left hand to assist.      Estim to wrist extensor mechanism in order to facilitate wrist extension.      Jovana received the following manual therapy techniques: Joint mobilizations were applied to B shoulders for 15  minutes.         Home Exercises and Education Provided     Education provided:   Education on new HEP for shoulder stretches and wrist drop    progress " towards goals   likely tx progression  rationale of rehab interventions    Written Home Exercises/Information Provided: yes.      previously issued exercises and/or other issued home therapy instructions were reviewed if still part of current tx plan as well as any issued today and Jovana was able to demonstrate them prior to the end of the session.  Jovana demonstrated Ind w/ understanding of the HEP provided.   See EMR under patient instructions and/or media for HEP issued today or in past    Assessment   Patient demonstrating an overall increase in arom for bue at shoulder level after stretches performed.      Pt would continue to benefit from skilled OT in order to address ROM, PAIN, AND/OR DECREASED FUNCTIONAL STATUS SECONDARY TO CURRENT INJURY/DEBILITATING FACTOR.     Jovana is progressing well towards her goals and there are no updates to goals at this time unless goals noted below are different than goals on previous notes or evaluation. Pt prognosis is good  Pt will continue to benefit from skilled outpatient occupational therapy to address the deficits listed in the problem list on initial evaluation to provide pt/family education and to maximize pt's level of independence in the home and community environment.     Anticipated barriers to occupational therapy: none    Pt's spiritual, cultural and educational needs have been considered and pt is agreeable to plan of care and goals.      Goals:   stg to be met in 4 weeks   1. Patient will be I with HEP   2. Patient will have 2/10 pain with light use   3. Patient will have = arom of right wrist  to enhance affected arm use with ADL        ltg to be met by d/c   1. Patient will be I with d/c HEP   2. Patient will have 0/10 pain with all use   3. Patient will increase her arom for wrist extension to wfls to promote full functional use.   4.patient will demonstrate 4+/5 strength for b wrist extension.                   Any goals met today ?  (if any met see  above)    Updates/Grading for next session: prn, will incorporate estim to wrist extensor musculature.     Plan: evaluate and tx    DOT Casiano

## 2023-07-11 ENCOUNTER — CLINICAL SUPPORT (OUTPATIENT)
Dept: REHABILITATION | Facility: HOSPITAL | Age: 78
End: 2023-07-11
Payer: MEDICARE

## 2023-07-11 DIAGNOSIS — E11.21 TYPE 2 DIABETES MELLITUS WITH DIABETIC NEPHROPATHY, WITHOUT LONG-TERM CURRENT USE OF INSULIN: ICD-10-CM

## 2023-07-11 DIAGNOSIS — M21.331 RIGHT WRIST DROP: ICD-10-CM

## 2023-07-11 DIAGNOSIS — S42.302D CLOSED FRACTURE OF BOTH HUMERI WITH ROUTINE HEALING, SUBSEQUENT ENCOUNTER: Primary | ICD-10-CM

## 2023-07-11 DIAGNOSIS — M81.0 OSTEOPOROSIS WITHOUT CURRENT PATHOLOGICAL FRACTURE, UNSPECIFIED OSTEOPOROSIS TYPE: ICD-10-CM

## 2023-07-11 DIAGNOSIS — S42.301D CLOSED FRACTURE OF BOTH HUMERI WITH ROUTINE HEALING, SUBSEQUENT ENCOUNTER: Primary | ICD-10-CM

## 2023-07-11 PROCEDURE — 97530 THERAPEUTIC ACTIVITIES: CPT | Mod: HCWC

## 2023-07-11 PROCEDURE — 97110 THERAPEUTIC EXERCISES: CPT | Mod: HCWC

## 2023-07-11 PROCEDURE — 97032 APPL MODALITY 1+ESTIM EA 15: CPT | Mod: HCWC

## 2023-07-11 RX ORDER — CALCIUM CITRATE/VITAMIN D3 200MG-6.25
TABLET ORAL
Qty: 100 STRIP | Refills: 3 | Status: SHIPPED | OUTPATIENT
Start: 2023-07-11

## 2023-07-11 RX ORDER — GLUCOSAM/CHON-MSM1/C/MANG/BOSW 500-416.6
TABLET ORAL
Qty: 100 EACH | Refills: 3 | Status: SHIPPED | OUTPATIENT
Start: 2023-07-11

## 2023-07-11 NOTE — PROGRESS NOTES
"  Occupational Therapy Daily Treatment Note     Date: 7/11/2023  Name: Jovana Zendejas [facility]  Clinic Number: [id number]    Therapy Diagnosis:   Encounter Diagnoses   Name Primary?    Closed fracture of both humeri with routine healing, subsequent encounter Yes    Right wrist drop        Physician: Carlitos Curry III, MD    Therapy Diagnosis:  right wrist drop  ; B humeral fx's        Physician Orders: evaluate and treat  Medical Diagnosis:right wrist drop/ B humeral fx's    Surgical Procedure and Date: tbd  Evaluation Date: 6/20/2023  Insurance Authorization Period Expiration: 06/20/2023 to 12/31/2023  Plan of Care Certification Period: 6/20/2023 to 8/20/2023   Date of Return to MD: tbd  Visit # / Visits authorized: 6/12      Time In:1:30  Time Out: 2:30  Total Billable Time: 60 minutes    Precautions:  universal, see epic, protocol if applicable    Subjective     Pt reports: "I have been your stretches.  "  she was compliant with home exercise program.  Response to previous treatment:patient w/ some progress w/ pt for b shoulders prior to most recent re-frature on right   Functional change: decreased functional use for overhead activities    Pain: 2/10 rest; 2/10 light use  Side:right   Location: b shoulders  Objective   Jovana received therapeutic exercises to develop ROM and flexibility for 45 minutes including:  ER STRETCHES FOR B SHOULDERS FROM 0, 45, AND 90 DEGREES SHOULDER ABD (45&90 IN SUPINE) AND 0 IN STAND.  PATIENT PERFORMED UBE 10 MIN X 1 TRIAL. PATIENT performed gravity eliminated right wrist extension AAROM w/ use of unaffected left hand to assist.      Estim to wrist extensor mechanism in order to facilitate wrist extension.      Jovana received the following manual therapy techniques: Joint mobilizations were applied to B shoulders for 15  minutes.         Home Exercises and Education Provided     Education provided:   Education on new HEP for shoulder stretches and wrist drop    progress towards goals "   likely tx progression  rationale of rehab interventions    Written Home Exercises/Information Provided: yes.      previously issued exercises and/or other issued home therapy instructions were reviewed if still part of current tx plan as well as any issued today and Jovana was able to demonstrate them prior to the end of the session.  Jovana demonstrated Ind w/ understanding of the HEP provided.   See EMR under patient instructions and/or media for HEP issued today or in past    Assessment   Patient w/ slight increase in arom for wrist ext   Pt would continue to benefit from skilled OT in order to address ROM, PAIN, AND/OR DECREASED FUNCTIONAL STATUS SECONDARY TO CURRENT INJURY/DEBILITATING FACTOR.     Jovana is progressing well towards her goals and there are no updates to goals at this time unless goals noted below are different than goals on previous notes or evaluation. Pt prognosis is good  Pt will continue to benefit from skilled outpatient occupational therapy to address the deficits listed in the problem list on initial evaluation to provide pt/family education and to maximize pt's level of independence in the home and community environment.     Anticipated barriers to occupational therapy: none    Pt's spiritual, cultural and educational needs have been considered and pt is agreeable to plan of care and goals.      Goals:   stg to be met in 4 weeks   1. Patient will be I with HEP   2. Patient will have 2/10 pain with light use   3. Patient will have = arom of right wrist  to enhance affected arm use with ADL        ltg to be met by d/c   1. Patient will be I with d/c HEP   2. Patient will have 0/10 pain with all use   3. Patient will increase her arom for wrist extension to wfls to promote full functional use.   4.patient will demonstrate 4+/5 strength for b wrist extension.                   Any goals met today ?  (if any met see above)    Updates/Grading for next session: prn, will incorporate estim to  wrist extensor musculature.     Plan: evaluate and tx    DOT Casiano

## 2023-07-11 NOTE — TELEPHONE ENCOUNTER
Care Due:                  Date            Visit Type   Department     Provider  --------------------------------------------------------------------------------                                EP -                              PRIMARY      [facility]   Marcy Bria  Last Visit: 03-      CARE ([facility])   CARE           Luís                              EP -                              PRIMARY      [facility]  Next Visit: 10-      CARE ([facility])   CARE           Luís                                                            Last  Test          Frequency    Reason                     Performed    Due Date  --------------------------------------------------------------------------------    HBA1C.......  6 months...  dulaglutide,               03- 09-                             empagliflozin............    [employer] Embedded Care Due Messages. Reference number: [id number].   7/11/2023 12:24:51 PM CDT

## 2023-07-12 RX ORDER — ERGOCALCIFEROL 1.25 MG/1
CAPSULE ORAL
Qty: 12 CAPSULE | Refills: 1 | Status: SHIPPED | OUTPATIENT
Start: 2023-07-12 | End: 2023-12-12 | Stop reason: SDUPTHER

## 2023-07-12 NOTE — TELEPHONE ENCOUNTER
Refill Routing Note   Medication(s) are not appropriate for processing by [facility] for the following reason(s):      Medication outside of protocol : VITAMIN D 1.25 MG     [facility] action(s):  Route  Approve Labs due            Appointments  past 12m or future 3m with PCP    Date Provider   Last Visit   3/21/2023 Marcy Staley MD   Next Visit   10/12/2023 Marcy Staley MD   ED visits in past 90 days: 0        Note composed:10:51 PM 07/11/2023

## 2023-07-13 ENCOUNTER — CLINICAL SUPPORT (OUTPATIENT)
Dept: REHABILITATION | Facility: HOSPITAL | Age: 78
End: 2023-07-13
Payer: MEDICARE

## 2023-07-13 DIAGNOSIS — S42.302D: ICD-10-CM

## 2023-07-13 DIAGNOSIS — S42.301D: ICD-10-CM

## 2023-07-13 DIAGNOSIS — M21.331 RIGHT WRIST DROP: Primary | ICD-10-CM

## 2023-07-13 PROCEDURE — 97530 THERAPEUTIC ACTIVITIES: CPT | Mod: HCWC

## 2023-07-13 PROCEDURE — 97140 MANUAL THERAPY 1/> REGIONS: CPT | Mod: HCWC

## 2023-07-13 PROCEDURE — 97032 APPL MODALITY 1+ESTIM EA 15: CPT | Mod: HCWC

## 2023-07-13 PROCEDURE — 97110 THERAPEUTIC EXERCISES: CPT | Mod: HCWC

## 2023-07-13 NOTE — PROGRESS NOTES
"  Occupational Therapy Daily Treatment Note     Date: 7/13/2023  Name: Jovana Zendejas [facility] Number: [id number]    Therapy Diagnosis:   Encounter Diagnoses   Name Primary?    Right wrist drop Yes    Open fracture of both humeri with routine healing, subsequent encounter        Physician: Carlitos Curry III, MD    Therapy Diagnosis:  right wrist drop  ; B humeral fx's        Physician Orders: evaluate and treat  Medical Diagnosis:right wrist drop/ B humeral fx's    Surgical Procedure and Date: tbd  Evaluation Date: 6/20/2023  Insurance Authorization Period Expiration: 06/20/2023 to 12/31/2023  Plan of Care Certification Period: 6/20/2023 to 8/20/2023   Date of Return to MD: tbd  Visit # / Visits authorized: 7/12      Time In:1:30  Time Out: 3:00  Total Billable Time: 90 minutes    Precautions:  universal, see epic, protocol if applicable    Subjective     Pt reports: "I tried to do my homework  "  she was compliant with home exercise program.  Response to previous treatment:patient w/ some progress w/ pt for b shoulders prior to most recent re-frature on right   Functional change: decreased functional use for overhead activities    Pain: 2/10 rest; 2/10 light use  Side:right   Location: b shoulders  Objective   Jovana received therapeutic exercises to develop ROM and flexibility for 45 minutes including:  ER STRETCHES FOR B SHOULDERS FROM 0, 45, AND 90 DEGREES SHOULDER ABD (45&90 IN SUPINE) AND 0 IN STAND.  PATIENT PERFORMED UBE 10 MIN X 1 TRIAL. PATIENT performed gravity eliminated right wrist extension AAROM w/ use of unaffected left hand to assist.      Estim to wrist extensor mechanism in order to facilitate wrist extension.  Incorporation this date of radial nerve palsy exercises w/ use of   Water spout activity  Figure 8 in stand  Hide & seek   tip  Water pump  Table stretch  Self massage    Jovana received the following manual therapy techniques: Joint mobilizations were applied to B shoulders for " 15  minutes.         Home Exercises and Education Provided     Education provided:   Education on new HEP for shoulder stretches and wrist drop    progress towards goals   likely tx progression  rationale of rehab interventions    Written Home Exercises/Information Provided: yes.      previously issued exercises and/or other issued home therapy instructions were reviewed if still part of current tx plan as well as any issued today and Jovana was able to demonstrate them prior to the end of the session.  Jovana demonstrated Ind w/ understanding of the HEP provided.   See EMR under patient instructions and/or media for HEP issued today or in past    Assessment   Patient w/ slight increase in arom for wrist ext   Pt would continue to benefit from skilled OT in order to address ROM, PAIN, AND/OR DECREASED FUNCTIONAL STATUS SECONDARY TO CURRENT INJURY/DEBILITATING FACTOR.     Jovana is progressing well towards her goals and there are no updates to goals at this time unless goals noted below are different than goals on previous notes or evaluation. Pt prognosis is good  Pt will continue to benefit from skilled outpatient occupational therapy to address the deficits listed in the problem list on initial evaluation to provide pt/family education and to maximize pt's level of independence in the home and community environment.     Anticipated barriers to occupational therapy: none    Pt's spiritual, cultural and educational needs have been considered and pt is agreeable to plan of care and goals.      Goals:   stg to be met in 4 weeks   1. Patient will be I with HEP   2. Patient will have 2/10 pain with light use   3. Patient will have = arom of right wrist  to enhance affected arm use with ADL        ltg to be met by d/c   1. Patient will be I with d/c HEP   2. Patient will have 0/10 pain with all use   3. Patient will increase her arom for wrist extension to wfls to promote full functional use.   4.patient will demonstrate  4+/5 strength for b wrist extension.                   Any goals met today ?  (if any met see above)    Updates/Grading for next session: prn, will incorporate estim to wrist extensor musculature.     Plan: evaluate and tx    DOT Casiano

## 2023-07-18 ENCOUNTER — CLINICAL SUPPORT (OUTPATIENT)
Dept: REHABILITATION | Facility: HOSPITAL | Age: 78
End: 2023-07-18
Payer: MEDICARE

## 2023-07-18 DIAGNOSIS — S42.302D CLOSED FRACTURE OF BOTH HUMERI WITH ROUTINE HEALING, SUBSEQUENT ENCOUNTER: Primary | ICD-10-CM

## 2023-07-18 DIAGNOSIS — M21.331 RIGHT WRIST DROP: ICD-10-CM

## 2023-07-18 DIAGNOSIS — S42.301D CLOSED FRACTURE OF BOTH HUMERI WITH ROUTINE HEALING, SUBSEQUENT ENCOUNTER: Primary | ICD-10-CM

## 2023-07-18 PROCEDURE — 97032 APPL MODALITY 1+ESTIM EA 15: CPT | Mod: HCWC

## 2023-07-18 PROCEDURE — 97530 THERAPEUTIC ACTIVITIES: CPT | Mod: HCWC

## 2023-07-18 PROCEDURE — 97010 HOT OR COLD PACKS THERAPY: CPT | Mod: HCWC

## 2023-07-18 PROCEDURE — 97110 THERAPEUTIC EXERCISES: CPT | Mod: HCWC

## 2023-07-18 NOTE — PROGRESS NOTES
"  Occupational Therapy Daily Treatment Note     Date: 7/18/2023  Name: Jovana Zendejas [facility]  Clinic Number: [id number]    Therapy Diagnosis:   Encounter Diagnoses   Name Primary?    Closed fracture of both humeri with routine healing, subsequent encounter Yes    Right wrist drop        Physician: Carlitos Curry III, MD    Therapy Diagnosis:  right wrist drop  ; B humeral fx's        Physician Orders: evaluate and treat  Medical Diagnosis:right wrist drop/ B humeral fx's    Surgical Procedure and Date: tbd  Evaluation Date: 6/20/2023  Insurance Authorization Period Expiration: 06/20/2023 to 12/31/2023  Plan of Care Certification Period: 6/20/2023 to 8/20/2023   Date of Return to MD: tbd  Visit # / Visits authorized: 8/12      Time In:2:30  Time Out: 4:00  Total Billable Time: 90 minutes    Precautions:  universal, see epic, protocol if applicable    Subjective     Pt reports: "I didn't do my homework on Sunday.  "  she was compliant with home exercise program.  Response to previous treatment:patient w/ some progress w/ pt for b shoulders prior to most recent re-frature on right   Functional change: decreased functional use for overhead activities    Pain: 2/10 rest; 2/10 light use  Side:right   Location: b shoulders  Objective   Jovana received therapeutic exercises to develop ROM and flexibility for 45 minutes including:   PATIENT PERFORMED UBE 10 MIN X 1 TRIAL. PATIENT performed gravity eliminated right wrist extension AAROM w/ use of unaffected left hand to assist.      Estim to wrist extensor mechanism in order to facilitate wrist extension.  Incorporation this date of radial nerve palsy exercises w/ use of   Water spout activity  Figure 8 in stand  Hide & seek   tip  Water pump  Table stretch  Self massage    Jovana received the following manual therapy techniques: Joint mobilizations were applied to B shoulders for 15  minutes.         Home Exercises and Education Provided     Education provided: "   Education on new HEP for shoulder stretches and wrist drop    progress towards goals   likely tx progression  rationale of rehab interventions    Written Home Exercises/Information Provided: yes.      previously issued exercises and/or other issued home therapy instructions were reviewed if still part of current tx plan as well as any issued today and Jovana was able to demonstrate them prior to the end of the session.  Jovana demonstrated Ind w/ understanding of the HEP provided.   See EMR under patient instructions and/or media for HEP issued today or in past    Assessment     Pt would continue to benefit from skilled OT in order to address ROM, PAIN, AND/OR DECREASED FUNCTIONAL STATUS SECONDARY TO CURRENT INJURY/DEBILITATING FACTOR.     Jovana is progressing well towards her goals and there are no updates to goals at this time unless goals noted below are different than goals on previous notes or evaluation. Pt prognosis is good  Pt will continue to benefit from skilled outpatient occupational therapy to address the deficits listed in the problem list on initial evaluation to provide pt/family education and to maximize pt's level of independence in the home and community environment.     Anticipated barriers to occupational therapy: none    Pt's spiritual, cultural and educational needs have been considered and pt is agreeable to plan of care and goals.      Goals:   stg to be met in 4 weeks   1. Patient will be I with HEP   2. Patient will have 2/10 pain with light use   3. Patient will have = arom of right wrist  to enhance affected arm use with ADL        ltg to be met by d/c   1. Patient will be I with d/c HEP   2. Patient will have 0/10 pain with all use   3. Patient will increase her arom for wrist extension to wfls to promote full functional use.   4.patient will demonstrate 4+/5 strength for b wrist extension.                   Any goals met today ?  (if any met see above)    Updates/Grading for next  session: prn, will incorporate estim to wrist extensor musculature.     Plan: evaluate and tx    DOT Casiano

## 2023-07-20 ENCOUNTER — CLINICAL SUPPORT (OUTPATIENT)
Dept: REHABILITATION | Facility: HOSPITAL | Age: 78
End: 2023-07-20
Payer: MEDICARE

## 2023-07-20 DIAGNOSIS — S42.302D: ICD-10-CM

## 2023-07-20 DIAGNOSIS — M21.331 RIGHT WRIST DROP: Primary | ICD-10-CM

## 2023-07-20 DIAGNOSIS — S42.301D: ICD-10-CM

## 2023-07-20 PROCEDURE — 97032 APPL MODALITY 1+ESTIM EA 15: CPT | Mod: HCWC

## 2023-07-20 PROCEDURE — 97530 THERAPEUTIC ACTIVITIES: CPT | Mod: HCWC

## 2023-07-20 PROCEDURE — 97140 MANUAL THERAPY 1/> REGIONS: CPT | Mod: HCWC

## 2023-07-20 PROCEDURE — 97110 THERAPEUTIC EXERCISES: CPT | Mod: HCWC

## 2023-07-20 NOTE — PROGRESS NOTES
"  Occupational Therapy Daily Treatment Note     Date: 7/20/2023  Name: Jovana Zendejas [facility] Number: [id number]    Therapy Diagnosis:   Encounter Diagnoses   Name Primary?    Right wrist drop Yes    Open fracture of both humeri with routine healing, subsequent encounter        Physician: Carlitos Curry III, MD    Therapy Diagnosis:  right wrist drop  ; B humeral fx's        Physician Orders: evaluate and treat  Medical Diagnosis:right wrist drop/ B humeral fx's    Surgical Procedure and Date: tbd  Evaluation Date: 6/20/2023  Insurance Authorization Period Expiration: 06/20/2023 to 12/31/2023  Plan of Care Certification Period: 6/20/2023 to 8/20/2023   Date of Return to MD: tbd  Visit # / Visits authorized: 9/12      Time In:3:00  Time Out: 4:30  Total Billable Time: 90 minutes    Precautions:  universal, see epic, protocol if applicable    Subjective     Pt reports: "I did my homework."  she was compliant with home exercise program.  Response to previous treatment:patient w/ some progress w/ pt for b shoulders prior to most recent re-frature on right   Functional change: decreased functional use for overhead activities    Pain: 2/10 rest; 2/10 light use  Side:right   Location: b shoulders  Objective   Jovana received therapeutic exercises to develop ROM and flexibility for 45 minutes including:  PATIENT PERFORMED UBE 10 MIN X 1 TRIAL. PATIENT performed gravity eliminated right wrist extension AAROM w/ use of unaffected left hand to assist.      Estim to wrist extensor mechanism in order to facilitate wrist extension.  Incorporation this date of radial nerve palsy exercises w/ use of   Water spout activity  Figure 8 in stand  Hide & seek   tip  Water pump  Table stretch  Self massage    Jovana received the following manual therapy techniques: Joint mobilizations were applied to B shoulders for 15  minutes.      Jovana received e-stim to right wrist/and hand extensor musculature @ 30ma cc    Home Exercises " and Education Provided     Education provided:   Education on new HEP for shoulder stretches and wrist drop    progress towards goals   likely tx progression  rationale of rehab interventions    Written Home Exercises/Information Provided: yes.      previously issued exercises and/or other issued home therapy instructions were reviewed if still part of current tx plan as well as any issued today and Jovana was able to demonstrate them prior to the end of the session.  Jovana demonstrated Ind w/ understanding of the HEP provided.   See EMR under patient instructions and/or media for HEP issued today or in past    Assessment     Pt would continue to benefit from skilled OT in order to address ROM, PAIN, AND/OR DECREASED FUNCTIONAL STATUS SECONDARY TO CURRENT INJURY/DEBILITATING FACTOR.     Jovana is progressing well towards her goals and there are no updates to goals at this time unless goals noted below are different than goals on previous notes or evaluation. Pt prognosis is good  Pt will continue to benefit from skilled outpatient occupational therapy to address the deficits listed in the problem list on initial evaluation to provide pt/family education and to maximize pt's level of independence in the home and community environment.     Anticipated barriers to occupational therapy: none    Pt's spiritual, cultural and educational needs have been considered and pt is agreeable to plan of care and goals.      Goals:   stg to be met in 4 weeks   1. Patient will be I with HEP   2. Patient will have 2/10 pain with light use   3. Patient will have = arom of right wrist  to enhance affected arm use with ADL        ltg to be met by d/c   1. Patient will be I with d/c HEP   2. Patient will have 0/10 pain with all use   3. Patient will increase her arom for wrist extension to wfls to promote full functional use.   4.patient will demonstrate 4+/5 strength for b wrist extension.                   Any goals met today ?  (if any  met see above)    Updates/Grading for next session: prn, will incorporate estim to wrist extensor musculature.     Plan: evaluate and tx    DOT Casiano

## 2023-07-27 ENCOUNTER — CLINICAL SUPPORT (OUTPATIENT)
Dept: REHABILITATION | Facility: HOSPITAL | Age: 78
End: 2023-07-27
Attending: STUDENT IN AN ORGANIZED HEALTH CARE EDUCATION/TRAINING PROGRAM
Payer: MEDICARE

## 2023-07-27 DIAGNOSIS — M21.331 RIGHT WRIST DROP: Primary | ICD-10-CM

## 2023-07-27 DIAGNOSIS — S42.301D CLOSED FRACTURE OF BOTH HUMERI WITH ROUTINE HEALING, SUBSEQUENT ENCOUNTER: ICD-10-CM

## 2023-07-27 DIAGNOSIS — S42.302D CLOSED FRACTURE OF BOTH HUMERI WITH ROUTINE HEALING, SUBSEQUENT ENCOUNTER: ICD-10-CM

## 2023-07-27 PROCEDURE — 97032 APPL MODALITY 1+ESTIM EA 15: CPT | Mod: KX,HCWC

## 2023-07-27 PROCEDURE — 97110 THERAPEUTIC EXERCISES: CPT | Mod: KX,HCWC

## 2023-07-27 PROCEDURE — 97530 THERAPEUTIC ACTIVITIES: CPT | Mod: KX,HCWC

## 2023-07-27 NOTE — PROGRESS NOTES
"  Updated Plan of care Occupational therapy      Date: 7/27/2023  Name: Jovana Zendejas [facility] Number: [id number]    Therapy Diagnosis:   Encounter Diagnoses   Name Primary?    Right wrist drop Yes    Closed fracture of both humeri with routine healing, subsequent encounter        Physician: Carlitos Curry III, MD    Therapy Diagnosis:  right wrist drop  ; B humeral fx's        Physician Orders: evaluate and treat  Medical Diagnosis:right wrist drop/ B humeral fx's    Surgical Procedure and Date: march 30th 2023  Evaluation Date: 6/20/2023  Insurance Authorization Period Expiration: 06/20/2023 to 12/31/2023  Plan of Care Certification Period: 6/20/2023 to 9/26/2023  Date of Return to MD: 9/26/2023  Visit # / Visits authorized: 10/20      Time In:3:00  Time Out: 4:30  Total Billable Time: 90 minutes    Precautions:  universal, see epic, protocol if applicable    Subjective     Pt reports: "My doctor says I am not healed in both shoulder, to be less aggressive."  she was compliant with home exercise program.  Response to previous treatment:patient w/ some progress w/ pt for b shoulders prior to most recent re-frature on right   Functional change: decreased functional use for overhead activities    Pain: 2/10 rest; 2/10 light use  Side:right   Location: b shoulders  Objective   Jovana received therapeutic exercises to develop ROM and flexibility for 45 minutes including:    PATIENT PERFORMED UBE 10 MIN X 1 TRIAL. PATIENT performed gravity eliminated right wrist extension AAROM w/ use of unaffected left hand to assist.    Patient now able to perform place hold exercises for wrist extension against gravity.  Patient w/ 1+-2/5 mmt wrist ext  Estim to wrist extensor mechanism in order to facilitate wrist extension.  Incorporation this date of radial nerve palsy exercises w/ use of   Water spout activity  Figure 8 in stand  Hide & seek   tip  Water pump  Table stretch  Self massage    Jovana received the following " manual therapy techniques: Joint mobilizations were applied to B shoulders for 15  minutes.      Jovana received e-stim to right wrist/and hand extensor musculature @ 30ma cc    Home Exercises and Education Provided     Education provided:   Education on new HEP for  wrist drop    progress towards goals   likely tx progression  rationale of rehab interventions    Written Home Exercises/Information Provided: yes.      previously issued exercises and/or other issued home therapy instructions were reviewed if still part of current tx plan as well as any issued today and Jovana was able to demonstrate them prior to the end of the session.  Jovana demonstrated Ind w/ understanding of the HEP provided.   See EMR under patient instructions and/or media for HEP issued today or in past    Assessment   Patient w/ report from MD stating that she is still healing in B humeri and not to be too aggressive during therapy for shoulders.  Will pare back on shoulder er stretches at 90 degrees and focus on functional movement w/ ube, pulleys, and graded range of motion therex.   Pt would continue to benefit from skilled OT in order to address ROM, PAIN, AND/OR DECREASED FUNCTIONAL STATUS SECONDARY TO CURRENT INJURY/DEBILITATING FACTOR.   Patient w/ some return of wrist extension.   Jovana is progressing well towards her goals updates to program as stated previously. Patient has good prognosis.          Anticipated barriers to occupational therapy: none    Pt's spiritual, cultural and educational needs have been considered and pt is agreeable to plan of care and goals.      Goals:   stg to be met in 4 weeks   1. Patient will be I with HEP   2. Patient will have 2/10 pain with light use   3. Patient will have = arom of right wrist  to enhance affected arm use with ADL        ltg to be met by d/c   1. Patient will be I with d/c HEP   2. Patient will have 0/10 pain with all use   3. Patient will increase her arom for wrist extension to wfls  to promote full functional use.   4.patient will demonstrate 4+/5 strength for b wrist extension.                   Any goals met today ?  (if any met see above)    Updates/Grading for next session: prn, will incorporate estim to wrist extensor musculature.     Plan: evaluate and tx    DOT Casiano MD SIGNATURE________________________________________________

## 2023-08-01 ENCOUNTER — CLINICAL SUPPORT (OUTPATIENT)
Dept: REHABILITATION | Facility: HOSPITAL | Age: 78
End: 2023-08-01
Attending: STUDENT IN AN ORGANIZED HEALTH CARE EDUCATION/TRAINING PROGRAM
Payer: MEDICARE

## 2023-08-01 DIAGNOSIS — S42.302D CLOSED FRACTURE OF BOTH HUMERI WITH ROUTINE HEALING, SUBSEQUENT ENCOUNTER: ICD-10-CM

## 2023-08-01 DIAGNOSIS — S42.301D CLOSED FRACTURE OF BOTH HUMERI WITH ROUTINE HEALING, SUBSEQUENT ENCOUNTER: ICD-10-CM

## 2023-08-01 DIAGNOSIS — M21.331 RIGHT WRIST DROP: Primary | ICD-10-CM

## 2023-08-01 PROCEDURE — 97032 APPL MODALITY 1+ESTIM EA 15: CPT | Mod: KX,HCWC

## 2023-08-01 PROCEDURE — 97110 THERAPEUTIC EXERCISES: CPT | Mod: KX,HCWC

## 2023-08-01 PROCEDURE — 97530 THERAPEUTIC ACTIVITIES: CPT | Mod: KX,HCWC

## 2023-08-01 NOTE — PROGRESS NOTES
"   Occupational therapy      Date: 8/1/2023  Name: Jovana Zendejas [facility] Number: [id number]    Therapy Diagnosis:   Encounter Diagnoses   Name Primary?    Right wrist drop Yes    Closed fracture of both humeri with routine healing, subsequent encounter        Physician: Carlitos Curry III, MD    Therapy Diagnosis:  right wrist drop  ; B humeral fx's        Physician Orders: evaluate and treat  Medical Diagnosis:right wrist drop/ B humeral fx's    Surgical Procedure and Date: march 30th 2023  Evaluation Date: 6/20/2023  Insurance Authorization Period Expiration: 06/20/2023 to 12/31/2023  Plan of Care Certification Period: 6/20/2023 to 9/26/2023  Date of Return to MD: 9/26/2023  Visit # / Visits authorized: 11/20      Time In:2:30  Time Out: 3:30  Total Billable Time: 60 minutes    Precautions:  universal, see epic, protocol if applicable    Subjective     Pt reports: "I am feeling more "  she was compliant with home exercise program.  Response to previous treatment:patient w/ some progress w/ pt for b shoulders prior to most recent re-frature on right   Functional change: decreased functional use for overhead activities    Pain: 1/10 rest; 1/10 light use  Side:right   Location: b shoulders  Objective   Jovana received therapeutic exercises to develop ROM and flexibility for 45 minutes including:    PATIENT PERFORMED UBE 10 MIN X 1 TRIAL. PATIENT performed gravity eliminated right wrist extension AAROM w/ use of unaffected left hand to assist.    Patient now able to perform place hold exercises for wrist extension against gravity.  Patient w/ 1+-2/5 mmt wrist ext  Estim to wrist extensor mechanism in order to facilitate wrist extension.  Incorporation this date of radial nerve palsy exercises w/ use of   Water spout activity  Figure 8 in stand  Hide & seek   tip  Water pump  Table stretch  Self massage    Jovana received the following manual therapy techniques: Joint mobilizations were applied to B " shoulders for 15  minutes.      Jovana received e-stim to right wrist/and hand extensor musculature @ 30ma cc    Home Exercises and Education Provided     Education provided:   Education on new HEP for  wrist drop    progress towards goals   likely tx progression  rationale of rehab interventions    Written Home Exercises/Information Provided: yes.      previously issued exercises and/or other issued home therapy instructions were reviewed if still part of current tx plan as well as any issued today and Jovana was able to demonstrate them prior to the end of the session.  Jovana demonstrated Ind w/ understanding of the HEP provided.   See EMR under patient instructions and/or media for HEP issued today or in past    Assessment   Patient w/ report from MD stating that she is still healing in B humeri and not to be too aggressive during therapy for shoulders.  Will pare back on shoulder er stretches at 90 degrees and focus on functional movement w/ ube, pulleys, and graded range of motion therex.   Pt would continue to benefit from skilled OT in order to address ROM, PAIN, AND/OR DECREASED FUNCTIONAL STATUS SECONDARY TO CURRENT INJURY/DEBILITATING FACTOR.   Patient w/ some return of wrist extension.   Jovana is progressing well towards her goals updates to program as stated previously. Patient has good prognosis.          Anticipated barriers to occupational therapy: none    Pt's spiritual, cultural and educational needs have been considered and pt is agreeable to plan of care and goals.      Goals:   stg to be met in 4 weeks   1. Patient will be I with HEP   2. Patient will have 2/10 pain with light use   3. Patient will have = arom of right wrist  to enhance affected arm use with ADL        ltg to be met by d/c   1. Patient will be I with d/c HEP   2. Patient will have 0/10 pain with all use   3. Patient will increase her arom for wrist extension to wfls to promote full functional use.   4.patient will demonstrate 4+/5  strength for b wrist extension.                   Any goals met today ?  (if any met see above)    Updates/Grading for next session: prn, will incorporate estim to wrist extensor musculature.     Plan: evaluate and tx    DOT Casiano

## 2023-08-09 DIAGNOSIS — M48.061 DEGENERATIVE LUMBAR SPINAL STENOSIS: ICD-10-CM

## 2023-08-09 NOTE — TELEPHONE ENCOUNTER
Care Due:                  Date            Visit Type   Department     Provider  --------------------------------------------------------------------------------                                EP -                              PRIMARY      [facility] PRIMARY   Macry Bria  Last Visit: 03-      CARE ([facility])   CARE           Degranjoshua                              EP -                              PRIMARY      [facility] PRIMARY   Marcy Fraser  Next Visit: 10-      CARE ([facility])   CARE           [place]                                                            Last  Test          Frequency    Reason                     Performed    Due Date  --------------------------------------------------------------------------------    Lipid Panel.  12 months..  atorvastatin.............  07- 07-    [employer] Embedded Care Due Messages. Reference number: [id number].   8/09/2023 1:24:03 PM CDT

## 2023-08-10 ENCOUNTER — TELEPHONE (OUTPATIENT)
Dept: DERMATOLOGY | Facility: CLINIC | Age: 78
End: 2023-08-10
Payer: MEDICARE

## 2023-08-10 RX ORDER — OXYCODONE AND ACETAMINOPHEN 5; 325 MG/1; MG/1
1 TABLET ORAL 2 TIMES DAILY PRN
Qty: 60 TABLET | Refills: 0 | Status: SHIPPED | OUTPATIENT
Start: 2023-08-10 | End: 2023-10-02 | Stop reason: SDUPTHER

## 2023-08-10 NOTE — TELEPHONE ENCOUNTER
I was able to get her scheduled on Thursday, August 17, 2023 at 930 am.  Left message to call back if this date and time does not work

## 2023-08-10 NOTE — TELEPHONE ENCOUNTER
----- Message from Keisha Burris sent at 8/10/2023  8:19 AM CDT -----  Contact: pt [phone]  Type: Needs Medical Advice  Who Called:  Pt     Best Call Back Number: [phone]    Additional Information: Pt had to cxl 3 month f/u today due to food poisoning. Pt asking if she can be seen sooner than next avail date for melanoma f/u. Pls call back and advise

## 2023-08-11 ENCOUNTER — CLINICAL SUPPORT (OUTPATIENT)
Dept: REHABILITATION | Facility: HOSPITAL | Age: 78
End: 2023-08-11
Attending: STUDENT IN AN ORGANIZED HEALTH CARE EDUCATION/TRAINING PROGRAM
Payer: MEDICARE

## 2023-08-11 DIAGNOSIS — M21.331 RIGHT WRIST DROP: Primary | ICD-10-CM

## 2023-08-11 DIAGNOSIS — S42.301D CLOSED FRACTURE OF BOTH HUMERI WITH ROUTINE HEALING, SUBSEQUENT ENCOUNTER: ICD-10-CM

## 2023-08-11 DIAGNOSIS — S42.302D CLOSED FRACTURE OF BOTH HUMERI WITH ROUTINE HEALING, SUBSEQUENT ENCOUNTER: ICD-10-CM

## 2023-08-11 DIAGNOSIS — I25.84 CORONARY ATHEROSCLEROSIS DUE TO CALCIFIED CORONARY LESION (CODE): ICD-10-CM

## 2023-08-11 PROCEDURE — 97110 THERAPEUTIC EXERCISES: CPT | Mod: KX,HCWC

## 2023-08-11 PROCEDURE — 97530 THERAPEUTIC ACTIVITIES: CPT | Mod: KX,HCWC

## 2023-08-11 PROCEDURE — 97032 APPL MODALITY 1+ESTIM EA 15: CPT | Mod: KX,HCWC

## 2023-08-11 RX ORDER — NITROGLYCERIN 0.4 MG/1
0.4 TABLET SUBLINGUAL EVERY 5 MIN PRN
Qty: 100 TABLET | Refills: 1 | Status: SHIPPED | OUTPATIENT
Start: 2023-08-11 | End: 2023-09-27

## 2023-08-11 NOTE — TELEPHONE ENCOUNTER
----- Message from Carolyn Mcdaniel sent at 8/11/2023  4:24 PM CDT -----  Contact: Berenice with [facility]   Requesting an RX refill or new RX.  Is this a refill or new RX: new  RX name and strength (copy/paste from chart):  nitroGLYCERIN (NITROSTAT) 0.4 MG SL tablet  Is this a 30 day or 90 day RX:   Pharmacy name and phone # (copy/paste from chart):      [facility] - [place], [place] - [address]  [address] [address]  Phone: [phone] Fax: [phone]

## 2023-08-11 NOTE — TELEPHONE ENCOUNTER
No care due was identified.  [facility] Embedded Care Due Messages. Reference number: [id number].   8/11/2023 4:31:07 PM CDT

## 2023-08-11 NOTE — PROGRESS NOTES
"   Occupational therapy      Date: 8/11/2023  Name: Jovana Zendejas [facility] Number: [id number]    Therapy Diagnosis:   Encounter Diagnoses   Name Primary?    Right wrist drop Yes    Closed fracture of both humeri with routine healing, subsequent encounter        Physician: Carlitos Curry III, MD    Therapy Diagnosis:  right wrist drop  ; B humeral fx's        Physician Orders: evaluate and treat  Medical Diagnosis:right wrist drop/ B humeral fx's    Surgical Procedure and Date: march 30th 2023  Evaluation Date: 6/20/2023  Insurance Authorization Period Expiration: 06/20/2023 to 12/31/2023  Plan of Care Certification Period: 6/20/2023 to 9/26/2023  Date of Return to MD: 9/26/2023  Visit # / Visits authorized: 12/20      Time In:9:00  Time Out: 10:00  Total Billable Time: 60 minutes    Precautions:  universal, see epic, protocol if applicable    Subjective     Pt reports: "I am using my right hand so much more. "  she was compliant with home exercise program.  Response to previous treatment:patient w/ some progress w/ pt for b shoulders prior to most recent re-frature on right   Functional change: decreased functional use for overhead activities    Pain: 1/10 rest; 7/10 light use  Side:right   Location: b shoulders  Objective   Jovana received therapeutic exercises to develop ROM and flexibility for 45 minutes including:    PATIENT PERFORMED UBE 10 MIN X 1 TRIAL. PATIENT performed gravity eliminated right wrist extension AAROM w/ use of unaffected left hand to assist.    Patient now able to perform place hold exercises for wrist extension against gravity.  Patient w/ 1+-2/5 mmt wrist ext  Estim to wrist extensor mechanism in order to facilitate wrist extension.  Incorporation this date of radial nerve palsy exercises w/ use of   Water spout activity  Figure 8 in stand  Hide & seek   tip  Water pump  Table stretch  Self massage    Jovana received the following manual therapy techniques: Joint mobilizations " were applied to B shoulders for 15  minutes.      Jovana received e-stim to right wrist/and hand extensor musculature @ 30ma cc    Home Exercises and Education Provided     Education provided:   Education on new HEP for  wrist drop    progress towards goals   likely tx progression  rationale of rehab interventions    Written Home Exercises/Information Provided: yes.      previously issued exercises and/or other issued home therapy instructions were reviewed if still part of current tx plan as well as any issued today and Jovana was able to demonstrate them prior to the end of the session.  Jovana demonstrated Ind w/ understanding of the HEP provided.   See EMR under patient instructions and/or media for HEP issued today or in past    Assessment   Patient w/ report from MD stating that she is still healing in B humeri and not to be too aggressive during therapy for shoulders.  Will pare back on shoulder er stretches at 90 degrees and focus on functional movement w/ ube, pulleys, and graded range of motion therex.   Pt would continue to benefit from skilled OT in order to address ROM, PAIN, AND/OR DECREASED FUNCTIONAL STATUS SECONDARY TO CURRENT INJURY/DEBILITATING FACTOR.   Patient w/ some return of wrist extension.   Jovana is progressing well towards her goals updates to program as stated previously. Patient has good prognosis.          Anticipated barriers to occupational therapy: none    Pt's spiritual, cultural and educational needs have been considered and pt is agreeable to plan of care and goals.      Goals:   stg to be met in 4 weeks   1. Patient will be I with HEP   2. Patient will have 2/10 pain with light use   3. Patient will have = arom of right wrist  to enhance affected arm use with ADL        ltg to be met by d/c   1. Patient will be I with d/c HEP   2. Patient will have 0/10 pain with all use   3. Patient will increase her arom for wrist extension to wfls to promote full functional use.   4.patient will  demonstrate 4+/5 strength for b wrist extension.                   Any goals met today ?  (if any met see above)    Updates/Grading for next session: prn, will incorporate estim to wrist extensor musculature.     Plan: evaluate and tx    DOT Casiano

## 2023-08-15 ENCOUNTER — CLINICAL SUPPORT (OUTPATIENT)
Dept: REHABILITATION | Facility: HOSPITAL | Age: 78
End: 2023-08-15
Payer: MEDICARE

## 2023-08-15 DIAGNOSIS — S42.302D: ICD-10-CM

## 2023-08-15 DIAGNOSIS — M21.331 RIGHT WRIST DROP: Primary | ICD-10-CM

## 2023-08-15 DIAGNOSIS — S42.301D: ICD-10-CM

## 2023-08-15 PROCEDURE — 97032 APPL MODALITY 1+ESTIM EA 15: CPT | Mod: HCWC

## 2023-08-15 PROCEDURE — 97530 THERAPEUTIC ACTIVITIES: CPT | Mod: HCWC

## 2023-08-15 PROCEDURE — 97140 MANUAL THERAPY 1/> REGIONS: CPT | Mod: HCWC

## 2023-08-15 PROCEDURE — 97110 THERAPEUTIC EXERCISES: CPT | Mod: HCWC

## 2023-08-15 NOTE — PROGRESS NOTES
"   Occupational therapy      Date: 8/15/2023  Name: Jovana Zendejas [facility] Number: [id number]    Therapy Diagnosis:   Encounter Diagnoses   Name Primary?    Right wrist drop Yes    Open fracture of both humeri with routine healing, subsequent encounter          Physician: Carlitos Curry III, MD    Therapy Diagnosis:  right wrist drop  ; B humeral fx's        Physician Orders: evaluate and treat  Medical Diagnosis:right wrist drop/ B humeral fx's    Surgical Procedure and Date: march 30th 2023  Evaluation Date: 6/20/2023  Insurance Authorization Period Expiration: 06/20/2023 to 12/31/2023  Plan of Care Certification Period: 6/20/2023 to 9/26/2023  Date of Return to MD: 9/26/2023  Visit # / Visits authorized: 13/20      Time In:2:30  Time Out: 3:30  Total Billable Time: 60 minutes    Precautions:  universal, see epic, protocol if applicable    Subjective     Pt reports: "I'm using my right hand more."  she was compliant with home exercise program.  Response to previous treatment:patient w/ some progress w/ pt for b shoulders prior to most recent re-fracture on right   Functional change: decreased functional use for overhead activities    Pain: 1/10 rest; 1/10 light use  Side:right   Location: b shoulders  Objective   Jovana received therapeutic exercises to develop ROM and flexibility for 45 minutes including:    PATIENT PERFORMED UBE 10 MIN X 1 TRIAL. PATIENT performed gravity eliminated right wrist extension AAROM w/ use of unaffected left hand to assist.    Patient now able to perform place hold exercises for wrist extension against gravity.  Patient w/ 1+-2/5 mmt wrist ext  Estim to wrist extensor mechanism in order to facilitate wrist extension.  Incorporation this date of radial nerve palsy exercises w/ use of   Water spout activity  Figure 8 in stand  Hide & seek   tip  Water pump  Table stretch  Self massage    Jovana received the following manual therapy techniques:Scar massage to b humeral scars.   "   Jovana received e-stim to right wrist/and hand extensor musculature @ 30ma cc    Home Exercises and Education Provided     Education provided:   Education on new HEP for  wrist drop    progress towards goals   likely tx progression  rationale of rehab interventions    Written Home Exercises/Information Provided: yes.      previously issued exercises and/or other issued home therapy instructions were reviewed if still part of current tx plan as well as any issued today and Jovana was able to demonstrate them prior to the end of the session.  Jovana demonstrated Ind w/ understanding of the HEP provided.   See EMR under patient instructions and/or media for HEP issued today or in past    Assessment   Pt would continue to benefit from skilled OT in order to address ROM, PAIN, AND/OR DECREASED FUNCTIONAL STATUS SECONDARY TO CURRENT INJURY/DEBILITATING FACTOR.   Patient w/ some return of wrist extension.   Jovana is progressing well towards her goals updates to program as stated previously. Patient has good prognosis.          Anticipated barriers to occupational therapy: none    Pt's spiritual, cultural and educational needs have been considered and pt is agreeable to plan of care and goals.      Goals:   stg to be met in 4 weeks   1. Patient will be I with HEP   2. Patient will have 2/10 pain with light use   3. Patient will have = arom of right wrist  to enhance affected arm use with ADL        ltg to be met by d/c   1. Patient will be I with d/c HEP   2. Patient will have 0/10 pain with all use   3. Patient will increase her arom for wrist extension to wfls to promote full functional use.   4.patient will demonstrate 4+/5 strength for b wrist extension.             Any goals met today ?  (if any met see above)    Updates/Grading for next session: prn, will incorporate estim to wrist extensor musculature.     Plan: evaluate and tx    Briana DOT Wright

## 2023-08-16 ENCOUNTER — TELEPHONE (OUTPATIENT)
Dept: OPTOMETRY | Facility: CLINIC | Age: 78
End: 2023-08-16
Payer: MEDICARE

## 2023-08-16 NOTE — TELEPHONE ENCOUNTER
----- Message from Torrie Antony sent at 8/16/2023 11:20 AM CDT -----  Contact: JUAN PIERCE [phone]    Type: Needs Medical Advice      Who Called:  HUGO PIERCENA     Best Call Back Number:  [phone] (home)       Additional Information: Patient is calling to speak with nurse/MA regarding blurr vision.  Patiet states when wearing contact lens it very blurry to see.   Please call back and advise. Thanks

## 2023-08-17 ENCOUNTER — CLINICAL SUPPORT (OUTPATIENT)
Dept: REHABILITATION | Facility: HOSPITAL | Age: 78
End: 2023-08-17
Payer: MEDICARE

## 2023-08-17 ENCOUNTER — OFFICE VISIT (OUTPATIENT)
Dept: DERMATOLOGY | Facility: CLINIC | Age: 78
End: 2023-08-17
Payer: MEDICARE

## 2023-08-17 VITALS — BODY MASS INDEX: 29.98 KG/M2 | HEIGHT: 67 IN | WEIGHT: 191 LBS

## 2023-08-17 DIAGNOSIS — S42.301D CLOSED FRACTURE OF BOTH HUMERI WITH ROUTINE HEALING, SUBSEQUENT ENCOUNTER: ICD-10-CM

## 2023-08-17 DIAGNOSIS — M21.331 RIGHT WRIST DROP: Primary | ICD-10-CM

## 2023-08-17 DIAGNOSIS — L81.4 SOLAR LENTIGO: ICD-10-CM

## 2023-08-17 DIAGNOSIS — D22.9 MULTIPLE BENIGN NEVI: ICD-10-CM

## 2023-08-17 DIAGNOSIS — Z85.828 ENCOUNTER FOR FOLLOW-UP SURVEILLANCE OF SKIN CANCER: ICD-10-CM

## 2023-08-17 DIAGNOSIS — D18.01 CHERRY ANGIOMA: ICD-10-CM

## 2023-08-17 DIAGNOSIS — L73.8 SEBACEOUS HYPERPLASIA OF FACE: ICD-10-CM

## 2023-08-17 DIAGNOSIS — S42.302D CLOSED FRACTURE OF BOTH HUMERI WITH ROUTINE HEALING, SUBSEQUENT ENCOUNTER: ICD-10-CM

## 2023-08-17 DIAGNOSIS — Z08 ENCOUNTER FOR FOLLOW-UP SURVEILLANCE OF SKIN CANCER: ICD-10-CM

## 2023-08-17 DIAGNOSIS — Z85.820 HISTORY OF MALIGNANT MELANOMA OF SKIN: ICD-10-CM

## 2023-08-17 DIAGNOSIS — D48.5 NEOPLASM OF UNCERTAIN BEHAVIOR OF SKIN: Primary | ICD-10-CM

## 2023-08-17 DIAGNOSIS — L82.1 SEBORRHEIC KERATOSES: ICD-10-CM

## 2023-08-17 PROCEDURE — 11102 TANGNTL BX SKIN SINGLE LES: CPT | Mod: S$GLB,,, | Performed by: DERMATOLOGY

## 2023-08-17 PROCEDURE — 1159F PR MEDICATION LIST DOCUMENTED IN MEDICAL RECORD: ICD-10-PCS | Mod: CPTII,S$GLB,, | Performed by: DERMATOLOGY

## 2023-08-17 PROCEDURE — 11102 PR TANGENTIAL BIOPSY, SKIN, SINGLE LESION: ICD-10-PCS | Mod: S$GLB,,, | Performed by: DERMATOLOGY

## 2023-08-17 PROCEDURE — 1101F PT FALLS ASSESS-DOCD LE1/YR: CPT | Mod: CPTII,S$GLB,, | Performed by: DERMATOLOGY

## 2023-08-17 PROCEDURE — 3288F FALL RISK ASSESSMENT DOCD: CPT | Mod: CPTII,S$GLB,, | Performed by: DERMATOLOGY

## 2023-08-17 PROCEDURE — 1159F MED LIST DOCD IN RCRD: CPT | Mod: CPTII,S$GLB,, | Performed by: DERMATOLOGY

## 2023-08-17 PROCEDURE — 1160F PR REVIEW ALL MEDS BY PRESCRIBER/CLIN PHARMACIST DOCUMENTED: ICD-10-PCS | Mod: CPTII,S$GLB,, | Performed by: DERMATOLOGY

## 2023-08-17 PROCEDURE — 1160F RVW MEDS BY RX/DR IN RCRD: CPT | Mod: CPTII,S$GLB,, | Performed by: DERMATOLOGY

## 2023-08-17 PROCEDURE — 97530 THERAPEUTIC ACTIVITIES: CPT | Mod: KX,HCWC

## 2023-08-17 PROCEDURE — 99213 PR OFFICE/OUTPT VISIT, EST, LEVL III, 20-29 MIN: ICD-10-PCS | Mod: 25,S$GLB,, | Performed by: DERMATOLOGY

## 2023-08-17 PROCEDURE — 88305 TISSUE EXAM BY PATHOLOGIST: CPT | Mod: HCNC | Performed by: PATHOLOGY

## 2023-08-17 PROCEDURE — 88305 TISSUE EXAM BY PATHOLOGIST: ICD-10-PCS | Mod: 26,,, | Performed by: PATHOLOGY

## 2023-08-17 PROCEDURE — 99213 OFFICE O/P EST LOW 20 MIN: CPT | Mod: 25,S$GLB,, | Performed by: DERMATOLOGY

## 2023-08-17 PROCEDURE — 88305 TISSUE EXAM BY PATHOLOGIST: CPT | Mod: 26,,, | Performed by: PATHOLOGY

## 2023-08-17 PROCEDURE — 3288F PR FALLS RISK ASSESSMENT DOCUMENTED: ICD-10-PCS | Mod: CPTII,S$GLB,, | Performed by: DERMATOLOGY

## 2023-08-17 PROCEDURE — 3051F PR MOST RECENT HEMOGLOBIN A1C LEVEL 7.0 - < 8.0%: ICD-10-PCS | Mod: CPTII,S$GLB,, | Performed by: DERMATOLOGY

## 2023-08-17 PROCEDURE — 97110 THERAPEUTIC EXERCISES: CPT | Mod: KX,HCWC

## 2023-08-17 PROCEDURE — 1101F PR PT FALLS ASSESS DOC 0-1 FALLS W/OUT INJ PAST YR: ICD-10-PCS | Mod: CPTII,S$GLB,, | Performed by: DERMATOLOGY

## 2023-08-17 PROCEDURE — 3051F HG A1C>EQUAL 7.0%<8.0%: CPT | Mod: CPTII,S$GLB,, | Performed by: DERMATOLOGY

## 2023-08-17 NOTE — PROGRESS NOTES
"   Occupational therapy      Date: 8/17/2023  Name: Jovana Zendejas [facility] Number: [id number]    Therapy Diagnosis:   Encounter Diagnoses   Name Primary?    Right wrist drop Yes    Closed fracture of both humeri with routine healing, subsequent encounter          Physician: Carlitos Curry III, MD    Therapy Diagnosis:  right wrist drop  ; B humeral fx's        Physician Orders: evaluate and treat  Medical Diagnosis:right wrist drop/ B humeral fx's    Surgical Procedure and Date: march 30th 2023  Evaluation Date: 6/20/2023  Insurance Authorization Period Expiration: 06/20/2023 to 12/31/2023  Plan of Care Certification Period: 6/20/2023 to 9/26/2023  Date of Return to MD: 9/26/2023  Visit # / Visits authorized: 14/20      Time In:3:05  Time Out: 4:05  Total Billable Time: 60 minutes    Precautions:  universal, see epic, protocol if applicable    Subjective     Pt reports: "I am feeling ok today. "  she was compliant with home exercise program.  Response to previous treatment:patient w/ some progress w/ pt for b shoulders prior to most recent re-fracture on right   Functional change: decreased functional use for overhead activities    Pain: 0/10 rest; 0/10 light use  Side:right   Location: b shoulders  Objective   Jovana received therapeutic exercises to develop ROM and flexibility for 45 minutes including:    PATIENT PERFORMED UBE 10 MIN X 1 TRIAL. PATIENT performed gravity eliminated right wrist extension AAROM w/ use of unaffected left hand to assist.    Patient performed place hold exercises for wrist extension against gravity.  Patient w/ 2/5 mmt wrist ext  Estim to wrist extensor mechanism in order to facilitate wrist extension.  Patient performed 2 sets 10reps each:  Water spout activity  Figure 8 in stand  Hide & seek   tip  Water pump  Table stretch  Self massage    Jovana received the following manual therapy techniques:Scar massage to b humeral scars.     Jovana received e-stim to right wrist/and " hand extensor musculature @ 30ma cc    Home Exercises and Education Provided     Education provided:   Education on new HEP for  wrist drop    progress towards goals   likely tx progression  rationale of rehab interventions    Written Home Exercises/Information Provided: yes.      previously issued exercises and/or other issued home therapy instructions were reviewed if still part of current tx plan as well as any issued today and Jovana was able to demonstrate them prior to the end of the session.  Jovana demonstrated Ind w/ understanding of the HEP provided.   See EMR under patient instructions and/or media for HEP issued today or in past    Assessment   Pt would continue to benefit from skilled OT in order to address ROM, PAIN, AND/OR DECREASED FUNCTIONAL STATUS SECONDARY TO CURRENT INJURY/DEBILITATING FACTOR.   Patient w/ some return of wrist extension.   Jovana is progressing well towards her goals updates to program as stated previously. Patient has good prognosis.          Anticipated barriers to occupational therapy: none    Pt's spiritual, cultural and educational needs have been considered and pt is agreeable to plan of care and goals.      Goals:   stg to be met in 4 weeks   1. Patient will be I with HEP met  2. Patient will have 2/10 pain with light use met  3. Patient will have = arom of right wrist  to enhance affected arm use with ADL ongoing        ltg to be met by d/c   1. Patient will be I with d/c HEP   2. Patient will have 0/10 pain with all use   3. Patient will increase her arom for wrist extension to wfls to promote full functional use.   4.patient will demonstrate 4+/5 strength for b wrist extension.             Any goals met today ?  (if any met see above)    Updates/Grading for next session: prn, will incorporate estim to wrist extensor musculature.     Plan: evaluate and tx    DOT Casiano

## 2023-08-17 NOTE — PROGRESS NOTES
Subjective:      Patient ID:  Jovana Fall is a 77 y.o. female who presents for   Chief Complaint   Patient presents with    Skin Check     TBSC     LOV 5/23/23 MM of scalp    Patient here today for TBSC.    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 Skin, midline posterior crown, shave biopsy:   -MALIGNANT MELANOMA, NON-ULCERATED, 0.7 MM IN DEPTH (pT1a), see synoptic report below     SYNOPTIC REPORT   Procedure: biopsy, shave   Specimen Laterality:  Midline   Tumor Site:  Posterior crown   Macroscopic Satellite Nodule(s): Not identified   Histologic Type:  Superficial spreading type   Maximum Tumor (Breslow) Thickness:  0.7 mm   Ulceration: Not identified   Microsatellite(s): Not identified   Margins:   Peripheral:  Involved by malignant melanoma in-situ.  Uninvolved by invasive malignant melanoma.   Deep:  Uninvolved by invasive malignant melanoma or malignant melanoma in-situ.   Mitotic rate: <1 mm2   Anatomic (Aniceto) level: III   Lympho-vascular invasion: Not identified   Neurotropism: Not identified   Tumor infiltrating lymphocytes: Present, brisk   Tumor regression:  Focally present   Pathologic Stage Classification (pTNM, AJCC 8th Ed):  pT1a     2. 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 1 Tablet Oral Every day, Disp: , Rfl:   ·  atorvastatin (LIPITOR) 80 MG tablet, Take 1 tablet (80 mg total) by mouth once daily., Disp: 90 tablet, Rfl: 2  ·  blood glucose control, normal Soln, Use as Instructed, Disp: 3 each, Rfl: 3  ·  carvediloL (COREG) 25 MG tablet, Take 1 tablet (25 mg total) by mouth 2 (two) times daily., Disp: 180 tablet, Rfl: 2  ·  cyanocobalamin (VITAMIN B-12) 250 MCG tablet, Take 250 mcg by mouth once daily., Disp: , Rfl:   ·  dulaglutide (TRULICITY) 1.5 mg/0.5 mL pen injector, Inject 1.5 mg into the skin every 7 days., Disp: 12 pen , Rfl: 1  ·  empagliflozin (JARDIANCE) 25 mg tablet, Take 1 tablet (25 mg total) by mouth once daily., Disp: 90 tablet, Rfl: 2  ·  ergocalciferol (ERGOCALCIFEROL) 50,000 unit Cap, TAKE 1 CAPSULE EVERY 7 DAYS., Disp: 12 capsule, Rfl: 1  ·  fish oil-omega-3 fatty acids 300-1,000 mg capsule, , Disp: , Rfl:   ·  gabapentin (NEURONTIN) 300 MG capsule, TAKE 1 CAPSULE EVERY EVENING, Disp: 90 capsule, Rfl: 2  ·  melatonin (MELATIN ORAL), Take 5 mg by mouth as needed., Disp: , Rfl:   ·  nitroGLYCERIN (NITROSTAT) 0.4 MG SL tablet, Place 1 tablet (0.4 mg total) under the tongue every 5 (five) minutes as needed for Chest pain., Disp: 100 tablet, Rfl: 1  ·  omeprazole (PRILOSEC) 20 MG capsule, TAKE 1 CAPSULE EVERY MORNING, Disp: 90 capsule, Rfl: 2  ·  oxyCODONE-acetaminophen (PERCOCET) 5-325 mg per tablet, Take 1 tablet by mouth 2 (two) times daily as needed for Pain., Disp: 60 tablet, Rfl: 0  ·  telmisartan (MICARDIS) 80 MG Tab, Take 1 tablet (80 mg total) by mouth once daily., Disp: 90 tablet, Rfl: 1  ·  TRUE METRIX GLUCOSE TEST STRIP Strp, TEST BLOOD SUGAR EVERY DAY, Disp: 100 strip, Rfl: 3  ·  TRUEDRAW LANCING DEVICE Misc, USE ONE TIME DAILY, Disp: 1 each, Rfl: 0  ·  TRUEPLUS LANCETS 30 gauge Misc, TEST BLOOD SUGAR ONE TIME DAILY, Disp: 100 each, Rfl: 3  ·  venlafaxine (EFFEXOR) 100 MG Tab, Take 1 tablet (100 mg total) by mouth once  daily., Disp: 90 tablet, Rfl: 2  ·  vitamin E 100 UNIT capsule, Take 100 Units by mouth 2 (two) times daily., Disp: , Rfl:           Review of Systems   Constitutional:  Positive for fatigue. Negative for fever and chills.   Skin:  Positive for daily sunscreen use, activity-related sunscreen use and wears hat.   Hematologic/Lymphatic: Bruises/bleeds easily.       Objective:   Physical Exam   Constitutional: She appears well-developed and well-nourished. No distress.   Genitourinary:         Neurological: She is alert and oriented to person, place, and time. She is not disoriented.   Psychiatric: She has a normal mood and affect.   Skin:   Areas Examined (abnormalities noted in diagram):   Scalp / Hair Palpated and Inspected  Head / Face Inspection Performed  Neck Inspection Performed  Chest / Axilla Inspection Performed  Abdomen Inspection Performed  Genitals / Buttocks / Groin Inspection Performed  Back Inspection Performed  RUE Inspected  LUE Inspection Performed  RLE Inspected  LLE Inspection Performed  Nails and Digits Inspection Performed                         Diagram Legend     Erythematous scaling macule/papule c/w actinic keratosis       Vascular papule c/w angioma      Pigmented verrucoid papule/plaque c/w seborrheic keratosis      Yellow umbilicated papule c/w sebaceous hyperplasia      Irregularly shaped tan macule c/w lentigo     1-2 mm smooth white papules consistent with Milia      Movable subcutaneous cyst with punctum c/w epidermal inclusion cyst      Subcutaneous movable cyst c/w pilar cyst      Firm pink to brown papule c/w dermatofibroma      Pedunculated fleshy papule(s) c/w skin tag(s)      Evenly pigmented macule c/w junctional nevus     Mildly variegated pigmented, slightly irregular-bordered macule c/w mildly atypical nevus      Flesh colored to evenly pigmented papule c/w intradermal nevus       Pink pearly papule/plaque c/w basal cell carcinoma      Erythematous hyperkeratotic cursted  plaque c/w SCC      Surgical scar with no sign of skin cancer recurrence      Open and closed comedones      Inflammatory papules and pustules      Verrucoid papule consistent consistent with wart     Erythematous eczematous patches and plaques     Dystrophic onycholytic nail with subungual debris c/w onychomycosis     Umbilicated papule    Erythematous-base heme-crusted tan verrucoid plaque consistent with inflamed seborrheic keratosis     Erythematous Silvery Scaling Plaque c/w Psoriasis     See annotation        Assessment / Plan:      Pathology Orders:       Normal Orders This Visit    Specimen to Pathology, Dermatology     Questions:    Procedure Type: Dermatology and skin neoplasms    Number of Specimens: 1    ------------------------: -------------------------    Spec 1 Procedure: Biopsy    Spec 1 Clinical Impression: sup BCC vs SCCIS    Spec 1 Source: left medial upper shin    Release to patient:           Neoplasm of uncertain behavior of skin  -     Specimen to Pathology, Dermatology  Shave biopsy procedure note:    Shave biopsy performed after verbal consent including risk of infection, scar, recurrence, need for additional treatment of site. Area prepped with alcohol, anesthetized with approximately 1.0cc of 1% lidocaine with epinephrine. Lesional tissue shaved with razor blade. Hemostasis achieved with application of aluminum chloride followed by hyfrecation. No complications. Dressing applied. Wound care explained.    History of malignant melanoma of skin  Encounter for follow-up surveillance of skin cancer  Area of previous melanoma and NMSCs examined. Sites well healed with no signs of recurrence.    Total body skin examination performed today including at least 12 points as noted in physical examination. No lesions suspicious for malignancy noted.    Seborrheic keratoses  These are benign inherited growths without a malignant potential. Reassurance given to patient. No treatment is necessary.      Multiple benign nevi  Careful dermoscopy evaluation of nevi performed with none identified as needing biopsy today  Monitor for new mole or moles that are becoming bigger, darker, irritated, or developing irregular borders.     Cherry angioma  This is a benign vascular lesion. Reassurance given. No treatment required.     Sebaceous hyperplasia of face  This is a common condition representing benign enlargement of the sebaceous lobule. It typically occurs in adulthood. Reassurance given to patient.     Solar lentigo  This is a benign hyperpigmented sun induced lesion. Daily sun protection will reduce the number of new lesions. Treatment of these benign lesions are considered cosmetic.    Patient instructed in importance in daily broad spectrum sun protection of at least spf 30. Mineral sunscreen ingredients preferred (Zinc +/- Titanium) and can be found OTC.   Recommend Elta MD for daily use on face and neck.  Patient encouraged to wear hat for all outdoor exposure.   Also discussed sun avoidance and use of protective clothing.             Follow up in about 3 months (around 11/17/2023).

## 2023-08-17 NOTE — PATIENT INSTRUCTIONS
Shave Biopsy Wound Care    Your doctor has performed a shave biopsy today.  A band aid and vaseline ointment has been placed over the site.  This should remain in place for 24 hours.  It is recommended that you keep the area dry for the first 24 hours.  After 24 hours, you may remove the band aid and wash the area with warm soap and water and apply Vaseline jelly.  Many patients prefer to use Neosporin or Bacitracin ointment.  This is acceptable; however, know that you can develop an allergy to this medication even if you have used it safely for years.  It is important to keep the area moist.  Letting it dry out and get air slows healing time, and will worsen the scar.  Band aid is optional after first 24 hours.      If you notice increasing redness, tenderness, pain, or yellow drainage at the biopsy site, please notify your doctor.  These are signs of an infection.    If your biopsy site is bleeding, apply firm pressure for 15 minutes straight.  Repeat for another 15 minutes, if it is still bleeding.   If the surgical site continues to bleed, then please contact your doctor.       [facility] - DERMATOLOGY  [address], [address]  [place] [address]  Dept: [phone]  Dept Fax: [phone]

## 2023-08-21 LAB
FINAL PATHOLOGIC DIAGNOSIS: NORMAL
GROSS: NORMAL
Lab: NORMAL
MICROSCOPIC EXAM: NORMAL

## 2023-08-22 ENCOUNTER — CLINICAL SUPPORT (OUTPATIENT)
Dept: REHABILITATION | Facility: HOSPITAL | Age: 78
End: 2023-08-22
Payer: MEDICARE

## 2023-08-22 DIAGNOSIS — M21.331 RIGHT WRIST DROP: Primary | ICD-10-CM

## 2023-08-22 DIAGNOSIS — S42.301D CLOSED FRACTURE OF BOTH HUMERI WITH ROUTINE HEALING, SUBSEQUENT ENCOUNTER: ICD-10-CM

## 2023-08-22 DIAGNOSIS — S42.302D CLOSED FRACTURE OF BOTH HUMERI WITH ROUTINE HEALING, SUBSEQUENT ENCOUNTER: ICD-10-CM

## 2023-08-22 PROCEDURE — 97032 APPL MODALITY 1+ESTIM EA 15: CPT | Mod: HCWC

## 2023-08-22 PROCEDURE — 97530 THERAPEUTIC ACTIVITIES: CPT | Mod: HCWC

## 2023-08-22 PROCEDURE — 97110 THERAPEUTIC EXERCISES: CPT | Mod: HCWC

## 2023-08-22 PROCEDURE — 97140 MANUAL THERAPY 1/> REGIONS: CPT | Mod: HCWC

## 2023-08-22 NOTE — PROGRESS NOTES
"   Occupational therapy      Date: 8/22/2023  Name: Jovana Zendejas [facility] Number: [id number]    Therapy Diagnosis:   Encounter Diagnoses   Name Primary?    Right wrist drop Yes    Closed fracture of both humeri with routine healing, subsequent encounter          Physician: Carlitos Curry III, MD    Therapy Diagnosis:  right wrist drop  ; B humeral fx's        Physician Orders: evaluate and treat  Medical Diagnosis:right wrist drop/ B humeral fx's    Surgical Procedure and Date: march 30th 2023  Evaluation Date: 6/20/2023  Insurance Authorization Period Expiration: 06/20/2023 to 12/31/2023  Plan of Care Certification Period: 6/20/2023 to 9/26/2023  Date of Return to MD: 9/26/2023  Visit # / Visits authorized: 16/20      Time In:2:30  Time Out: 3:30  Total Billable Time: 60 minutes    Precautions:  universal, see epic, protocol if applicable    Subjective     Pt reports: "I my hand is getting better   "  she was compliant with home exercise program.  Response to previous treatment:patient w/ some progress w/ pt for b shoulders prior to most recent re-fracture on right   Functional change: decreased functional use for overhead activities    Pain: 0/10 rest; 0/10 light use  Side:right   Location: b shoulders  Objective   Jovana received therapeutic exercises to develop ROM and flexibility for 45 minutes including:    PATIENT PERFORMED UBE 10 MIN X 1 TRIAL. PATIENT performed gravity eliminated right wrist extension AAROM w/ use of unaffected left hand to assist.    Patient performed place hold exercises for wrist extension against gravity.  Patient w/ 2/5 mmt wrist ext  .  Patient performed 2 sets 10reps each:  Water spout activity  Figure 8 in stand  Hide & seek   tip  Water pump  Table stretch  Self massage    Jovana received the following manual therapy techniques:Scar massage to b humeral scars.     Jovana received e-stim to right wrist/and hand extensor musculature @ 30 pps  Home Exercises and Education " Provided     Education provided:   Education on new HEP for  wrist drop    progress towards goals   likely tx progression  rationale of rehab interventions    Written Home Exercises/Information Provided: yes.      previously issued exercises and/or other issued home therapy instructions were reviewed if still part of current tx plan as well as any issued today and Jovana was able to demonstrate them prior to the end of the session.  Jovana demonstrated Ind w/ understanding of the HEP provided.   See EMR under patient instructions and/or media for HEP issued today or in past    Assessment   Pt would continue to benefit from skilled OT in order to address ROM, PAIN, AND/OR DECREASED FUNCTIONAL STATUS SECONDARY TO CURRENT INJURY/DEBILITATING FACTOR.   Patient w/ some return of wrist extension.   Jovana is progressing well towards her goals updates to program as stated previously. Patient has good prognosis.          Anticipated barriers to occupational therapy: none    Pt's spiritual, cultural and educational needs have been considered and pt is agreeable to plan of care and goals.      Goals:   stg to be met in 4 weeks   1. Patient will be I with HEP met  2. Patient will have 2/10 pain with light use met  3. Patient will have = arom of right wrist  to enhance affected arm use with ADL ongoing        ltg to be met by d/c   1. Patient will be I with d/c HEP   2. Patient will have 0/10 pain with all use   3. Patient will increase her arom for wrist extension to wfls to promote full functional use.   4.patient will demonstrate 4+/5 strength for b wrist extension.             Any goals met today ?  (if any met see above)    Updates/Grading for next session: prn, will incorporate estim to wrist extensor musculature.     Plan: evaluate and tx    Briana DOT Wright

## 2023-08-29 ENCOUNTER — CLINICAL SUPPORT (OUTPATIENT)
Dept: REHABILITATION | Facility: HOSPITAL | Age: 78
End: 2023-08-29
Payer: MEDICARE

## 2023-08-29 DIAGNOSIS — S42.301D: ICD-10-CM

## 2023-08-29 DIAGNOSIS — S42.302D: ICD-10-CM

## 2023-08-29 DIAGNOSIS — M21.331 RIGHT WRIST DROP: Primary | ICD-10-CM

## 2023-08-29 PROCEDURE — 97032 APPL MODALITY 1+ESTIM EA 15: CPT | Mod: HCWC

## 2023-08-29 PROCEDURE — 97110 THERAPEUTIC EXERCISES: CPT | Mod: HCWC

## 2023-08-29 PROCEDURE — 97112 NEUROMUSCULAR REEDUCATION: CPT | Mod: HCWC

## 2023-08-29 NOTE — PROGRESS NOTES
"   Occupational therapy      Date: 8/29/2023  Name: Jovana Zendejas [facility] Number: [id number]    Therapy Diagnosis:   Encounter Diagnosis   Name Primary?    Right wrist drop Yes         Physician: Carlitos Curry III, MD    Therapy Diagnosis:  right wrist drop  ; B humeral fx's        Physician Orders: evaluate and treat  Medical Diagnosis:right wrist drop/ B humeral fx's    Surgical Procedure and Date: march 30th 2023  Evaluation Date: 6/20/2023  Insurance Authorization Period Expiration: 06/20/2023 to 12/31/2023  Plan of Care Certification Period: 6/20/2023 to 9/26/2023  Date of Return to MD: 9/26/2023  Visit # / Visits authorized: 17/20      Time In:2:30  Time Out: 3:30  Total Billable Time: 60 minutes    Precautions:  universal, see epic, protocol if applicable    Subjective     Pt reports: "I my hand is getting better   "  she was compliant with home exercise program.  Response to previous treatment:patient w/ some progress w/ pt for b shoulders prior to most recent re-fracture on right   Functional change: decreased functional use for overhead activities    Pain: 0/10 rest; 0/10 light use  Side:right   Location: b shoulders  Objective   Jovana received therapeutic exercises to develop ROM and flexibility for 45 minutes including:   PATIENT PERFORMED UBE 10 MIN X 1 TRIAL. PATIENT performed gravity eliminated right wrist extension AAROM w/ use of unaffected left hand to assist.    Patient performed place hold exercises for wrist extension against gravity.  Patient w/ 2/5 mmt wrist ext  .  Patient performed 2 sets 10reps each:  Water spout activity  Figure 8 in stand  Hide & seek   tip  Water pump  Table stretch  Self massage    Jovana received the following manual therapy techniques:Scar massage to b humeral scars.     Jovana received e-stim to right wrist/and hand extensor musculature @ 30 pps  Home Exercises and Education Provided     Education provided:   Education on new HEP for  wrist " drop    progress towards goals   likely tx progression  rationale of rehab interventions    Written Home Exercises/Information Provided: yes.      previously issued exercises and/or other issued home therapy instructions were reviewed if still part of current tx plan as well as any issued today and Jovana was able to demonstrate them prior to the end of the session.  Jovana demonstrated Ind w/ understanding of the HEP provided.   See EMR under patient instructions and/or media for HEP issued today or in past    Assessment   Patient demonstrating an increase in arom for ext of right wrist. Pt would continue to benefit from skilled OT in order to address ROM, PAIN, AND/OR DECREASED FUNCTIONAL STATUS SECONDARY TO CURRENT INJURY/DEBILITATING FACTOR.   Patient w/ some return of wrist extension.   Jovana is progressing well towards her goals updates to program as stated previously. Patient has good prognosis.          Anticipated barriers to occupational therapy: none    Pt's spiritual, cultural and educational needs have been considered and pt is agreeable to plan of care and goals.      Goals:   stg to be met in 4 weeks   1. Patient will be I with HEP met  2. Patient will have 2/10 pain with light use met  3. Patient will have = arom of right wrist  to enhance affected arm use with ADL ongoing        ltg to be met by d/c   1. Patient will be I with d/c HEP   2. Patient will have 0/10 pain with all use   3. Patient will increase her arom for wrist extension to wfls to promote full functional use.   4.patient will demonstrate 4+/5 strength for b wrist extension.             Any goals met today ?  (if any met see above)    Updates/Grading for next session: prn, will incorporate estim to wrist extensor musculature.     Plan: evaluate and tx    DOT Casiano

## 2023-09-05 ENCOUNTER — TELEPHONE (OUTPATIENT)
Dept: OPHTHALMOLOGY | Facility: CLINIC | Age: 78
End: 2023-09-05
Payer: MEDICARE

## 2023-09-05 NOTE — TELEPHONE ENCOUNTER
Spoke to pt and rescheduled appt     ----- Message from Annabel Andrea sent at 9/5/2023  8:38 AM CDT -----  Contact: Self  Type:  Sooner Appointment Request    Caller is requesting a sooner appointment.  Caller declined first available appointment listed below.  Caller will not accept being placed on the waitlist and is requesting a message be sent to doctor.    Name of Caller:  Pt  When is the first available appointment?  NA  Symptoms:  Pt states she is sick and vomiting. Needs to reschedule her appointments for today 9/5/23.  Best Call Back Number:  [phone]    Additional Information:  Reschedule. Please call.

## 2023-09-06 ENCOUNTER — CLINICAL SUPPORT (OUTPATIENT)
Dept: REHABILITATION | Facility: HOSPITAL | Age: 78
End: 2023-09-06
Payer: MEDICARE

## 2023-09-06 ENCOUNTER — PATIENT MESSAGE (OUTPATIENT)
Dept: PRIMARY CARE CLINIC | Facility: CLINIC | Age: 78
End: 2023-09-06
Payer: MEDICARE

## 2023-09-06 DIAGNOSIS — M21.331 RIGHT WRIST DROP: Primary | ICD-10-CM

## 2023-09-06 DIAGNOSIS — E11.21 TYPE 2 DIABETES MELLITUS WITH DIABETIC NEPHROPATHY, WITHOUT LONG-TERM CURRENT USE OF INSULIN: ICD-10-CM

## 2023-09-06 DIAGNOSIS — S42.302D: ICD-10-CM

## 2023-09-06 DIAGNOSIS — E11.59 TYPE 2 DIABETES MELLITUS WITH OTHER CIRCULATORY COMPLICATION, WITHOUT LONG-TERM CURRENT USE OF INSULIN: ICD-10-CM

## 2023-09-06 DIAGNOSIS — S42.301D: ICD-10-CM

## 2023-09-06 PROCEDURE — 97110 THERAPEUTIC EXERCISES: CPT | Mod: HCWC

## 2023-09-06 PROCEDURE — 97032 APPL MODALITY 1+ESTIM EA 15: CPT | Mod: HCWC

## 2023-09-06 PROCEDURE — 97530 THERAPEUTIC ACTIVITIES: CPT | Mod: HCWC

## 2023-09-06 PROCEDURE — 97112 NEUROMUSCULAR REEDUCATION: CPT | Mod: HCWC

## 2023-09-06 NOTE — PROGRESS NOTES
"   Occupational therapy      Date: 9/6/2023  Name: Jovana Zendejas [facility] Number: [id number]    Therapy Diagnosis:   Encounter Diagnoses   Name Primary?    Right wrist drop Yes    Open fracture of both humeri with routine healing, subsequent encounter          Physician: Carlitos Curry III, MD    Therapy Diagnosis:  right wrist drop  ; B humeral fx's        Physician Orders: evaluate and treat  Medical Diagnosis:right wrist drop/ B humeral fx's    Surgical Procedure and Date: march 30th 2023  Evaluation Date: 6/20/2023  Insurance Authorization Period Expiration: 06/20/2023 to 12/31/2023  Plan of Care Certification Period: 6/20/2023 to 9/26/2023  Date of Return to MD: 9/26/2023  Visit # / Visits authorized: 17/20      Time In:2:00  Time Out: 3:00  Total Billable Time: 60 minutes    Precautions:  universal, see epic, protocol if applicable    Subjective     Pt reports: "I'm using my hand more.   "  she was compliant with home exercise program.  Response to previous treatment:patient w/ some progress w/ pt for b shoulders prior to most recent re-fracture on right   Functional change: decreased functional use for overhead activities    Pain: 0/10 rest; 0/10 light use  Side:right   Location: b shoulders  Objective   Jovana received therapeutic exercises to develop ROM and flexibility for 45 minutes including:  . PATIENT performed gravity eliminated right wrist extension AAROM w/ use of unaffected left hand to assist.    Patient performed place hold exercises for wrist extension against gravity.  Patient w/ 2/5 mmt wrist ext  .  Patient performed 2 sets 10reps each:  Water spout activity  Figure 8 in stand  Hide & seek   tip  Patient performed MCP extension exercises w/ hand wrapped in coban in intrinsic minus position in order to isolate extensor musculature of her mcps. 3 sets 10 reps.   Jovana received the following manual therapy techniques:Scar massage to b humeral scars.     Jovana received e-stim to right " wrist/and hand extensor musculature @ 30 pps  Home Exercises and Education Provided     Education provided:   Education on new HEP for  wrist drop    progress towards goals   likely tx progression  rationale of rehab interventions    Written Home Exercises/Information Provided: yes.      previously issued exercises and/or other issued home therapy instructions were reviewed if still part of current tx plan as well as any issued today and Jovana was able to demonstrate them prior to the end of the session.  Jovana demonstrated Ind w/ understanding of the HEP provided.   See EMR under patient instructions and/or media for HEP issued today or in past    Assessment   Patient demonstrating an increase in arom for ext of right wrist, yet still w/ limitations of her mcps  for extension. Pt would continue to benefit from skilled OT in order to address ROM, PAIN, AND/OR DECREASED FUNCTIONAL STATUS SECONDARY TO CURRENT INJURY/DEBILITATING FACTOR.   Patient w/ some return of wrist extension.   Jovana is progressing well towards her goals updates to program as stated previously. Patient has good prognosis.          Anticipated barriers to occupational therapy: none    Pt's spiritual, cultural and educational needs have been considered and pt is agreeable to plan of care and goals.      Goals:   stg to be met in 4 weeks   1. Patient will be I with HEP met  2. Patient will have 2/10 pain with light use met  3. Patient will have = arom of right wrist  to enhance affected arm use with ADL ongoing        ltg to be met by d/c   1. Patient will be I with d/c HEP   2. Patient will have 0/10 pain with all use   3. Patient will increase her arom for wrist extension to wfls to promote full functional use.   4.patient will demonstrate 4+/5 strength for b wrist extension.             Any goals met today ?  (if any met see above)    Updates/Grading for next session: prn, will incorporate estim to wrist extensor musculature.     Plan: evaluate  and DOT [place]

## 2023-09-12 ENCOUNTER — LAB VISIT (OUTPATIENT)
Dept: LAB | Facility: HOSPITAL | Age: 78
End: 2023-09-12
Attending: INTERNAL MEDICINE
Payer: MEDICARE

## 2023-09-12 DIAGNOSIS — E11.59 TYPE 2 DIABETES MELLITUS WITH OTHER CIRCULATORY COMPLICATION, WITHOUT LONG-TERM CURRENT USE OF INSULIN: ICD-10-CM

## 2023-09-12 LAB
ALBUMIN SERPL BCP-MCNC: 3.9 G/DL (ref 3.5–5.2)
ALP SERPL-CCNC: 182 U/L (ref 55–135)
ALT SERPL W/O P-5'-P-CCNC: 18 U/L (ref 10–44)
ANION GAP SERPL CALC-SCNC: 10 MMOL/L (ref 8–16)
AST SERPL-CCNC: 15 U/L (ref 10–40)
BILIRUB SERPL-MCNC: 0.7 MG/DL (ref 0.1–1)
BUN SERPL-MCNC: 15 MG/DL (ref 8–23)
CALCIUM SERPL-MCNC: 9.2 MG/DL (ref 8.7–10.5)
CHLORIDE SERPL-SCNC: 107 MMOL/L (ref 95–110)
CHOLEST SERPL-MCNC: 161 MG/DL (ref 120–199)
CHOLEST/HDLC SERPL: 4.5 {RATIO} (ref 2–5)
CO2 SERPL-SCNC: 23 MMOL/L (ref 23–29)
CREAT SERPL-MCNC: 0.8 MG/DL (ref 0.5–1.4)
ERYTHROCYTE [DISTWIDTH] IN BLOOD BY AUTOMATED COUNT: 13.7 % (ref 11.5–14.5)
EST. GFR  (NO RACE VARIABLE): >60 ML/MIN/1.73 M^2
ESTIMATED AVG GLUCOSE: 126 MG/DL (ref 68–131)
GLUCOSE SERPL-MCNC: 156 MG/DL (ref 70–110)
HBA1C MFR BLD: 6 % (ref 4–5.6)
HCT VFR BLD AUTO: 44.8 % (ref 37–48.5)
HDLC SERPL-MCNC: 36 MG/DL (ref 40–75)
HDLC SERPL: 22.4 % (ref 20–50)
HGB BLD-MCNC: 15 G/DL (ref 12–16)
LDLC SERPL CALC-MCNC: 81.6 MG/DL (ref 63–159)
MCH RBC QN AUTO: 30.1 PG (ref 27–31)
MCHC RBC AUTO-ENTMCNC: 33.5 G/DL (ref 32–36)
MCV RBC AUTO: 90 FL (ref 82–98)
NONHDLC SERPL-MCNC: 125 MG/DL
PLATELET # BLD AUTO: 221 K/UL (ref 150–450)
PMV BLD AUTO: 8.9 FL (ref 9.2–12.9)
POTASSIUM SERPL-SCNC: 4.1 MMOL/L (ref 3.5–5.1)
PROT SERPL-MCNC: 7.5 G/DL (ref 6–8.4)
RBC # BLD AUTO: 4.99 M/UL (ref 4–5.4)
SODIUM SERPL-SCNC: 140 MMOL/L (ref 136–145)
TRIGL SERPL-MCNC: 217 MG/DL (ref 30–150)
WBC # BLD AUTO: 7.61 K/UL (ref 3.9–12.7)

## 2023-09-12 PROCEDURE — 80053 COMPREHEN METABOLIC PANEL: CPT | Mod: HCWC | Performed by: INTERNAL MEDICINE

## 2023-09-12 PROCEDURE — 83036 HEMOGLOBIN GLYCOSYLATED A1C: CPT | Mod: HCWC | Performed by: INTERNAL MEDICINE

## 2023-09-12 PROCEDURE — 36415 COLL VENOUS BLD VENIPUNCTURE: CPT | Mod: HCWC | Performed by: INTERNAL MEDICINE

## 2023-09-12 PROCEDURE — 80061 LIPID PANEL: CPT | Mod: HCWC | Performed by: INTERNAL MEDICINE

## 2023-09-12 PROCEDURE — 85027 COMPLETE CBC AUTOMATED: CPT | Mod: HCWC | Performed by: INTERNAL MEDICINE

## 2023-09-13 ENCOUNTER — PATIENT MESSAGE (OUTPATIENT)
Dept: PRIMARY CARE CLINIC | Facility: CLINIC | Age: 78
End: 2023-09-13
Payer: MEDICARE

## 2023-09-13 ENCOUNTER — TELEPHONE (OUTPATIENT)
Dept: DERMATOLOGY | Facility: CLINIC | Age: 78
End: 2023-09-13
Payer: MEDICARE

## 2023-09-13 NOTE — TELEPHONE ENCOUNTER
----- Message from Nisha Linares sent at 9/13/2023  2:21 PM CDT -----  Contact: Pt  Type:  Patient Returning Call    Who Called:Pt  Who Left Message for Patient:Unknown  Does the patient know what this is regarding?:yes  Would the patient rather a call back or a response via [facility]? call  Best Call Back Number:[phone]  Additional Information: Pt is returning a call from the office. She has an appt on 11/26/2024, but it's suppose to be 2023. Please call pt back to advise.

## 2023-09-19 ENCOUNTER — CLINICAL SUPPORT (OUTPATIENT)
Dept: REHABILITATION | Facility: HOSPITAL | Age: 78
End: 2023-09-19
Payer: MEDICARE

## 2023-09-19 DIAGNOSIS — M21.331 RIGHT WRIST DROP: Primary | ICD-10-CM

## 2023-09-19 PROCEDURE — 97032 APPL MODALITY 1+ESTIM EA 15: CPT | Mod: HCWC

## 2023-09-19 PROCEDURE — 97110 THERAPEUTIC EXERCISES: CPT | Mod: HCWC

## 2023-09-19 PROCEDURE — 97530 THERAPEUTIC ACTIVITIES: CPT | Mod: HCWC

## 2023-09-19 NOTE — PROGRESS NOTES
"   Occupational therapy      Date: 9/19/2023  Name: Jovana Fall  Clinic Number: [id number]    Therapy Diagnosis:   Wrist drop right / b humeral fractures         Physician: Carlitos Curry III, MD    Therapy Diagnosis:  right wrist drop  ; B humeral fx's        Physician Orders: evaluate and treat  Medical Diagnosis:right wrist drop/ B humeral fx's    Surgical Procedure and Date: march 30th 2023  Evaluation Date: 6/20/2023  Insurance Authorization Period Expiration: 06/20/2023 to 12/31/2023  Plan of Care Certification Period: 6/20/2023 to 9/26/2023  Date of Return to MD: 9/26/2023  Visit # / Visits authorized: 18/20      Time In:3:30  Time Out: 4:30  Total Billable Time: 60 minutes    Precautions:  universal, see epic, protocol if applicable    Subjective     Pt reports: "my fingers look swollen. "  she was compliant with home exercise program.  Response to previous treatment:patient w/ some progress w/ pt for b shoulders prior to most recent re-fracture on right   Functional change: decreased functional use for overhead activities    Pain: 0/10 rest; 0/10 light use  Side:right   Location: b shoulders  Objective   Jovana received therapeutic exercises to develop ROM and flexibility for 45 minutes including:   PATIENT performed gravity eliminated right wrist extension AAROM w/ use of unaffected left hand to assist.    Patient performed place hold exercises for wrist extension against gravity.  Patient w/ 2/5 mmt wrist ext  .  Patient performed 2 sets 10reps each:  Water spout activity  Figure 8 in stand  Hide & seek   tip  Patient performed MCP extension exercises w/ hand wrapped in coban in intrinsic minus position in order to isolate extensor musculature of her mcps. 3 sets 10 reps.   Jovana received the following manual therapy techniques:Scar massage to b humeral scars.     Jovana received e-stim to right wrist/and hand extensor musculature @ 30 pps  Home Exercises and Education Provided     Education " provided:   Education on new HEP for  wrist drop    progress towards goals   likely tx progression  rationale of rehab interventions    Written Home Exercises/Information Provided: yes.      previously issued exercises and/or other issued home therapy instructions were reviewed if still part of current tx plan as well as any issued today and Jovana was able to demonstrate them prior to the end of the session.  Jovana demonstrated Ind w/ understanding of the HEP provided.   See EMR under patient instructions and/or media for HEP issued today or in past    Assessment   Patient demonstrating an increase in arom for ext of right wrist, yet still w/ limitations of her mcps  for extension. Pt would continue to benefit from skilled OT in order to address ROM, PAIN, AND/OR DECREASED FUNCTIONAL STATUS SECONDARY TO CURRENT INJURY/DEBILITATING FACTOR.   Patient w/ some return of wrist extension.   Jovana is progressing well towards her goals updates to program as stated previously. Patient has good prognosis.          Anticipated barriers to occupational therapy: none    Pt's spiritual, cultural and educational needs have been considered and pt is agreeable to plan of care and goals.      Goals:   stg to be met in 4 weeks   1. Patient will be I with HEP met  2. Patient will have 2/10 pain with light use met  3. Patient will have = arom of right wrist  to enhance affected arm use with ADL ongoing        ltg to be met by d/c   1. Patient will be I with d/c HEP   2. Patient will have 0/10 pain with all use   3. Patient will increase her arom for wrist extension to wfls to promote full functional use.   4.patient will demonstrate 4+/5 strength for b wrist extension.             Any goals met today ?  (if any met see above)    Updates/Grading for next session: prn, will incorporate estim to wrist extensor musculature.     Plan: evaluate and tx    Briana DOT [place]

## 2023-09-27 DIAGNOSIS — I25.84 CORONARY ATHEROSCLEROSIS DUE TO CALCIFIED CORONARY LESION (CODE): ICD-10-CM

## 2023-09-27 RX ORDER — NITROGLYCERIN 0.4 MG/1
TABLET SUBLINGUAL
Qty: 100 TABLET | Refills: 1 | Status: SHIPPED | OUTPATIENT
Start: 2023-09-27

## 2023-09-27 NOTE — TELEPHONE ENCOUNTER
Refill Routing Note   Medication(s) are not appropriate for processing by [facility] for the following reason(s):      New or recently adjusted medication    [facility] action(s):  Defer Care Due:  None identified            Appointments  past 12m or future 3m with PCP    Date Provider   Last Visit   3/21/2023 Marcy Staley MD   Next Visit   10/12/2023 Marcy Staley MD   ED visits in past 90 days: 0        Note composed:10:06 AM 09/27/2023

## 2023-09-27 NOTE — TELEPHONE ENCOUNTER
No care due was identified.  [facility] Embedded Care Due Messages. Reference number: [id number].   9/27/2023 9:43:37 AM CDT

## 2023-09-28 ENCOUNTER — TELEPHONE (OUTPATIENT)
Dept: PRIMARY CARE CLINIC | Facility: CLINIC | Age: 78
End: 2023-09-28
Payer: MEDICARE

## 2023-09-28 NOTE — TELEPHONE ENCOUNTER
----- Message from Marti Dennison sent at 9/28/2023 10:00 AM CDT -----  Contact: [phone]/ [phone]  Enma Esparza called, requested copy from her last clinic visit notes, and blood work, patient is having surgery on 10/03/23. Please call at [phone]/, fax [phone].  Please call and advise. Thank you

## 2023-09-30 ENCOUNTER — PATIENT MESSAGE (OUTPATIENT)
Dept: PRIMARY CARE CLINIC | Facility: CLINIC | Age: 78
End: 2023-09-30
Payer: MEDICARE

## 2023-10-02 ENCOUNTER — TELEPHONE (OUTPATIENT)
Dept: PRIMARY CARE CLINIC | Facility: CLINIC | Age: 78
End: 2023-10-02
Payer: MEDICARE

## 2023-10-02 ENCOUNTER — CLINICAL SUPPORT (OUTPATIENT)
Dept: REHABILITATION | Facility: HOSPITAL | Age: 78
End: 2023-10-02
Payer: MEDICARE

## 2023-10-02 DIAGNOSIS — M21.331 RIGHT WRIST DROP: Primary | ICD-10-CM

## 2023-10-02 DIAGNOSIS — M48.061 DEGENERATIVE LUMBAR SPINAL STENOSIS: ICD-10-CM

## 2023-10-02 NOTE — TELEPHONE ENCOUNTER
----- Message from Kaden Thomas sent at 10/2/2023 11:21 AM CDT -----  Contact: self [phone]  Requesting an RX refill or new RX.  Is this a refill or new RX: refill  RX name and strength (copy/paste from chart):  oxyCODONE-acetaminophen (PERCOCET) 5-325 mg per tablet  Is this a 30 day or 90 day RX:   Pharmacy name and phone # (copy/paste from chart):  [address], [place] - [address] [address] [[id number]]  The doctors have asked that we provide their patients with the following 2 reminders -- prescription refills can take up to 72 hours, and a friendly reminder that in the future you can use your [facility] account to request refills: yes    Please call and advise        Exercise Prescription       Exercise Modality  Initial Workload MET Level    Nu-Step (NU) Level: 3 Steps/Minute: 60 1 5 METs   Rower (RW) Level: na     Airdyne Bike (AD-Bike) Level: 2 0 1 5  METs   Arm Ergometer (AE) RPM: 30 Allison: 20  METs   Treadmill 1 8 mph 0% grade 2 4  METs   Recumbent Bike (RB) Level: 1 Allison: 40 3 4 METs   Resistance (RES) 5-8 lbs Weights 30 lbs Pulleys Level Red Resistance Bands        Comments:   Patient completed 6 min walk test with a max MET rate of 1 9 METs using supplemental oxygen at 3L/min via nasal cannula  We will begin exercise around 1 5-2 5 METs and will increase exercise intensity and duration as tolerated by the patient  Supplemental oxygen will be given as needed to maintain SaO2 > 90%

## 2023-10-02 NOTE — TELEPHONE ENCOUNTER
No care due was identified.  [facility] Embedded Care Due Messages. Reference number: [id number].   10/02/2023 1:54:21 PM CDT

## 2023-10-02 NOTE — PROGRESS NOTES
"   Occupational therapy      Date: 10/2/2023  Name: Jovana Fall  Clinic Number: [id number]    Therapy Diagnosis:   Wrist drop right / b humeral fractures         Physician: Carlitos Curry III, MD    Therapy Diagnosis:  right wrist drop  ; B humeral fx's        Physician Orders: evaluate and treat  Medical Diagnosis:right wrist drop/ B humeral fx's    Surgical Procedure and Date: march 30th 2023  Evaluation Date: 6/20/2023  Insurance Authorization Period Expiration: 06/20/2023 to 12/31/2023  Plan of Care Certification Period: 6/20/2023 to 9/26/2023  Date of Return to MD: 9/26/2023  Visit # / Visits authorized: 18/30      Time In:1:30  Time Out: 2:30  Total Billable Time: 60 minutes    Precautions:  universal, see epic, protocol if applicable    Subjective     Pt reports: "my fingers look swollen. "  she was compliant with home exercise program.  Response to previous treatment:patient w/ some progress w/ pt for b shoulders prior to most recent re-fracture on right   Functional change: decreased functional use for overhead activities    Pain: 0/10 rest; 0/10 light use  Side:right   Location: b shoulders  Objective   Jovana received therapeutic exercises to develop ROM and flexibility for 45 minutes including:  PATIENT performed gravity eliminated right wrist extension AAROM w/ use of unaffected left hand to assist.    Patient performed place hold exercises for wrist extension against gravity.  Patient w/ 2/5 mmt wrist ext  .  Patient performed 2 sets 10reps each:  Water spout activity  Figure 8 in stand  Hide & seek   tip  Patient performed MCP extension exercises w/ hand wrapped in coban in intrinsic minus position in order to isolate extensor musculature of her mcps. 3 sets 10 reps.   Jovana received the following manual therapy techniques:Scar massage to b humeral scars.     Jovana received e-stim to right wrist/and hand extensor musculature @ 30 pps  Home Exercises and Education Provided     Education " provided:   Education on new HEP for  wrist drop    progress towards goals   likely tx progression  rationale of rehab interventions    Written Home Exercises/Information Provided: yes.      previously issued exercises and/or other issued home therapy instructions were reviewed if still part of current tx plan as well as any issued today and Jovana was able to demonstrate them prior to the end of the session.  Jovana demonstrated Ind w/ understanding of the HEP provided.   See EMR under patient instructions and/or media for HEP issued today or in past    Assessment   Patient demonstrating an increase in arom for ext of right wrist, yet still w/ limitations of her mcps  for extension. Pt would continue to benefit from skilled OT in order to address ROM, PAIN, AND/OR DECREASED FUNCTIONAL STATUS SECONDARY TO CURRENT INJURY/DEBILITATING FACTOR.   Patient w/ some return of wrist extension.   Jovana is progressing well towards her goals updates to program as stated previously. Patient has good prognosis.          Anticipated barriers to occupational therapy: none    Pt's spiritual, cultural and educational needs have been considered and pt is agreeable to plan of care and goals.      Goals:   stg to be met in 4 weeks   1. Patient will be I with HEP met  2. Patient will have 2/10 pain with light use met  3. Patient will have = arom of right wrist  to enhance affected arm use with ADL ongoing        ltg to be met by d/c   1. Patient will be I with d/c HEP   2. Patient will have 0/10 pain with all use   3. Patient will increase her arom for wrist extension to wfls to promote full functional use.   4.patient will demonstrate 4+/5 strength for b wrist extension.             Any goals met today ?  (if any met see above)    Updates/Grading for next session: prn, will incorporate estim to wrist extensor musculature.     Plan: evaluate and tx    Briana DOT [place]

## 2023-10-02 NOTE — TELEPHONE ENCOUNTER
----- Message from Aliyah Bran sent at 10/2/2023  8:46 AM CDT -----  Contact: Cynthia bailey/[place] [phone]  2nd message.        Per Cynthia, this is a second message in regards to pt's last clinical notes and lab work. Please fax this information to [phone].              Thank you

## 2023-10-03 RX ORDER — OXYCODONE AND ACETAMINOPHEN 5; 325 MG/1; MG/1
1 TABLET ORAL 2 TIMES DAILY PRN
Qty: 30 TABLET | Refills: 0 | Status: SHIPPED | OUTPATIENT
Start: 2023-10-03 | End: 2023-10-11 | Stop reason: SDUPTHER

## 2023-10-11 ENCOUNTER — TELEPHONE (OUTPATIENT)
Dept: PRIMARY CARE CLINIC | Facility: CLINIC | Age: 78
End: 2023-10-11
Payer: MEDICARE

## 2023-10-11 DIAGNOSIS — M48.061 DEGENERATIVE LUMBAR SPINAL STENOSIS: ICD-10-CM

## 2023-10-11 RX ORDER — TERIPARATIDE 250 UG/ML
INJECTION, SOLUTION SUBCUTANEOUS
COMMUNITY
Start: 2023-08-21

## 2023-10-11 RX ORDER — GABAPENTIN 300 MG/1
CAPSULE ORAL
COMMUNITY
Start: 2023-08-09 | End: 2023-12-12

## 2023-10-11 RX ORDER — OXYCODONE AND ACETAMINOPHEN 5; 325 MG/1; MG/1
1 TABLET ORAL 2 TIMES DAILY PRN
Qty: 30 TABLET | Refills: 0 | Status: SHIPPED | OUTPATIENT
Start: 2023-10-11 | End: 2023-11-06 | Stop reason: SDUPTHER

## 2023-10-11 NOTE — TELEPHONE ENCOUNTER
30 tabs ordered by covering MD 10/3/23. Surgeon did not prescribe?  Added the other 30 tabs today (usually gets 60), but advise she begin to wean down pain meds for surgery done eight days ago.

## 2023-10-11 NOTE — TELEPHONE ENCOUNTER
Pt state she on received 25 tabs of her oxycodone -acetaminophen pt is asking can the rest of the tablets be sent to the pharmacy

## 2023-10-11 NOTE — TELEPHONE ENCOUNTER
----- Message from Maddi Bran sent at 10/11/2023  9:47 AM CDT -----  Contact: Self [phone]  Would like to receive medical advice.     Would they like a call back or a response via [facility]:  call back    Additional information:  Calling to confirm if still need to attend appt on tomorrow. Pt states she recently had surgery and blood work was fine.

## 2023-11-06 DIAGNOSIS — M48.061 DEGENERATIVE LUMBAR SPINAL STENOSIS: ICD-10-CM

## 2023-11-06 NOTE — TELEPHONE ENCOUNTER
Care Due:                  Date            Visit Type   Department     Provider  --------------------------------------------------------------------------------                                EP -                              PRIMARY      [facility] PRIMARY   Marcy Fraser  Last Visit: 03-      CARE ([facility])   CARE           Degranjoshua                              EP -                              PRIMARY      [facility] PRIMARY   Marcy Fraser  Next Visit: 12-      CARE ([facility])   CARE           Luís                                                            Last  Test          Frequency    Reason                     Performed    Due Date  --------------------------------------------------------------------------------    Mg Level....  12 months..  alendronate..............  Not Found    Overdue    Phosphate...  12 months..  alendronate..............  Not Found    Overdue    Vitamin D...  12 months..  alendronate..............  07- 07-    [facility] Embedded Care Due Messages. Reference number: [id number].   11/06/2023 9:47:55 AM CST

## 2023-11-07 RX ORDER — OXYCODONE AND ACETAMINOPHEN 5; 325 MG/1; MG/1
1 TABLET ORAL 2 TIMES DAILY PRN
Qty: 60 TABLET | Refills: 0 | Status: SHIPPED | OUTPATIENT
Start: 2023-11-07 | End: 2023-12-12 | Stop reason: SDUPTHER

## 2023-11-27 ENCOUNTER — OFFICE VISIT (OUTPATIENT)
Dept: DERMATOLOGY | Facility: CLINIC | Age: 78
End: 2023-11-27
Payer: MEDICARE

## 2023-11-27 VITALS — WEIGHT: 191 LBS | BODY MASS INDEX: 29.98 KG/M2 | HEIGHT: 67 IN

## 2023-11-27 DIAGNOSIS — Z85.820 HISTORY OF MALIGNANT MELANOMA OF SKIN: ICD-10-CM

## 2023-11-27 DIAGNOSIS — Z85.828 ENCOUNTER FOR FOLLOW-UP SURVEILLANCE OF SKIN CANCER: ICD-10-CM

## 2023-11-27 DIAGNOSIS — Z08 ENCOUNTER FOR FOLLOW-UP SURVEILLANCE OF SKIN CANCER: ICD-10-CM

## 2023-11-27 DIAGNOSIS — L81.4 SOLAR LENTIGO: ICD-10-CM

## 2023-11-27 DIAGNOSIS — L82.1 SEBORRHEIC KERATOSES: ICD-10-CM

## 2023-11-27 DIAGNOSIS — D22.9 MULTIPLE BENIGN NEVI: ICD-10-CM

## 2023-11-27 DIAGNOSIS — L57.0 ACTINIC KERATOSES: Primary | ICD-10-CM

## 2023-11-27 PROCEDURE — 17000 DESTRUCT PREMALG LESION: CPT | Mod: S$GLB,,, | Performed by: DERMATOLOGY

## 2023-11-27 PROCEDURE — 1160F PR REVIEW ALL MEDS BY PRESCRIBER/CLIN PHARMACIST DOCUMENTED: ICD-10-PCS | Mod: CPTII,S$GLB,, | Performed by: DERMATOLOGY

## 2023-11-27 PROCEDURE — 99213 OFFICE O/P EST LOW 20 MIN: CPT | Mod: 25,S$GLB,, | Performed by: DERMATOLOGY

## 2023-11-27 PROCEDURE — 99213 PR OFFICE/OUTPT VISIT, EST, LEVL III, 20-29 MIN: ICD-10-PCS | Mod: 25,S$GLB,, | Performed by: DERMATOLOGY

## 2023-11-27 PROCEDURE — 3288F PR FALLS RISK ASSESSMENT DOCUMENTED: ICD-10-PCS | Mod: CPTII,S$GLB,, | Performed by: DERMATOLOGY

## 2023-11-27 PROCEDURE — 1101F PT FALLS ASSESS-DOCD LE1/YR: CPT | Mod: CPTII,S$GLB,, | Performed by: DERMATOLOGY

## 2023-11-27 PROCEDURE — 1160F RVW MEDS BY RX/DR IN RCRD: CPT | Mod: CPTII,S$GLB,, | Performed by: DERMATOLOGY

## 2023-11-27 PROCEDURE — 17000 PR DESTRUCTION(LASER SURGERY,CRYOSURGERY,CHEMOSURGERY),PREMALIGNANT LESIONS,FIRST LESION: ICD-10-PCS | Mod: S$GLB,,, | Performed by: DERMATOLOGY

## 2023-11-27 PROCEDURE — 1159F MED LIST DOCD IN RCRD: CPT | Mod: CPTII,S$GLB,, | Performed by: DERMATOLOGY

## 2023-11-27 PROCEDURE — 1159F PR MEDICATION LIST DOCUMENTED IN MEDICAL RECORD: ICD-10-PCS | Mod: CPTII,S$GLB,, | Performed by: DERMATOLOGY

## 2023-11-27 PROCEDURE — 3288F FALL RISK ASSESSMENT DOCD: CPT | Mod: CPTII,S$GLB,, | Performed by: DERMATOLOGY

## 2023-11-27 PROCEDURE — 1101F PR PT FALLS ASSESS DOC 0-1 FALLS W/OUT INJ PAST YR: ICD-10-PCS | Mod: CPTII,S$GLB,, | Performed by: DERMATOLOGY

## 2023-11-27 RX ORDER — CALCIUM CARB/VITAMIN D3/VIT K1 500-500-40
TABLET,CHEWABLE ORAL
COMMUNITY
Start: 2023-03-27 | End: 2023-12-12

## 2023-11-27 NOTE — PROGRESS NOTES
Subjective:      Patient ID:  Jovana Fall is a 78 y.o. female who presents for   Chief Complaint   Patient presents with    Skin Check     TBSC      LOV 8/17/23- NUB  Lesion was a thin skin cancer; appears fully removed by shave biopsy, monitor site for recurrence. If patient wishes, may schedule a 2 months FU for me to reexamine site (or next visit as she is being seen often due to recent MM of scalp).  Skin, left medial upper shin, shave biopsy:  -SQUAMOUS CELL CARCINOMA IN-SITU, EXCISED IN THE PLANES OF SECTION EXAMINED    Patient here today for TBSC  This is a high risk patient here to check for the development of new lesions.      Derm Hx:  Melanoma- midline posterior crown 5/23/23 staged excision [facility]  SCC- right lateral thigh 5/23/23  BCC- left ankle 5/23/23  AK w/ focal transition to SCCIS- right cheek tx w/ imiquimod  Basal cell carcinoma 2014      left upper arm   BCC (basal cell carcinoma of skin) mid upper back  BCC (basal cell carcinoma)  left upper back  superficial BCC mid upper back 1/2016, E&S  BCC R mid infraorbital, 4/2014  Severely DN left mid upper back 4/2014     Current Outpatient Medications:   ·  alendronate (FOSAMAX) 70 MG tablet, Take 1 tablet (70 mg total) by mouth every 7 days., Disp: 12 tablet, Rfl: 3  ·  aspirin 81 mg Tab, Take 81 mg by mouth. 1 Tablet Oral Every day, Disp: , Rfl:   ·  atorvastatin (LIPITOR) 80 MG tablet, Take 1 tablet (80 mg total) by mouth once daily., Disp: 90 tablet, Rfl: 2  ·  blood glucose control, normal Soln, Use as Instructed, Disp: 3 each, Rfl: 3  ·  carvediloL (COREG) 25 MG tablet, Take 1 tablet (25 mg total) by mouth 2 (two) times daily., Disp: 180 tablet, Rfl: 2  ·  cholecalciferol, vitamin D3, (VITAMIN D3) 10 mcg (400 unit) Cap capsule, Oral, qWeek, every Monday, 0 Refill(s), Maintenance, Disp: , Rfl:   ·  cyanocobalamin (VITAMIN B-12) 250 MCG tablet, Take 250 mcg by mouth once daily., Disp: , Rfl:   ·  dulaglutide (TRULICITY) 1.5  mg/0.5 mL pen injector, Inject 1.5 mg into the skin every 7 days., Disp: 12 pen , Rfl: 1  ·  empagliflozin (JARDIANCE) 25 mg tablet, Take 1 tablet (25 mg total) by mouth once daily., Disp: 90 tablet, Rfl: 2  ·  ergocalciferol (ERGOCALCIFEROL) 50,000 unit Cap, TAKE 1 CAPSULE EVERY 7 DAYS., Disp: 12 capsule, Rfl: 1  ·  fish oil-omega-3 fatty acids 300-1,000 mg capsule, , Disp: , Rfl:   ·  FORTEO 20 mcg/dose (600mcg/2.4mL) PnIj, , Disp: , Rfl:   ·  gabapentin (NEURONTIN) 300 MG capsule, TAKE 1 CAPSULE EVERY EVENING, Disp: 90 capsule, Rfl: 2  ·  gabapentin (NEURONTIN) 300 MG capsule, , Disp: , Rfl:   ·  melatonin (MELATIN ORAL), Take 5 mg by mouth as needed., Disp: , Rfl:   ·  nitroGLYCERIN (NITROSTAT) 0.4 MG SL tablet, DISSOLVE 1 TABLET UNDER THE TONGUE EVERY 5 MINUTES FOR 3 DOSES AS NEEDED FOR CHEST PAIN, Disp: 100 tablet, Rfl: 1  ·  omeprazole (PRILOSEC) 20 MG capsule, TAKE 1 CAPSULE EVERY MORNING, Disp: 90 capsule, Rfl: 2  ·  oxyCODONE-acetaminophen (PERCOCET) 5-325 mg per tablet, Take 1 tablet by mouth 2 (two) times daily as needed for Pain., Disp: 60 tablet, Rfl: 0  ·  telmisartan (MICARDIS) 80 MG Tab, Take 1 tablet (80 mg total) by mouth once daily., Disp: 90 tablet, Rfl: 1  ·  TRUE METRIX GLUCOSE TEST STRIP Strp, TEST BLOOD SUGAR EVERY DAY, Disp: 100 strip, Rfl: 3  ·  TRUEDRAW LANCING DEVICE Misc, USE ONE TIME DAILY, Disp: 1 each, Rfl: 0  ·  TRUEPLUS LANCETS 30 gauge Misc, TEST BLOOD SUGAR ONE TIME DAILY, Disp: 100 each, Rfl: 3  ·  venlafaxine (EFFEXOR) 100 MG Tab, Take 1 tablet (100 mg total) by mouth once daily., Disp: 90 tablet, Rfl: 2  ·  vitamin E 100 UNIT capsule, Take 100 Units by mouth 2 (two) times daily., Disp: , Rfl:               Review of Systems   Constitutional:  Positive for fatigue. Negative for fever and chills.   Skin:  Positive for daily sunscreen use, activity-related sunscreen use and wears hat.   Hematologic/Lymphatic: Bruises/bleeds easily.       Objective:   Physical Exam    Constitutional: She appears well-developed and well-nourished. No distress.   Genitourinary:         Neurological: She is alert and oriented to person, place, and time. She is not disoriented.   Psychiatric: She has a normal mood and affect.   Skin:   Areas Examined (abnormalities noted in diagram):   Scalp / Hair Palpated and Inspected  Head / Face Inspection Performed  Neck Inspection Performed  Chest / Axilla Inspection Performed  Abdomen Inspection Performed  Genitals / Buttocks / Groin Inspection Performed  Back Inspection Performed  RUE Inspected  LUE Inspection Performed  RLE Inspected  LLE Inspection Performed  Nails and Digits Inspection Performed                         Diagram Legend     Erythematous scaling macule/papule c/w actinic keratosis       Vascular papule c/w angioma      Pigmented verrucoid papule/plaque c/w seborrheic keratosis      Yellow umbilicated papule c/w sebaceous hyperplasia      Irregularly shaped tan macule c/w lentigo     1-2 mm smooth white papules consistent with Milia      Movable subcutaneous cyst with punctum c/w epidermal inclusion cyst      Subcutaneous movable cyst c/w pilar cyst      Firm pink to brown papule c/w dermatofibroma      Pedunculated fleshy papule(s) c/w skin tag(s)      Evenly pigmented macule c/w junctional nevus     Mildly variegated pigmented, slightly irregular-bordered macule c/w mildly atypical nevus      Flesh colored to evenly pigmented papule c/w intradermal nevus       Pink pearly papule/plaque c/w basal cell carcinoma      Erythematous hyperkeratotic cursted plaque c/w SCC      Surgical scar with no sign of skin cancer recurrence      Open and closed comedones      Inflammatory papules and pustules      Verrucoid papule consistent consistent with wart     Erythematous eczematous patches and plaques     Dystrophic onycholytic nail with subungual debris c/w onychomycosis     Umbilicated papule    Erythematous-base heme-crusted tan verrucoid plaque  consistent with inflamed seborrheic keratosis     Erythematous Silvery Scaling Plaque c/w Psoriasis     See annotation      Assessment / Plan:        Actinic keratoses  Cryosurgery Procedure Note    Verbal consent from the patient is obtained and the patient is aware of the precancerous quality and need for treatment of these lesions. Liquid nitrogen cryosurgery is applied to the 1 actinic keratoses, as detailed in the physical exam, to produce a freeze injury. The patient is aware that blisters may form and is instructed on wound care with gentle cleansing and use of vaseline ointment to keep moist until healed. The patient is supplied a handout on cryosurgery and is instructed to call if lesions do not completely resolve.    Multiple benign nevi  Careful dermoscopy evaluation of nevi performed with none identified as needing biopsy today  Monitor for new mole or moles that are becoming bigger, darker, irritated, or developing irregular borders.     Solar lentigo  This is a benign hyperpigmented sun induced lesion. Daily sun protection will reduce the number of new lesions. Treatment of these benign lesions are considered cosmetic.    Seborrheic keratoses  These are benign inherited growths without a malignant potential. Reassurance given to patient. No treatment is necessary.     Encounter for follow-up surveillance of skin cancer  History of malignant melanoma of skin  Area of previous melanoma and NMSCsexamined. Sites well healed with no signs of recurrence.    Total body skin examination performed today including at least 12 points as noted in physical examination. No lesions suspicious for malignancy noted.    Patient instructed in importance in daily broad spectrum sun protection of at least spf 30. Mineral sunscreen ingredients preferred (Zinc +/- Titanium) and can be found OTC.   Recommend Elta MD for daily use on face and neck.  Patient encouraged to wear hat for all outdoor exposure.   Also discussed sun  avoidance and use of protective clothing.             Follow up in about 4 months (around 3/27/2024).

## 2023-11-27 NOTE — PATIENT INSTRUCTIONS

## 2023-11-28 ENCOUNTER — OFFICE VISIT (OUTPATIENT)
Dept: OPTOMETRY | Facility: CLINIC | Age: 78
End: 2023-11-28
Payer: COMMERCIAL

## 2023-11-28 DIAGNOSIS — H52.7 REFRACTIVE ERROR: ICD-10-CM

## 2023-11-28 DIAGNOSIS — H25.13 NUCLEAR SCLEROSIS, BILATERAL: ICD-10-CM

## 2023-11-28 DIAGNOSIS — H26.9 CORTICAL CATARACT: ICD-10-CM

## 2023-11-28 DIAGNOSIS — E11.9 DIABETES MELLITUS TYPE 2 WITHOUT RETINOPATHY: ICD-10-CM

## 2023-11-28 DIAGNOSIS — Z01.00 EXAMINATION OF EYES AND VISION: Primary | ICD-10-CM

## 2023-11-28 DIAGNOSIS — Z46.0 CONTACT LENS/GLASSES FITTING: Primary | ICD-10-CM

## 2023-11-28 PROCEDURE — 92015 DETERMINE REFRACTIVE STATE: CPT | Mod: S$GLB,,, | Performed by: OPTOMETRIST

## 2023-11-28 PROCEDURE — 92014 PR EYE EXAM, EST PATIENT,COMPREHESV: ICD-10-PCS | Mod: S$GLB,,, | Performed by: OPTOMETRIST

## 2023-11-28 PROCEDURE — 92310 PR CONTACT LENS FITTING (NO CHANGE): ICD-10-PCS | Mod: CSM,S$GLB,, | Performed by: OPTOMETRIST

## 2023-11-28 PROCEDURE — 99499 NO LOS: ICD-10-PCS | Mod: ,,, | Performed by: OPTOMETRIST

## 2023-11-28 PROCEDURE — 99499 UNLISTED E&M SERVICE: CPT | Mod: ,,, | Performed by: OPTOMETRIST

## 2023-11-28 PROCEDURE — 99999 PR PBB SHADOW E&M-EST. PATIENT-LVL III: CPT | Mod: PBBFAC,,, | Performed by: OPTOMETRIST

## 2023-11-28 PROCEDURE — 99999 PR PBB SHADOW E&M-EST. PATIENT-LVL III: ICD-10-PCS | Mod: PBBFAC,,, | Performed by: OPTOMETRIST

## 2023-11-28 PROCEDURE — 99999 PR PBB SHADOW E&M-EST. PATIENT-LVL II: CPT | Mod: PBBFAC,,, | Performed by: OPTOMETRIST

## 2023-11-28 PROCEDURE — 92310 CONTACT LENS FITTING OU: CPT | Mod: CSM,S$GLB,, | Performed by: OPTOMETRIST

## 2023-11-28 PROCEDURE — 99999 PR PBB SHADOW E&M-EST. PATIENT-LVL II: ICD-10-PCS | Mod: PBBFAC,,, | Performed by: OPTOMETRIST

## 2023-11-28 PROCEDURE — 92014 COMPRE OPH EXAM EST PT 1/>: CPT | Mod: S$GLB,,, | Performed by: OPTOMETRIST

## 2023-11-28 PROCEDURE — 92015 PR REFRACTION: ICD-10-PCS | Mod: S$GLB,,, | Performed by: OPTOMETRIST

## 2023-11-28 NOTE — PROGRESS NOTES
Assessment /Plan     For exam results, see Encounter Report.    Contact lens/glasses fitting      Patient is here for a comprehensive eye exam and contact lens fit. See other exam visit with same encounter date 11/28/2023 for detailed exam information.

## 2023-11-28 NOTE — PROGRESS NOTES
HPI    Pt here today for annual DM exam with contacts.  States no visual changes   or complaints.   Needs updated specs & clrx.    Happy with current contacts -- wearing Acuvue 1 Day for monovision.    Hx of PVD - OS with arc of light in outer peripheral vision -- comes &   goes.    Denies any headaches or eye pain.    Hemoglobin A1C       Date                     Value               Ref Range             Status                09/12/2023               6.0 (H)             4.0 - 5.6 %           Final                 03/21/2023               7.0 (H)             4.0 - 5.6 %           Final                 11/07/2022               6.5 (H)             4.7 - 5.6 %           Final                 05/11/2022               6.8 (H)             4.0 - 5.6 %           Final           BSL fluctuates between 150-200s.       (-) allergies / dry eyes  (-) gtts  (-) floaters   Last edited by Rianna Patterson on 11/28/2023  1:53 PM.            Assessment /Plan     For exam results, see Encounter Report.    Examination of eyes and vision    Diabetes mellitus type 2 without retinopathy    Nuclear sclerosis, bilateral    Cortical cataract    Refractive error      1. Examination of eyes and vision    2. Diabetes mellitus type 2 without retinopathy  Discussed possible ocular affects of uncontrolled blood sugar with patient. Recommended continued strong blood sugar control and continued care with PCP. Monitor yearly.     3. Nuclear sclerosis, bilateral  4. Cortical cataract  Moderate, approaching VS OS > OD  Discussed possible ocular affects of cataracts. Acceptable BCVA OU. Discussed treatment options. Surgery not recommended at this time. Monitor yearly.     5. Refractive error  Dispensed updated spectacle Rx. Discussed various spectacle lens options. Discussed adaptation period to new specs.   Discussed cls options -- happy with current brand   Dispensed CLs Rx: acuvue oasys 1 day. Daily wear only, dispose of daily.   Discussed proper  hand hygiene and wear/care of lenses. Do not sleep/swim/shower in lenses.   Discontinue CL wear ASAP and RTC if any redness or discomfort occurs.   Try x few days, order if happy  Report back to janet marinan    ** addendum 3/7/24: pt messaged, prefers previous cls rx, finalized per request **

## 2023-12-04 DIAGNOSIS — M80.80XD OTHER OSTEOPOROSIS WITH CURRENT PATHOLOGICAL FRACTURE, UNSPECIFIED SITE, SUBSEQUENT ENCOUNTER FOR FRACTURE WITH ROUTINE HEALING: Primary | ICD-10-CM

## 2023-12-12 ENCOUNTER — OFFICE VISIT (OUTPATIENT)
Dept: PRIMARY CARE CLINIC | Facility: CLINIC | Age: 78
End: 2023-12-12
Payer: MEDICARE

## 2023-12-12 VITALS
BODY MASS INDEX: 28.38 KG/M2 | DIASTOLIC BLOOD PRESSURE: 80 MMHG | TEMPERATURE: 98 F | HEIGHT: 67 IN | HEART RATE: 74 BPM | SYSTOLIC BLOOD PRESSURE: 130 MMHG | WEIGHT: 180.81 LBS | OXYGEN SATURATION: 97 %

## 2023-12-12 DIAGNOSIS — I11.9 HYPERTENSIVE HEART DISEASE WITHOUT HEART FAILURE: ICD-10-CM

## 2023-12-12 DIAGNOSIS — I25.10 CORONARY ARTERY DISEASE INVOLVING NATIVE CORONARY ARTERY OF NATIVE HEART WITHOUT ANGINA PECTORIS: ICD-10-CM

## 2023-12-12 DIAGNOSIS — Z23 NEED FOR COVID-19 VACCINE: ICD-10-CM

## 2023-12-12 DIAGNOSIS — E11.69 HYPERLIPIDEMIA ASSOCIATED WITH TYPE 2 DIABETES MELLITUS: ICD-10-CM

## 2023-12-12 DIAGNOSIS — E11.21 TYPE 2 DIABETES MELLITUS WITH DIABETIC NEPHROPATHY, WITHOUT LONG-TERM CURRENT USE OF INSULIN: Primary | ICD-10-CM

## 2023-12-12 DIAGNOSIS — K21.9 GASTROESOPHAGEAL REFLUX DISEASE WITHOUT ESOPHAGITIS: ICD-10-CM

## 2023-12-12 DIAGNOSIS — Z29.11 NEED FOR RSV VACCINATION: ICD-10-CM

## 2023-12-12 DIAGNOSIS — F33.9 MAJOR DEPRESSION, RECURRENT, CHRONIC: ICD-10-CM

## 2023-12-12 DIAGNOSIS — M48.061 DEGENERATIVE LUMBAR SPINAL STENOSIS: ICD-10-CM

## 2023-12-12 DIAGNOSIS — Z85.828 HISTORY OF SKIN CANCER: ICD-10-CM

## 2023-12-12 DIAGNOSIS — Z23 INFLUENZA VACCINE NEEDED: ICD-10-CM

## 2023-12-12 DIAGNOSIS — E78.5 HYPERLIPIDEMIA ASSOCIATED WITH TYPE 2 DIABETES MELLITUS: ICD-10-CM

## 2023-12-12 DIAGNOSIS — M81.0 OSTEOPOROSIS WITHOUT CURRENT PATHOLOGICAL FRACTURE, UNSPECIFIED OSTEOPOROSIS TYPE: ICD-10-CM

## 2023-12-12 LAB
ALBUMIN/CREAT UR: 11.5 UG/MG (ref 0–30)
CREAT UR-MCNC: 104 MG/DL (ref 15–325)
MICROALBUMIN UR DL<=1MG/L-MCNC: 12 UG/ML

## 2023-12-12 PROCEDURE — 1160F RVW MEDS BY RX/DR IN RCRD: CPT | Mod: HCNC,CPTII,S$GLB, | Performed by: INTERNAL MEDICINE

## 2023-12-12 PROCEDURE — 3075F SYST BP GE 130 - 139MM HG: CPT | Mod: HCNC,CPTII,S$GLB, | Performed by: INTERNAL MEDICINE

## 2023-12-12 PROCEDURE — 1159F PR MEDICATION LIST DOCUMENTED IN MEDICAL RECORD: ICD-10-PCS | Mod: HCNC,CPTII,S$GLB, | Performed by: INTERNAL MEDICINE

## 2023-12-12 PROCEDURE — 3075F PR MOST RECENT SYSTOLIC BLOOD PRESS GE 130-139MM HG: ICD-10-PCS | Mod: HCNC,CPTII,S$GLB, | Performed by: INTERNAL MEDICINE

## 2023-12-12 PROCEDURE — 1159F MED LIST DOCD IN RCRD: CPT | Mod: HCNC,CPTII,S$GLB, | Performed by: INTERNAL MEDICINE

## 2023-12-12 PROCEDURE — 99214 PR OFFICE/OUTPT VISIT, EST, LEVL IV, 30-39 MIN: ICD-10-PCS | Mod: HCNC,S$GLB,, | Performed by: INTERNAL MEDICINE

## 2023-12-12 PROCEDURE — 1126F AMNT PAIN NOTED NONE PRSNT: CPT | Mod: HCNC,CPTII,S$GLB, | Performed by: INTERNAL MEDICINE

## 2023-12-12 PROCEDURE — 99999 PR PBB SHADOW E&M-EST. PATIENT-LVL IV: ICD-10-PCS | Mod: PBBFAC,HCNC,, | Performed by: INTERNAL MEDICINE

## 2023-12-12 PROCEDURE — 99214 OFFICE O/P EST MOD 30 MIN: CPT | Mod: HCNC,S$GLB,, | Performed by: INTERNAL MEDICINE

## 2023-12-12 PROCEDURE — 1160F PR REVIEW ALL MEDS BY PRESCRIBER/CLIN PHARMACIST DOCUMENTED: ICD-10-PCS | Mod: HCNC,CPTII,S$GLB, | Performed by: INTERNAL MEDICINE

## 2023-12-12 PROCEDURE — 1100F PTFALLS ASSESS-DOCD GE2>/YR: CPT | Mod: HCNC,CPTII,S$GLB, | Performed by: INTERNAL MEDICINE

## 2023-12-12 PROCEDURE — 3079F DIAST BP 80-89 MM HG: CPT | Mod: HCNC,CPTII,S$GLB, | Performed by: INTERNAL MEDICINE

## 2023-12-12 PROCEDURE — 3079F PR MOST RECENT DIASTOLIC BLOOD PRESSURE 80-89 MM HG: ICD-10-PCS | Mod: HCNC,CPTII,S$GLB, | Performed by: INTERNAL MEDICINE

## 2023-12-12 PROCEDURE — 3288F PR FALLS RISK ASSESSMENT DOCUMENTED: ICD-10-PCS | Mod: HCNC,CPTII,S$GLB, | Performed by: INTERNAL MEDICINE

## 2023-12-12 PROCEDURE — 82043 UR ALBUMIN QUANTITATIVE: CPT | Mod: HCNC | Performed by: INTERNAL MEDICINE

## 2023-12-12 PROCEDURE — 1100F PR PT FALLS ASSESS DOC 2+ FALLS/FALL W/INJURY/YR: ICD-10-PCS | Mod: HCNC,CPTII,S$GLB, | Performed by: INTERNAL MEDICINE

## 2023-12-12 PROCEDURE — 3288F FALL RISK ASSESSMENT DOCD: CPT | Mod: HCNC,CPTII,S$GLB, | Performed by: INTERNAL MEDICINE

## 2023-12-12 PROCEDURE — 99999 PR PBB SHADOW E&M-EST. PATIENT-LVL IV: CPT | Mod: PBBFAC,HCNC,, | Performed by: INTERNAL MEDICINE

## 2023-12-12 PROCEDURE — 81001 URINALYSIS AUTO W/SCOPE: CPT | Mod: HCNC | Performed by: INTERNAL MEDICINE

## 2023-12-12 PROCEDURE — 1126F PR PAIN SEVERITY QUANTIFIED, NO PAIN PRESENT: ICD-10-PCS | Mod: HCNC,CPTII,S$GLB, | Performed by: INTERNAL MEDICINE

## 2023-12-12 RX ORDER — OXYCODONE AND ACETAMINOPHEN 5; 325 MG/1; MG/1
1 TABLET ORAL 2 TIMES DAILY PRN
Qty: 60 TABLET | Refills: 0 | Status: SHIPPED | OUTPATIENT
Start: 2023-12-12 | End: 2024-02-16 | Stop reason: SDUPTHER

## 2023-12-12 RX ORDER — VENLAFAXINE 100 MG/1
100 TABLET ORAL DAILY
Qty: 90 TABLET | Refills: 2 | Status: SHIPPED | OUTPATIENT
Start: 2023-12-12

## 2023-12-12 RX ORDER — OMEPRAZOLE 20 MG/1
20 CAPSULE, DELAYED RELEASE ORAL EVERY MORNING
Qty: 90 CAPSULE | Refills: 2 | Status: SHIPPED | OUTPATIENT
Start: 2023-12-12

## 2023-12-12 RX ORDER — DULAGLUTIDE 1.5 MG/.5ML
1.5 INJECTION, SOLUTION SUBCUTANEOUS
Qty: 12 PEN | Refills: 2 | Status: SHIPPED | OUTPATIENT
Start: 2023-12-12

## 2023-12-12 RX ORDER — ERGOCALCIFEROL 1.25 MG/1
CAPSULE ORAL
Qty: 12 CAPSULE | Refills: 2 | Status: SHIPPED | OUTPATIENT
Start: 2023-12-12

## 2023-12-12 RX ORDER — ATORVASTATIN CALCIUM 80 MG/1
80 TABLET, FILM COATED ORAL DAILY
Qty: 90 TABLET | Refills: 2 | Status: SHIPPED | OUTPATIENT
Start: 2023-12-12

## 2023-12-12 RX ORDER — CARVEDILOL 25 MG/1
25 TABLET ORAL 2 TIMES DAILY
Qty: 180 TABLET | Refills: 2 | Status: SHIPPED | OUTPATIENT
Start: 2023-12-12

## 2023-12-12 RX ORDER — GABAPENTIN 300 MG/1
300 CAPSULE ORAL NIGHTLY
Qty: 90 CAPSULE | Refills: 2 | Status: SHIPPED | OUTPATIENT
Start: 2023-12-12

## 2023-12-12 RX ORDER — TELMISARTAN 80 MG/1
80 TABLET ORAL DAILY
Qty: 90 TABLET | Refills: 2 | Status: SHIPPED | OUTPATIENT
Start: 2023-12-12

## 2023-12-12 NOTE — PROGRESS NOTES
Subjective     Patient ID: Jovana Fall is a 78 y.o. female.    Chief Complaint: Annual Exam    Last seen 9 months ago after sustaining bilateral Humeral Fractures . A week later in March, she re-fractured her right humerus requiring surgery again. And just two months ago required another surgery on the left humeral fracture due to hardware failure. Has been in physical therapy for right wrist drop. Ortho started her on Forteo 10/23, but she will not be able to afford it when the price goes up in the new year. Has been seeing Dermatology, new skin cancers removed - Squamous Cell from leg, and Malignant Melanoma from scalp. Eye exam was okay last month. Hasn't seen Cardiology since , but not having any chest discomfort or other vascular issues. Taking daily meds as prescribed. Fasting glucose was 158 yesterday, but recent labs show good control on Jardiance and Trulicity. She has a friend living with her who helps out, walks the dogs so she doesn't risk falling.    PMH: , misc x1.   Diabetes Type 2. HbA1c HbA1c 7.0% .  Hypertensive heart disease without heart failure.   Hyperlipidemia, LDL 76 .  Nephrolithiasis.   Urinary incontinence.  GERD. EGD 9/15, 3/17,  - mild chronic non-active gastritis, no dysplasia, H pylori negative.  Depression/Anxiety.  Non-obstructive Coronary artery disease, angiogram , Cardiology .  Osteoporosis.  Mild Vitamin D deficiency, up to 68.  Lumbar Spinal Stenosis. Cervical Spine DJD.   Skin Cancer, Basal Cell, Squamous Cell and Melanoma.     Mammogram normal . Pelvic exam . BMD . Eye exam . Exercise Stress Echo negative 3/18.  Colonoscopy  - internal hemorrhoid, diverticulosis, two tubular adenomas, random biopsies negative. Vaccines reviewed, not in LINKS due to received in [place] - had Prevnar 20, Flu shot, Shingrix and Bivalent COVID booster in 2022.    PSH: reviewed.     Social: Nonsmoker. Occas.  "alcohol. . 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    FMH: Colon and ovarian cancer in cousin. Colon cancer in brother. Mother - heart disease. MGM with cervical cancer. DM in aunts.     Allergies: multiple, see Med Card.     Medications: list reviewed and reconciled.               Review of Systems   Constitutional:  Negative for activity change, appetite change, fatigue, fever and unexpected weight change.   HENT:  Negative for nasal congestion, ear pain, hearing loss, rhinorrhea, sneezing, sore throat, trouble swallowing and voice change.    Eyes:  Negative for pain and visual disturbance.   Respiratory:  Negative for cough, chest tightness, shortness of breath and wheezing.    Cardiovascular:  Negative for chest pain, palpitations and leg swelling.   Gastrointestinal:  Negative for abdominal pain, blood in stool, constipation, diarrhea, nausea and vomiting.   Genitourinary:  Negative for dysuria, frequency, pelvic pain and vaginal bleeding.   Musculoskeletal:  Positive for arthralgias and back pain. Negative for gait problem, joint swelling, myalgias and neck pain.   Integumentary:  Negative for color change and rash.   Neurological:  Negative for dizziness, syncope, facial asymmetry, speech difficulty, weakness, numbness and headaches.   Hematological:  Negative for adenopathy. Does not bruise/bleed easily.   Psychiatric/Behavioral:  Negative for dysphoric mood and sleep disturbance. The patient is not nervous/anxious.         Mood stable on Effexor.           Objective   Vitals:    12/12/23 0914   BP: 130/80   Pulse: 74   Temp: 98 °F (36.7 °C)   SpO2: 97%   Weight: 82 kg (180 lb 12.8 oz)     From 192 nine months ago.   Height: 5' 7" (1.702 m)   BMI=28.3  Physical Exam  Vitals reviewed.   Constitutional:       General: She is not in acute distress.     Appearance: She is well-developed. She is not diaphoretic.   HENT:      Head: Normocephalic and atraumatic.      Right Ear: Tympanic " membrane and ear canal normal.      Left Ear: Tympanic membrane and ear canal normal.      Nose: Nose normal. No congestion.      Mouth/Throat:      Mouth: Mucous membranes are moist.      Pharynx: Oropharynx is clear.   Eyes:      General: No scleral icterus.     Extraocular Movements: Extraocular movements intact.      Conjunctiva/sclera: Conjunctivae normal.      Right eye: Right conjunctiva is not injected.      Left eye: Left conjunctiva is not injected.      Pupils: Pupils are equal, round, and reactive to light.   Neck:      Thyroid: No thyromegaly.      Vascular: No carotid bruit or JVD.   Cardiovascular:      Rate and Rhythm: Normal rate and regular rhythm.      Pulses: Normal pulses.      Heart sounds: Normal heart sounds. No murmur heard.  Pulmonary:      Effort: Pulmonary effort is normal. No respiratory distress.      Breath sounds: Normal breath sounds. No wheezing, rhonchi or rales.   Abdominal:      General: Bowel sounds are normal. There is no distension.      Palpations: Abdomen is soft. There is no mass.      Tenderness: There is no abdominal tenderness.   Musculoskeletal:         General: No tenderness or deformity.      Cervical back: Normal range of motion and neck supple.      Right lower leg: No edema.      Left lower leg: No edema.      Comments: Limited range of motion in both shoulders.    Lymphadenopathy:      Cervical: No cervical adenopathy.   Skin:     General: Skin is warm and dry.      Coloration: Skin is not pale.      Findings: No erythema or rash.      Nails: There is no clubbing.   Neurological:      General: No focal deficit present.      Mental Status: She is alert and oriented to person, place, and time.      Cranial Nerves: No cranial nerve deficit.      Motor: No weakness or abnormal muscle tone.      Coordination: Coordination normal.      Gait: Gait normal.   Psychiatric:         Mood and Affect: Mood and affect normal.         Speech: Speech normal.         Behavior:  Behavior normal.         Thought Content: Thought content normal.         Judgment: Judgment normal.       No visits with results within 3 Week(s) from this visit.   Latest known visit with results is:   Lab Visit on 09/12/2023   Component Date Value    WBC 09/12/2023 7.61     RBC 09/12/2023 4.99     Hemoglobin 09/12/2023 15.0     Hematocrit 09/12/2023 44.8     MCV 09/12/2023 90     MCH 09/12/2023 30.1     MCHC 09/12/2023 33.5     RDW 09/12/2023 13.7     Platelets 09/12/2023 221     MPV 09/12/2023 8.9 (L)     Sodium 09/12/2023 140     Potassium 09/12/2023 4.1     Chloride 09/12/2023 107     CO2 09/12/2023 23     Glucose 09/12/2023 156 (H)     BUN 09/12/2023 15     Creatinine 09/12/2023 0.8     Calcium 09/12/2023 9.2     Total Protein 09/12/2023 7.5     Albumin 09/12/2023 3.9     Total Bilirubin 09/12/2023 0.7     Alkaline Phosphatase 09/12/2023 182 (H)     AST 09/12/2023 15     ALT 09/12/2023 18     eGFR 09/12/2023 >60.0     Anion Gap 09/12/2023 10     Cholesterol 09/12/2023 161     Triglycerides 09/12/2023 217 (H)     HDL 09/12/2023 36 (L)     LDL Cholesterol 09/12/2023 81.6     HDL/Cholesterol Ratio 09/12/2023 22.4     Total Cholesterol/HDL Ra* 09/12/2023 4.5     Non-HDL Cholesterol 09/12/2023 125     Hemoglobin A1C 09/12/2023 6.0 (H)     Estimated Avg Glucose 09/12/2023 126         Assessment and Plan     1. Type 2 diabetes mellitus with diabetic nephropathy, without long-term current use of insulin - controlled.  -     Basic Metabolic Panel; Future - in 3 months.   -     Hemoglobin A1C; Future - in 3 months.   -     Urinalysis - today.   -     Microalbumin/Creatinine Ratio, Urine - today.   -     dulaglutide (TRULICITY) 1.5 mg/0.5 mL pen injector; Inject 1.5 mg into the skin every 7 days.  Dispense: 12 pen ; Refill: 2  -     empagliflozin (JARDIANCE) 25 mg tablet; Take 1 tablet (25 mg total) by mouth once daily.  Dispense: 90 tablet; Refill: 2    2. Hypertensive heart disease without heart failure - controlled.    -     carvediloL (COREG) 25 MG tablet; Take 1 tablet (25 mg total) by mouth 2 (two) times daily.  Dispense: 180 tablet; Refill: 2  -     telmisartan (MICARDIS) 80 MG Tab; Take 1 tablet (80 mg total) by mouth once daily.  Dispense: 90 tablet; Refill: 2    3. Hyperlipidemia associated with type 2 diabetes mellitus  -     atorvastatin (LIPITOR) 80 MG tablet; Take 1 tablet (80 mg total) by mouth once daily.  Dispense: 90 tablet; Refill: 2    4. Coronary artery disease involving native coronary artery of native heart without angina pectoris  -     atorvastatin (LIPITOR) 80 MG tablet; Take 1 tablet (80 mg total) by mouth once daily.  Dispense: 90 tablet; Refill: 2    5. Degenerative lumbar spinal stenosis  -     gabapentin (NEURONTIN) 300 MG capsule; Take 1 capsule (300 mg total) by mouth every evening.  Dispense: 90 capsule; Refill: 2  -     oxyCODONE-acetaminophen (PERCOCET) 5-325 mg per tablet; Take 1 tablet by mouth 2 (two) times daily as needed for Pain.  Dispense: 60 tablet; Refill: 0    6. Osteoporosis without current pathological fracture, unspecified osteoporosis type  -     ergocalciferol (ERGOCALCIFEROL) 50,000 unit Cap; TAKE 1 CAPSULE EVERY 7 DAYS.  Dispense: 12 capsule; Refill: 2    7. History of skin cancer  Overview:  Malignant Melanoma, Basal Cell, and Squamous Cell CA.    8. Gastroesophageal reflux disease without esophagitis  -     omeprazole (PRILOSEC) 20 MG capsule; Take 1 capsule (20 mg total) by mouth every morning.  Dispense: 90 capsule; Refill: 2    9. Major depression, recurrent, chronic  -     venlafaxine (EFFEXOR) 100 MG Tab; Take 1 tablet (100 mg total) by mouth once daily.  Dispense: 90 tablet; Refill: 2    10. Influenza vaccine needed - Flu shot today.     11. Need for COVID-19 vaccine - COVID booster today.    12. Need for RSV vaccination - RSV vaccine deferred to a later date.        Follow up in about 4 months (around 4/12/2024).

## 2023-12-13 ENCOUNTER — PATIENT MESSAGE (OUTPATIENT)
Dept: OTHER | Facility: OTHER | Age: 78
End: 2023-12-13
Payer: MEDICARE

## 2023-12-13 ENCOUNTER — PATIENT MESSAGE (OUTPATIENT)
Dept: PRIMARY CARE CLINIC | Facility: CLINIC | Age: 78
End: 2023-12-13
Payer: MEDICARE

## 2023-12-13 LAB
BACTERIA #/AREA URNS AUTO: ABNORMAL /HPF
BILIRUB UR QL STRIP: NEGATIVE
CLARITY UR REFRACT.AUTO: CLEAR
COLOR UR AUTO: YELLOW
GLUCOSE UR QL STRIP: ABNORMAL
HGB UR QL STRIP: NEGATIVE
KETONES UR QL STRIP: NEGATIVE
LEUKOCYTE ESTERASE UR QL STRIP: NEGATIVE
MICROSCOPIC COMMENT: ABNORMAL
NITRITE UR QL STRIP: NEGATIVE
PH UR STRIP: 5 [PH] (ref 5–8)
PROT UR QL STRIP: NEGATIVE
RBC #/AREA URNS AUTO: 1 /HPF (ref 0–4)
SP GR UR STRIP: >1.03 (ref 1–1.03)
URN SPEC COLLECT METH UR: ABNORMAL
WBC #/AREA URNS AUTO: 2 /HPF (ref 0–5)
YEAST UR QL AUTO: ABNORMAL

## 2023-12-14 DIAGNOSIS — M21.331 RIGHT WRIST DROP: Primary | ICD-10-CM

## 2023-12-15 ENCOUNTER — PATIENT OUTREACH (OUTPATIENT)
Dept: ADMINISTRATIVE | Facility: HOSPITAL | Age: 78
End: 2023-12-15
Payer: MEDICARE

## 2023-12-15 NOTE — PROGRESS NOTES

## 2023-12-20 ENCOUNTER — CLINICAL SUPPORT (OUTPATIENT)
Dept: REHABILITATION | Facility: HOSPITAL | Age: 78
End: 2023-12-20
Attending: ORTHOPAEDIC SURGERY
Payer: MEDICARE

## 2023-12-20 DIAGNOSIS — M21.331 RIGHT WRIST DROP: ICD-10-CM

## 2023-12-20 DIAGNOSIS — M80.80XD OTHER OSTEOPOROSIS WITH CURRENT PATHOLOGICAL FRACTURE, UNSPECIFIED SITE, SUBSEQUENT ENCOUNTER FOR FRACTURE WITH ROUTINE HEALING: ICD-10-CM

## 2023-12-20 PROCEDURE — 97032 APPL MODALITY 1+ESTIM EA 15: CPT

## 2023-12-20 PROCEDURE — 97166 OT EVAL MOD COMPLEX 45 MIN: CPT

## 2023-12-20 NOTE — PLAN OF CARE
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    Imaging: see epic  Previous Therapy: patient had OT for B fractured humeri in the past year, as well as the right wrist drop.  Patient had another surgery on her left humerus and has not had therapy since.     Past Medical History/Physical Systems Review:   Jovana Fall  has a past medical history of Anticoagulant long-term use, Anxiety, Basal cell carcinoma, BCC (basal cell carcinoma of skin), BCC (basal cell carcinoma), Calcium nephrolithiasis, Cataract, Colon polyp, Coronary artery disease, Cortical cataract - Both Eyes, Depression, Diabetes mellitus, Displacement of lumbar intervertebral disc without myelopathy, Diverticulosis, GERD (gastroesophageal reflux disease), H. pylori infection, Hepatomegaly, History of melanoma in situ, colonic polyps, Hyperlipidemia, Hypertension, Lactose intolerance, Lumbar spinal stenosis, Melanoma, Nuclear  sclerosis - Both Eyes, Osteopenia, Spinal stenosis, lumbar region, with neurogenic claudication, Spondylosis without myelopathy, Squamous cell carcinoma of skin, and Type 2 diabetes mellitus.    Jovana Fall  has a past surgical history that includes Cholecystectomy; Carpal tunnel release (Bilateral); Hysterectomy; Appendectomy; Tubal ligation; Hemorrhoid surgery; Lumbar laminectomy; Rhinoplasty tip; Brow lift and blepharoplasty; Bunionectomy (Right); Skin cancer excision; Colonoscopy (02/05/2015); Upper gastrointestinal endoscopy (2015); Upper gastrointestinal endoscopy (03/30/2017); Esophagogastroduodenoscopy (N/A, 1/27/2021); and Colonoscopy (N/A, 2/3/2021).    Jovana has a current medication list which includes the following prescription(s): aspirin, atorvastatin, blood glucose control, normal, carvedilol, cyanocobalamin, trulicity, empagliflozin, ergocalciferol, fish oil-omega-3 fatty acids, forteo, gabapentin, melatonin, nitroglycerin, omeprazole, oxycodone-acetaminophen, telmisartan, true metrix glucose test strip, truedraw lancing device, trueplus lancets, venlafaxine, and vitamin e.    Review of patient's allergies indicates:   Allergen Reactions    Adhesive tape-silicones      Other reaction(s): Swelling  Other reaction(s): Itching    Codeine Itching    Metformin Itching    Morphine Itching    Penicillins Itching     Other reaction(s): Itching    Sulfa (sulfonamide antibiotics) Itching     Other reaction(s): Itching        Patient's Goals for Therapy: full painless use    Pain:  Functional Pain Scale Rating 0-10:   0/10 on average  0/10 at best  0/10 at worst  Side:right  Location: wrist  Description: none  Aggravating Factors: none  Easing Factors: none  Occupation:  retired  Working presently: retired  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     Current Functional Status   Home/Living environment  : lives with alone    Limitation of Functional Status as follows: decreased motion, use, and strength.    ADLs/IADLs: mod ind w/ all adls and iadls                  pain meds:none    Pt's spiritual, cultural and educational needs have been considered and pt is agreeable to plan of care and goals.    Objective     Sensation:intact  ROM: Active range of motion                                     right                             left  Supination/pronation                                                   wfl                               wfl  Dorsiflexion/palmar flexion                                          10deg/ 50                   50deg/60deg  Radial deviation/ulnar deviation                                   10/30                              18/30          /pinch:       II                 key        3jaw              tip  right              15lb              15lb         10lb                7lb                       left                22lb              14lb         12lb                9lb                             Edema:circumferential    @ right dpc=8in  @left dpc=7.9in  @right wrist crease=7in  @left wrist crease=6.5in      MMT for right wrist = 2+/5 dorsiflexion    For left wrist=5/5 dorsiflexion    CMS Impairment/Limitation/Restriction for FOTO Survey    Therapist reviewed FOTO scores for Jovana Fall on 12/20/2023.   FOTO documents entered into [employer] - see Media section.    Limitation Score: 55%               Treatment       Jovana received E stim  for 15 minutes including:  -e stim to right wrist for facilitation of right wrist dorsiflexion.           Home Exercise Program/Education:  Issued HEP (see patient instructions in EMR) . Exercises were reviewed and Jovana was able to demonstrate them prior to the end of the session.   Pt received a written copy of exercises to perform at home. Jovana demonstrated good understanding of the education provided.  Pt was advised to perform these exercises  free of pain, and to stop performing them if pain occurs.    Patient/Family Education: role of OT, goals for OT, scheduling/cancellations - pt verbalized understanding. Discussed insurance limitations with patient.    Additional Education provided: likely tx progression, expectations of rehab    Assessment     Jovana Fall is a 78 y.o. female referred to outpatient occupational therapy and presents with a medical diagnosis of see above resulting in Decreased ROM, Decreased  strength, Decreased pinch strength, Decreased muscle strength, Decreased functional hand use, and Joint Stiffness and demonstrates limitations as described in the chart below. Following medical record review it is determined that pt will benefit from occupational therapy services in order to maximize pain free and/or functional use of her right wrist. The following goals were discussed with the patient and patient is in agreement with them as to be addressed in the treatment plan. The patient's rehab potential is good.     Anticipated barriers to occupational therapy: edema, pain, stiffness, weakness, scarring, time from day of injury and/or day of surgery to first day of OT.    Pt has no cultural, educational or language barriers to learning provided.    Profile and History Assessment of Occupational Performance Level of Clinical Decision Making Complexity Score   Occupational Profile:   Jovana Fall is a 78 y.o. female who lives alone and is retired Jovana Fall has difficulty with  ADLs and IADLs as listed previously, which  affecting his/her daily functional abilities.      Comorbidities:    has a past medical history of Anticoagulant long-term use, Anxiety, Basal cell carcinoma, BCC (basal cell carcinoma of skin), BCC (basal cell carcinoma), Calcium nephrolithiasis, Cataract, Colon polyp, Coronary artery disease, Cortical cataract - Both Eyes, Depression, Diabetes mellitus, Displacement of lumbar intervertebral disc  without myelopathy, Diverticulosis, GERD (gastroesophageal reflux disease), H. pylori infection, Hepatomegaly, History of melanoma in situ, colonic polyps, Hyperlipidemia, Hypertension, Lactose intolerance, Lumbar spinal stenosis, Melanoma, Nuclear sclerosis - Both Eyes, Osteopenia, Spinal stenosis, lumbar region, with neurogenic claudication, Spondylosis without myelopathy, Squamous cell carcinoma of skin, and Type 2 diabetes mellitus.    Medical and Therapy History Review:   Expanded               Performance Deficits    Physical:  Joint Mobility  Joint Stability  Muscle Power/Strength  Muscle Endurance  Edema  Control of Voluntary Movement   Strength  Pinch Strength  Fine Motor Coordination  Muscle Tone    Cognitive:  No Deficits    Psychosocial:    No Deficits     Clinical Decision Making:  moderate    Assessment Process:  Detailed Assessments    Modification/Need for Assistance:  Not Necessary    Intervention Selection:  Several Treatment Options       moderate  Based on PMHX, co morbidities , data from assessments and functional level of assistance required with task and clinical presentation directly impacting function.           The following goals were discussed with the patient and patient is in agreement with them as to be addressed in the treatment plan.     Goals:   stg to be met in 3 weeks   1. Patient will be I with HEP   2. Patient will have 2/10 pain with light use   3. Patient will have = prom of bilateral wrists  to enhance affected arm use with ADL  4. Patient's right wrist strength for dorsiflexion will be 3+/5.      ltg to be met by d/c   1. Patient will be I with d/c HEP   2. Patient will have 0/10 pain with all use   3. Patient will have about 75% /pinch  on affected side vs unaffected side to promote full functional use   4. Patient will increase arom of right wrist for dorsiflexion to wnl's  5. Patient's strength for right wrist dorsiflexion will be 5/5   all goals ongoing unless  noted above or met today or in past but not noted yet    Plan   Certification Period/Plan of care expiration: 12/20/2023 to 02/20/2024          Outpatient Occupational Therapy 1 to 2 times weekly for 8 weeks to include the following interventions: eval and tx and could include :  Therex, theract, adl training, neuromuscular giovana activities; supervised modalities, Manual therapy/joint mobilizations,therapeutic massage techniques; Electrical Stimulation    Above frequency and duration in above dates may change based on patient progress and need for therapy    Briana CHRIS

## 2024-01-03 ENCOUNTER — CLINICAL SUPPORT (OUTPATIENT)
Dept: REHABILITATION | Facility: HOSPITAL | Age: 79
End: 2024-01-03
Payer: MEDICARE

## 2024-01-03 DIAGNOSIS — M21.331 RIGHT WRIST DROP: Primary | ICD-10-CM

## 2024-01-03 PROCEDURE — 97032 APPL MODALITY 1+ESTIM EA 15: CPT

## 2024-01-03 PROCEDURE — 97110 THERAPEUTIC EXERCISES: CPT

## 2024-01-03 NOTE — PROGRESS NOTES
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    Evaluation Date: 12/20/2023  Insurance Authorization Period Expiration: 12/31/2024  Plan of Care Certification Period: 12/20/2023 to 2/20/2024   Date of Return to MD: anil  Visit # / Visits authorized: 2 / 12    Time In:1:15  Time Out: 2:00  Total Billable Time: 45 minutes    Precautions:  universal, see epic, protocol if applicable    Subjective     Pt reports: "I haven't been having any real pain."  she was compliant with home exercise program.  Response to previous treatment:patient w/ an overall increase in arom for wrist ext now   Functional change: decreased functional hand use    Pain: 0/10 rest; 0/10 light use  Side:right  Location: wrist dorsal surface  Objective   Jovana received therapeutic exercises to develop strength, ROM, and flexibility for 30 minutes including:  Patient performed right median nerve glides 5 times per motion with 5 second hold.   Patient performed right ulnar nerve glides with gentle thumb motion 10 times per motion with 5 second hold.  Patient performed right flexor tendon glides for 10 times per motion with 5 second hold.  Patient performed right thumb isolated IP flexion for 3 sets of 10 repetitions.  Patient performed right thumb palmar abduction for 3 sets of 10 repetitions.  Patient performed right thumb radial abduction for 3 sets of 10 repetitions.   Patient performed right  hand thumb opposition for 30 seconds.  Patient performed right wrist volar flexion for 2.5 minutes.  Patient performed right wrist dorsal flexion for 2.5 minutes.   Patient " performed right composite fist flexion with wrist extension for 3 sets of 10 repetitions.  Patient performed right  hand ball squeeze, excluding pressure on right thumb, for 5 second hold, 3 sets of 10 repetitions.   Patient performed right forearm pronation/supination with elbow resting at 90 degrees for 2 minutes, twice.        Jovaan received the following supervised modalities after being cleared for contradictions: NMES Electrical Stimulation:  Jovana received NMES Electrical Stimulation to elicit muscle contraction of the right wrist extensors. Pt received stimulation at a rate of 44 pps with symmetric current, ramp of 3 seconds with 10 second on time and 10 second off time. Patient tolerated treatment well without any adverse effects.       Home Exercises and Education Provided     Education provided:  progress towards goals   likely tx progression  rationale of rehab interventions    Written Home Exercises/Information Provided: Patient instructed to cont prior HEP.        previously issued exercises and/or other issued home therapy instructions were reviewed if still part of current tx plan as well as any issued today and Jovana was able to demonstrate them prior to the end of the session.  Jovana demonstrated Ind w/ understanding of the HEP provided.   See EMR under patient instructions and/or media for HEP issued today or in past    Assessment       Pt would continue to benefit from skilled OT in order to address ROM, PAIN, AND/OR DECREASED FUNCTIONAL STATUS SECONDARY TO CURRENT INJURY/DEBILITATING FACTOR.     Jovana is progressing well towards her goals and there are NO updates to goals at this time unless goals noted below are different than goals on previous notes or evaluation. Pt prognosis is good  Pt will continue to benefit from skilled outpatient occupational therapy to address the deficits listed in the problem list on initial evaluation to provide pt/family education and to maximize pt's level of  independence in the home and community environment.     Anticipated barriers to occupational therapy: NONE    Pt's spiritual, cultural and educational needs have been considered and pt is agreeable to plan of care and goals.    Goals:   stg to be met in 3 weeks   1. Patient will be I with HEP   2. Patient will have 2/10 pain with light use   3. Patient will have = prom of bilateral wrists  to enhance affected arm use with ADL  4. Patient's right wrist strength for dorsiflexion will be 3+/5.       ltg to be met by d/c   1. Patient will be I with d/c HEP   2. Patient will have 0/10 pain with all use   3. Patient will have about 75% /pinch  on affected side vs unaffected side to promote full functional use   4. Patient will increase arom of right wrist for dorsiflexion to wnl's  5. Patient's strength for right wrist dorsiflexion will be 5/5   all goals ongoing unless noted above or met today or in past but not noted yet      Any goals met today ?  (if any met see above)    Updates/Grading for next session: prn     Plan: evaluate and tx     DOT Casiano

## 2024-01-09 ENCOUNTER — TELEPHONE (OUTPATIENT)
Dept: PRIMARY CARE CLINIC | Facility: CLINIC | Age: 79
End: 2024-01-09
Payer: MEDICARE

## 2024-01-09 NOTE — TELEPHONE ENCOUNTER
----- Message from April Driscoll sent at 1/9/2024  9:41 AM CST -----  Contact: [phone]  [email or web address]     Patient is calling for Medical Advice regarding:speak to Phillip    How long has patient had these symptoms:    Pharmacy name and phone#:    Would like response via testbirdst:  no     Comments:  Pt is calling she has a form that she is asking if she can be=ring to you on Friday and she is asking if she can bring the form then this is for [employer] please give return call

## 2024-01-10 ENCOUNTER — CLINICAL SUPPORT (OUTPATIENT)
Dept: REHABILITATION | Facility: HOSPITAL | Age: 79
End: 2024-01-10
Payer: MEDICARE

## 2024-01-10 DIAGNOSIS — M21.331 RIGHT WRIST DROP: Primary | ICD-10-CM

## 2024-01-10 PROCEDURE — 97032 APPL MODALITY 1+ESTIM EA 15: CPT

## 2024-01-10 PROCEDURE — 97110 THERAPEUTIC EXERCISES: CPT

## 2024-01-10 NOTE — PROGRESS NOTES
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    Evaluation Date: 12/20/2023  Insurance Authorization Period Expiration: 12/31/2024  Plan of Care Certification Period: 12/20/2023 to 2/20/2024   Date of Return to MD: anil  Visit # / Visits authorized: 3 / 12    Time In:2:45  Time Out: 3:30  Total Billable Time: 45 minutes    Precautions:  universal, see epic, protocol if applicable    Subjective     Pt reports: "I still don't have pain."  she was compliant with home exercise program.  Response to previous treatment:patient w/ an overall increase in arom for wrist ext now   Functional change: decreased functional hand use    Pain: 0/10 rest; 0/10 light use  Side:right  Location: wrist dorsal surface  Objective   Jovana received therapeutic exercises to develop strength, ROM, and flexibility for 30 minutes including:  Patient performed right median nerve glides 5 times per motion with 5 second hold.   Patient performed right ulnar nerve glides with gentle thumb motion 10 times per motion with 5 second hold.  Patient performed right flexor tendon glides for 10 times per motion with 5 second hold.  Patient performed right thumb isolated IP flexion for 3 sets of 10 repetitions.  Patient performed right thumb palmar abduction for 3 sets of 10 repetitions.  Patient performed right thumb radial abduction for 3 sets of 10 repetitions.   Patient performed right  hand thumb opposition for 30 seconds.  Patient performed right wrist volar flexion for 2.5 minutes.  Patient performed right wrist dorsal flexion for 2.5 minutes.   Patient performed " right composite fist flexion with wrist extension for 3 sets of 10 repetitions.  Patient performed right  hand ball squeeze, for 5 second hold, 3 sets of 10 repetitions.   Patient performed right forearm pronation/supination with elbow resting at 90 degrees for 2 minutes, twice.        Jovana received the following supervised modalities after being cleared for contradictions: NMES Electrical Stimulation:  Jovana received NMES Electrical Stimulation to elicit muscle contraction of the right wrist extensors. Pt received stimulation at a rate of 44 pps with symmetric current, ramp of 3 seconds with 10 second on time and 10 second off time. Patient tolerated treatment well without any adverse effects.       Home Exercises and Education Provided     Education provided:  progress towards goals   likely tx progression  rationale of rehab interventions    Written Home Exercises/Information Provided: Patient instructed to cont prior HEP.        previously issued exercises and/or other issued home therapy instructions were reviewed if still part of current tx plan as well as any issued today and Jovana was able to demonstrate them prior to the end of the session.  Jovana demonstrated Ind w/ understanding of the HEP provided.   See EMR under patient instructions and/or media for HEP issued today or in past    Assessment   Patient continues to demonstrate an overall increase in arom for wrist flexion.     Pt would continue to benefit from skilled OT in order to address ROM, PAIN, AND/OR DECREASED FUNCTIONAL STATUS SECONDARY TO CURRENT INJURY/DEBILITATING FACTOR.     Jovana is progressing well towards her goals and there are NO updates to goals at this time unless goals noted below are different than goals on previous notes or evaluation. Pt prognosis is good  Pt will continue to benefit from skilled outpatient occupational therapy to address the deficits listed in the problem list on initial evaluation to provide pt/family education  and to maximize pt's level of independence in the home and community environment.     Anticipated barriers to occupational therapy: NONE    Pt's spiritual, cultural and educational needs have been considered and pt is agreeable to plan of care and goals.    Goals:   stg to be met in 3 weeks   1. Patient will be I with HEP   2. Patient will have 2/10 pain with light use   3. Patient will have = prom of bilateral wrists  to enhance affected arm use with ADL  4. Patient's right wrist strength for dorsiflexion will be 3+/5.       ltg to be met by d/c   1. Patient will be I with d/c HEP   2. Patient will have 0/10 pain with all use   3. Patient will have about 75% /pinch  on affected side vs unaffected side to promote full functional use   4. Patient will increase arom of right wrist for dorsiflexion to wnl's  5. Patient's strength for right wrist dorsiflexion will be 5/5   all goals ongoing unless noted above or met today or in past but not noted yet      Any goals met today ?  (if any met see above)    Updates/Grading for next session: prn     Plan: evaluate and tx     DOT Casiano

## 2024-01-12 ENCOUNTER — TELEPHONE (OUTPATIENT)
Dept: PRIMARY CARE CLINIC | Facility: CLINIC | Age: 79
End: 2024-01-12
Payer: MEDICARE

## 2024-01-12 NOTE — TELEPHONE ENCOUNTER
----- Message from Mariam Phillip sent at 1/12/2024 12:10 PM CST -----  Contact: Pt [phone]  Patient is returning a phone call.  Who left a message for the patient: Min  Does patient know what this is regarding:  Yes. She said she will bring the form in on Tuesday around 1 if that works for you and you just have to sign in 2 spots. She is planning on waiting for it.

## 2024-01-12 NOTE — TELEPHONE ENCOUNTER
----- Message from Maddi Bran sent at 1/12/2024 10:55 AM CST -----  Contact: Self  [phone]  Would like to receive medical advice.    Would they like a call back or a response via [facility]:  portal    Additional information:  Pt states she will bring [employer] form on Tuesday.

## 2024-01-17 ENCOUNTER — CLINICAL SUPPORT (OUTPATIENT)
Dept: REHABILITATION | Facility: HOSPITAL | Age: 79
End: 2024-01-17
Payer: MEDICARE

## 2024-01-17 DIAGNOSIS — M21.331 RIGHT WRIST DROP: Primary | ICD-10-CM

## 2024-01-17 PROCEDURE — 97032 APPL MODALITY 1+ESTIM EA 15: CPT

## 2024-01-17 PROCEDURE — 97110 THERAPEUTIC EXERCISES: CPT

## 2024-01-17 NOTE — PROGRESS NOTES
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    Evaluation Date: 12/20/2023  Insurance Authorization Period Expiration: 12/31/2024  Plan of Care Certification Period: 12/20/2023 to 2/20/2024   Date of Return to MD: anil  Visit # / Visits authorized: 4 / 12    Time In:2:45  Time Out: 3:30  Total Billable Time: 45 minutes    Precautions:  universal, see epic, protocol if applicable    Subjective     Pt reports: "I am using my hand more"  she was compliant with home exercise program.  Response to previous treatment:patient w/ an overall increase in arom for wrist ext now   Functional change: decreased functional hand use    Pain: 0/10 rest; 0/10 light use  Side:right  Location: wrist dorsal surface  Objective   Jovana received therapeutic exercises to develop strength, ROM, and flexibility for 30 minutes including:  Patient performed right median nerve glides 5 times per motion with 5 second hold.   Patient performed right ulnar nerve glides with gentle thumb motion 10 times per motion with 5 second hold.  Patient performed right flexor tendon glides for 10 times per motion with 5 second hold.  Patient performed right thumb isolated IP flexion for 3 sets of 10 repetitions.  Patient performed right thumb palmar abduction for 3 sets of 10 repetitions.  Patient performed right thumb radial abduction for 3 sets of 10 repetitions.   Patient performed right  hand thumb opposition for 30 seconds.  Patient performed right wrist volar flexion for 2.5 minutes.  Patient performed right wrist dorsal flexion for 2.5 minutes.   Patient performed " right composite fist flexion with wrist extension for 3 sets of 10 repetitions.  Patient performed right  hand ball squeeze, for 5 second hold, 3 sets of 10 repetitions.   Patient performed right forearm pronation/supination with elbow resting at 90 degrees for 2 minutes, twice.        Jovana received the following supervised modalities after being cleared for contradictions: NMES Electrical Stimulation:  Jovana received NMES Electrical Stimulation to elicit muscle contraction of the right wrist extensors. Pt received stimulation at a rate of 44 pps with symmetric current, ramp of 3 seconds with 10 second on time and 10 second off time. Patient tolerated treatment well without any adverse effects.       Home Exercises and Education Provided     Education provided:  progress towards goals   likely tx progression  rationale of rehab interventions    Written Home Exercises/Information Provided: Patient instructed to cont prior HEP.        previously issued exercises and/or other issued home therapy instructions were reviewed if still part of current tx plan as well as any issued today and Jovana was able to demonstrate them prior to the end of the session.  Jovana demonstrated Ind w/ understanding of the HEP provided.   See EMR under patient instructions and/or media for HEP issued today or in past    Assessment   Patient continues to demonstrate an overall increase in arom for wrist flexion.     Pt would continue to benefit from skilled OT in order to address ROM, PAIN, AND/OR DECREASED FUNCTIONAL STATUS SECONDARY TO CURRENT INJURY/DEBILITATING FACTOR.     Jovana is progressing well towards her goals and there are NO updates to goals at this time unless goals noted below are different than goals on previous notes or evaluation. Pt prognosis is good  Pt will continue to benefit from skilled outpatient occupational therapy to address the deficits listed in the problem list on initial evaluation to provide pt/family education  and to maximize pt's level of independence in the home and community environment.     Anticipated barriers to occupational therapy: NONE    Pt's spiritual, cultural and educational needs have been considered and pt is agreeable to plan of care and goals.    Goals:   stg to be met in 3 weeks   1. Patient will be I with HEP   2. Patient will have 2/10 pain with light use   3. Patient will have = prom of bilateral wrists  to enhance affected arm use with ADL  4. Patient's right wrist strength for dorsiflexion will be 3+/5.       ltg to be met by d/c   1. Patient will be I with d/c HEP   2. Patient will have 0/10 pain with all use   3. Patient will have about 75% /pinch  on affected side vs unaffected side to promote full functional use   4. Patient will increase arom of right wrist for dorsiflexion to wnl's  5. Patient's strength for right wrist dorsiflexion will be 5/5   all goals ongoing unless noted above or met today or in past but not noted yet      Any goals met today ?  (if any met see above)    Updates/Grading for next session: prn     Plan: evaluate and tx     DOT Casiano

## 2024-01-24 ENCOUNTER — CLINICAL SUPPORT (OUTPATIENT)
Dept: REHABILITATION | Facility: HOSPITAL | Age: 79
End: 2024-01-24
Payer: MEDICARE

## 2024-01-24 DIAGNOSIS — M21.331 RIGHT WRIST DROP: Primary | ICD-10-CM

## 2024-01-24 PROCEDURE — 97032 APPL MODALITY 1+ESTIM EA 15: CPT

## 2024-01-24 PROCEDURE — 97110 THERAPEUTIC EXERCISES: CPT

## 2024-01-24 NOTE — PROGRESS NOTES
"  Occupational Therapy Daily Treatment Note     Date: 1/24/2024  Name: Jovana Zendejas 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    Evaluation Date: 12/20/2023  Insurance Authorization Period Expiration: 12/31/2024  Plan of Care Certification Period: 12/20/2023 to 2/20/2024   Date of Return to MD: anil  Visit # / Visits authorized: 5 /12    Time In:2:45  Time Out: 3:30  Total Billable Time: 45 minutes    Precautions:  universal, see epic, protocol if applicable    Subjective     Pt reports: "my hand has been hurting in the joint."  she was compliant with home exercise program.  Response to previous treatment:patient w/ an overall increase in arom for wrist ext now   Functional change: decreased functional hand use    Pain: 0/10 rest; 0/10 light use  Side:right  Location: wrist dorsal surface  Objective   Jovana received therapeutic exercises to develop strength, ROM, and flexibility for 30 minutes including:  Patient performed right median nerve glides 5 times per motion with 5 second hold.   Patient performed right ulnar nerve glides with gentle thumb motion 10 times per motion with 5 second hold.  Patient performed right flexor tendon glides for 10 times per motion with 5 second hold.  Patient performed right  hand thumb opposition for 30 seconds.  Patient performed right wrist volar flexion for 2.5 minutes.  Patient performed right wrist dorsal flexion for 2.5 minutes.   Patient performed right composite fist flexion with wrist extension for 3 sets of 10 repetitions.  Patient performed right  hand ball squeeze, for 5 second hold, 3 sets of 10 repetitions.   Patient performed right forearm pronation/supination " with elbow resting at 90 degrees for 2 minutes, twice.    Patient performed right thumb isolated IP flexion for 3 sets of 10 repetitions.  Patient performed right thumb palmar abduction for 3 sets of 10 repetitions.  Patient performed right thumb radial abduction for 3 sets of 10 repetitions.       Jovana received the following supervised modalities after being cleared for contradictions: NMES Electrical Stimulation:  Jovana received NMES Electrical Stimulation to elicit muscle contraction of the right wrist extensors. Pt received 12 min stimulation at a rate of 44 pps with symmetric current, ramp of 3 seconds with 10 second on time and 10 second off time. Patient tolerated treatment well without any adverse effects.       Home Exercises and Education Provided     Education provided:  progress towards goals   likely tx progression  rationale of rehab interventions    Written Home Exercises/Information Provided: Patient instructed to cont prior HEP.        previously issued exercises and/or other issued home therapy instructions were reviewed if still part of current tx plan as well as any issued today and Jovana was able to demonstrate them prior to the end of the session.  Jovana demonstrated Ind w/ understanding of the HEP provided.   See EMR under patient instructions and/or media for HEP issued today or in past    Assessment   Jovana has continued to demonstrate an overall increase in wrist extension and is working on thumb flexion.      Pt would continue to benefit from skilled OT in order to address ROM, PAIN, AND/OR DECREASED FUNCTIONAL STATUS SECONDARY TO CURRENT INJURY/DEBILITATING FACTOR.     Jovana is progressing well towards her goals and there are NO updates to goals at this time unless goals noted below are different than goals on previous notes or evaluation. Pt prognosis is good  Pt will continue to benefit from skilled outpatient occupational therapy to address the deficits listed in the problem list on  initial evaluation to provide pt/family education and to maximize pt's level of independence in the home and community environment.     Anticipated barriers to occupational therapy: NONE    Pt's spiritual, cultural and educational needs have been considered and pt is agreeable to plan of care and goals.    Goals:   stg to be met in 3 weeks   1. Patient will be I with HEP   2. Patient will have 2/10 pain with light use   3. Patient will have = prom of bilateral wrists  to enhance affected arm use with ADL  4. Patient's right wrist strength for dorsiflexion will be 3+/5.       ltg to be met by d/c   1. Patient will be I with d/c HEP   2. Patient will have 0/10 pain with all use   3. Patient will have about 75% /pinch  on affected side vs unaffected side to promote full functional use   4. Patient will increase arom of right wrist for dorsiflexion to wnl's  5. Patient's strength for right wrist dorsiflexion will be 5/5   all goals ongoing unless noted above or met today or in past but not noted yet      Any goals met today ?  (if any met see above)    Updates/Grading for next session: prn     Plan: evaluate and tx     DOT Casiano

## 2024-01-29 ENCOUNTER — CLINICAL SUPPORT (OUTPATIENT)
Dept: REHABILITATION | Facility: HOSPITAL | Age: 79
End: 2024-01-29
Payer: MEDICARE

## 2024-01-29 DIAGNOSIS — M21.331 RIGHT WRIST DROP: Primary | ICD-10-CM

## 2024-01-29 PROCEDURE — 97032 APPL MODALITY 1+ESTIM EA 15: CPT

## 2024-01-29 PROCEDURE — 97110 THERAPEUTIC EXERCISES: CPT

## 2024-01-29 NOTE — PROGRESS NOTES
"  Occupational Therapy Daily Treatment Note     Date: 1/29/2024  Name: Jovana Zendejas 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    Evaluation Date: 12/20/2023  Insurance Authorization Period Expiration: 12/31/2024  Plan of Care Certification Period: 12/20/2023 to 2/20/2024   Date of Return to MD: anil  Visit # / Visits authorized: 6 /12    Time In:2:00  Time Out: 2:45  Total Billable Time: 45 minutes    Precautions:  universal, see epic, protocol if applicable    Subjective     Pt reports: "I want to do something with my shoulder."  she was compliant with home exercise program.  Response to previous treatment:patient w/ an overall increase in arom for wrist ext now   Functional change: decreased functional hand use    Pain: 0/10 rest; 0/10 light use  Side:right  Location: wrist dorsal surface  Objective   Jovana received therapeutic exercises to develop strength, ROM, and flexibility for 30 minutes including:  Patient performed right median nerve glides 5 times per motion with 5 second hold.   Patient performed right ulnar nerve glides with gentle thumb motion 10 times per motion with 5 second hold.  Patient performed right flexor tendon glides for 10 times per motion with 5 second hold.  Patient performed right  hand thumb opposition for 30 seconds.  Patient performed right wrist volar flexion for 2.5 minutes.  Patient performed right wrist dorsal flexion for 2.5 minutes.   Patient performed right composite fist flexion with wrist extension for 3 sets of 10 repetitions.  Patient performed right  hand ball squeeze, for 5 second hold, 3 sets of 10 repetitions.   Patient performed right forearm pronation/supination " with elbow resting at 90 degrees for 2 minutes, twice.    Patient performed right thumb isolated IP flexion for 3 sets of 10 repetitions.  Patient performed right thumb palmar abduction for 3 sets of 10 repetitions.  Patient performed right thumb radial abduction for 3 sets of 10 repetitions.       Jovana received the following supervised modalities after being cleared for contradictions: NMES Electrical Stimulation:  Jovana received NMES Electrical Stimulation to elicit muscle contraction of the right wrist extensors. Pt received 12 min stimulation at a rate of 44 pps with symmetric current, ramp of 3 seconds with 10 second on time and 10 second off time. Patient tolerated treatment well without any adverse effects.   Ube for 10 min using right upper only.     Home Exercises and Education Provided     Education provided:  progress towards goals   likely tx progression  rationale of rehab interventions    Written Home Exercises/Information Provided: Patient instructed to cont prior HEP.        previously issued exercises and/or other issued home therapy instructions were reviewed if still part of current tx plan as well as any issued today and Jovana was able to demonstrate them prior to the end of the session.  Jovana demonstrated Ind w/ understanding of the HEP provided.   See EMR under patient instructions and/or media for HEP issued today or in past    Assessment   Jovana has continued to demonstrate an overall increase in wrist extension and is working on thumb flexion.      Pt would continue to benefit from skilled OT in order to address ROM, PAIN, AND/OR DECREASED FUNCTIONAL STATUS SECONDARY TO CURRENT INJURY/DEBILITATING FACTOR.     Jovana is progressing well towards her goals and there are NO updates to goals at this time unless goals noted below are different than goals on previous notes or evaluation. Pt prognosis is good  Pt will continue to benefit from skilled outpatient occupational therapy to address the  deficits listed in the problem list on initial evaluation to provide pt/family education and to maximize pt's level of independence in the home and community environment.     Anticipated barriers to occupational therapy: NONE    Pt's spiritual, cultural and educational needs have been considered and pt is agreeable to plan of care and goals.    Goals:   stg to be met in 3 weeks   1. Patient will be I with HEP   2. Patient will have 2/10 pain with light use   3. Patient will have = prom of bilateral wrists  to enhance affected arm use with ADL  4. Patient's right wrist strength for dorsiflexion will be 3+/5.       ltg to be met by d/c   1. Patient will be I with d/c HEP   2. Patient will have 0/10 pain with all use   3. Patient will have about 75% /pinch  on affected side vs unaffected side to promote full functional use   4. Patient will increase arom of right wrist for dorsiflexion to wnl's  5. Patient's strength for right wrist dorsiflexion will be 5/5   all goals ongoing unless noted above or met today or in past but not noted yet      Any goals met today ?  (if any met see above)    Updates/Grading for next session: prn     Plan: evaluate and tx     DOT Casiano

## 2024-02-07 ENCOUNTER — CLINICAL SUPPORT (OUTPATIENT)
Dept: REHABILITATION | Facility: HOSPITAL | Age: 79
End: 2024-02-07
Payer: MEDICARE

## 2024-02-07 DIAGNOSIS — M21.331 RIGHT WRIST DROP: Primary | ICD-10-CM

## 2024-02-07 PROCEDURE — 97110 THERAPEUTIC EXERCISES: CPT

## 2024-02-07 PROCEDURE — 97530 THERAPEUTIC ACTIVITIES: CPT

## 2024-02-07 PROCEDURE — 97032 APPL MODALITY 1+ESTIM EA 15: CPT

## 2024-02-07 NOTE — PROGRESS NOTES
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    Evaluation Date: 12/20/2023  Insurance Authorization Period Expiration: 12/31/2024  Plan of Care Certification Period: 12/20/2023 to 2/20/2024   Date of Return to MD: anil  Visit # / Visits authorized: 7 /12    Time In:12:30  Time Out: 1:15  Total Billable Time: 45 minutes    Precautions:  universal, see epic, protocol if applicable    Subjective     Pt reports: "my hand is so much better."  she was compliant with home exercise program.  Response to previous treatment:patient w/ an overall increase in arom for wrist ext now   Functional change: decreased functional hand use    Pain: 0/10 rest; 0/10 light use  Side:right  Location: wrist dorsal surface  Objective   Jovana received therapeutic exercises to develop strength, ROM, and flexibility for 30 minutes including:  Patient performed right median nerve glides 5 times per motion with 5 second hold.   Patient performed right ulnar nerve glides with gentle thumb motion 10 times per motion with 5 second hold.  Patient performed right flexor tendon glides for 10 times per motion with 5 second hold.  Patient performed right  hand thumb opposition for 30 seconds.  Patient performed right wrist volar flexion for 2.5 minutes.  Patient performed right wrist dorsal flexion for 2.5 minutes.   Patient performed right composite fist flexion with wrist extension for 3 sets of 10 repetitions.  Patient performed right  hand ball squeeze, for 5 second hold, 3 sets of 10 repetitions.   Patient performed right forearm pronation/supination with elbow " resting at 90 degrees for 2 minutes, twice.    Patient performed right thumb isolated IP flexion for 3 sets of 10 repetitions.  Patient performed right thumb palmar abduction for 3 sets of 10 repetitions.  Patient performed right thumb radial abduction for 3 sets of 10 repetitions.       Jovana received the following supervised modalities after being cleared for contradictions: NMES Electrical Stimulation:  Jovana received NMES Electrical Stimulation to elicit muscle contraction of the right wrist extensors. Pt received 12 min stimulation at a rate of 44 pps with symmetric current, ramp of 3 seconds with 10 second on time and 10 second off time. Patient tolerated treatment well without any adverse effects.   Ube for 10 min using right upper only.     Home Exercises and Education Provided     Education provided:  progress towards goals   likely tx progression  rationale of rehab interventions    Written Home Exercises/Information Provided: Patient instructed to cont prior HEP.        previously issued exercises and/or other issued home therapy instructions were reviewed if still part of current tx plan as well as any issued today and Jovana was able to demonstrate them prior to the end of the session.  Jovana demonstrated Ind w/ understanding of the HEP provided.   See EMR under patient instructions and/or media for HEP issued today or in past    Assessment   Jovana has continued to demonstrate an overall increase in wrist extension and is working on thumb flexion.      Pt would continue to benefit from skilled OT in order to address ROM, PAIN, AND/OR DECREASED FUNCTIONAL STATUS SECONDARY TO CURRENT INJURY/DEBILITATING FACTOR.     Jovana is progressing well towards her goals and there are NO updates to goals at this time unless goals noted below are different than goals on previous notes or evaluation. Pt prognosis is good  Pt will continue to benefit from skilled outpatient occupational therapy to address the deficits  listed in the problem list on initial evaluation to provide pt/family education and to maximize pt's level of independence in the home and community environment.     Anticipated barriers to occupational therapy: NONE    Pt's spiritual, cultural and educational needs have been considered and pt is agreeable to plan of care and goals.    Goals:   stg to be met in 3 weeks   1. Patient will be I with HEP   2. Patient will have 2/10 pain with light use   3. Patient will have = prom of bilateral wrists  to enhance affected arm use with ADL  4. Patient's right wrist strength for dorsiflexion will be 3+/5.       ltg to be met by d/c   1. Patient will be I with d/c HEP   2. Patient will have 0/10 pain with all use   3. Patient will have about 75% /pinch  on affected side vs unaffected side to promote full functional use   4. Patient will increase arom of right wrist for dorsiflexion to wnl's  5. Patient's strength for right wrist dorsiflexion will be 5/5   all goals ongoing unless noted above or met today or in past but not noted yet      Any goals met today ?  (if any met see above)    Updates/Grading for next session: prn     Plan: evaluate and tx     DOT Casiano

## 2024-02-07 NOTE — PROGRESS NOTES
"  Occupational Therapy Daily Treatment Note     Date: 2/7/2024  Name: Jovana Zendejas [facility]  Clinic Number: [id number]    Therapy Diagnosis: right wrist drop  Physician: Carlitos Curry MD     Physician Orders: evaluate and treat  Medical Diagnosis: Right wrist drop(M21.331)  Surgical Procedure and Date: 03/2023 patient had surgery for right fractured humerus, and sustained an iatrogenic nerve injury to right radial nerve during procedure.    Evaluation Date: 12/20/2023  Insurance Authorization Period Expiration: 12/31/2024  Plan of Care Certification Period: 12/20/2023 to 2/20/2024   Date of Return to MD: anil  Visit # / Visits authorized: 7 /12    Time In:12:30  Time Out: 1:15  Total Billable Time: 45 minutes    Precautions:  universal, see epic, protocol if applicable    Subjective     Pt reports: "I went to the doctor."  she was compliant with home exercise program.  Response to previous treatment:patient w/ an overall increase in arom for wrist ext now   Functional change: decreased functional hand use    Pain: 0/10 rest; 0/10 light use  Side:right  Location: wrist dorsal surface  Objective   Jovana received therapeutic exercises to develop strength, ROM, and flexibility for 30 minutes including:  Patient performed right median nerve glides 5 times per motion with 5 second hold.   Patient performed right ulnar nerve glides with gentle thumb motion 10 times per motion with 5 second hold.    Patient performed right  hand thumb opposition for 30 seconds.  Patient performed right wrist volar flexion for 2.5 minutes.  Patient performed right wrist dorsal flexion for 2.5 minutes.     Patient performed right thumb isolated IP flexion for 3 sets of 10 repetitions.  Patient performed right thumb palmar abduction for 3 sets of 10 repetitions.  Patient performed right thumb radial abduction for 3 sets of 10 repetitions.       Jovana received the following supervised modalities after being cleared for contradictions: NMES Electrical " Stimulation:  Jovana received NMES Electrical Stimulation to elicit muscle contraction of the right wrist extensors. Pt received 12 min stimulation at a rate of 44 pps with symmetric current, ramp of 3 seconds with 10 second on time and 10 second off time. Patient tolerated treatment well without any adverse effects.   Ube for 10 min using right upper only.     Home Exercises and Education Provided     Education provided:  progress towards goals   likely tx progression  rationale of rehab interventions    Written Home Exercises/Information Provided: Patient instructed to cont prior HEP.        previously issued exercises and/or other issued home therapy instructions were reviewed if still part of current tx plan as well as any issued today and Jovana was able to demonstrate them prior to the end of the session.  Jovana demonstrated Ind w/ understanding of the HEP provided.   See EMR under patient instructions and/or media for HEP issued today or in past    Assessment   Jovana continues to progress w/ strength for wrist extension.   Pt would continue to benefit from skilled OT in order to address ROM, PAIN, AND/OR DECREASED FUNCTIONAL STATUS SECONDARY TO CURRENT INJURY/DEBILITATING FACTOR.     Jovana is progressing well towards her goals and there are NO updates to goals at this time unless goals noted below are different than goals on previous notes or evaluation. Pt prognosis is good  Pt will continue to benefit from skilled outpatient occupational therapy to address the deficits listed in the problem list on initial evaluation to provide pt/family education and to maximize pt's level of independence in the home and community environment.     Anticipated barriers to occupational therapy: NONE    Pt's spiritual, cultural and educational needs have been considered and pt is agreeable to plan of care and goals.    Goals:   stg to be met in 3 weeks   1. Patient will be I with HEP met  2. Patient will have 2/10 pain with  light use met  3. Patient will have = prom of bilateral wrists  to enhance affected arm use with ADL  4. Patient's right wrist strength for dorsiflexion will be 3+/5.  met     ltg to be met by d/c   1. Patient will be I with d/c HEP ongoing  2. Patient will have 0/10 pain with all use ongoing  3. Patient will have about 75% /pinch  on affected side vs unaffected side to promote full functional use ongoing  4. Patient will increase arom of right wrist for dorsiflexion to wnl's ongoing  5. Patient's strength for right wrist dorsiflexion will be 5/5   all goals ongoing unless noted above or met today or in past but not noted yet      Any goals met today ?  (if any met see above)    Updates/Grading for next session: prn     Plan: evaluate and tx     DOT Casiano

## 2024-02-16 DIAGNOSIS — M48.061 DEGENERATIVE LUMBAR SPINAL STENOSIS: ICD-10-CM

## 2024-02-16 RX ORDER — OXYCODONE AND ACETAMINOPHEN 5; 325 MG/1; MG/1
1 TABLET ORAL 2 TIMES DAILY PRN
Qty: 60 TABLET | Refills: 0 | Status: SHIPPED | OUTPATIENT
Start: 2024-02-16 | End: 2024-04-11 | Stop reason: SDUPTHER

## 2024-02-16 NOTE — TELEPHONE ENCOUNTER
Care Due:                  Date            Visit Type   Department     Provider  --------------------------------------------------------------------------------                                EP -                              PRIMARY      [facility]   Marcy Bria  Last Visit: 12-      CARE ([facility])   CARE           [place]                              EP -                              PRIMARY      [facility]  Next Visit: 04-      CARE ([facility])   CARE           [place]                                                            Last  Test          Frequency    Reason                     Performed    Due Date  --------------------------------------------------------------------------------    HBA1C.......  6 months...  dulaglutide,               09-   03-                             empagliflozin............    Vitamin D...  12 months..  ergocalciferol...........  07- 07-    [facility] Embedded Care Due Messages. Reference number: [id number].   2/16/2024 8:37:20 AM CST

## 2024-03-07 ENCOUNTER — TELEPHONE (OUTPATIENT)
Dept: OPTOMETRY | Facility: CLINIC | Age: 79
End: 2024-03-07
Payer: MEDICARE

## 2024-03-07 ENCOUNTER — CLINICAL SUPPORT (OUTPATIENT)
Dept: REHABILITATION | Facility: HOSPITAL | Age: 79
End: 2024-03-07
Payer: MEDICARE

## 2024-03-07 DIAGNOSIS — M21.331 RIGHT WRIST DROP: Primary | ICD-10-CM

## 2024-03-07 PROCEDURE — 97032 APPL MODALITY 1+ESTIM EA 15: CPT

## 2024-03-07 PROCEDURE — 97110 THERAPEUTIC EXERCISES: CPT

## 2024-03-07 NOTE — PROGRESS NOTES
Occupational Therapy Discharge Summary    Name: Jovana Fall 1945  MRN: [id number]   Date: 3/7/2024  Principal Problem: RIGHT WRIST DROP  Subjective:  Patient Discharged from acute Occupational Therapy on 03/07/2024  Please refer to prior OT noted date on 02/07/2024 for functional status.    Objective:  Jovana received therapeutic exercises to develop strength, ROM, and flexibility for 30 minutes including:  Patient performed right median nerve glides 5 times per motion with 5 second hold.   Patient performed right ulnar nerve glides with gentle thumb motion 10 times per motion with 5 second hold.     Patient performed right  hand thumb opposition for 30 seconds.  Patient performed right wrist volar flexion for 2.5 minutes.  Patient performed right wrist dorsal flexion for 2.5 minutes.      Patient performed right thumb isolated IP flexion for 3 sets of 10 repetitions.  Patient performed right thumb palmar abduction for 3 sets of 10 repetitions.  Patient performed right thumb radial abduction for 3 sets of 10 repetitions.         Jovana received the following supervised modalities after being cleared for contradictions: NMES Electrical Stimulation:  Jovana received NMES Electrical Stimulation to elicit muscle contraction of the right wrist extensors. Pt received 12 min stimulation at a rate of 44 pps with symmetric current, ramp of 3 seconds with 10 second on time and 10 second off time. Patient tolerated treatment well without any adverse effects.   Ube for 10 min using right upper only.           Goals:   stg to be met in 3 weeks   1. Patient will be I with HEP met  2. Patient will have 2/10 pain with light use met  3. Patient will have = prom of bilateral wrists  to enhance affected arm use with ADL  4. Patient's right wrist strength for dorsiflexion will be 3+/5.  met     ltg to be met by d/c   1. Patient will be I with d/c HEP MET  2. Patient will have 0/10 pain with all use MET  3. Patient will have about  75% /pinch  on affected side vs unaffected side to promote full functional use MET  4. Patient will increase arom of right wrist for dorsiflexion to wnl's MET  5. Patient's strength for right wrist dorsiflexion will be 5/5 MET  Assessment:  Patient has met all goals and is not appropriate for therapy.    Reasons for Discontinuation of Therapy Services  Satisfactory goal achievement.      Plan:  Patient Discharged to:  HOME W/ NO OT NEEDED .    Garima Wright OT

## 2024-03-07 NOTE — TELEPHONE ENCOUNTER
Spoke to patient likes old prescription ctl better would like that instead. Will send message to Dr. Trimble and call patient back.

## 2024-03-07 NOTE — TELEPHONE ENCOUNTER
----- Message from Brad Trimble, OD sent at 3/7/2024  1:12 PM CST -----  Erwin davis, finalized old rx    ----- Message -----  From: Kalani Fatima  Sent: 3/7/2024  11:49 AM CST  To: Brad Trimble, OD    Patient called wants old ctl prescription instead. I told her it has been a while so not sure if you wanted to see her?   ----- Message -----  From: Sandra Tobias  Sent: 3/7/2024  11:11 AM CST  To: Nai Salinas Staff    Type:  Needs Medical Advice    Who Called: pt   Best Call Back Number: [phone] (home)     Additional Information:  Requesting call back  wants to discuss sight, sts her eyes are getting worst and prefer glasses over the contacts . Pt requesting  call before 2 or after 315     please advise thank you

## 2024-03-07 NOTE — TELEPHONE ENCOUNTER
----- Message from Sandra Tobias sent at 3/7/2024 11:10 AM CST -----  Type:  Needs Medical Advice    Who Called: pt   Best Call Back Number: [phone] (home)     Additional Information:  Requesting call back  wants to discuss sight, sts her eyes are getting worst and prefer glasses over the contacts . Pt requesting  call before 2 or after 315     please advise thank you

## 2024-03-08 ENCOUNTER — TELEPHONE (OUTPATIENT)
Dept: OPTOMETRY | Facility: CLINIC | Age: 79
End: 2024-03-08
Payer: MEDICARE

## 2024-03-08 NOTE — TELEPHONE ENCOUNTER
----- Message from Richa Nowak sent at 3/8/2024 10:46 AM CST -----  Type:  Patient Returning Call    Who Called:  patient  Who Left Message for Patient:  ailyn  Does the patient know what this is regarding?:  contact lens  Best Call Back Number:  [phone] (home)   Additional Information:

## 2024-03-13 ENCOUNTER — TELEPHONE (OUTPATIENT)
Dept: OPTOMETRY | Facility: CLINIC | Age: 79
End: 2024-03-13
Payer: MEDICARE

## 2024-03-13 NOTE — TELEPHONE ENCOUNTER
----- Message from Antolin Duque sent at 3/13/2024 12:57 PM CDT -----  Regarding: advice  Type:  Needs Medical Advice    Who Called: pt     Best Call Back Number: [phone]      Additional Information: pt st that she's been trying get in touch with office to get some contacts lens, but no one is rt her call.  please call to discuss.

## 2024-04-08 ENCOUNTER — PATIENT MESSAGE (OUTPATIENT)
Dept: ADMINISTRATIVE | Facility: OTHER | Age: 79
End: 2024-04-08
Payer: MEDICARE

## 2024-04-11 ENCOUNTER — TELEPHONE (OUTPATIENT)
Dept: DERMATOLOGY | Facility: CLINIC | Age: 79
End: 2024-04-11

## 2024-04-11 DIAGNOSIS — M48.061 DEGENERATIVE LUMBAR SPINAL STENOSIS: ICD-10-CM

## 2024-04-11 NOTE — TELEPHONE ENCOUNTER
Patient rescheduled.     ----- Message from Pily Chowdary sent at 4/11/2024 11:23 AM CDT -----  Contact: PT  Type:  Sooner Apoointment Request    Caller is requesting a sooner appointment.  Caller declined first available appointment listed below.  Caller will not accept being placed on the waitlist and is requesting a message be sent to doctor.  Name of Caller:PT   When is the first available appointment? N/A  Symptoms: SKIN CANCER F/U   Would the patient rather a call back or a response via [facility]? CALL   Best Call Back Number:[phone] (home)   Additional Information: THANK YOU

## 2024-04-11 NOTE — TELEPHONE ENCOUNTER
No care due was identified.  [facility] Embedded Care Due Messages. Reference number: [id number].   4/11/2024 2:32:55 PM CDT

## 2024-04-12 RX ORDER — OXYCODONE AND ACETAMINOPHEN 5; 325 MG/1; MG/1
1 TABLET ORAL 2 TIMES DAILY PRN
Qty: 60 TABLET | Refills: 0 | Status: SHIPPED | OUTPATIENT
Start: 2024-04-12 | End: 2024-05-27 | Stop reason: SDUPTHER

## 2024-04-15 ENCOUNTER — TELEPHONE (OUTPATIENT)
Dept: PRIMARY CARE CLINIC | Facility: CLINIC | Age: 79
End: 2024-04-15

## 2024-04-15 ENCOUNTER — TELEPHONE (OUTPATIENT)
Dept: PRIMARY CARE CLINIC | Facility: CLINIC | Age: 79
End: 2024-04-15
Payer: MEDICARE

## 2024-04-15 DIAGNOSIS — Z12.31 ENCOUNTER FOR SCREENING MAMMOGRAM FOR BREAST CANCER: Primary | ICD-10-CM

## 2024-04-15 NOTE — TELEPHONE ENCOUNTER
----- Message from Jesetonoyared Dorado sent at 4/15/2024  8:02 AM CDT -----  Contact: Self [phone]  Would like to receive medical advice.    Would they like a call back or a response via [facility]: call back      Additional information:  Calling to have an A1C and a mammo order put in

## 2024-04-15 NOTE — TELEPHONE ENCOUNTER
Mammogram order updated, due now. Labs ordered on December 12th may be scheduled, no fasting required.

## 2024-04-15 NOTE — TELEPHONE ENCOUNTER
----- Message from Paula Bran sent at 4/15/2024  1:27 PM CDT -----  Please put in an order for pt mammogram for pt to schedule.    Thanks

## 2024-04-15 NOTE — TELEPHONE ENCOUNTER
----- Message from Jesetonoyared Dorado sent at 4/15/2024  8:02 AM CDT -----  Contact: Self [phone]  Would like to receive medical advice.    Would they like a call back or a response via [facility]: call back      Additional information:  Calling to have an A1C and a mammo order put in

## 2024-04-15 NOTE — TELEPHONE ENCOUNTER
----- Message from Shashank Centeno sent at 4/15/2024  1:13 PM CDT -----  Contact: [phone]@[email or web address]  Patient is returning a phone call.yes   Who left a message for the patient: Ana Love MA  Does patient know what this is regarding:  no   Would you like a call back, or a response through your [facility] portal?:   call back   Comments:

## 2024-04-17 ENCOUNTER — TELEPHONE (OUTPATIENT)
Dept: PRIMARY CARE CLINIC | Facility: CLINIC | Age: 79
End: 2024-04-17
Payer: MEDICARE

## 2024-04-17 NOTE — TELEPHONE ENCOUNTER
----- Message from Oksana Cortez sent at 4/17/2024  9:43 AM CDT -----  Contact: Ojvana   Jovana wanted you to know she canceled her labs today because she has diarrhea  She will keep her appt with Dr Staley tomorrow

## 2024-04-18 ENCOUNTER — LAB VISIT (OUTPATIENT)
Dept: LAB | Facility: HOSPITAL | Age: 79
End: 2024-04-18
Attending: INTERNAL MEDICINE
Payer: MEDICARE

## 2024-04-18 ENCOUNTER — OFFICE VISIT (OUTPATIENT)
Dept: PRIMARY CARE CLINIC | Facility: CLINIC | Age: 79
End: 2024-04-18
Payer: MEDICARE

## 2024-04-18 VITALS
WEIGHT: 189.13 LBS | BODY MASS INDEX: 29.69 KG/M2 | OXYGEN SATURATION: 98 % | HEART RATE: 71 BPM | SYSTOLIC BLOOD PRESSURE: 138 MMHG | TEMPERATURE: 98 F | HEIGHT: 67 IN | DIASTOLIC BLOOD PRESSURE: 80 MMHG

## 2024-04-18 DIAGNOSIS — Z29.11 NEED FOR RSV VACCINATION: ICD-10-CM

## 2024-04-18 DIAGNOSIS — F33.9 MAJOR DEPRESSION, RECURRENT, CHRONIC: ICD-10-CM

## 2024-04-18 DIAGNOSIS — E11.69 HYPERLIPIDEMIA ASSOCIATED WITH TYPE 2 DIABETES MELLITUS: ICD-10-CM

## 2024-04-18 DIAGNOSIS — I70.0 THORACIC AORTA ATHEROSCLEROSIS: ICD-10-CM

## 2024-04-18 DIAGNOSIS — I25.10 CORONARY ARTERY DISEASE INVOLVING NATIVE CORONARY ARTERY OF NATIVE HEART WITHOUT ANGINA PECTORIS: ICD-10-CM

## 2024-04-18 DIAGNOSIS — M48.061 DEGENERATIVE LUMBAR SPINAL STENOSIS: ICD-10-CM

## 2024-04-18 DIAGNOSIS — E11.21 TYPE 2 DIABETES MELLITUS WITH DIABETIC NEPHROPATHY, WITHOUT LONG-TERM CURRENT USE OF INSULIN: ICD-10-CM

## 2024-04-18 DIAGNOSIS — M81.0 OSTEOPOROSIS WITHOUT CURRENT PATHOLOGICAL FRACTURE, UNSPECIFIED OSTEOPOROSIS TYPE: ICD-10-CM

## 2024-04-18 DIAGNOSIS — E78.5 HYPERLIPIDEMIA ASSOCIATED WITH TYPE 2 DIABETES MELLITUS: ICD-10-CM

## 2024-04-18 DIAGNOSIS — E11.21 TYPE 2 DIABETES MELLITUS WITH DIABETIC NEPHROPATHY, WITHOUT LONG-TERM CURRENT USE OF INSULIN: Primary | ICD-10-CM

## 2024-04-18 DIAGNOSIS — I11.9 HYPERTENSIVE HEART DISEASE WITHOUT HEART FAILURE: ICD-10-CM

## 2024-04-18 DIAGNOSIS — C43.4 MALIGNANT MELANOMA OF SCALP AND NECK: ICD-10-CM

## 2024-04-18 LAB
ANION GAP SERPL CALC-SCNC: 9 MMOL/L (ref 8–16)
BUN SERPL-MCNC: 18 MG/DL (ref 8–23)
CALCIUM SERPL-MCNC: 9.9 MG/DL (ref 8.7–10.5)
CHLORIDE SERPL-SCNC: 108 MMOL/L (ref 95–110)
CO2 SERPL-SCNC: 23 MMOL/L (ref 23–29)
CREAT SERPL-MCNC: 0.7 MG/DL (ref 0.5–1.4)
EST. GFR  (NO RACE VARIABLE): >60 ML/MIN/1.73 M^2
ESTIMATED AVG GLUCOSE: 146 MG/DL (ref 68–131)
GLUCOSE SERPL-MCNC: 100 MG/DL (ref 70–110)
HBA1C MFR BLD: 6.7 % (ref 4–5.6)
POTASSIUM SERPL-SCNC: 4.6 MMOL/L (ref 3.5–5.1)
SODIUM SERPL-SCNC: 140 MMOL/L (ref 136–145)

## 2024-04-18 PROCEDURE — 83036 HEMOGLOBIN GLYCOSYLATED A1C: CPT | Performed by: INTERNAL MEDICINE

## 2024-04-18 PROCEDURE — 1126F AMNT PAIN NOTED NONE PRSNT: CPT | Mod: CPTII,S$GLB,, | Performed by: INTERNAL MEDICINE

## 2024-04-18 PROCEDURE — 1101F PT FALLS ASSESS-DOCD LE1/YR: CPT | Mod: CPTII,S$GLB,, | Performed by: INTERNAL MEDICINE

## 2024-04-18 PROCEDURE — 3288F FALL RISK ASSESSMENT DOCD: CPT | Mod: CPTII,S$GLB,, | Performed by: INTERNAL MEDICINE

## 2024-04-18 PROCEDURE — 1160F RVW MEDS BY RX/DR IN RCRD: CPT | Mod: CPTII,S$GLB,, | Performed by: INTERNAL MEDICINE

## 2024-04-18 PROCEDURE — 3075F SYST BP GE 130 - 139MM HG: CPT | Mod: CPTII,S$GLB,, | Performed by: INTERNAL MEDICINE

## 2024-04-18 PROCEDURE — 1159F MED LIST DOCD IN RCRD: CPT | Mod: CPTII,S$GLB,, | Performed by: INTERNAL MEDICINE

## 2024-04-18 PROCEDURE — 3079F DIAST BP 80-89 MM HG: CPT | Mod: CPTII,S$GLB,, | Performed by: INTERNAL MEDICINE

## 2024-04-18 PROCEDURE — 99214 OFFICE O/P EST MOD 30 MIN: CPT | Mod: S$GLB,,, | Performed by: INTERNAL MEDICINE

## 2024-04-18 PROCEDURE — 36415 COLL VENOUS BLD VENIPUNCTURE: CPT | Mod: PN | Performed by: INTERNAL MEDICINE

## 2024-04-18 PROCEDURE — 99999 PR PBB SHADOW E&M-EST. PATIENT-LVL V: CPT | Mod: PBBFAC,,, | Performed by: INTERNAL MEDICINE

## 2024-04-18 PROCEDURE — 80048 BASIC METABOLIC PNL TOTAL CA: CPT | Performed by: INTERNAL MEDICINE

## 2024-04-21 ENCOUNTER — PATIENT MESSAGE (OUTPATIENT)
Dept: PRIMARY CARE CLINIC | Facility: CLINIC | Age: 79
End: 2024-04-21
Payer: MEDICARE

## 2024-04-21 PROBLEM — C43.4 MALIGNANT MELANOMA OF SCALP AND NECK: Status: ACTIVE | Noted: 2024-04-21

## 2024-04-21 NOTE — PROGRESS NOTES
Subjective     Patient ID: Jovana Fall is a 78 y.o. female.    Chief Complaint: Follow-up    Last seen 4 months ago. Returns for scheduled f/u chronic medical conditions. Taking daily meds as prescribed. No home BP or glucose monitoring reported.     PMH: , misc x1.   Diabetes Type 2. HbA1c 6.0% Sep. '23.  Hypertensive heart disease without heart failure.   Hyperlipidemia, LDL 82 Sep. '23.  Nephrolithiasis.   Urinary incontinence.  GERD. EGD 9/15, 3/17,  - mild chronic non-active gastritis, no dysplasia, H pylori negative.  Depression/Anxiety.  Non-obstructive Coronary artery disease, angiogram , Cardiology .  Osteoporosis.  Mild Vitamin D deficiency, up to 68.  Lumbar Spinal Stenosis. Cervical Spine DJD.   Skin Cancer, Basal Cell, Squamous Cell and Melanoma.     Mammogram normal . Pelvic exam . BMD . Eye exam . Exercise Stress Echo negative 3/18.  Colonoscopy  - internal hemorrhoid, diverticulosis, two tubular adenomas - rep 5 yrs. Vaccines reviewed.    PSH: reviewed.     Social: Nonsmoker. Occas. alcohol. . 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    FMH: Colon and ovarian cancer in cousin. Colon cancer in brother. Mother - heart disease. MGM with cervical cancer. DM in aunts.     Allergies: multiple, see Med Card.     Medications: list reviewed and reconciled.               Review of Systems   Constitutional:  Negative for chills and fever.   Eyes:  Negative for visual disturbance.   Respiratory:  Negative for cough and shortness of breath.    Cardiovascular:  Negative for chest pain, palpitations and leg swelling.   Gastrointestinal:  Negative for abdominal pain, diarrhea, nausea and vomiting.   Genitourinary:  Negative for dysuria and frequency.   Musculoskeletal:  Positive for arthralgias and back pain.   Neurological:  Negative for dizziness, syncope, weakness and headaches.   Psychiatric/Behavioral:          Mood stable on Effexor.  "         Objective   Vitals:    04/18/24 1503   BP: 138/80   BP Location: Right arm   Patient Position: Sitting   Pulse: 71   Temp: 98 °F (36.7 °C)   TempSrc: Oral   SpO2: 98%   Weight: 85.8 kg (189 lb 2.5 oz)   Height: 5' 7" (1.702 m)      Physical Exam  Constitutional:       General: She is not in acute distress.     Appearance: She is not ill-appearing.   Cardiovascular:      Rate and Rhythm: Normal rate and regular rhythm.      Heart sounds: Normal heart sounds.   Pulmonary:      Effort: Pulmonary effort is normal. No respiratory distress.      Breath sounds: Normal breath sounds. No wheezing, rhonchi or rales.   Musculoskeletal:         General: Normal range of motion.      Right lower leg: No edema.      Left lower leg: No edema.      Comments: Protective Sensation:  Right: Intact  Left: Intact    Visual Inspection:  Normal -  Bilateral    Pedal Pulses:   Right: Present  Left: Present    Posterior Tibialis Pulses:   Right:Present  Left: Present       Skin:     General: Skin is warm and dry.      Findings: No erythema or rash.   Neurological:      Mental Status: She is alert.   Psychiatric:         Mood and Affect: Mood normal.         Behavior: Behavior normal.         Thought Content: Thought content normal.          Assessment and Plan     1. Type 2 diabetes mellitus with diabetic nephropathy, without long-term current use of insulin  -     Basic Metabolic Panel; Future; Expected date: 04/18/2024  -     Hemoglobin A1C; Future; Expected date: 04/18/2024  -     continue Trulicity and Jardiance.     2. Hypertensive heart disease without heart failure        -     near goal, continue Carvedilol, Telmisartan.    3. Hyperlipidemia associated with type 2 diabetes mellitus        -      continue Atorvastatin 80 mg.    4. Coronary artery disease involving native coronary artery of native heart without angina pectoris         -      asymptomatic.    5. Thoracic aorta atherosclerosis         -      continue high " intensity statin.    6. Osteoporosis without current pathological fracture, unspecified osteoporosis type         -      Forteo ordered by Orthopedics was not covered by insurance, I recommend Prolia - she will discuss with Ortho.   7. Degenerative lumbar spinal stenosis         -      stable chronic low back pain. Oxycodone recently refilled.    8. Malignant melanoma of scalp and neck         -      Dermatology follow up is scheduled next week.    9. Major depression, recurrent, chronic         -      Stable on Venlafaxine, continue same..    10. Need for RSV vaccination - RSV vaccine today.    Mammogram recently ordered - to be scheduled.       Follow up in about 6 months (around 10/18/2024).

## 2024-04-23 ENCOUNTER — OFFICE VISIT (OUTPATIENT)
Dept: DERMATOLOGY | Facility: CLINIC | Age: 79
End: 2024-04-23
Payer: MEDICARE

## 2024-04-23 VITALS — WEIGHT: 189.13 LBS | BODY MASS INDEX: 29.69 KG/M2 | HEIGHT: 67 IN

## 2024-04-23 DIAGNOSIS — Z08 ENCOUNTER FOR FOLLOW-UP SURVEILLANCE OF SKIN CANCER: ICD-10-CM

## 2024-04-23 DIAGNOSIS — Z85.828 ENCOUNTER FOR FOLLOW-UP SURVEILLANCE OF SKIN CANCER: ICD-10-CM

## 2024-04-23 DIAGNOSIS — D22.9 MULTIPLE BENIGN NEVI: ICD-10-CM

## 2024-04-23 DIAGNOSIS — D48.5 NEOPLASM OF UNCERTAIN BEHAVIOR OF SKIN: Primary | ICD-10-CM

## 2024-04-23 DIAGNOSIS — L57.0 ACTINIC KERATOSES: ICD-10-CM

## 2024-04-23 DIAGNOSIS — Z85.820 HISTORY OF MALIGNANT MELANOMA OF SKIN: ICD-10-CM

## 2024-04-23 DIAGNOSIS — L82.1 SEBORRHEIC KERATOSES: ICD-10-CM

## 2024-04-23 DIAGNOSIS — L30.4 INTERTRIGO: ICD-10-CM

## 2024-04-23 DIAGNOSIS — D18.01 CHERRY ANGIOMA: ICD-10-CM

## 2024-04-23 PROCEDURE — 1159F MED LIST DOCD IN RCRD: CPT | Mod: CPTII,S$GLB,, | Performed by: DERMATOLOGY

## 2024-04-23 PROCEDURE — 17000 DESTRUCT PREMALG LESION: CPT | Mod: XS,S$GLB,, | Performed by: DERMATOLOGY

## 2024-04-23 PROCEDURE — 88305 TISSUE EXAM BY PATHOLOGIST: CPT | Mod: 26,,, | Performed by: DERMATOLOGY

## 2024-04-23 PROCEDURE — 3288F FALL RISK ASSESSMENT DOCD: CPT | Mod: CPTII,S$GLB,, | Performed by: DERMATOLOGY

## 2024-04-23 PROCEDURE — 11102 TANGNTL BX SKIN SINGLE LES: CPT | Mod: S$GLB,,, | Performed by: DERMATOLOGY

## 2024-04-23 PROCEDURE — 1101F PT FALLS ASSESS-DOCD LE1/YR: CPT | Mod: CPTII,S$GLB,, | Performed by: DERMATOLOGY

## 2024-04-23 PROCEDURE — 1126F AMNT PAIN NOTED NONE PRSNT: CPT | Mod: CPTII,S$GLB,, | Performed by: DERMATOLOGY

## 2024-04-23 PROCEDURE — 99214 OFFICE O/P EST MOD 30 MIN: CPT | Mod: 25,S$GLB,, | Performed by: DERMATOLOGY

## 2024-04-23 PROCEDURE — 11103 TANGNTL BX SKIN EA SEP/ADDL: CPT | Mod: S$GLB,,, | Performed by: DERMATOLOGY

## 2024-04-23 PROCEDURE — 1160F RVW MEDS BY RX/DR IN RCRD: CPT | Mod: CPTII,S$GLB,, | Performed by: DERMATOLOGY

## 2024-04-23 PROCEDURE — 88305 TISSUE EXAM BY PATHOLOGIST: CPT | Performed by: DERMATOLOGY

## 2024-04-23 RX ORDER — KETOCONAZOLE 20 MG/G
CREAM TOPICAL
Qty: 60 G | Refills: 3 | Status: SHIPPED | OUTPATIENT
Start: 2024-04-23

## 2024-04-23 NOTE — PROGRESS NOTES
"  Subjective:      Patient ID:  Jovana Fall is a 78 y.o. female who presents for   Chief Complaint   Patient presents with    Skin Check     TBSE    Spot     L eyebrow, R nasal wall, and R sideburn    Rash     Under R breast     LOV 11/27/23 - AK, nevi, lentigo, SK    Patient here today for TBSC  This is a high risk patient here to check for the development of new lesions.    C/o spots on L eyebrow, R nasal wall, R sideburn  Red and raised, general concern   No prior treatment on these areas    C/o rash under R breast x1day  Red and burning sensation under R breast, "shingles" possibly        Derm Hx:  Melanoma- midline posterior crown 5/23/23 [facility]  SCC- right lateral thigh 5/23/23  BCC- left ankle 5/23/23  AK w/ focal transition to SCCIS- right cheek tx w/ imiquimod  Basal cell carcinoma 2014      left upper arm   BCC (basal cell carcinoma of skin) mid upper back  BCC (basal cell carcinoma)  left upper back  superficial BCC mid upper back 1/2016, E&S  BCC R mid infraorbital, 4/2014  Severely DN left mid upper back 4/2014     Current Outpatient Medications:   ·  aspirin 81 mg Tab, Take 81 mg by mouth. 1 Tablet Oral Every day, Disp: , Rfl:   ·  atorvastatin (LIPITOR) 80 MG tablet, Take 1 tablet (80 mg total) by mouth once daily., Disp: 90 tablet, Rfl: 2  ·  blood glucose control, normal Soln, Use as Instructed, Disp: 3 each, Rfl: 3  ·  carvediloL (COREG) 25 MG tablet, Take 1 tablet (25 mg total) by mouth 2 (two) times daily., Disp: 180 tablet, Rfl: 2  ·  cyanocobalamin (VITAMIN B-12) 250 MCG tablet, Take 250 mcg by mouth once daily., Disp: , Rfl:   ·  dulaglutide (TRULICITY) 1.5 mg/0.5 mL pen injector, Inject 1.5 mg into the skin every 7 days., Disp: 12 pen , Rfl: 2  ·  empagliflozin (JARDIANCE) 25 mg tablet, Take 1 tablet (25 mg total) by mouth once daily., Disp: 90 tablet, Rfl: 2  ·  ergocalciferol (ERGOCALCIFEROL) 50,000 unit Cap, TAKE 1 CAPSULE EVERY 7 DAYS., Disp: 12 " capsule, Rfl: 2  ·  fish oil-omega-3 fatty acids 300-1,000 mg capsule, , Disp: , Rfl:   ·  gabapentin (NEURONTIN) 300 MG capsule, Take 1 capsule (300 mg total) by mouth every evening., Disp: 90 capsule, Rfl: 2  ·  nitroGLYCERIN (NITROSTAT) 0.4 MG SL tablet, DISSOLVE 1 TABLET UNDER THE TONGUE EVERY 5 MINUTES FOR 3 DOSES AS NEEDED FOR CHEST PAIN, Disp: 100 tablet, Rfl: 1  ·  omeprazole (PRILOSEC) 20 MG capsule, Take 1 capsule (20 mg total) by mouth every morning., Disp: 90 capsule, Rfl: 2  ·  oxyCODONE-acetaminophen (PERCOCET) 5-325 mg per tablet, Take 1 tablet by mouth 2 (two) times daily as needed for Pain., Disp: 60 tablet, Rfl: 0  ·  RSVPreF3 antigen-AS01E, PF, (AREXVY, PF,) 120 mcg/0.5 mL SusR vaccine, Inject into the muscle., Disp: 0.5 mL, Rfl: 0  ·  telmisartan (MICARDIS) 80 MG Tab, Take 1 tablet (80 mg total) by mouth once daily., Disp: 90 tablet, Rfl: 2  ·  TRUE METRIX GLUCOSE TEST STRIP Strp, TEST BLOOD SUGAR EVERY DAY, Disp: 100 strip, Rfl: 3  ·  TRUEDRAW LANCING DEVICE Misc, USE ONE TIME DAILY, Disp: 1 each, Rfl: 0  ·  TRUEPLUS LANCETS 30 gauge Misc, TEST BLOOD SUGAR ONE TIME DAILY, Disp: 100 each, Rfl: 3  ·  venlafaxine (EFFEXOR) 100 MG Tab, Take 1 tablet (100 mg total) by mouth once daily., Disp: 90 tablet, Rfl: 2  ·  vitamin E 100 UNIT capsule, Take 100 Units by mouth 2 (two) times daily., Disp: , Rfl:   ·  FORTEO 20 mcg/dose (600mcg/2.4mL) PnIj, , Disp: , Rfl:   ·  melatonin (MELATIN ORAL), Take 5 mg by mouth as needed. (Patient not taking: Reported on 4/18/2024), Disp: , Rfl:                 Review of Systems   Constitutional:  Positive for fatigue. Negative for fever and chills.   Skin:  Positive for daily sunscreen use, activity-related sunscreen use and wears hat.   Hematologic/Lymphatic: Bruises/bleeds easily.       Objective:   Physical Exam   Constitutional: She appears well-developed and well-nourished. No distress.   Genitourinary:         Neurological: She is alert and oriented to person,  place, and time. She is not disoriented.   Psychiatric: She has a normal mood and affect.   Skin:   Areas Examined (abnormalities noted in diagram):   Scalp / Hair Palpated and Inspected  Head / Face Inspection Performed  Neck Inspection Performed  Chest / Axilla Inspection Performed  Abdomen Inspection Performed  Genitals / Buttocks / Groin Inspection Performed  Back Inspection Performed  RUE Inspected  LUE Inspection Performed  RLE Inspected  LLE Inspection Performed  Nails and Digits Inspection Performed                         Diagram Legend     Erythematous scaling macule/papule c/w actinic keratosis       Vascular papule c/w angioma      Pigmented verrucoid papule/plaque c/w seborrheic keratosis      Yellow umbilicated papule c/w sebaceous hyperplasia      Irregularly shaped tan macule c/w lentigo     1-2 mm smooth white papules consistent with Milia      Movable subcutaneous cyst with punctum c/w epidermal inclusion cyst      Subcutaneous movable cyst c/w pilar cyst      Firm pink to brown papule c/w dermatofibroma      Pedunculated fleshy papule(s) c/w skin tag(s)      Evenly pigmented macule c/w junctional nevus     Mildly variegated pigmented, slightly irregular-bordered macule c/w mildly atypical nevus      Flesh colored to evenly pigmented papule c/w intradermal nevus       Pink pearly papule/plaque c/w basal cell carcinoma      Erythematous hyperkeratotic cursted plaque c/w SCC      Surgical scar with no sign of skin cancer recurrence      Open and closed comedones      Inflammatory papules and pustules      Verrucoid papule consistent consistent with wart     Erythematous eczematous patches and plaques     Dystrophic onycholytic nail with subungual debris c/w onychomycosis     Umbilicated papule    Erythematous-base heme-crusted tan verrucoid plaque consistent with inflamed seborrheic keratosis     Erythematous Silvery Scaling Plaque c/w Psoriasis     See annotation            Assessment / Plan:       Pathology Orders:       Normal Orders This Visit    Specimen to Pathology, Dermatology     Comments:    Number of Specimens:->2  ------------------------->-------------------------  Spec 1 Procedure:->Biopsy  Spec 1 Clinical Impression:->BCC vs other  Spec 1 Source:->R upper arm  ------------------------->-------------------------  Spec 2 Procedure:->Biopsy  Spec 2 Clinical Impression:->BCC vs other  Spec 2 Source:->left upper back    Questions:    Procedure Type: Dermatology and skin neoplasms    Number of Specimens: 2    ------------------------: -------------------------    Spec 1 Procedure: Biopsy    Spec 1 Clinical Impression: BCC vs other    Spec 1 Source: R upper arm    ------------------------: -------------------------    Spec 2 Procedure: Biopsy    Spec 2 Clinical Impression: BCC vs other    Spec 2 Source: left upper back    Release to patient:           Neoplasm of uncertain behavior of skin  -     Specimen to Pathology, Dermatology  Shave biopsy procedure note:x2    Shave biopsy performed after verbal consent including risk of infection, scar, recurrence, need for additional treatment of site. Area prepped with alcohol, anesthetized with approximately 1.0cc of 1% lidocaine with epinephrine. Lesional tissue shaved with razor blade. Hemostasis achieved with application of aluminum chloride followed by hyfrecation. No complications. Dressing applied. Wound care explained.    Actinic keratoses  Cryosurgery Procedure Note    Verbal consent from the patient is obtained and the patient is aware of the precancerous quality and need for treatment of these lesions. Liquid nitrogen cryosurgery is applied to the 1 actinic keratoses, as detailed in the physical exam, to produce a freeze injury. The patient is aware that blisters may form and is instructed on wound care with gentle cleansing and use of vaseline ointment to keep moist until healed. The patient is supplied a handout on cryosurgery and is instructed to  call if lesions do not completely resolve.    Intertrigo  R inframammary  Not shingles, reassurance   -keto cream 2% AAA BID until resolved  Drying measures    History of malignant melanoma of skin  Encounter for follow-up surveillance of skin cancer  Area of previous melanoma and NMSCs examined. Sites well healed with no signs of recurrence.    Total body skin examination performed today including at least 12 points as noted in physical examination. No lesions suspicious for malignancy noted.    Cherry angioma  This is a benign vascular lesion. Reassurance given. No treatment required.     Multiple benign nevi  Careful dermoscopy evaluation of nevi performed with none identified as needing biopsy today  Monitor for new mole or moles that are becoming bigger, darker, irritated, or developing irregular borders.     Seborrheic keratoses  These are benign inherited growths without a malignant potential. Reassurance given to patient. No treatment is necessary.     Patient instructed in importance in daily broad spectrum sun protection of at least spf 30. Mineral sunscreen ingredients preferred (Zinc +/- Titanium) and can be found OTC.   Recommend Elta MD for daily use on face and neck.  Patient encouraged to wear hat for all outdoor exposure.   Also discussed sun avoidance and use of protective clothing.             Follow up in about 4 months (around 8/23/2024).

## 2024-04-23 NOTE — PATIENT INSTRUCTIONS
Shave Biopsy Wound Care    Your doctor has performed a shave biopsy today.  A band aid and vaseline ointment has been placed over the site.  This should remain in place for 24 hours.  It is recommended that you keep the area dry for the first 24 hours.  After 24 hours, you may remove the band aid and wash the area with warm soap and water and apply Vaseline jelly.  Many patients prefer to use Neosporin or Bacitracin ointment.  This is acceptable; however, know that you can develop an allergy to this medication even if you have used it safely for years.  It is important to keep the area moist.  Letting it dry out and get air slows healing time, and will worsen the scar.  Band aid is optional after first 24 hours.      If you notice increasing redness, tenderness, pain, or yellow drainage at the biopsy site, please notify your doctor.  These are signs of an infection.    If your biopsy site is bleeding, apply firm pressure for 15 minutes straight.  Repeat for another 15 minutes, if it is still bleeding.   If the surgical site continues to bleed, then please contact your doctor.       [facility] - DERMATOLOGY  [address], [address]  [place] [address]  Dept: [phone]  Dept Fax: [phone]      CRYOSURGERY      Your doctor has used a method called cryosurgery to treat your skin condition. Cryosurgery refers to the use of very cold substances to treat a variety of skin conditions such as warts, pre-skin cancers, molluscum contagiosum, sun spots, and several benign growths. The substance we use in cryosurgery is liquid nitrogen and is so cold (-195 degrees Celsius) that is burns when administered.     Following treatment in the office, the skin may immediately burn and become red. You may find the area around the lesion is affected as well. It is sometimes necessary to treat not only the lesion, but a small area of the surrounding normal skin to achieve a good response.     A  blister, and even a blood filled blister, may form after treatment.   This is a normal response. If the blister is painful, it is acceptable to sterilize a needle and with rubbing alcohol and gently pop the blister. It is important that you gently wash the area with soap and warm water as the blister fluid may contain wart virus if a wart was treated. Do no remove the roof of the blister.     The area treated can take anywhere from 1-3 weeks to heal. Healing time depends on the kind skin lesion treated, the location, and how aggressively the lesion was treated. It is recommended that the areas treated are covered with Vaseline or bacitracin ointment and a band-aid. If a band-aid is not practical, just ointment applied several times per day will do. Keeping these areas moist will speed the healing time.    Treatment with liquid nitrogen can leave a scar. In dark skin, it may be a light or dark scar, in light skin it may be a white or pink scar. These will generally fade with time, but may never go away completely.     If you have any concerns after your treatment, please feel free to call the office.         [facility] - DERMATOLOGY  [address], [address]  [place] [address]  Dept: [phone]  Dept Fax: [phone]

## 2024-04-24 ENCOUNTER — TELEPHONE (OUTPATIENT)
Dept: DERMATOLOGY | Facility: CLINIC | Age: 79
End: 2024-04-24
Payer: MEDICARE

## 2024-04-24 NOTE — TELEPHONE ENCOUNTER
----- Message from Stacy Peace sent at 4/24/2024  3:01 PM CDT -----  Regarding: Returning call  Type:  Patient Returning Call    Who Called:  Pt  Who Left Message for Patient:  Derm Office  Does the patient know what this is regarding?:  No  Best Call Back Number:  [phone]    Additional Information:  Pt said she got another call from the office after she had spoke to Antonia to schedule, she said she didn't know why she was getting another call please advise

## 2024-04-29 LAB
FINAL PATHOLOGIC DIAGNOSIS: NORMAL
GROSS: NORMAL
Lab: NORMAL
MICROSCOPIC EXAM: NORMAL

## 2024-04-30 ENCOUNTER — TELEPHONE (OUTPATIENT)
Dept: DERMATOLOGY | Facility: CLINIC | Age: 79
End: 2024-04-30
Payer: MEDICARE

## 2024-04-30 NOTE — TELEPHONE ENCOUNTER
----- Message from Sandra Tobias sent at 4/30/2024  1:21 PM CDT -----  Type:  Patient Returning Call    Who Called:pt     Who Left Message for Patient:unk    Does the patient know what this is regarding?:yes results     Would the patient rather a call back or a response via [facility]? Callback     Best Call Back Number:[phone] (home)       Additional Information: please advise thank you

## 2024-05-27 ENCOUNTER — TELEPHONE (OUTPATIENT)
Dept: DERMATOLOGY | Facility: CLINIC | Age: 79
End: 2024-05-27

## 2024-05-27 DIAGNOSIS — M48.061 DEGENERATIVE LUMBAR SPINAL STENOSIS: ICD-10-CM

## 2024-05-27 RX ORDER — OXYCODONE AND ACETAMINOPHEN 5; 325 MG/1; MG/1
1 TABLET ORAL 2 TIMES DAILY PRN
Qty: 60 TABLET | Refills: 0 | Status: SHIPPED | OUTPATIENT
Start: 2024-05-27

## 2024-05-27 NOTE — TELEPHONE ENCOUNTER
----- Message from Niki Franco sent at 5/27/2024  9:02 AM CDT -----  Regarding: Sooner Appointment Reschedule Request  Contact: patient at [phone]  Type:  Sooner Appointment Reschedule Request    Name of Caller:  patient at [phone]    Additional Information:  patient had to cancel today's appointment and needs to reschedule. Please call and advise. Thank you

## 2024-05-27 NOTE — TELEPHONE ENCOUNTER
Care Due:                  Date            Visit Type   Department     Provider  --------------------------------------------------------------------------------                                EP -                              PRIMARY      [facility] PRIMARY   Marcy Fraser  Last Visit: 04-      CARE ([facility])   CARE           [place]                              EP -                              PRIMARY      [facility] PRIMARY   Marcy Fraser  Next Visit: 10-      CARE ([facility])   CARE           [place]                                                            Last  Test          Frequency    Reason                     Performed    Due Date  --------------------------------------------------------------------------------    Vitamin D...  12 months..  ergocalciferol...........  07- 07-    [employer] Embedded Care Due Messages. Reference number: [id number].   5/27/2024 10:55:09 AM CDT

## 2024-06-25 ENCOUNTER — OFFICE VISIT (OUTPATIENT)
Dept: DERMATOLOGY | Facility: CLINIC | Age: 79
End: 2024-06-25
Payer: MEDICARE

## 2024-06-25 VITALS — HEIGHT: 67 IN | WEIGHT: 189.13 LBS | BODY MASS INDEX: 29.69 KG/M2

## 2024-06-25 DIAGNOSIS — Z85.820 HISTORY OF MALIGNANT MELANOMA OF SKIN: ICD-10-CM

## 2024-06-25 DIAGNOSIS — C44.519 BASAL CELL CARCINOMA (BCC) OF BACK: ICD-10-CM

## 2024-06-25 DIAGNOSIS — C44.612 BASAL CELL CARCINOMA (BCC) OF RIGHT UPPER ARM: Primary | ICD-10-CM

## 2024-06-25 PROCEDURE — 1159F MED LIST DOCD IN RCRD: CPT | Mod: CPTII,S$GLB,, | Performed by: DERMATOLOGY

## 2024-06-25 PROCEDURE — 1126F AMNT PAIN NOTED NONE PRSNT: CPT | Mod: CPTII,S$GLB,, | Performed by: DERMATOLOGY

## 2024-06-25 PROCEDURE — 1160F RVW MEDS BY RX/DR IN RCRD: CPT | Mod: CPTII,S$GLB,, | Performed by: DERMATOLOGY

## 2024-06-25 PROCEDURE — 99213 OFFICE O/P EST LOW 20 MIN: CPT | Mod: 25,S$GLB,, | Performed by: DERMATOLOGY

## 2024-06-25 PROCEDURE — 17262 DSTRJ MAL LES T/A/L 1.1-2.0: CPT | Mod: S$GLB,,, | Performed by: DERMATOLOGY

## 2024-06-25 PROCEDURE — 1101F PT FALLS ASSESS-DOCD LE1/YR: CPT | Mod: CPTII,S$GLB,, | Performed by: DERMATOLOGY

## 2024-06-25 PROCEDURE — 3288F FALL RISK ASSESSMENT DOCD: CPT | Mod: CPTII,S$GLB,, | Performed by: DERMATOLOGY

## 2024-06-25 NOTE — PATIENT INSTRUCTIONS
ED&C Wound Care    Your doctor has performed an electrodesiccation and curettage today.  A bandage and vaseline ointment has been placed over the site.  This should remain in place for 24 hours.  It is recommended that you keep the area dry for the first 24 hours.  After 24 hours, you may remove the band aid and wash the area with warm soap and water and apply Vaseline jelly or aquaphore.  Many patients prefer to use Neosporin or Bacitracin ointment.  This is acceptable; however know that you can develop an allergy to this medication even if you have used it safely for years.  It is important to keep the area moist.  Letting it dry out and get air slows healing time, and will worsen the scar.        If you notice increasing redness, tenderness, pain, or yellow drainage at the biopsy or surgical site, please notify your doctor.  These are signs of an infection.    If your biopsy/surgical site is bleeding, apply firm pressure for 15 minutes straight.  Repeat for another 15 minutes, if it is still bleeding.   If the surgical site continues to bleed, then please contact your doctor.     [facility]   [facility] - DERMATOLOGY   [address] [address]   Dept: [phone]

## 2024-06-25 NOTE — PROGRESS NOTES
Subjective:      Patient ID:  Jovana Fall is a 78 y.o. female who presents for   Chief Complaint   Patient presents with    Follow-up     ED&C     LOV: 4/23/24 NUB, AK, history of MM, angioma, nevi, SK, intertrigo    1. Skin,  Right upper arm,  shave biopsy:    - BASAL CELL CARCINOMA, SUPERFICIAL-MULTIFOCAL TYPE    - The peripheral tissue edge is narrowly uninvolved by tumor.  The deep tissue edge is uninvolved by tumor.     2. Skin,  Left upper back,  shave biopsy:    - BASAL CELL CARCINOMA, SUPERFICIAL-MULTIFOCAL TYPE    - The deep and peripheral tissue edges are uninvolved by tumor.     Patient here for ED& C of right upper arm    Derm Hx:  BCC-Right upper arm 4/2024 Dr. Joiner ED& C 6/2024  BCC- Left upper back 4/2024 Dr. Joiner monitoring   Melanoma- midline posterior crown 5/23/23 staged excision [facility]  SCC- right lateral thigh 5/23/23  BCC- left ankle 5/23/23  AK w/ focal transition to SCCIS- right cheek tx w/ imiquimod  Basal cell carcinoma 2014      left upper arm   BCC (basal cell carcinoma of skin) mid upper back  BCC (basal cell carcinoma)  left upper back  superficial BCC mid upper back 1/2016, E&S  BCC R mid infraorbital, 4/2014  Severely DN left mid upper back 4/2014      Current Outpatient Medications:   ·  aspirin 81 mg Tab, Take 81 mg by mouth. 1 Tablet Oral Every day, Disp: , Rfl:   ·  atorvastatin (LIPITOR) 80 MG tablet, Take 1 tablet (80 mg total) by mouth once daily., Disp: 90 tablet, Rfl: 2  ·  blood glucose control, normal Soln, Use as Instructed, Disp: 3 each, Rfl: 3  ·  carvediloL (COREG) 25 MG tablet, Take 1 tablet (25 mg total) by mouth 2 (two) times daily., Disp: 180 tablet, Rfl: 2  ·  cyanocobalamin (VITAMIN B-12) 250 MCG tablet, Take 250 mcg by mouth once daily., Disp: , Rfl:   ·  dulaglutide (TRULICITY) 1.5 mg/0.5 mL pen injector, Inject 1.5 mg into the skin every 7 days., Disp: 12 pen , Rfl: 2  ·  empagliflozin (JARDIANCE) 25 mg tablet, Take 1 tablet (25  mg total) by mouth once daily., Disp: 90 tablet, Rfl: 2  ·  ergocalciferol (ERGOCALCIFEROL) 50,000 unit Cap, TAKE 1 CAPSULE EVERY 7 DAYS., Disp: 12 capsule, Rfl: 2  ·  fish oil-omega-3 fatty acids 300-1,000 mg capsule, , Disp: , Rfl:   ·  FORTEO 20 mcg/dose (600mcg/2.4mL) PnIj, , Disp: , Rfl:   ·  gabapentin (NEURONTIN) 300 MG capsule, Take 1 capsule (300 mg total) by mouth every evening., Disp: 90 capsule, Rfl: 2  ·  ketoconazole (NIZORAL) 2 % cream, AAA bid, Disp: 60 g, Rfl: 3  ·  melatonin (MELATIN ORAL), Take 5 mg by mouth as needed., Disp: , Rfl:   ·  nitroGLYCERIN (NITROSTAT) 0.4 MG SL tablet, DISSOLVE 1 TABLET UNDER THE TONGUE EVERY 5 MINUTES FOR 3 DOSES AS NEEDED FOR CHEST PAIN, Disp: 100 tablet, Rfl: 1  ·  omeprazole (PRILOSEC) 20 MG capsule, Take 1 capsule (20 mg total) by mouth every morning., Disp: 90 capsule, Rfl: 2  ·  oxyCODONE-acetaminophen (PERCOCET) 5-325 mg per tablet, Take 1 tablet by mouth 2 (two) times daily as needed for Pain., Disp: 60 tablet, Rfl: 0  ·  RSVPreF3 antigen-AS01E, PF, (AREXVY, PF,) 120 mcg/0.5 mL SusR vaccine, Inject into the muscle., Disp: 0.5 mL, Rfl: 0  ·  telmisartan (MICARDIS) 80 MG Tab, Take 1 tablet (80 mg total) by mouth once daily., Disp: 90 tablet, Rfl: 2  ·  TRUE METRIX GLUCOSE TEST STRIP Strp, TEST BLOOD SUGAR EVERY DAY, Disp: 100 strip, Rfl: 3  ·  TRUEDRAW LANCING DEVICE Misc, USE ONE TIME DAILY, Disp: 1 each, Rfl: 0  ·  TRUEPLUS LANCETS 30 gauge Misc, TEST BLOOD SUGAR ONE TIME DAILY, Disp: 100 each, Rfl: 3  ·  venlafaxine (EFFEXOR) 100 MG Tab, Take 1 tablet (100 mg total) by mouth once daily., Disp: 90 tablet, Rfl: 2  ·  vitamin E 100 UNIT capsule, Take 100 Units by mouth 2 (two) times daily., Disp: , Rfl:           Review of Systems   Constitutional:  Positive for fatigue. Negative for fever and chills.   Skin:  Positive for daily sunscreen use, activity-related sunscreen use and wears hat. Negative for itching, rash and dry skin.   Hematologic/Lymphatic:  Bruises/bleeds easily.       Objective:   Physical Exam   Skin:   Areas Examined (abnormalities noted in diagram):   Back Inspection Performed  RUE Inspected            Diagram Legend     Erythematous scaling macule/papule c/w actinic keratosis       Vascular papule c/w angioma      Pigmented verrucoid papule/plaque c/w seborrheic keratosis      Yellow umbilicated papule c/w sebaceous hyperplasia      Irregularly shaped tan macule c/w lentigo     1-2 mm smooth white papules consistent with Milia      Movable subcutaneous cyst with punctum c/w epidermal inclusion cyst      Subcutaneous movable cyst c/w pilar cyst      Firm pink to brown papule c/w dermatofibroma      Pedunculated fleshy papule(s) c/w skin tag(s)      Evenly pigmented macule c/w junctional nevus     Mildly variegated pigmented, slightly irregular-bordered macule c/w mildly atypical nevus      Flesh colored to evenly pigmented papule c/w intradermal nevus       Pink pearly papule/plaque c/w basal cell carcinoma      Erythematous hyperkeratotic cursted plaque c/w SCC      Surgical scar with no sign of skin cancer recurrence      Open and closed comedones      Inflammatory papules and pustules      Verrucoid papule consistent consistent with wart     Erythematous eczematous patches and plaques     Dystrophic onycholytic nail with subungual debris c/w onychomycosis     Umbilicated papule    Erythematous-base heme-crusted tan verrucoid plaque consistent with inflamed seborrheic keratosis     Erythematous Silvery Scaling Plaque c/w Psoriasis     See annotation      Assessment / Plan:        Basal cell carcinoma (BCC) of right upper arm  Electrodesiccation and Curettage Procedure note:    Verbal consent obtained.Time out period with surgeon, assistant and patient in surgical suite was taken. Lesional tissue marked and prepped with alcohol. Lesion anesthetized with 1% lidocaine with epinephrine. Curettage and Desiccation x 3 cycles to base. Aluminum chloride  for hemostasis. Lesion size after primary curettage: 1.4 cm    Area bandaged and wound care explained.    Basal cell carcinoma (BCC) of back  Scar examined, NER    H/o MM scalp 05/2023  Slow [place] Dr Kumar[facility]  No cervical LAD, ROS reassuring  Plan TBSE 09/2024    Strict sun protection  Patient instructed in importance in daily broad spectrum sun protection of at least spf 30. Mineral sunscreen ingredients preferred (Zinc +/- Titanium) and can be found OTC.   Recommend Elta MD for daily use on face and neck.  Patient encouraged to wear hat for all outdoor exposure.   Also discussed sun avoidance and use of protective clothing.           Follow up in about 3 months (around 9/25/2024).

## 2024-06-27 ENCOUNTER — TELEPHONE (OUTPATIENT)
Dept: DERMATOLOGY | Facility: CLINIC | Age: 79
End: 2024-06-27
Payer: MEDICARE

## 2024-06-27 NOTE — TELEPHONE ENCOUNTER
Called and spoke with patient regarding her wound. Let patient know what she was describing sounded normal healing. But advised patient if she feels like it is not healing well to send a picture through the portal and we will evaluate.     ----- Message from Seth García sent at 6/27/2024 10:15 AM CDT -----  Type: Needs Medical Advice  Who Called:  pt  Best Call Back Number: [phone]    Additional Information: Pt is calling the office asking to speak with the nurse regarding most recent appt pt is having clear drainage, area is puffy and itchy.Please call back and advise.

## 2024-07-12 DIAGNOSIS — M48.061 DEGENERATIVE LUMBAR SPINAL STENOSIS: ICD-10-CM

## 2024-07-12 NOTE — TELEPHONE ENCOUNTER
Care Due:                  Date            Visit Type   Department     Provider  --------------------------------------------------------------------------------                                EP -                              PRIMARY      [facility]   Marcy Camaradonna  Last Visit: 04-      CARE ([facility])   CARE           [place]                              EP -                              PRIMARY      [facility]   Marcy Bria  Next Visit: 10-      CARE ([facility])   CARE           [place]                                                            Last  Test          Frequency    Reason                     Performed    Due Date  --------------------------------------------------------------------------------    CMP.........  12 months..  atorvastatin.............  09- 09-    Lipid Panel.  12 months..  atorvastatin.............  09- 09-    [employer] Embedded Care Due Messages. Reference number: [id number].   7/12/2024 1:09:09 PM CDT

## 2024-07-15 RX ORDER — OXYCODONE AND ACETAMINOPHEN 5; 325 MG/1; MG/1
1 TABLET ORAL 2 TIMES DAILY PRN
Qty: 60 TABLET | Refills: 0 | Status: SHIPPED | OUTPATIENT
Start: 2024-07-15

## 2024-08-02 DIAGNOSIS — M48.061 DEGENERATIVE LUMBAR SPINAL STENOSIS: ICD-10-CM

## 2024-08-02 DIAGNOSIS — I11.9 HYPERTENSIVE HEART DISEASE WITHOUT HEART FAILURE: ICD-10-CM

## 2024-08-02 DIAGNOSIS — E11.21 TYPE 2 DIABETES MELLITUS WITH DIABETIC NEPHROPATHY, WITHOUT LONG-TERM CURRENT USE OF INSULIN: ICD-10-CM

## 2024-08-02 DIAGNOSIS — M81.0 OSTEOPOROSIS WITHOUT CURRENT PATHOLOGICAL FRACTURE, UNSPECIFIED OSTEOPOROSIS TYPE: ICD-10-CM

## 2024-08-02 RX ORDER — GLUCOSAM/CHON-MSM1/C/MANG/BOSW 500-416.6
TABLET ORAL
Qty: 100 EACH | Refills: 3 | Status: SHIPPED | OUTPATIENT
Start: 2024-08-02

## 2024-08-02 RX ORDER — ERGOCALCIFEROL 1.25 MG/1
CAPSULE ORAL
Qty: 12 CAPSULE | Refills: 3 | Status: SHIPPED | OUTPATIENT
Start: 2024-08-02

## 2024-08-02 RX ORDER — GABAPENTIN 300 MG/1
300 CAPSULE ORAL NIGHTLY
Qty: 90 CAPSULE | Refills: 3 | Status: SHIPPED | OUTPATIENT
Start: 2024-08-02

## 2024-08-02 RX ORDER — CALCIUM CITRATE/VITAMIN D3 200MG-6.25
TABLET ORAL
Qty: 100 STRIP | Refills: 3 | Status: SHIPPED | OUTPATIENT
Start: 2024-08-02

## 2024-08-02 RX ORDER — TELMISARTAN 80 MG/1
80 TABLET ORAL
Qty: 90 TABLET | Refills: 2 | Status: SHIPPED | OUTPATIENT
Start: 2024-08-02

## 2024-08-02 NOTE — TELEPHONE ENCOUNTER
Care Due:                  Date            Visit Type   Department     Provider  --------------------------------------------------------------------------------                                EP -                              PRIMARY      [facility]   Marcy Villdonna  Last Visit: 04-      CARE ([facility])   CARE           Degranjoshua                              EP -                              PRIMARY      [facility] [place]  Next Visit: 10-      CARE ([facility])   CARE           Luís                                                            Last  Test          Frequency    Reason                     Performed    Due Date  --------------------------------------------------------------------------------    HBA1C.......  6 months...  dulaglutide,               04-   10-                             empagliflozin............    Vitamin D...  12 months..  ergocalciferol...........  Not Found    Overdue    [employer] Embedded Care Due Messages. Reference number: [id number].   8/02/2024 8:53:49 AM CDT

## 2024-08-20 DIAGNOSIS — M48.061 DEGENERATIVE LUMBAR SPINAL STENOSIS: ICD-10-CM

## 2024-08-21 RX ORDER — OXYCODONE AND ACETAMINOPHEN 5; 325 MG/1; MG/1
1 TABLET ORAL 2 TIMES DAILY PRN
Qty: 60 TABLET | Refills: 0 | Status: SHIPPED | OUTPATIENT
Start: 2024-08-21

## 2024-08-21 NOTE — TELEPHONE ENCOUNTER
No care due was identified.  [facility] Embedded Care Due Messages. Reference number: [id number].   8/20/2024 9:18:22 PM CDT

## 2024-09-03 ENCOUNTER — PATIENT MESSAGE (OUTPATIENT)
Dept: DERMATOLOGY | Facility: CLINIC | Age: 79
End: 2024-09-03

## 2024-09-23 DIAGNOSIS — M48.061 DEGENERATIVE LUMBAR SPINAL STENOSIS: ICD-10-CM

## 2024-09-23 RX ORDER — OXYCODONE AND ACETAMINOPHEN 5; 325 MG/1; MG/1
1 TABLET ORAL 2 TIMES DAILY PRN
Qty: 60 TABLET | Refills: 0 | Status: SHIPPED | OUTPATIENT
Start: 2024-09-23

## 2024-09-23 NOTE — TELEPHONE ENCOUNTER
Care Due:                  Date            Visit Type   Department     Provider  --------------------------------------------------------------------------------                                EP -                              PRIMARY      [facility]   Marcy Camaradonna  Last Visit: 04-      CARE ([facility])   CARE           [place]                              EP -                              PRIMARY      [facility]   Marcy Bria  Next Visit: 10-      CARE ([facility])   CARE           Degranjoshua                                                            Last  Test          Frequency    Reason                     Performed    Due Date  --------------------------------------------------------------------------------    CMP.........  12 months..  atorvastatin.............  09- 09-    Lipid Panel.  12 months..  atorvastatin.............  09- 09-    [employer] Embedded Care Due Messages. Reference number: [id number].   9/23/2024 6:10:39 AM CDT

## 2024-09-24 ENCOUNTER — TELEPHONE (OUTPATIENT)
Dept: PRIMARY CARE CLINIC | Facility: CLINIC | Age: 79
End: 2024-09-24
Payer: MEDICARE

## 2024-09-24 NOTE — TELEPHONE ENCOUNTER
----- Message from Maddi Bran sent at 9/24/2024  2:45 PM CDT -----  Contact: Self [phone]  Would like to receive medical advice.    Symptoms (please be specific):  left hip and groin pain     How long has the patient had these symptoms:  over 2 weeks      Pharmacy name/number (copy/paste from chart):      [facility] - [place], [place] - [address]  [address]  [place] [address]  Phone: [phone] Fax: [phone]     Would they like a call back or a response via [facility]:  call back    Additional information:  Calling to request a steroid called into pharm for left and groin pain.

## 2024-10-01 ENCOUNTER — TELEPHONE (OUTPATIENT)
Dept: PRIMARY CARE CLINIC | Facility: CLINIC | Age: 79
End: 2024-10-01
Payer: MEDICARE

## 2024-10-01 NOTE — TELEPHONE ENCOUNTER
----- Message from Kaelyn sent at 10/1/2024  8:43 AM CDT -----  Contact: [phone] Patient  1MEDICALADVICE     Patient is calling for Medical Advice regarding: Pt states she has not received a call back from the original message sent over on 9/24/24. Pt needs clarification on whether or not another medication can prescribed for hip pain she's experiencing. Pt states she is also experiencing yeast infection symptoms - supposedly jardiance meds can cause this - otc meds or schedule an appt.      How long has patient had these symptoms: 1 month    Pharmacy name and phone#:   [facility], [place] - [address] [address]  Phone: [phone] Fax: [phone]    Patient wants a call back or thru [facility]: call back    Comments:    Please advise patient replies from provider may take up to 48 hours.

## 2024-10-02 ENCOUNTER — TELEPHONE (OUTPATIENT)
Dept: DERMATOLOGY | Facility: CLINIC | Age: 79
End: 2024-10-02

## 2024-10-02 ENCOUNTER — TELEPHONE (OUTPATIENT)
Dept: PRIMARY CARE CLINIC | Facility: CLINIC | Age: 79
End: 2024-10-02
Payer: MEDICARE

## 2024-10-02 NOTE — TELEPHONE ENCOUNTER
----- Message from Kaelyn sent at 10/1/2024  8:43 AM CDT -----  Contact: [phone] Patient  1MEDICALADVICE     Patient is calling for Medical Advice regarding: Pt states she has not received a call back from the original message sent over on 9/24/24. Pt needs clarification on whether or not another medication can prescribed for hip pain she's experiencing. Pt states she is also experiencing yeast infection symptoms - supposedly jardiance meds can cause this - otc meds or schedule an appt.      How long has patient had these symptoms: 1 month    Pharmacy name and phone#:   [facility], [place] - [address] [address]  Phone: [phone] Fax: [phone]    Patient wants a call back or thru [facility]: call back    Comments:    Please advise patient replies from provider may take up to 48 hours.

## 2024-10-02 NOTE — TELEPHONE ENCOUNTER
Patient called back on 10/1/24 stating that message from 9/24/24 was not addressed.  Please see below from triage call on 9/30/24 where patients had in depth conversation with me, and message was sent to Dr. Staley.

## 2024-10-02 NOTE — TELEPHONE ENCOUNTER
----- Message from MAREK Ibrahim sent at 10/2/2024  7:51 AM CDT -----  Regarding: FW: hip pain and yeast infection    ----- Message -----  From: Alexandria Brooks RN  Sent: 10/1/2024   9:27 AM CDT  To: Marcy Staley MD  Subject: hip pain and yeast infection                     Call received from patient via the nurse triage line.  Patient complains of increased left hip pain down to her knee and a yeast infection.  Reports that she spoke with her digital medicine team and they advised her that jardiance can cause yeast infections since she is not taking in much water, drinking mostly coffee and other beverages.  Patient admits to eating a lot of sweets and not drinking as much water as she should.  She understands that her percocet prescription cannot be increased.  She will try monistat OTC to see if that helps with her yeast infection.  She declines offer of visit today at 3:30 pm.  I moved her 6 month f/u from 10/22/24 to 10/7/24.

## 2024-10-02 NOTE — TELEPHONE ENCOUNTER
Spoke with Pt would like A Referral for Ortho for Left Hip Pain   Pt is Schedule For 10/7 with Dr. Staley

## 2024-10-02 NOTE — TELEPHONE ENCOUNTER
----- Message from Kiki sent at 10/2/2024 11:06 AM CDT -----  Type:  Sooner Appointment Request    Caller is requesting a sooner appointment.  Caller declined first available appointment listed below.  Caller will not accept being placed on the waitlist and is requesting a message be sent to doctor.    Name of Caller:  pt  When is the first available appointment?  Today but she can not make it today--said she need to be seen before the next appt I could schedule in March--please call and advise  Symptoms:  #4 month f/u  Would the patient rather a call back or a response via [facility]? call  Best Call Back Number:  [phone] (home)     Additional Information:  thank you

## 2024-10-07 ENCOUNTER — TELEPHONE (OUTPATIENT)
Dept: PRIMARY CARE CLINIC | Facility: CLINIC | Age: 79
End: 2024-10-07
Payer: MEDICARE

## 2024-10-07 ENCOUNTER — PATIENT MESSAGE (OUTPATIENT)
Dept: ADMINISTRATIVE | Facility: OTHER | Age: 79
End: 2024-10-07
Payer: MEDICARE

## 2024-10-07 NOTE — TELEPHONE ENCOUNTER
----- Message from Mihai sent at 10/7/2024 12:07 PM CDT -----  Regarding: Reschedule  Contact: Pt [phone]  1MEDICALADVICE     Patient is calling for Medical Advice regarding: Patient needing to reschedule due to hip pain. She said she cannot walk today. Please call back to discuss rescheduling options.    How long has patient had these symptoms:    Pharmacy name and phone#:    Patient wants a call back or thru [facility]: Call    Comments:    Please advise patient replies from provider may take up to 48 hours.

## 2024-10-08 ENCOUNTER — OFFICE VISIT (OUTPATIENT)
Dept: PRIMARY CARE CLINIC | Facility: CLINIC | Age: 79
End: 2024-10-08
Payer: MEDICARE

## 2024-10-08 VITALS
HEART RATE: 76 BPM | OXYGEN SATURATION: 96 % | HEIGHT: 67 IN | WEIGHT: 186.75 LBS | RESPIRATION RATE: 15 BRPM | BODY MASS INDEX: 29.31 KG/M2 | DIASTOLIC BLOOD PRESSURE: 58 MMHG | SYSTOLIC BLOOD PRESSURE: 102 MMHG

## 2024-10-08 DIAGNOSIS — M81.0 OSTEOPOROSIS WITHOUT CURRENT PATHOLOGICAL FRACTURE, UNSPECIFIED OSTEOPOROSIS TYPE: ICD-10-CM

## 2024-10-08 DIAGNOSIS — Z23 NEED FOR COVID-19 VACCINE: ICD-10-CM

## 2024-10-08 DIAGNOSIS — Z12.31 ENCOUNTER FOR SCREENING MAMMOGRAM FOR BREAST CANCER: ICD-10-CM

## 2024-10-08 DIAGNOSIS — E11.69 HYPERLIPIDEMIA ASSOCIATED WITH TYPE 2 DIABETES MELLITUS: ICD-10-CM

## 2024-10-08 DIAGNOSIS — I11.9 HYPERTENSIVE HEART DISEASE WITHOUT HEART FAILURE: ICD-10-CM

## 2024-10-08 DIAGNOSIS — Z23 INFLUENZA VACCINE NEEDED: ICD-10-CM

## 2024-10-08 DIAGNOSIS — M47.16 SPONDYLOSIS OF LUMBAR SPINE WITH MYELOPATHY: ICD-10-CM

## 2024-10-08 DIAGNOSIS — I25.10 CORONARY ARTERY DISEASE INVOLVING NATIVE CORONARY ARTERY OF NATIVE HEART WITHOUT ANGINA PECTORIS: ICD-10-CM

## 2024-10-08 DIAGNOSIS — E78.5 HYPERLIPIDEMIA ASSOCIATED WITH TYPE 2 DIABETES MELLITUS: ICD-10-CM

## 2024-10-08 DIAGNOSIS — G89.29 CHRONIC LEFT HIP PAIN: ICD-10-CM

## 2024-10-08 DIAGNOSIS — F33.9 MAJOR DEPRESSION, RECURRENT, CHRONIC: ICD-10-CM

## 2024-10-08 DIAGNOSIS — E11.40 TYPE 2 DIABETES MELLITUS WITH DIABETIC NEUROPATHY, WITHOUT LONG-TERM CURRENT USE OF INSULIN: Primary | ICD-10-CM

## 2024-10-08 DIAGNOSIS — M25.552 CHRONIC LEFT HIP PAIN: ICD-10-CM

## 2024-10-08 LAB
ALBUMIN/CREAT UR: 13.6 UG/MG (ref 0–30)
CREAT UR-MCNC: 125 MG/DL (ref 15–325)
MICROALBUMIN UR DL<=1MG/L-MCNC: 17 UG/ML

## 2024-10-08 PROCEDURE — 1160F RVW MEDS BY RX/DR IN RCRD: CPT | Mod: CPTII,S$GLB,, | Performed by: INTERNAL MEDICINE

## 2024-10-08 PROCEDURE — 1101F PT FALLS ASSESS-DOCD LE1/YR: CPT | Mod: CPTII,S$GLB,, | Performed by: INTERNAL MEDICINE

## 2024-10-08 PROCEDURE — 3288F FALL RISK ASSESSMENT DOCD: CPT | Mod: CPTII,S$GLB,, | Performed by: INTERNAL MEDICINE

## 2024-10-08 PROCEDURE — 82570 ASSAY OF URINE CREATININE: CPT | Performed by: INTERNAL MEDICINE

## 2024-10-08 PROCEDURE — 3078F DIAST BP <80 MM HG: CPT | Mod: CPTII,S$GLB,, | Performed by: INTERNAL MEDICINE

## 2024-10-08 PROCEDURE — 1159F MED LIST DOCD IN RCRD: CPT | Mod: CPTII,S$GLB,, | Performed by: INTERNAL MEDICINE

## 2024-10-08 PROCEDURE — 99215 OFFICE O/P EST HI 40 MIN: CPT | Mod: S$GLB,,, | Performed by: INTERNAL MEDICINE

## 2024-10-08 PROCEDURE — 3074F SYST BP LT 130 MM HG: CPT | Mod: CPTII,S$GLB,, | Performed by: INTERNAL MEDICINE

## 2024-10-08 PROCEDURE — 99999 PR PBB SHADOW E&M-EST. PATIENT-LVL V: CPT | Mod: PBBFAC,,, | Performed by: INTERNAL MEDICINE

## 2024-10-08 PROCEDURE — 1126F AMNT PAIN NOTED NONE PRSNT: CPT | Mod: CPTII,S$GLB,, | Performed by: INTERNAL MEDICINE

## 2024-10-08 RX ORDER — ATORVASTATIN CALCIUM 80 MG/1
80 TABLET, FILM COATED ORAL DAILY
Qty: 90 TABLET | Refills: 2 | Status: SHIPPED | OUTPATIENT
Start: 2024-10-08

## 2024-10-08 RX ORDER — GABAPENTIN 300 MG/1
300 CAPSULE ORAL 2 TIMES DAILY
Qty: 180 CAPSULE | Refills: 1 | Status: SHIPPED | OUTPATIENT
Start: 2024-10-08

## 2024-10-08 RX ORDER — CARVEDILOL 25 MG/1
25 TABLET ORAL 2 TIMES DAILY
Qty: 180 TABLET | Refills: 2 | Status: SHIPPED | OUTPATIENT
Start: 2024-10-08

## 2024-10-08 RX ORDER — VENLAFAXINE 100 MG/1
100 TABLET ORAL DAILY
Qty: 90 TABLET | Refills: 2 | Status: SHIPPED | OUTPATIENT
Start: 2024-10-08

## 2024-10-09 NOTE — PROGRESS NOTES
Subjective     Patient ID: Jovana Fall is a 79 y.o. female.    Chief Complaint: Follow-up    Last seen 6 months ago. Returns for scheduled f/u chronic medical conditions. Taking daily meds as prescribed. Home BP and glucose reviewed in Digital Medicine files.  C/O severe increase in chronic low back pain radiating down left leg with weakness, and severe pain in the left hip joint. No recent fall/trauma. Having to take her Percocet more often with little relief. No bowel or bladder incontinence, no saddle anesthesia.    PMH: , misc x1.   Diabetes Type 2. HbA1c 6.7% .  Hypertensive heart disease without heart failure.   Hyperlipidemia, LDL 82 Sep. '23.  Nephrolithiasis.   Urinary incontinence.  GERD. EGD 9/15, 3/17,  - mild chronic non-active gastritis, no dysplasia, H pylori negative.  Depression/Anxiety.  Non-obstructive Coronary artery disease, angiogram , Cardiology .  Osteoporosis.  Mild Vitamin D deficiency, up to 68.  Lumbar Spinal Stenosis. Cervical Spine DJD.   Skin Cancer, Basal Cell, Squamous Cell and Melanoma.     Mammogram normal . Pelvic exam . BMD . Eye exam . Exercise Stress Echo negative 3/18.  Colonoscopy  - internal hemorrhoid, diverticulosis, two tubular adenomas - rep 5 yrs. Vaccines reviewed.    PSH: reviewed.     Social: Nonsmoker. Occas. alcohol. . 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    FMH: Colon and ovarian cancer in cousin. Colon cancer in brother. Mother - heart disease. MGM with cervical cancer. DM in aunts.     Allergies: multiple, see Med Card.     Medications: list reviewed and reconciled.               Review of Systems   Constitutional:  Negative for activity change, appetite change, fatigue, fever and unexpected weight change.   HENT:  Negative for nasal congestion, ear pain, hearing loss, rhinorrhea, sneezing, sore throat, trouble swallowing and voice change.    Eyes:  Negative for pain and  "visual disturbance.   Respiratory:  Negative for cough, chest tightness, shortness of breath and wheezing.    Cardiovascular:  Negative for chest pain, palpitations and leg swelling.        Transient episodes of chest tightness in recent months, has not reached out to her Cardiologist.   Gastrointestinal:  Negative for abdominal pain, blood in stool, constipation, diarrhea, nausea and vomiting.   Genitourinary:  Positive for frequency. Negative for bladder incontinence, dysuria, hematuria, pelvic pain and vaginal bleeding.   Musculoskeletal:  Positive for arthralgias, back pain and leg pain. Negative for gait problem, joint swelling, myalgias, neck pain and joint deformity.   Integumentary:  Negative for color change and rash.   Neurological:  Negative for dizziness, tremors, seizures, syncope, facial asymmetry, speech difficulty, headaches and memory loss.        Paresthesias in feet.   Hematological:  Negative for adenopathy. Does not bruise/bleed easily.   Psychiatric/Behavioral:  Negative for dysphoric mood and sleep disturbance. The patient is not nervous/anxious.         Mood stable on current dose of Effexor.          Objective   Vitals:    10/08/24 1538   BP: (!) 102/58   BP Location: Right arm   Patient Position: Sitting   Pulse: 76   Resp: 15   SpO2: 96%   Weight: 84.7 kg (186 lb 11.7 oz)   Height: 5' 7" (1.702 m)   BMI=29  Physical Exam  Vitals reviewed.   Constitutional:       Appearance: She is well-developed. She is not diaphoretic.      Comments: Apparent discomfort moving about the exam room.   HENT:      Head: Normocephalic and atraumatic.      Right Ear: Tympanic membrane and ear canal normal.      Left Ear: Tympanic membrane and ear canal normal.      Nose: Nose normal. No congestion.      Mouth/Throat:      Mouth: Mucous membranes are moist.      Pharynx: Oropharynx is clear.   Eyes:      General: No scleral icterus.     Extraocular Movements: Extraocular movements intact.      " Conjunctiva/sclera: Conjunctivae normal.      Right eye: Right conjunctiva is not injected.      Left eye: Left conjunctiva is not injected.   Neck:      Thyroid: No thyromegaly.      Vascular: No carotid bruit or JVD.   Cardiovascular:      Rate and Rhythm: Normal rate and regular rhythm.      Pulses: Normal pulses.      Heart sounds: Normal heart sounds. No murmur heard.  Pulmonary:      Effort: Pulmonary effort is normal. No respiratory distress.      Breath sounds: Normal breath sounds. No wheezing, rhonchi or rales.   Abdominal:      General: Bowel sounds are normal. There is no distension.      Palpations: Abdomen is soft.      Tenderness: There is no abdominal tenderness.   Musculoskeletal:         General: No swelling or deformity.      Cervical back: Normal range of motion and neck supple.      Right lower leg: No edema.      Left lower leg: No edema.      Comments: No point tenderness along the spine. Left hip tender with pain on all movement including flexion, abduction, rotation.   Lymphadenopathy:      Cervical: No cervical adenopathy.   Skin:     General: Skin is warm and dry.      Coloration: Skin is not pale.      Findings: No erythema or rash.      Nails: There is no clubbing.   Neurological:      Mental Status: She is alert and oriented to person, place, and time.      Cranial Nerves: No cranial nerve deficit.      Motor: No abnormal muscle tone.      Comments: Left hip flexors weak vs pain limiting movement.   Psychiatric:         Mood and Affect: Mood and affect normal.         Speech: Speech normal.         Behavior: Behavior normal.         Thought Content: Thought content normal.         Judgment: Judgment normal.          Assessment and Plan     1. Type 2 diabetes mellitus with diabetic neuropathy, without long-term current use of insulin  -     CBC Auto Differential; Future; Expected date: 10/08/2024  -     Comprehensive Metabolic Panel; Future; Expected date: 10/08/2024  -     Hemoglobin  A1C; Future; Expected date: 10/08/2024  -     Microalbumin/Creatinine Ratio, Urine  -     empagliflozin (JARDIANCE) 25 mg tablet; Take 1 tablet (25 mg total) by mouth once daily.  Dispense: 90 tablet; Refill: 2    2. Hypertensive heart disease without heart failure  -     carvediloL (COREG) 25 MG tablet; Take 1 tablet (25 mg total) by mouth 2 (two) times daily.  Dispense: 180 tablet; Refill: 2    3. Hyperlipidemia associated with type 2 diabetes mellitus  -     Lipid Panel; Future; Expected date: 10/08/2024  -     atorvastatin (LIPITOR) 80 MG tablet; Take 1 tablet (80 mg total) by mouth once daily.  Dispense: 90 tablet; Refill: 2    4. Coronary artery disease involving native coronary artery of native heart without angina pectoris  -     Lipid Panel; Future; Expected date: 10/08/2024  -     Ambulatory referral/consult to Cardiology; Future; Expected date: 10/15/2024  -     atorvastatin (LIPITOR) 80 MG tablet; Take 1 tablet (80 mg total) by mouth once daily.  Dispense: 90 tablet; Refill: 2    5. Spondylosis of lumbar spine with myelopathy  -     X-Ray Lumbar Spine Ap And Lateral; Future; Expected date: 10/08/2024  -     Ambulatory referral/consult to Pain Clinic; Future; Expected date: 10/15/2024  -     gabapentin (NEURONTIN) 300 MG capsule; Take 1 capsule (300 mg total) by mouth 2 (two) times daily.  Dispense: 180 capsule; Refill: 1    6. Chronic left hip pain  -     X-Ray Hip 2 or 3 views Left with Pelvis when performed; Future; Expected date: 10/08/2024  -     Ambulatory referral/consult to Orthopedics; Future; Expected date: 10/15/2024  -     Percocet recently refilled.    7. Osteoporosis without current pathological fracture, unspecified osteoporosis type  -     Vitamin D; Future; Expected date: 10/08/2024    8. Major depression, recurrent, chronic  -     venlafaxine (EFFEXOR) 100 MG Tab; Take 1 tablet (100 mg total) by mouth once daily.  Dispense: 90 tablet; Refill: 2    9. Encounter for screening mammogram for  breast cancer        -     Mammogram previously ordered, to be scheduled.    10. Need for COVID-19 vaccine - COVID booster today.    11. Influenza vaccine needed - Flu shot today.       Follow up in about 6 months (around 4/8/2025).

## 2024-10-10 ENCOUNTER — PATIENT MESSAGE (OUTPATIENT)
Dept: PRIMARY CARE CLINIC | Facility: CLINIC | Age: 79
End: 2024-10-10
Payer: MEDICARE

## 2024-10-10 ENCOUNTER — HOSPITAL ENCOUNTER (OUTPATIENT)
Dept: RADIOLOGY | Facility: HOSPITAL | Age: 79
Discharge: HOME | End: 2024-10-10
Attending: INTERNAL MEDICINE
Payer: MEDICARE

## 2024-10-10 DIAGNOSIS — M47.16 SPONDYLOSIS OF LUMBAR SPINE WITH MYELOPATHY: ICD-10-CM

## 2024-10-10 DIAGNOSIS — G89.29 CHRONIC LEFT HIP PAIN: ICD-10-CM

## 2024-10-10 DIAGNOSIS — M25.552 CHRONIC LEFT HIP PAIN: ICD-10-CM

## 2024-10-10 PROCEDURE — 73502 X-RAY EXAM HIP UNI 2-3 VIEWS: CPT | Mod: TC,LT

## 2024-10-10 PROCEDURE — 73502 X-RAY EXAM HIP UNI 2-3 VIEWS: CPT | Mod: 26,LT,, | Performed by: RADIOLOGY

## 2024-10-10 PROCEDURE — 72100 X-RAY EXAM L-S SPINE 2/3 VWS: CPT | Mod: TC

## 2024-10-10 PROCEDURE — 72100 X-RAY EXAM L-S SPINE 2/3 VWS: CPT | Mod: 26,,, | Performed by: RADIOLOGY

## 2024-10-14 ENCOUNTER — OFFICE VISIT (OUTPATIENT)
Dept: ORTHOPEDICS | Facility: CLINIC | Age: 79
End: 2024-10-14
Payer: MEDICARE

## 2024-10-14 ENCOUNTER — TELEPHONE (OUTPATIENT)
Dept: ORTHOPEDICS | Facility: CLINIC | Age: 79
End: 2024-10-14

## 2024-10-14 VITALS — BODY MASS INDEX: 28.86 KG/M2 | HEIGHT: 67 IN | WEIGHT: 183.88 LBS

## 2024-10-14 DIAGNOSIS — M16.12 PRIMARY OSTEOARTHRITIS OF LEFT HIP: Primary | ICD-10-CM

## 2024-10-14 DIAGNOSIS — G89.29 CHRONIC LEFT HIP PAIN: Primary | ICD-10-CM

## 2024-10-14 DIAGNOSIS — G89.29 CHRONIC LEFT HIP PAIN: ICD-10-CM

## 2024-10-14 DIAGNOSIS — M25.552 CHRONIC LEFT HIP PAIN: Primary | ICD-10-CM

## 2024-10-14 DIAGNOSIS — M25.552 CHRONIC LEFT HIP PAIN: ICD-10-CM

## 2024-10-14 PROCEDURE — 1101F PT FALLS ASSESS-DOCD LE1/YR: CPT | Mod: CPTII,S$GLB,, | Performed by: ORTHOPAEDIC SURGERY

## 2024-10-14 PROCEDURE — 99205 OFFICE O/P NEW HI 60 MIN: CPT | Mod: S$GLB,,, | Performed by: ORTHOPAEDIC SURGERY

## 2024-10-14 PROCEDURE — 1125F AMNT PAIN NOTED PAIN PRSNT: CPT | Mod: CPTII,S$GLB,, | Performed by: ORTHOPAEDIC SURGERY

## 2024-10-14 PROCEDURE — 99999 PR PBB SHADOW E&M-EST. PATIENT-LVL IV: CPT | Mod: PBBFAC,,, | Performed by: ORTHOPAEDIC SURGERY

## 2024-10-14 PROCEDURE — 3288F FALL RISK ASSESSMENT DOCD: CPT | Mod: CPTII,S$GLB,, | Performed by: ORTHOPAEDIC SURGERY

## 2024-10-14 PROCEDURE — 1159F MED LIST DOCD IN RCRD: CPT | Mod: CPTII,S$GLB,, | Performed by: ORTHOPAEDIC SURGERY

## 2024-10-14 PROCEDURE — 1160F RVW MEDS BY RX/DR IN RCRD: CPT | Mod: CPTII,S$GLB,, | Performed by: ORTHOPAEDIC SURGERY

## 2024-10-14 RX ORDER — MELOXICAM 15 MG/1
15 TABLET ORAL DAILY
Qty: 30 TABLET | Refills: 1 | Status: SHIPPED | OUTPATIENT
Start: 2024-10-14

## 2024-10-14 RX ORDER — CELECOXIB 100 MG/1
100 CAPSULE ORAL 2 TIMES DAILY
Qty: 60 CAPSULE | Refills: 1 | Status: SHIPPED | OUTPATIENT
Start: 2024-10-14

## 2024-10-14 NOTE — PROGRESS NOTES
Subjective:      Patient ID: Jovana Fall is a 79 y.o. female.    Chief Complaint: Pain of the Left Hip    HPI  79-year-old female long history of intermittent problems with the left hip.  Recently she has had several weeks of pain that radiates from her hip down her buttocks into her left leg.  Denies any specific trauma.  She also has groin pain.  Was recently seen by her PCP and referred for further evaluation.  Pain is interfering with activities of daily living.  ROS      Objective:    Ortho Exam     Constitutional:   Patient is alert  and oriented in no acute distress  HEENT:  normocephalic atraumatic; PERRL EOMI  Neck:  Supple without adenopathy  Cardiovascular:  Normal rate and rhythm  Pulmonary:  Normal respiratory effort normal chest wall expansion  Abdominal:  Nonprotuberant nondistended  Musculoskeletal:   patient has no abnormality to inspection or palpation.  There is no scoliosis noted. Minimal tenderness over the low back.  Mildly antalgic gait.  Moderate limitation of the left hip range of motion. Minimal limitation of forward flexion. No Sb signs noted.  Motor strength is 5/5 all muscle groups tested.  Deep tendon reflexes were 2+ and symmetric. Toes downgoing with Babinski testing with no clonus  Neurological:  No focal defect; cranial nerves 2-12 grossly intact equivocal seated straight leg raise  Psychiatric/behavioral:  Mood and behavior normal     X-Ray Hip 2 or 3 views Left with Pelvis when performed  Narrative: EXAMINATION:  XR HIP WITH PELVIS WHEN PERFORMED 2 OR 3 VIEWS LEFT    CLINICAL HISTORY:  Pain in left hip    TECHNIQUE:  AP view of the pelvis and frog leg lateral view of the left hip were performed.    COMPARISON:  Pelvis and left hip-09/25/2008    FINDINGS:  There is severe superolateral left hip joint space narrowing, more pronounced than at the time of the prior study.  There is a possible loose body observed along the lateral aspect of the left hip joint.  There is  mild right superolateral hip joint space narrowing.  There is prominent rim osteophyte formation about the left and right femoral neck.  No radiographically evident osteonecrosis of the femoral heads.  There is right gluteus tendon insertion greater trochanteric enthesophyte formation.  There is cortical ill definition present at the cranial aspect of the left greater trochanter.  This an age-indeterminate left greater trochanteric cortical avulsion fracture cannot be excluded.  There is degenerative change of the symphysis pubis.  There is degenerative change of the lower lumbar spine in the form of marginal osteophyte formation, disc space narrowing, and degenerative facet arthropathy.  There is a moderate volume of stool in the colon and rectum.  Impression: 1. Cortical ill definition at the cranial aspect of the left greater trochanter with displaced osseous fragment noted.  An age-indeterminate, possibly recent, isolated left greater trochanteric cortical avulsion fracture cannot be excluded.  Consider additional investigation with CT or MRI of the left hip.  2. Interval progression of left hip joint space narrowing as compared to the previous study.    Electronically signed by: Kushal Melton MD  Date:    10/10/2024  Time:    11:28  X-Ray Lumbar Spine Ap And Lateral  EXAMINATION:  XR LUMBAR SPINE AP AND LATERAL    CLINICAL HISTORY:  Other spondylosis with myelopathy, lumbar region    TECHNIQUE:  AP, lateral and spot images were performed of the lumbar spine.    COMPARISON:  05/06/2013    FINDINGS:  There is slight levocurvature centered L2.  No spondylolisthesis.  No displaced fracture with preserved vertebral body heights.  Baastrup disease.  There is moderate degenerative disc disease throughout the included spinal levels worst at the lowest 2 lumbar levels.  There is facet degenerative change most pronounced at the lowest 3 lumbar levels.  Osteopenia and atherosclerosis.    Electronically signed by: Dameon  Lees  Date:    10/10/2024  Time:    10:47       My Radiographs Findings:    I see no cause for concern for knee acute fractures.  I think she just has a degenerative hip with some degenerative disc disease  Assessment:       Encounter Diagnoses   Name Primary?    Chronic left hip pain     Primary osteoarthritis of left hip Yes         Plan:       I have discussed medical condition treatment options with her at length she desires hip replacement but I have suggested prior to that we get her into see 1 of the spine specialists to see what improvement can be made in her overall pain level with the treatment for her sciatica.  If symptoms fail to adequately improve and she continues to have groin pain we certainly can consider hip replacement for her.  Follow up can be as needed.        Past Medical History:   Diagnosis Date    Anticoagulant long-term use     Anxiety     Basal cell carcinoma 2014    left upper arm     BCC (basal cell carcinoma of skin) 01/07/2016    mid upper back    BCC (basal cell carcinoma)     left upper back    Calcium nephrolithiasis     Cataract     Colon polyp     Coronary artery disease     Cortical cataract - Both Eyes 04/29/2013    Depression     Diabetes mellitus     Displacement of lumbar intervertebral disc without myelopathy 05/06/2013    Diverticulosis     GERD (gastroesophageal reflux disease)     H. pylori infection     Hepatomegaly     History of melanoma in situ 02/02/2015    Mid upper back ; 5/2014     Hx of colonic polyps 01/27/2015    Hyperlipidemia     Hypertension     Lactose intolerance     Lumbar spinal stenosis 05/06/2013    Melanoma     mid upper back- in situ 5/2014    Nuclear sclerosis - Both Eyes 04/29/2013    Osteopenia     Spinal stenosis, lumbar region, with neurogenic claudication 05/06/2013    Spondylosis without myelopathy 05/06/2013    Squamous cell carcinoma of skin     Type 2 diabetes mellitus      Past Surgical History:   Procedure Laterality Date     APPENDECTOMY      BROW LIFT AND BLEPHAROPLASTY      BUNIONECTOMY Right     CARPAL TUNNEL RELEASE Bilateral     CHOLECYSTECTOMY      COLONOSCOPY  02/05/2015    Dr. Blas, repeat in 3-5 years    COLONOSCOPY N/A 2/3/2021    Procedure: COLONOSCOPY;  Surgeon: Cale Aggarwal MD;  Location: [facility];  Service: Endoscopy;  Laterality: N/A;    ESOPHAGOGASTRODUODENOSCOPY N/A 1/27/2021    Procedure: EGD (ESOPHAGOGASTRODUODENOSCOPY);  Surgeon: Cale Aggarwal MD;  Location: [facility];  Service: Endoscopy;  Laterality: N/A;    HEMORRHOID SURGERY      HYSTERECTOMY      ovaries remain    LUMBAR LAMINECTOMY      RHINOPLASTY TIP      SKIN CANCER EXCISION      TUBAL LIGATION      UPPER GASTROINTESTINAL ENDOSCOPY  2015    Dr. Blas    UPPER GASTROINTESTINAL ENDOSCOPY  03/30/2017    Dr. Aggarwal         Current Outpatient Medications:     aspirin 81 mg Tab, Take 81 mg by mouth. 1 Tablet Oral Every day, Disp: , Rfl:     atorvastatin (LIPITOR) 80 MG tablet, Take 1 tablet (80 mg total) by mouth once daily., Disp: 90 tablet, Rfl: 2    carvediloL (COREG) 25 MG tablet, Take 1 tablet (25 mg total) by mouth 2 (two) times daily., Disp: 180 tablet, Rfl: 2    cyanocobalamin (VITAMIN B-12) 250 MCG tablet, Take 250 mcg by mouth once daily., Disp: , Rfl:     dulaglutide (TRULICITY) 3 mg/0.5 mL pen injector, Inject 3 mg into the skin every 7 days., Disp: 12 pen , Rfl: 1    empagliflozin (JARDIANCE) 25 mg tablet, Take 1 tablet (25 mg total) by mouth once daily., Disp: 90 tablet, Rfl: 2    ergocalciferol (ERGOCALCIFEROL) 50,000 unit Cap, TAKE 1 CAPSULE EVERY 7 DAYS., Disp: 12 capsule, Rfl: 3    fish oil-omega-3 fatty acids 300-1,000 mg capsule, , Disp: , Rfl:     gabapentin (NEURONTIN) 300 MG capsule, Take 1 capsule (300 mg total) by mouth 2 (two) times daily., Disp: 180 capsule, Rfl: 1    ketoconazole (NIZORAL) 2 % cream, AAA bid, Disp: 60 g, Rfl: 3    melatonin (MELATIN ORAL), Take 5 mg by mouth as needed., Disp: , Rfl:     nitroGLYCERIN  (NITROSTAT) 0.4 MG SL tablet, DISSOLVE 1 TABLET UNDER THE TONGUE EVERY 5 MINUTES FOR 3 DOSES AS NEEDED FOR CHEST PAIN, Disp: 100 tablet, Rfl: 1    omeprazole (PRILOSEC) 20 MG capsule, Take 1 capsule (20 mg total) by mouth every morning., Disp: 90 capsule, Rfl: 2    oxyCODONE-acetaminophen (PERCOCET) 5-325 mg per tablet, Take 1 tablet by mouth 2 (two) times daily as needed for Pain., Disp: 60 tablet, Rfl: 0    telmisartan (MICARDIS) 80 MG Tab, TAKE 1 TABLET ONE TIME DAILY, Disp: 90 tablet, Rfl: 2    venlafaxine (EFFEXOR) 100 MG Tab, Take 1 tablet (100 mg total) by mouth once daily., Disp: 90 tablet, Rfl: 2    vitamin E 100 UNIT capsule, Take 100 Units by mouth 2 (two) times daily., Disp: , Rfl:     blood glucose control, normal Soln, Use as Instructed, Disp: 3 each, Rfl: 3    celecoxib (CELEBREX) 100 MG capsule, Take 1 capsule (100 mg total) by mouth 2 (two) times daily., Disp: 60 capsule, Rfl: 1    TRUE METRIX GLUCOSE TEST STRIP Strp, TEST BLOOD SUGAR EVERY DAY, Disp: 100 strip, Rfl: 3    TRUEDRAW LANCING DEVICE Misc, USE ONE TIME DAILY, Disp: 1 each, Rfl: 0    TRUEPLUS LANCETS 30 gauge Misc, TEST BLOOD SUGAR ONE TIME DAILY, Disp: 100 each, Rfl: 3    Review of patient's allergies indicates:   Allergen Reactions    Adhesive tape-silicones      Other reaction(s): Swelling  Other reaction(s): Itching    Codeine Itching    Metformin Itching    Morphine Itching    Penicillins Itching     Other reaction(s): Itching    Sulfa (sulfonamide antibiotics) Itching     Other reaction(s): Itching       Family History   Problem Relation Name Age of Onset    Heart disease Mother          MI    Cancer Mother          SQ carcinoma of lung    Heart attack Mother      Skin cancer Mother      Cancer Father          prostate    Ovarian cancer Cousin      Colon cancer Cousin      Skin cancer Daughter      Cancer Sister 1/2         uterine sarcoma    Colon cancer Brother 1/2 60    No Known Problems Son      No Known Problems Sister 1/2      Diabetes Sister 1/2     Hypertension Sister 1/2     Arthritis Sister 1/2     No Known Problems Daughter      Breast cancer Maternal Aunt      Amblyopia Neg Hx      Blindness Neg Hx      Cataracts Neg Hx      Glaucoma Neg Hx      Retinal detachment Neg Hx      Strabismus Neg Hx      Stroke Neg Hx      Thyroid disease Neg Hx      Melanoma Neg Hx      Psoriasis Neg Hx      Lupus Neg Hx      Eczema Neg Hx      Crohn's disease Neg Hx      Ulcerative colitis Neg Hx       Social History     Occupational History     Employer: OTHER   Tobacco Use    Smoking status: Never    Smokeless tobacco: Never   Substance and Sexual Activity    Alcohol use: Yes     Comment: rare glass - less than one monthly    Drug use: No    Sexual activity: Not Currently

## 2024-10-14 NOTE — TELEPHONE ENCOUNTER
----- Message from Med Assistant Lofton sent at 10/14/2024  2:50 PM CDT -----  Contact: [facility]  [place]  Calling on above listed patient   Call back

## 2024-10-14 NOTE — TELEPHONE ENCOUNTER
Pharmacist stated pt has sulfa allergy and does not want to risk taking Celebrex.   Consulted with Dr. Amanda, advised to send in MOBIC.

## 2024-10-18 ENCOUNTER — TELEPHONE (OUTPATIENT)
Dept: PRIMARY CARE CLINIC | Facility: CLINIC | Age: 79
End: 2024-10-18
Payer: MEDICARE

## 2024-10-18 ENCOUNTER — OFFICE VISIT (OUTPATIENT)
Dept: SPINE | Facility: CLINIC | Age: 79
End: 2024-10-18
Payer: MEDICARE

## 2024-10-18 VITALS — BODY MASS INDEX: 28.86 KG/M2 | WEIGHT: 183.88 LBS | HEIGHT: 67 IN

## 2024-10-18 DIAGNOSIS — G89.29 CHRONIC LEFT HIP PAIN: ICD-10-CM

## 2024-10-18 DIAGNOSIS — M25.552 CHRONIC LEFT HIP PAIN: ICD-10-CM

## 2024-10-18 DIAGNOSIS — G89.29 CHRONIC BILATERAL LOW BACK PAIN WITH LEFT-SIDED SCIATICA: Primary | ICD-10-CM

## 2024-10-18 DIAGNOSIS — M54.42 CHRONIC BILATERAL LOW BACK PAIN WITH LEFT-SIDED SCIATICA: Primary | ICD-10-CM

## 2024-10-18 DIAGNOSIS — M54.9 DORSALGIA, UNSPECIFIED: ICD-10-CM

## 2024-10-18 DIAGNOSIS — M48.061 DEGENERATIVE LUMBAR SPINAL STENOSIS: ICD-10-CM

## 2024-10-18 PROCEDURE — 99999 PR PBB SHADOW E&M-EST. PATIENT-LVL V: CPT | Mod: PBBFAC,,, | Performed by: PHYSICAL MEDICINE & REHABILITATION

## 2024-10-18 NOTE — TELEPHONE ENCOUNTER
I called and sp w pt, she states she is having severe pain and is requesting pain medication to get through to 10/23/24.     Pt has MRI scheduled for 10/25/24 and saw Dr. Amanda in Ortho and Dr. Damon in Spine Services, she states she was told by both doctors that they do not prescribe pain medication and she needs to go to Pain Mgmt but she was told by Pain Mgmt that she does not need to see them. She rates current pain 8/10 an states that the anti-inflammatory she is taking is not working.     LOV 10/08/24

## 2024-10-18 NOTE — PROGRESS NOTES
SUBJECTIVE:    Patient ID: Jovana Fall is a 79 y.o. female.    Chief Complaint: Low-back Pain    This is a 79-year-old woman who sees Dr. Staley for her primary care.  History of diabetes and hyperlipidemia.  Remote history of cervical cancer status post hysterectomy back in the 80s or 90s.  History of melanoma of the gallop removed last year.  She says she has had 3 back surgeries remotely.  I do not have the details.  She presents with a 6 week history of spontaneously occurring low back pain at the lumbosacral junction that radiates down the posterior portion of the left leg and into the anterior portion of the leg below the knee.  She also has pain in the groin.  She has known degenerative joint disease of the left hip followed by Dr. Amanda with orthopedics.  She is being considered for hip replacement but he would like her radicular symptoms resolved before that.  She denies bowel or bladder dysfunction fever chills sweats or unexpected weight loss.  She is on gabapentin Mobic and low-dose Percocet which helps some.  Current pain level is 4/10 and interferes with quality of life in terms of activities of daily living recreation and social activities.  I personally reviewed an MRI of the lumbar spine done in 2013 which shows advanced degenerative disc disease at L5-S1 greater than L4-5 with moderate spinal stenosis L3-4.  Has a right-sided disc protrusion at L4-5 and a left-sided disc protrusion at L5-S1.  Historically she has had success with transforaminal injection on the right at L4-5.  She is not having any right-sided symptoms at this time        Past Medical History:   Diagnosis Date    Anticoagulant long-term use     Anxiety     Basal cell carcinoma 2014    left upper arm     BCC (basal cell carcinoma of skin) 01/07/2016    mid upper back    BCC (basal cell carcinoma)     left upper back    Calcium nephrolithiasis     Cataract     Colon polyp     Coronary artery disease      Cortical cataract - Both Eyes 04/29/2013    Depression     Diabetes mellitus     Displacement of lumbar intervertebral disc without myelopathy 05/06/2013    Diverticulosis     GERD (gastroesophageal reflux disease)     H. pylori infection     Hepatomegaly     History of melanoma in situ 02/02/2015    Mid upper back ; 5/2014     Hx of colonic polyps 01/27/2015    Hyperlipidemia     Hypertension     Lactose intolerance     Lumbar spinal stenosis 05/06/2013    Melanoma     mid upper back- in situ 5/2014    Nuclear sclerosis - Both Eyes 04/29/2013    Osteopenia     Spinal stenosis, lumbar region, with neurogenic claudication 05/06/2013    Spondylosis without myelopathy 05/06/2013    Squamous cell carcinoma of skin     Type 2 diabetes mellitus      Social History     Socioeconomic History    Marital status:    Occupational History     Employer: OTHER   Tobacco Use    Smoking status: Never    Smokeless tobacco: Never   Substance and Sexual Activity    Alcohol use: Yes     Comment: rare glass - less than one monthly    Drug use: No    Sexual activity: Not Currently     Social Drivers of Health     Financial Resource Strain: Low Risk  (9/26/2024)    Overall Financial Resource Strain (CARDIA)     Difficulty of Paying Living Expenses: Not hard at all   Food Insecurity: No Food Insecurity (9/26/2024)    Hunger Vital Sign     Worried About Running Out of Food in the Last Year: Never true     Ran Out of Food in the Last Year: Never true   Transportation Needs: No Transportation Needs (1/25/2021)    PRAPARE - Transportation     Lack of Transportation (Medical): No     Lack of Transportation (Non-Medical): No   Physical Activity: Sufficiently Active (9/26/2024)    Exercise Vital Sign     Days of Exercise per Week: 7 days     Minutes of Exercise per Session: 30 min   Stress: No Stress Concern Present (9/26/2024)    [employer] - Occupational Stress Questionnaire      Feeling of Stress : Only a little   Housing Stability: Unknown (9/26/2024)    Housing Stability Vital Sign     Unable to Pay for Housing in the Last Year: No     Past Surgical History:   Procedure Laterality Date    APPENDECTOMY      BROW LIFT AND BLEPHAROPLASTY      BUNIONECTOMY Right     CARPAL TUNNEL RELEASE Bilateral     CHOLECYSTECTOMY      COLONOSCOPY  02/05/2015    Dr. Blas, repeat in 3-5 years    COLONOSCOPY N/A 2/3/2021    Procedure: COLONOSCOPY;  Surgeon: Cale Aggarwal MD;  Location: [facility];  Service: Endoscopy;  Laterality: N/A;    ESOPHAGOGASTRODUODENOSCOPY N/A 1/27/2021    Procedure: EGD (ESOPHAGOGASTRODUODENOSCOPY);  Surgeon: Cale Aggarwal MD;  [facility];  Service: Endoscopy;  Laterality: N/A;    HEMORRHOID SURGERY      HYSTERECTOMY      ovaries remain    LUMBAR LAMINECTOMY      RHINOPLASTY TIP      SKIN CANCER EXCISION      TUBAL LIGATION      UPPER GASTROINTESTINAL ENDOSCOPY  2015    Dr. Blas    UPPER GASTROINTESTINAL ENDOSCOPY  03/30/2017    Dr. Aggarwal     Family History   Problem Relation Name Age of Onset    Heart disease Mother          MI    Cancer Mother          SQ carcinoma of lung    Heart attack Mother      Skin cancer Mother      Cancer Father          prostate    Ovarian cancer Cousin      Colon cancer Cousin      Skin cancer Daughter      Cancer Sister 1/2         uterine sarcoma    Colon cancer Brother 1/2 60    No Known Problems Son      No Known Problems Sister 1/2     Diabetes Sister 1/2     Hypertension Sister 1/2     Arthritis Sister 1/2     No Known Problems Daughter      Breast cancer Maternal Aunt      Amblyopia Neg Hx      Blindness Neg Hx      Cataracts Neg Hx      Glaucoma Neg Hx      Retinal detachment Neg Hx      Strabismus Neg Hx      Stroke Neg Hx      Thyroid disease Neg Hx      Melanoma Neg Hx      Psoriasis Neg Hx      Lupus Neg Hx      Eczema Neg Hx      Crohn's disease Neg Hx       "Ulcerative colitis Neg Hx       Vitals:    10/18/24 1039   Weight: 83.4 kg (183 lb 13.8 oz)   Height: 5' 7" (1.702 m)       Review of Systems   Constitutional:  Negative for chills, diaphoresis, fatigue, fever and unexpected weight change.   HENT:  Negative for trouble swallowing.    Eyes:  Negative for visual disturbance.   Respiratory:  Negative for shortness of breath.    Cardiovascular:  Negative for chest pain.   Gastrointestinal:  Negative for abdominal pain, constipation, nausea and vomiting.   Genitourinary:  Negative for difficulty urinating.   Musculoskeletal:  Negative for arthralgias, back pain, gait problem, joint swelling, myalgias, neck pain and neck stiffness.   Neurological:  Negative for dizziness, speech difficulty, weakness, light-headedness, numbness and headaches.          Objective:      Physical Exam  Neurological:      Mental Status: She is alert and oriented to person, place, and time.      Comments: She is awake and in no acute distress  No point tenderness or palpable masses about the lumbar spine  Forward flexion is to about 60° before she complains of pain at the lumbosacral junction.  Extension combined with rotation to the right and left causes mild pain at the lumbosacral junction  Reflexes- +1-+2 reflexes at the following:   C5-Biceps   C6-Brachioradialis   C7-Triceps   L3/4-Patellar   S1-Achilles   Barb sign is negative bilaterally  Strength testing- 5/5 strength in the following muscle groups:  C5-Elbow flexion  C6-Wrist extension  C7-Elbow extension  C8-Finger flexion  T1-Finger abduction  L2-Hip flexion  L3-Knee extension  L4-Ankle dorsiflexion  L5-Great toe extension  S1-Ankle plantar flexion       Straight leg raise is positive on the left for reproduction of left leg pain.  Straight leg raise on the right is negative.  SHANTA test on the left causes left groin pain.           Assessment:       1. Chronic bilateral low back pain with left-sided sciatica    2. Chronic left " hip pain    3. Dorsalgia, unspecified           Plan:     She has a nonfocal neurological examination and no historical red flags.  I suspect she has low back pain on basis of degenerative disc disease and facet arthropathy.  Has pain with facet loading.  She has symptoms of left L5 radiculitis with no evidence of nerve root dysfunction.  She is frustrated with her ongoing pain that significantly interferes with her quality of life.  Concern is for disc herniation at L5-S1.  I recommend an MRI of the lumbar spine with and without contrast in preparation for epidural steroid injections.  Follow up with me after the scan.    Chronic bilateral low back pain with left-sided sciatica    Chronic left hip pain  -     [facility]ulatory referral[facility]    Dorsalgia, unspecified  -     MRI Lumbar Spine W WO Cont; Future; Expected date: 10/18/2024

## 2024-10-18 NOTE — TELEPHONE ENCOUNTER
----- Message from Malika sent at 10/18/2024  4:09 PM CDT -----  Contact: [phone]  Requesting an RX refill or new RX. Patient is requesting Tramadol for pain as she has run out of pain medication. Patient states she is taking anti inflammatory prescribed by another doctor and it is not helping with the pain     Is this a refill or new RX:     RX name and strength (copy/paste from chart):      Is this a 30 day or 90 day RX:     Pharmacy name and phone # (copy/paste from chart):    [facility] [address] [facility], [place] - [address] [address]  [address] [place] [address]  Phone: [phone] Fax: [phone]        The doctors have asked that we provide their patients with the following 2 reminders -- prescription refills can take up to 72 hours, and a friendly reminder that in the future you can use your [facility] account to request refills: yes

## 2024-10-23 ENCOUNTER — OFFICE VISIT (OUTPATIENT)
Dept: DERMATOLOGY | Facility: CLINIC | Age: 79
End: 2024-10-23
Payer: MEDICARE

## 2024-10-23 VITALS — HEIGHT: 67 IN | WEIGHT: 183 LBS | BODY MASS INDEX: 28.72 KG/M2

## 2024-10-23 DIAGNOSIS — L82.1 SEBORRHEIC KERATOSES: ICD-10-CM

## 2024-10-23 DIAGNOSIS — Z08 ENCOUNTER FOR FOLLOW-UP SURVEILLANCE OF SKIN CANCER: ICD-10-CM

## 2024-10-23 DIAGNOSIS — L81.4 SOLAR LENTIGO: ICD-10-CM

## 2024-10-23 DIAGNOSIS — Z85.820 HISTORY OF MALIGNANT MELANOMA OF SKIN: ICD-10-CM

## 2024-10-23 DIAGNOSIS — L30.4 INTERTRIGO: ICD-10-CM

## 2024-10-23 DIAGNOSIS — B37.31 YEAST VAGINITIS: ICD-10-CM

## 2024-10-23 DIAGNOSIS — Z85.828 ENCOUNTER FOR FOLLOW-UP SURVEILLANCE OF SKIN CANCER: ICD-10-CM

## 2024-10-23 DIAGNOSIS — L57.0 ACTINIC KERATOSES: Primary | ICD-10-CM

## 2024-10-23 PROCEDURE — 1160F RVW MEDS BY RX/DR IN RCRD: CPT | Mod: CPTII,S$GLB,, | Performed by: DERMATOLOGY

## 2024-10-23 PROCEDURE — 1125F AMNT PAIN NOTED PAIN PRSNT: CPT | Mod: CPTII,S$GLB,, | Performed by: DERMATOLOGY

## 2024-10-23 PROCEDURE — 99214 OFFICE O/P EST MOD 30 MIN: CPT | Mod: 25,S$GLB,, | Performed by: DERMATOLOGY

## 2024-10-23 PROCEDURE — 3288F FALL RISK ASSESSMENT DOCD: CPT | Mod: CPTII,S$GLB,, | Performed by: DERMATOLOGY

## 2024-10-23 PROCEDURE — 1101F PT FALLS ASSESS-DOCD LE1/YR: CPT | Mod: CPTII,S$GLB,, | Performed by: DERMATOLOGY

## 2024-10-23 PROCEDURE — 1159F MED LIST DOCD IN RCRD: CPT | Mod: CPTII,S$GLB,, | Performed by: DERMATOLOGY

## 2024-10-23 PROCEDURE — 17000 DESTRUCT PREMALG LESION: CPT | Mod: S$GLB,,, | Performed by: DERMATOLOGY

## 2024-10-23 RX ORDER — OXYCODONE AND ACETAMINOPHEN 5; 325 MG/1; MG/1
1 TABLET ORAL 2 TIMES DAILY PRN
Qty: 60 TABLET | Refills: 0 | Status: SHIPPED | OUTPATIENT
Start: 2024-10-23

## 2024-10-23 RX ORDER — FLUCONAZOLE 150 MG/1
TABLET ORAL
Qty: 1 TABLET | Refills: 1 | Status: SHIPPED | OUTPATIENT
Start: 2024-10-23

## 2024-10-23 NOTE — PROGRESS NOTES
Subjective:      Patient ID:  Jovana Fall is a 79 y.o. female who presents for   Chief Complaint   Patient presents with    Skin Check     tbsc     LOV- 6/25/24- bcc of right upper arm, bcc of back,     Here today for a TBSC  C/o rash under her breasts-would like to talk about diflucan-oral     Derm Hx:  BCC-Right upper arm 4/2024 Dr. Joiner ED& C 6/2024  BCC- Left upper back 4/2024 Dr. Joiner monitoring   Melanoma- midline posterior crown 5/23/23 [facility]  SCC- right lateral thigh 5/23/23  BCC- left ankle 5/23/23  AK w/ focal transition to SCCIS- right cheek tx w/ imiquimod  Basal cell carcinoma 2014      left upper arm   BCC (basal cell carcinoma of skin) mid upper back  BCC (basal cell carcinoma)  left upper back  superficial BCC mid upper back 1/2016, E&S  BCC R mid infraorbital, 4/2014  Severely DN left mid upper back 4/2014  Has no fhx of MM    Current Outpatient Medications:   ·  aspirin 81 mg Tab, Take 81 mg by mouth. 1 Tablet Oral Every day, Disp: , Rfl:   ·  atorvastatin (LIPITOR) 80 MG tablet, Take 1 tablet (80 mg total) by mouth once daily., Disp: 90 tablet, Rfl: 2  ·  blood glucose control, normal Soln, Use as Instructed, Disp: 3 each, Rfl: 3  ·  carvediloL (COREG) 25 MG tablet, Take 1 tablet (25 mg total) by mouth 2 (two) times daily., Disp: 180 tablet, Rfl: 2  ·  cyanocobalamin (VITAMIN B-12) 250 MCG tablet, Take 250 mcg by mouth once daily., Disp: , Rfl:   ·  dulaglutide (TRULICITY) 3 mg/0.5 mL pen injector, Inject 3 mg into the skin every 7 days., Disp: 12 pen , Rfl: 1  ·  empagliflozin (JARDIANCE) 25 mg tablet, Take 1 tablet (25 mg total) by mouth once daily., Disp: 90 tablet, Rfl: 2  ·  ergocalciferol (ERGOCALCIFEROL) 50,000 unit Cap, TAKE 1 CAPSULE EVERY 7 DAYS., Disp: 12 capsule, Rfl: 3  ·  fish oil-omega-3 fatty acids 300-1,000 mg capsule, , Disp: , Rfl:   ·  gabapentin (NEURONTIN) 300 MG capsule, Take 1 capsule (300 mg total) by mouth 2 (two) times  daily., Disp: 180 capsule, Rfl: 1  ·  ketoconazole (NIZORAL) 2 % cream, AAA bid, Disp: 60 g, Rfl: 3  ·  melatonin (MELATIN ORAL), Take 5 mg by mouth as needed., Disp: , Rfl:   ·  meloxicam (MOBIC) 15 MG tablet, Take 1 tablet (15 mg total) by mouth once daily., Disp: 30 tablet, Rfl: 1  ·  nitroGLYCERIN (NITROSTAT) 0.4 MG SL tablet, DISSOLVE 1 TABLET UNDER THE TONGUE EVERY 5 MINUTES FOR 3 DOSES AS NEEDED FOR CHEST PAIN, Disp: 100 tablet, Rfl: 1  ·  omeprazole (PRILOSEC) 20 MG capsule, Take 1 capsule (20 mg total) by mouth every morning., Disp: 90 capsule, Rfl: 2  ·  oxyCODONE-acetaminophen (PERCOCET) 5-325 mg per tablet, Take 1 tablet by mouth 2 (two) times daily as needed for Pain., Disp: 60 tablet, Rfl: 0  ·  telmisartan (MICARDIS) 80 MG Tab, TAKE 1 TABLET ONE TIME DAILY, Disp: 90 tablet, Rfl: 2  ·  TRUE METRIX GLUCOSE TEST STRIP Strp, TEST BLOOD SUGAR EVERY DAY, Disp: 100 strip, Rfl: 3  ·  TRUEDRAW LANCING DEVICE Misc, USE ONE TIME DAILY, Disp: 1 each, Rfl: 0  ·  TRUEPLUS LANCETS 30 gauge Misc, TEST BLOOD SUGAR ONE TIME DAILY, Disp: 100 each, Rfl: 3  ·  venlafaxine (EFFEXOR) 100 MG Tab, Take 1 tablet (100 mg total) by mouth once daily., Disp: 90 tablet, Rfl: 2  ·  vitamin E 100 UNIT capsule, Take 100 Units by mouth 2 (two) times daily., Disp: , Rfl:   ·  celecoxib (CELEBREX) 100 MG capsule, Take 1 capsule (100 mg total) by mouth 2 (two) times daily. (Patient not taking: Reported on 10/23/2024), Disp: 60 capsule, Rfl: 1             Review of Systems   Skin:  Positive for itching, rash, daily sunscreen use, activity-related sunscreen use and wears hat.   Hematologic/Lymphatic: Does not bruise/bleed easily.       Objective:   Physical Exam   Constitutional: She appears well-developed and well-nourished. No distress.   Genitourinary:         Neurological: She is alert and oriented to person, place, and time. She is not disoriented.   Psychiatric: She has a normal mood and affect.   Skin:   Areas Examined (abnormalities  noted in diagram):   Scalp / Hair Palpated and Inspected  Head / Face Inspection Performed  Neck Inspection Performed  Chest / Axilla Inspection Performed  Abdomen Inspection Performed  Genitals / Buttocks / Groin Inspection Performed  Back Inspection Performed  RUE Inspected  LUE Inspection Performed  RLE Inspected  LLE Inspection Performed  Nails and Digits Inspection Performed                       Diagram Legend     Erythematous scaling macule/papule c/w actinic keratosis       Vascular papule c/w angioma      Pigmented verrucoid papule/plaque c/w seborrheic keratosis      Yellow umbilicated papule c/w sebaceous hyperplasia      Irregularly shaped tan macule c/w lentigo     1-2 mm smooth white papules consistent with Milia      Movable subcutaneous cyst with punctum c/w epidermal inclusion cyst      Subcutaneous movable cyst c/w pilar cyst      Firm pink to brown papule c/w dermatofibroma      Pedunculated fleshy papule(s) c/w skin tag(s)      Evenly pigmented macule c/w junctional nevus     Mildly variegated pigmented, slightly irregular-bordered macule c/w mildly atypical nevus      Flesh colored to evenly pigmented papule c/w intradermal nevus       Pink pearly papule/plaque c/w basal cell carcinoma      Erythematous hyperkeratotic cursted plaque c/w SCC      Surgical scar with no sign of skin cancer recurrence      Open and closed comedones      Inflammatory papules and pustules      Verrucoid papule consistent consistent with wart     Erythematous eczematous patches and plaques     Dystrophic onycholytic nail with subungual debris c/w onychomycosis     Umbilicated papule    Erythematous-base heme-crusted tan verrucoid plaque consistent with inflamed seborrheic keratosis     Erythematous Silvery Scaling Plaque c/w Psoriasis     See annotation      Assessment / Plan:        There are no diagnoses linked to this encounter.         No follow-ups on file.   (V5) oriented

## 2024-10-23 NOTE — PROGRESS NOTES
Subjective:      Patient ID:  Jovana Fall is a 79 y.o. female who presents for   Chief Complaint   Patient presents with    Skin Check     tbsc     LOV- 6/25/24- bcc of right upper arm, bcc of back,     Here today for a TBSC  C/o rash under her breasts-would like to talk about diflucan-oral     Derm Hx:  BCC-Right upper arm 4/2024 Dr. Joiner ED& C 6/2024  BCC- Left upper back 4/2024 Dr. Joiner monitoring   Melanoma 0.7mm- midline posterior crown 5/23/23 [facility]  SCC- right lateral thigh 5/23/23  BCC- left ankle 5/23/23  AK w/ focal transition to SCCIS- right cheek tx w/ imiquimod  Basal cell carcinoma 2014      left upper arm   BCC (basal cell carcinoma of skin) mid upper back  BCC (basal cell carcinoma)  left upper back  superficial BCC mid upper back 1/2016, E&S  BCC R mid infraorbital, 4/2014  Severely DN left mid upper back 4/2014  Has no fhx of MM    Current Outpatient Medications:   ·  aspirin 81 mg Tab, Take 81 mg by mouth. 1 Tablet Oral Every day, Disp: , Rfl:   ·  atorvastatin (LIPITOR) 80 MG tablet, Take 1 tablet (80 mg total) by mouth once daily., Disp: 90 tablet, Rfl: 2  ·  blood glucose control, normal Soln, Use as Instructed, Disp: 3 each, Rfl: 3  ·  carvediloL (COREG) 25 MG tablet, Take 1 tablet (25 mg total) by mouth 2 (two) times daily., Disp: 180 tablet, Rfl: 2  ·  cyanocobalamin (VITAMIN B-12) 250 MCG tablet, Take 250 mcg by mouth once daily., Disp: , Rfl:   ·  dulaglutide (TRULICITY) 3 mg/0.5 mL pen injector, Inject 3 mg into the skin every 7 days., Disp: 12 pen , Rfl: 1  ·  empagliflozin (JARDIANCE) 25 mg tablet, Take 1 tablet (25 mg total) by mouth once daily., Disp: 90 tablet, Rfl: 2  ·  ergocalciferol (ERGOCALCIFEROL) 50,000 unit Cap, TAKE 1 CAPSULE EVERY 7 DAYS., Disp: 12 capsule, Rfl: 3  ·  fish oil-omega-3 fatty acids 300-1,000 mg capsule, , Disp: , Rfl:   ·  gabapentin (NEURONTIN) 300 MG capsule, Take 1 capsule (300 mg total) by mouth 2 (two)  times daily., Disp: 180 capsule, Rfl: 1  ·  ketoconazole (NIZORAL) 2 % cream, AAA bid, Disp: 60 g, Rfl: 3  ·  melatonin (MELATIN ORAL), Take 5 mg by mouth as needed., Disp: , Rfl:   ·  meloxicam (MOBIC) 15 MG tablet, Take 1 tablet (15 mg total) by mouth once daily., Disp: 30 tablet, Rfl: 1  ·  nitroGLYCERIN (NITROSTAT) 0.4 MG SL tablet, DISSOLVE 1 TABLET UNDER THE TONGUE EVERY 5 MINUTES FOR 3 DOSES AS NEEDED FOR CHEST PAIN, Disp: 100 tablet, Rfl: 1  ·  omeprazole (PRILOSEC) 20 MG capsule, Take 1 capsule (20 mg total) by mouth every morning., Disp: 90 capsule, Rfl: 2  ·  oxyCODONE-acetaminophen (PERCOCET) 5-325 mg per tablet, Take 1 tablet by mouth 2 (two) times daily as needed for Pain., Disp: 60 tablet, Rfl: 0  ·  telmisartan (MICARDIS) 80 MG Tab, TAKE 1 TABLET ONE TIME DAILY, Disp: 90 tablet, Rfl: 2  ·  TRUE METRIX GLUCOSE TEST STRIP Strp, TEST BLOOD SUGAR EVERY DAY, Disp: 100 strip, Rfl: 3  ·  TRUEDRAW LANCING DEVICE Misc, USE ONE TIME DAILY, Disp: 1 each, Rfl: 0  ·  TRUEPLUS LANCETS 30 gauge Misc, TEST BLOOD SUGAR ONE TIME DAILY, Disp: 100 each, Rfl: 3  ·  venlafaxine (EFFEXOR) 100 MG Tab, Take 1 tablet (100 mg total) by mouth once daily., Disp: 90 tablet, Rfl: 2  ·  vitamin E 100 UNIT capsule, Take 100 Units by mouth 2 (two) times daily., Disp: , Rfl:   ·  celecoxib (CELEBREX) 100 MG capsule, Take 1 capsule (100 mg total) by mouth 2 (two) times daily. (Patient not taking: Reported on 10/23/2024), Disp: 60 capsule, Rfl: 1               Review of Systems   Skin:  Positive for itching, rash, daily sunscreen use, activity-related sunscreen use and wears hat.   Hematologic/Lymphatic: Does not bruise/bleed easily.       Objective:   Physical Exam   Constitutional: She appears well-developed and well-nourished. No distress.   Genitourinary:         Neurological: She is alert and oriented to person, place, and time. She is not disoriented.   Psychiatric: She has a normal mood and affect.   Skin:   Areas Examined  (abnormalities noted in diagram):   Scalp / Hair Palpated and Inspected  Head / Face Inspection Performed  Neck Inspection Performed  Chest / Axilla Inspection Performed  Abdomen Inspection Performed  Genitals / Buttocks / Groin Inspection Performed  Back Inspection Performed  RUE Inspected  LUE Inspection Performed  RLE Inspected  LLE Inspection Performed  Nails and Digits Inspection Performed                         Diagram Legend     Erythematous scaling macule/papule c/w actinic keratosis       Vascular papule c/w angioma      Pigmented verrucoid papule/plaque c/w seborrheic keratosis      Yellow umbilicated papule c/w sebaceous hyperplasia      Irregularly shaped tan macule c/w lentigo     1-2 mm smooth white papules consistent with Milia      Movable subcutaneous cyst with punctum c/w epidermal inclusion cyst      Subcutaneous movable cyst c/w pilar cyst      Firm pink to brown papule c/w dermatofibroma      Pedunculated fleshy papule(s) c/w skin tag(s)      Evenly pigmented macule c/w junctional nevus     Mildly variegated pigmented, slightly irregular-bordered macule c/w mildly atypical nevus      Flesh colored to evenly pigmented papule c/w intradermal nevus       Pink pearly papule/plaque c/w basal cell carcinoma      Erythematous hyperkeratotic cursted plaque c/w SCC      Surgical scar with no sign of skin cancer recurrence      Open and closed comedones      Inflammatory papules and pustules      Verrucoid papule consistent consistent with wart     Erythematous eczematous patches and plaques     Dystrophic onycholytic nail with subungual debris c/w onychomycosis     Umbilicated papule    Erythematous-base heme-crusted tan verrucoid plaque consistent with inflamed seborrheic keratosis     Erythematous Silvery Scaling Plaque c/w Psoriasis     See annotation      Assessment / Plan:        Encounter for follow-up surveillance of skin cancer  History of malignant melanoma of skin  Area of previous melanoma  and NMSCs examined. Sites well healed with no signs of recurrence.    Total body skin examination performed today including at least 12 points as noted in physical examination. No lesions suspicious for malignancy noted.    Actinic keratoses  Cryosurgery Procedure Note    Verbal consent from the patient is obtained and the patient is aware of the precancerous quality and need for treatment of these lesions. Liquid nitrogen cryosurgery is applied to the 1 actinic keratoses, as detailed in the physical exam, to produce a freeze injury. The patient is aware that blisters may form and is instructed on wound care with gentle cleansing and use of vaseline ointment to keep moist until healed. The patient is supplied a handout on cryosurgery and is instructed to call if lesions do not completely resolve.    Seborrheic keratoses  These are benign inherited growths without a malignant potential. Reassurance given to patient. No treatment is necessary.     Solar lentigo  This is a benign hyperpigmented sun induced lesion. Daily sun protection will reduce the number of new lesions. Treatment of these benign lesions are considered cosmetic.    Intertrigo  Inframammary, well controlled with keto cream and drying measures, continue    Yeast vaginitis  -     fluconazole (DIFLUCAN) 150 MG Tab; Take one tab PO once PRN yeast vaginitis  Dispense: 1 tablet; Refill: 1  Requests oral PRN dose. Finds monistat tedious and ineffective             No follow-ups on file.

## 2024-10-23 NOTE — TELEPHONE ENCOUNTER
I was out this past weekend, message came in too late Friday evening for covering MD to address.   Her Percocet has been refilled.

## 2024-10-25 ENCOUNTER — HOSPITAL ENCOUNTER (OUTPATIENT)
Dept: RADIOLOGY | Facility: HOSPITAL | Age: 79
Discharge: HOME | End: 2024-10-25
Attending: PHYSICAL MEDICINE & REHABILITATION
Payer: MEDICARE

## 2024-10-25 DIAGNOSIS — M54.9 DORSALGIA, UNSPECIFIED: ICD-10-CM

## 2024-10-25 PROCEDURE — 72158 MRI LUMBAR SPINE W/O & W/DYE: CPT | Mod: TC

## 2024-10-25 PROCEDURE — A9585 GADOBUTROL INJECTION: HCPCS | Performed by: PHYSICAL MEDICINE & REHABILITATION

## 2024-10-25 PROCEDURE — 25500020 PHARM REV CODE 255: Performed by: PHYSICAL MEDICINE & REHABILITATION

## 2024-10-25 PROCEDURE — 72158 MRI LUMBAR SPINE W/O & W/DYE: CPT | Mod: 26,,, | Performed by: RADIOLOGY

## 2024-10-25 RX ORDER — GADOBUTROL 604.72 MG/ML
8 INJECTION INTRAVENOUS
Status: COMPLETED | OUTPATIENT
Start: 2024-10-25 | End: 2024-10-25

## 2024-10-25 RX ADMIN — GADOBUTROL 8 ML: 604.72 INJECTION INTRAVENOUS at 10:10

## 2024-10-28 ENCOUNTER — TELEPHONE (OUTPATIENT)
Dept: PAIN MEDICINE | Facility: CLINIC | Age: 79
End: 2024-10-28
Payer: MEDICARE

## 2024-10-28 ENCOUNTER — OFFICE VISIT (OUTPATIENT)
Dept: SPINE | Facility: CLINIC | Age: 79
End: 2024-10-28
Payer: MEDICARE

## 2024-10-28 VITALS — HEIGHT: 67 IN | WEIGHT: 183 LBS | BODY MASS INDEX: 28.72 KG/M2

## 2024-10-28 DIAGNOSIS — G89.29 CHRONIC BILATERAL LOW BACK PAIN WITH LEFT-SIDED SCIATICA: Primary | ICD-10-CM

## 2024-10-28 DIAGNOSIS — M54.16 LUMBAR RADICULOPATHY: Primary | ICD-10-CM

## 2024-10-28 DIAGNOSIS — M54.42 CHRONIC BILATERAL LOW BACK PAIN WITH LEFT-SIDED SCIATICA: Primary | ICD-10-CM

## 2024-10-28 PROCEDURE — 99999 PR PBB SHADOW E&M-EST. PATIENT-LVL IV: CPT | Mod: PBBFAC,,, | Performed by: PHYSICAL MEDICINE & REHABILITATION

## 2024-10-28 PROCEDURE — 99213 OFFICE O/P EST LOW 20 MIN: CPT | Mod: S$GLB,,, | Performed by: PHYSICAL MEDICINE & REHABILITATION

## 2024-10-28 PROCEDURE — 1160F RVW MEDS BY RX/DR IN RCRD: CPT | Mod: CPTII,S$GLB,, | Performed by: PHYSICAL MEDICINE & REHABILITATION

## 2024-10-28 PROCEDURE — 1101F PT FALLS ASSESS-DOCD LE1/YR: CPT | Mod: CPTII,S$GLB,, | Performed by: PHYSICAL MEDICINE & REHABILITATION

## 2024-10-28 PROCEDURE — 1125F AMNT PAIN NOTED PAIN PRSNT: CPT | Mod: CPTII,S$GLB,, | Performed by: PHYSICAL MEDICINE & REHABILITATION

## 2024-10-28 PROCEDURE — 1159F MED LIST DOCD IN RCRD: CPT | Mod: CPTII,S$GLB,, | Performed by: PHYSICAL MEDICINE & REHABILITATION

## 2024-10-28 PROCEDURE — 3288F FALL RISK ASSESSMENT DOCD: CPT | Mod: CPTII,S$GLB,, | Performed by: PHYSICAL MEDICINE & REHABILITATION

## 2024-10-28 RX ORDER — CYCLOBENZAPRINE HCL 10 MG
10 TABLET ORAL NIGHTLY PRN
Qty: 21 TABLET | Refills: 0 | Status: SHIPPED | OUTPATIENT
Start: 2024-10-28 | End: 2024-11-27

## 2024-10-28 NOTE — H&P (VIEW-ONLY)
SUBJECTIVE:    Patient ID: Jovana Fall is a 79 y.o. female.    Chief Complaint: Follow-up    She is here to review her lumbar MRI done 10/25/2024 to evaluate her complaint of low back pain at the lumbosacral junction that radiates down the posterior portion of the left leg and into the anterior portion of the leg below the knee.      The MRI is summarized below:          FINDINGS:  The lumbar vertebral bodies show normal height and signal intensity without evidence of acute compression fracture or pathologic marrow replacement process.  There is a grade I retrolisthesis of L4 on L5 and L5 on S1..  There is degenerative disc desiccation present at every lumbar level with disc space narrowing observed at L4-L5 and L5-S1..     The conus medullaris terminates at L1-L2.  The visualized lower thoracic spinal cord is normal in signal intensity with no evidence of cord edema, myelomalacia, or cord syrinx.  No abnormal intramedullary enhancement.  No peripherally enhancing epidural fluid collections or masses.  The paraspinous musculature is unremarkable.     There are multiple probable parapelvic cysts noted in both kidneys.     T12-L1: No disc protrusion or extrusion. No central canal stenosis or neuroforaminal stenosis.     L1-L2: No disc protrusion or extrusion. No central canal stenosis or neuroforaminal stenosis.     L2-L3: There is a broad disc bulge which extends into both neural foramina.  There is an annular fissure noted diffusely across the central, paracentral, foraminal, and extraforaminal portions of the intervertebral disc bilaterally no central canal stenosis.  There is ligamentum flavum thickening and mild facet arthropathy.     L3-L4: There is a broad disc bulge which extends into both neural foramina.  There is ligamentum flavum thickening and bilateral facet arthropathy.  There is moderate-severe central canal stenosis.  There is mild-moderate right neuroforaminal stenosis.  No left  neuroforaminal stenosis.     L4-L5: There is a grade I retrolisthesis of L4 on L5.  There is uncovering of the intervertebral disc.  There is mild-moderate right neuroforaminal stenosis present.  There is a broad right paracentral/foraminal/extraforaminal disc protrusion with abutment of the exited right L4 nerve in the right extraforaminal soft tissues.  There is abutment of the descending right L5 nerve in the right lateral recess at L4-L5.  There is left ligamentum flavum thickening and bilateral facet arthropathy.  There are possible postprocedural changes of right semihemilaminectomy.  There is mild overall central canal stenosis.     L5-S1: There is a broad disc bulge which extends into both neural foramina.  There is bilateral hypertrophic facet arthropathy.  The right ligamentum flavum has been resected.  There are postoperative changes of right semi hemilaminectomy.There is a broad disc bulge present. There is a superimposed peripherally enhancing broad left paracentral disc protrusion which abuts the descending left S1 nerve in the left lateral recess. Please correlate clinically for symptoms referable to the left S1 nerve.  There is mild bilateral neuroforaminal stenosis.  No overall central canal stenosis..     Impression:     1. Multilevel degenerative change of the lumbar spine resulting in multilevel central canal and neuroforaminal stenosis, most pronounced at L3-L4 where moderate-severe central canal stenosis is observed.  2. Probable postoperative changes of right semi hemilaminectomy at L4-L5 and L5-S1.  3. Abutment of the exited right L4 nerve at L4-L5 by a broad right paracentral/foraminal/extraforaminal disc protrusion.  4. Broad left paracentral disc protrusion at L5-S1 which could abut the descending left S1 nerve in the left lateral recess at L5-S1.  5. Grade I retrolisthesis of L4 on L5 and L5 on S1.  6. Probable bilateral parapelvic renal cysts.      Clinically she is about the same.  Her  symptoms are described above.  Pain level is 7/10 and interferes with quality of life in terms of activities of daily living recreation and social activities.            Past Medical History:   Diagnosis Date    Anticoagulant long-term use     Anxiety     Basal cell carcinoma 2014    left upper arm     BCC (basal cell carcinoma of skin) 01/07/2016    mid upper back    BCC (basal cell carcinoma)     left upper back    Calcium nephrolithiasis     Cataract     Colon polyp     Coronary artery disease     Cortical cataract - Both Eyes 04/29/2013    Depression     Diabetes mellitus     Displacement of lumbar intervertebral disc without myelopathy 05/06/2013    Diverticulosis     GERD (gastroesophageal reflux disease)     H. pylori infection     Hepatomegaly     History of melanoma in situ 02/02/2015    Mid upper back ; 5/2014     Hx of colonic polyps 01/27/2015    Hyperlipidemia     Hypertension     Lactose intolerance     Lumbar spinal stenosis 05/06/2013    Melanoma     mid upper back- in situ 5/2014    Nuclear sclerosis - Both Eyes 04/29/2013    Osteopenia     Spinal stenosis, lumbar region, with neurogenic claudication 05/06/2013    Spondylosis without myelopathy 05/06/2013    Squamous cell carcinoma of skin     Type 2 diabetes mellitus      Social History     Socioeconomic History    Marital status:    Occupational History     Employer: OTHER   Tobacco Use    Smoking status: Never    Smokeless tobacco: Never   Substance and Sexual Activity    Alcohol use: Yes     Comment: rare glass - less than one monthly    Drug use: No    Sexual activity: Not Currently     Social Drivers of Health     Financial Resource Strain: Low Risk  (9/26/2024)    Overall Financial Resource Strain (CARDIA)     Difficulty of Paying Living Expenses: Not hard at all   Food Insecurity: No Food Insecurity (9/26/2024)    Hunger Vital Sign     Worried About Running Out of Food in the Last Year: Never true     Ran Out of Food in the Last  Year: Never true   Transportation Needs: No Transportation Needs (1/25/2021)    PRAPARE - Transportation     Lack of Transportation (Medical): No     Lack of Transportation (Non-Medical): No   Physical Activity: Sufficiently Active (9/26/2024)    Exercise Vital Sign     Days of Exercise per Week: 7 days     Minutes of Exercise per Session: 30 min   Stress: No Stress Concern Present (9/26/2024)    [employer] - Occupational Stress Questionnaire     Feeling of Stress : Only a little   Housing Stability: Unknown (9/26/2024)    Housing Stability Vital Sign     Unable to Pay for Housing in the Last Year: No     Past Surgical History:   Procedure Laterality Date    APPENDECTOMY      BROW LIFT AND BLEPHAROPLASTY      BUNIONECTOMY Right     CARPAL TUNNEL RELEASE Bilateral     CHOLECYSTECTOMY      COLONOSCOPY  02/05/2015    Dr. Blas, repeat in 3-5 years    COLONOSCOPY N/A 2/3/2021    Procedure: COLONOSCOPY;  Surgeon: Cale Aggarwal MD;  Location: [facility] ENDO;  Service: Endoscopy;  Laterality: N/A;    ESOPHAGOGASTRODUODENOSCOPY N/A 1/27/2021    Procedure: EGD (ESOPHAGOGASTRODUODENOSCOPY);  Surgeon: Cale Aggarwal MD;  Location: [facility] ENDO;  Service: Endoscopy;  Laterality: N/A;    HEMORRHOID SURGERY      HYSTERECTOMY      ovaries remain    LUMBAR LAMINECTOMY      RHINOPLASTY TIP      SKIN CANCER EXCISION      TUBAL LIGATION      UPPER GASTROINTESTINAL ENDOSCOPY  2015    Dr. Blas    UPPER GASTROINTESTINAL ENDOSCOPY  03/30/2017    Dr. Aggarwal     Family History   Problem Relation Name Age of Onset    Heart disease Mother          MI    Cancer Mother          SQ carcinoma of lung    Heart attack Mother      Skin cancer Mother      Cancer Father          prostate    Ovarian cancer Cousin      Colon cancer Cousin      Skin cancer Daughter      Cancer Sister 1/2         uterine sarcoma    Colon cancer Brother 1/2 60    No Known Problems Son      No Known Problems Sister 1/2     Diabetes Sister  "1/2     Hypertension Sister 1/2     Arthritis Sister 1/2     No Known Problems Daughter      Breast cancer Maternal Aunt      Amblyopia Neg Hx      Blindness Neg Hx      Cataracts Neg Hx      Glaucoma Neg Hx      Retinal detachment Neg Hx      Strabismus Neg Hx      Stroke Neg Hx      Thyroid disease Neg Hx      Melanoma Neg Hx      Psoriasis Neg Hx      Lupus Neg Hx      Eczema Neg Hx      Crohn's disease Neg Hx      Ulcerative colitis Neg Hx       Vitals:    10/28/24 1111   Weight: 83 kg (182 lb 15.7 oz)   Height: 5' 7" (1.702 m)       Review of Systems   Constitutional:  Negative for chills, diaphoresis, fatigue, fever and unexpected weight change.   HENT:  Negative for trouble swallowing.    Eyes:  Negative for visual disturbance.   Respiratory:  Negative for shortness of breath.    Cardiovascular:  Negative for chest pain.   Gastrointestinal:  Negative for abdominal pain, constipation, nausea and vomiting.   Genitourinary:  Negative for difficulty urinating.   Musculoskeletal:  Negative for arthralgias, back pain, gait problem, joint swelling, myalgias, neck pain and neck stiffness.   Neurological:  Negative for dizziness, speech difficulty, weakness, light-headedness, numbness and headaches.          Objective:      Physical Exam  Neurological:      Mental Status: She is alert and oriented to person, place, and time.             Assessment:       1. Chronic bilateral low back pain with left-sided sciatica           Plan:     I reassured her there are no worrisome findings on her MRI.  For symptom relief I am recommending transforaminal injections on the left at L5-S1.  Follow up with me after the procedure.  Consider neurosurgical evaluation.      Chronic bilateral low back pain with left-sided sciatica    Other orders  -     cyclobenzaprine (FLEXERIL) 10 MG tablet; Take 1 tablet (10 mg total) by mouth nightly as needed for Muscle spasms (Pain).  Dispense: 21 tablet; Refill: 0          "

## 2024-10-29 DIAGNOSIS — K21.9 GASTROESOPHAGEAL REFLUX DISEASE WITHOUT ESOPHAGITIS: ICD-10-CM

## 2024-10-29 RX ORDER — OMEPRAZOLE 20 MG/1
20 CAPSULE, DELAYED RELEASE ORAL EVERY MORNING
Qty: 90 CAPSULE | Refills: 3 | Status: SHIPPED | OUTPATIENT
Start: 2024-10-29

## 2024-10-29 RX ORDER — OMEPRAZOLE 20 MG/1
20 CAPSULE, DELAYED RELEASE ORAL EVERY MORNING
Qty: 90 CAPSULE | Refills: 3 | OUTPATIENT
Start: 2024-10-29

## 2024-11-12 ENCOUNTER — TELEPHONE (OUTPATIENT)
Dept: FAMILY MEDICINE | Facility: CLINIC | Age: 79
End: 2024-11-12
Payer: MEDICARE

## 2024-11-12 NOTE — TELEPHONE ENCOUNTER
----- Message from Jacqui sent at 11/12/2024 11:31 AM CST -----  Regarding: advise  Contact: pt  Type: Needs Medical Advice  Who Called:  patient   Symptoms (please be specific):    How long has patient had these symptoms:    Pharmacy name and phone #:    Best Call Back Number: [phone]    Additional Information: tasha jimenez pt can't make it

## 2024-11-13 NOTE — PROGRESS NOTES
Subjective:    Patient ID:  Jovana Fall is a 79 y.o. female who presents for evaluation of   Chief Complaint   Patient presents with    Establish Care    Shortness of Breath    Fatigue       HPI:  Comes in today referred by Dr. Darrell kwok to establish care  She has a history of hypertension diabetes hyperlipidemia and obesity.  She has coronary calcifications seen on like CT scan.  She has had 2 coronary angiograms at [facility] in the past was told she has a lot of disease but no intervention was required just lifestyle changes.  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  She has noticed over the last 2 months she has complain of dyspnea on exertion walking about 2 blocks and doing housework.  She has been having back pain at L4-5 injection with steroids recently in the last 2 weeks.  She does not feel like it has worked yet she has some pain going down her left leg.  She has fatigue.  She gets lightheaded if she gets up quickly he is in the digital blood pressure monitoring program her pressure usually runs 130/76 she is in the diabetic digital program her sugars been controlled.  He has lost about 25 lb over several months  EKG today reveals normal sinus rhythm poor R-wave progression can not rule out old anterior MI    Orthostatics blood pressure 170/87 pulse 69 sitting 152/83 pulse of 70 standing 141/73 consistent with mild orthostasis and chronotropic incompetence    CT chest abdomen  FINDINGS:    THORACIC INLET: Normal.  BODY WALL AND AXILLAE: Normal.  GREAT VESSELS: Scattered, calcified atherosclerotic changes of the aorta and major branching vessels.  MEDIASTINUM/KARTHIKEYAN: Normal.  HEART, PERICARDIUM, AND CORONARY VESSELS: Coronary atherosclerosis. No pericardial effusion.  TRACHEA/CENTRAL AIRWAY: Normal.  LUNGS/PLEURA: Right posterior basilar subsegmental atelectasis. No large focal consolidation, pleural effusion or  pneumothorax.  DIAPHRAGM: Normal.  MUSCULOSKELETAL SYSTEM: Comminuted, displaced bilateral proximal humeral shaft fractures with marked foreshortening. 4 through 10th lateral right rib fractures.  UPPER ABDOMEN: Findings of the upper abdomen are discussed in detail in the Abdomen/Pelvic CT report performed concurrently.    Exam End: 11/06/22 18:38       Review of patient's allergies indicates:   Allergen Reactions    Adhesive tape-silicones      Other reaction(s): Swelling  Other reaction(s): Itching    Codeine Itching    Metformin Itching    Morphine Itching    Penicillins Itching     Other reaction(s): Itching    Sulfa (sulfonamide antibiotics) Itching     Other reaction(s): Itching       Past Medical History:   Diagnosis Date    Anticoagulant long-term use     Anxiety     Basal cell carcinoma 2014    left upper arm     BCC (basal cell carcinoma of skin) 01/07/2016    mid upper back    BCC (basal cell carcinoma)     left upper back    Calcium nephrolithiasis     Cataract     Colon polyp     Coronary artery disease     Cortical cataract - Both Eyes 04/29/2013    Depression     Diabetes mellitus     Displacement of lumbar intervertebral disc without myelopathy 05/06/2013    Diverticulosis     GERD (gastroesophageal reflux disease)     H. pylori infection     Hepatomegaly     History of melanoma in situ 02/02/2015    Mid upper back ; 5/2014     Hx of colonic polyps 01/27/2015    Hyperlipidemia     Hypertension     Lactose intolerance     Lumbar spinal stenosis 05/06/2013    Melanoma     mid upper back- in situ 5/2014    Nuclear sclerosis - Both Eyes 04/29/2013    Osteopenia     Spinal stenosis, lumbar region, with neurogenic claudication 05/06/2013    Spondylosis without myelopathy 05/06/2013    Squamous cell carcinoma of skin     Type 2 diabetes mellitus      Past Surgical History:   Procedure Laterality Date    APPENDECTOMY      BROW LIFT AND BLEPHAROPLASTY      BUNIONECTOMY Right     CARPAL TUNNEL RELEASE  Bilateral     CHOLECYSTECTOMY      COLONOSCOPY  02/05/2015    Dr. Blas, repeat in 3-5 years    COLONOSCOPY N/A 2/3/2021    Procedure: COLONOSCOPY;  Surgeon: Cale Aggarwal MD;  Location: [facility] ENDO;  Service: Endoscopy;  Laterality: N/A;    ESOPHAGOGASTRODUODENOSCOPY N/A 1/27/2021    Procedure: EGD (ESOPHAGOGASTRODUODENOSCOPY);  Surgeon: Cale Aggarwal MD;  Location: [facility] ENDO;  Service: Endoscopy;  Laterality: N/A;    HEMORRHOID SURGERY      HYSTERECTOMY      ovaries remain    LUMBAR LAMINECTOMY      RHINOPLASTY TIP      SKIN CANCER EXCISION      TRANSFORAMINAL EPIDURAL INJECTION OF STEROID Left 11/19/2024    Procedure: Injection,steroid,epidural,transforaminal approach;  Surgeon: Dieter York MD;  Location: [facility];  Service: Pain Management;  Laterality: Left;    TUBAL LIGATION      UPPER GASTROINTESTINAL ENDOSCOPY  2015    Dr. Blas    UPPER GASTROINTESTINAL ENDOSCOPY  03/30/2017    Dr. Aggarwal     Social History     Tobacco Use    Smoking status: Never    Smokeless tobacco: Never   Substance Use Topics    Alcohol use: Yes     Comment: rare glass - less than one monthly    Drug use: No     Family History   Problem Relation Name Age of Onset    Heart disease Mother          MI    Cancer Mother          SQ carcinoma of lung    Heart attack Mother      Skin cancer Mother      Cancer Father          prostate    Ovarian cancer Cousin      Colon cancer Cousin      Skin cancer Daughter      Cancer Sister 1/2         uterine sarcoma    Colon cancer Brother 1/2 60    No Known Problems Son      No Known Problems Sister 1/2     Diabetes Sister 1/2     Hypertension Sister 1/2     Arthritis Sister 1/2     No Known Problems Daughter      Breast cancer Maternal Aunt      Amblyopia Neg Hx      Blindness Neg Hx      Cataracts Neg Hx      Glaucoma Neg Hx      Retinal detachment Neg Hx      Strabismus Neg Hx      Stroke Neg Hx      Thyroid disease Neg Hx      Melanoma Neg Hx      Psoriasis Neg Hx      Lupus Neg Hx       Eczema Neg Hx      Crohn's disease Neg Hx      Ulcerative colitis Neg Hx          Review of Systems:   Constitution: Negative for diaphoresis and fever.   HEENT: Negative for nosebleeds.    Cardiovascular: Negative for chest pain       No dyspnea on exertion       No leg swelling        No palpitations  Respiratory: Negative for shortness of breath and wheezing.    Hematologic/Lymphatic: Negative for bleeding problem. Does not bruise/bleed easily.   Skin: Negative for color change and rash.   Musculoskeletal: Negative for falls and myalgias.   Gastrointestinal: Negative for hematemesis and hematochezia.   Genitourinary: Negative for hematuria.   Neurological: Negative for dizziness and light-headedness.   Psychiatric/Behavioral: Negative for altered mental status and memory loss.          Objective:        Vitals:    11/21/24 1442   BP: (!) 177/87   Pulse: 67       Lab Results   Component Value Date    WBC 6.81 10/10/2024    HGB 15.1 10/10/2024    HCT 45.2 10/10/2024     10/10/2024    CHOL 138 10/10/2024    TRIG 192 (H) 10/10/2024    HDL 30 (L) 10/10/2024    ALT 17 10/10/2024    AST 17 10/10/2024     10/10/2024    K 3.6 10/10/2024     10/10/2024    CREATININE 0.9 10/10/2024    BUN 19 10/10/2024    CO2 21 (L) 10/10/2024    TSH 0.784 12/20/2017    INR 1.0 03/27/2023    GLUF 120 (H) 03/02/2007    HGBA1C 6.3 (H) 10/10/2024        ECHOCARDIOGRAM RESULTS  Results for orders placed during the hospital encounter of 06/11/21    Echo Color Flow Doppler? Yes    Interpretation Summary  · The left ventricle is normal in size with concentric remodeling and normal systolic function. The estimated ejection fraction is 60%.  · Normal right ventricular size with normal right ventricular systolic function.  · Normal left ventricular diastolic function.  · The estimated PA systolic pressure is 29 mmHg.  · Normal central venous pressure (3 mmHg).        CURRENT/PREVIOUS VISIT EKG  Results for orders placed or  performed in visit on 05/10/21   EKG 12-lead    Collection Time: 05/10/21  2:27 PM    Narrative    Test Reason : I25.10,E11.59,I10,    Vent. Rate : 072 BPM     Atrial Rate : 072 BPM     P-R Int : 156 ms          QRS Dur : 094 ms      QT Int : 388 ms       P-R-T Axes : 028 -17 030 degrees     QTc Int : 424 ms    Normal sinus rhythm  Normal ECG  When compared with ECG of 29-MAR-2018 12:28,  No significant change was found  Confirmed by KATIE GÓMEZ MD ([id number]) on 5/10/2021 4:05:38 PM    Referred By: CARLOS   SELF           Confirmed By:KATIE GÓMEZ MD     No valid procedures specified.   No results found for this or any previous visit.      Physical Exam:  CONSTITUTIONAL: No fever, no chills  HEENT: Normocephalic, atraumatic,pupils reactive to light                 NECK:  No JVD no carotid bruit  CVS: S1S2+, RRR, no murmurs,   LUNGS: Clear  ABDOMEN: Soft, NT, BS+  EXTREMITIES: No cyanosis, edema  : No javier catheter  NEURO: AAO X 3  PSY: Normal affect      Medication List with Changes/Refills   New Medications    EZETIMIBE (ZETIA) 10 MG TABLET    Take 1 tablet (10 mg total) by mouth once daily.   Current Medications    ASPIRIN 81 MG TAB    Take 81 mg by mouth. 1 Tablet Oral Every day    ATORVASTATIN (LIPITOR) 80 MG TABLET    Take 1 tablet (80 mg total) by mouth once daily.    BLOOD GLUCOSE CONTROL, NORMAL SOLN    Use as Instructed    CARVEDILOL (COREG) 25 MG TABLET    Take 1 tablet (25 mg total) by mouth 2 (two) times daily.    CYANOCOBALAMIN (VITAMIN B-12) 250 MCG TABLET    Take 250 mcg by mouth once daily.    CYCLOBENZAPRINE (FLEXERIL) 10 MG TABLET    Take 1 tablet (10 mg total) by mouth nightly as needed for Muscle spasms (Pain).    DULAGLUTIDE (TRULICITY) 3 MG/0.5 ML PEN INJECTOR    Inject 3 mg into the skin every 7 days.    EMPAGLIFLOZIN (JARDIANCE) 25 MG TABLET    Take 1 tablet (25 mg total) by mouth once daily.    ERGOCALCIFEROL (ERGOCALCIFEROL) 50,000 UNIT CAP    TAKE 1 CAPSULE EVERY 7 DAYS.    FISH  OIL-OMEGA-3 FATTY ACIDS 300-1,000 MG CAPSULE        FLUCONAZOLE (DIFLUCAN) 150 MG TAB    Take one tab PO once PRN yeast vaginitis    GABAPENTIN (NEURONTIN) 300 MG CAPSULE    Take 1 capsule (300 mg total) by mouth 2 (two) times daily.    KETOCONAZOLE (NIZORAL) 2 % CREAM    AAA bid    MELATONIN (MELATIN ORAL)    Take 5 mg by mouth as needed.    MELOXICAM (MOBIC) 15 MG TABLET    Take 1 tablet (15 mg total) by mouth once daily.    NITROGLYCERIN (NITROSTAT) 0.4 MG SL TABLET    DISSOLVE 1 TABLET UNDER THE TONGUE EVERY 5 MINUTES FOR 3 DOSES AS NEEDED FOR CHEST PAIN    OMEPRAZOLE (PRILOSEC) 20 MG CAPSULE    Take 1 capsule (20 mg total) by mouth every morning.    OXYCODONE-ACETAMINOPHEN (PERCOCET) 5-325 MG PER TABLET    Take 1 tablet by mouth 2 (two) times daily as needed for Pain.    TELMISARTAN (MICARDIS) 80 MG TAB    TAKE 1 TABLET ONE TIME DAILY    TRUE METRIX GLUCOSE TEST STRIP STRP    TEST BLOOD SUGAR EVERY DAY    TRUEDRAW LANCING DEVICE MISC    USE ONE TIME DAILY    TRUEPLUS LANCETS 30 GAUGE MISC    TEST BLOOD SUGAR ONE TIME DAILY    VENLAFAXINE (EFFEXOR) 100 MG TAB    Take 1 tablet (100 mg total) by mouth once daily.    VITAMIN E 100 UNIT CAPSULE    Take 100 Units by mouth 2 (two) times daily.   Discontinued Medications    CELECOXIB (CELEBREX) 100 MG CAPSULE    Take 1 capsule (100 mg total) by mouth 2 (two) times daily.             Assessment:       1. Coronary artery calcification seen on CAT scan    2. Hyperlipidemia associated with type 2 diabetes mellitus    3. Hypertension associated with diabetes    4. Abdominal aortic atherosclerosis    5. Stenosis of right carotid artery    6. Thoracic aorta atherosclerosis    7. Type 2 diabetes mellitus without complication, without long-term current use of insulin    8. Coronary artery disease involving native coronary artery of native heart without angina pectoris    9. Back pain, unspecified back location, unspecified back pain laterality, unspecified chronicity    10.  Neuropathy    11. Orthostasis         Plan:     Problem List Items Addressed This Visit          Cardiac/Vascular    Coronary artery calcification seen on CAT scan - Primary    Relevant Orders    IN OFFICE EKG 12-LEAD (to [place])    Echo Saline Bubble? No    Nuclear Stress - Cardiology Interpreted    Hyperlipidemia associated with type 2 diabetes mellitus    Hypertension associated with diabetes    Relevant Orders    Echo Saline Bubble? No    Nuclear Stress - Cardiology Interpreted    Abdominal aortic atherosclerosis    Stenosis of right carotid artery    Thoracic aorta atherosclerosis    Relevant Orders    Echo Saline Bubble? No    Nuclear Stress - Cardiology Interpreted       Endocrine    Type 2 diabetes mellitus without complication, without long-term current use of insulin    Relevant Orders    US Carotid Bilateral     Other Visit Diagnoses       Coronary artery disease involving native coronary artery of native heart without angina pectoris        Relevant Orders    IN OFFICE EKG 12-LEAD (to [place])    US Carotid Bilateral    Back pain, unspecified back location, unspecified back pain laterality, unspecified chronicity        Neuropathy        Orthostasis              Dyspnea on exertion recommend a stress test it has been several years since she has had a stress test    Coronary artery disease she has known coronary calcification seen on the CT scan had to catheterizations when told she has extensive coronary disease but non obstructive continue medical management she needs a follow-up stress test.  Continue aspirin    Hyperlipidemia her cholesterol is 138 LDL cholesterol 69 on Lipitor 80 and her goal is 55 LDL also add Zetia to the regimen      Orthostasis.  She has may be more sensitive to her medications since she has been on carvedilol for awhile and has lost weight.  Will consider decreasing the carvedilol and adding low-dose amlodipine on next visit      Hypertension continue blood pressure  monitoring      Abnormalities on EKG anterior infarct age indeterminate progress contrary to lead placement      History of carotid stenosis recheck carotid duplex    Follow up in about 2 months (around 1/21/2025).    The patients questions were answered, they verbalized understanding, and agreed with the treatment plan.     JENNIFER JASON MD  [facility]

## 2024-11-18 ENCOUNTER — PATIENT MESSAGE (OUTPATIENT)
Dept: ADMINISTRATIVE | Facility: HOSPITAL | Age: 79
End: 2024-11-18
Payer: MEDICARE

## 2024-11-19 ENCOUNTER — HOSPITAL ENCOUNTER (OUTPATIENT)
Facility: HOSPITAL | Age: 79
Discharge: HOME | End: 2024-11-19
Attending: ANESTHESIOLOGY | Admitting: ANESTHESIOLOGY
Payer: MEDICARE

## 2024-11-19 DIAGNOSIS — M54.16 LUMBAR RADICULITIS: ICD-10-CM

## 2024-11-19 LAB — POCT GLUCOSE: 133 MG/DL (ref 70–110)

## 2024-11-19 PROCEDURE — 64483 NJX AA&/STRD TFRM EPI L/S 1: CPT | Mod: LT,,, | Performed by: ANESTHESIOLOGY

## 2024-11-19 PROCEDURE — 25500020 PHARM REV CODE 255: Performed by: ANESTHESIOLOGY

## 2024-11-19 PROCEDURE — 64483 NJX AA&/STRD TFRM EPI L/S 1: CPT | Mod: LT | Performed by: ANESTHESIOLOGY

## 2024-11-19 PROCEDURE — 63600175 PHARM REV CODE 636 W HCPCS: Performed by: ANESTHESIOLOGY

## 2024-11-19 RX ORDER — MIDAZOLAM HYDROCHLORIDE 1 MG/ML
INJECTION INTRAMUSCULAR; INTRAVENOUS
Status: DISCONTINUED | OUTPATIENT
Start: 2024-11-19 | End: 2024-11-19 | Stop reason: HOSPADM

## 2024-11-19 RX ORDER — DEXAMETHASONE SODIUM PHOSPHATE 10 MG/ML
INJECTION INTRAMUSCULAR; INTRAVENOUS
Status: DISCONTINUED | OUTPATIENT
Start: 2024-11-19 | End: 2024-11-19 | Stop reason: HOSPADM

## 2024-11-19 RX ORDER — LIDOCAINE HYDROCHLORIDE 10 MG/ML
INJECTION, SOLUTION EPIDURAL; INFILTRATION; INTRACAUDAL; PERINEURAL
Status: DISCONTINUED | OUTPATIENT
Start: 2024-11-19 | End: 2024-11-19 | Stop reason: HOSPADM

## 2024-11-19 RX ORDER — LIDOCAINE HYDROCHLORIDE 10 MG/ML
1 INJECTION, SOLUTION EPIDURAL; INFILTRATION; INTRACAUDAL; PERINEURAL ONCE
Status: DISCONTINUED | OUTPATIENT
Start: 2024-11-19 | End: 2024-11-19 | Stop reason: HOSPADM

## 2024-11-19 RX ORDER — BUPIVACAINE HYDROCHLORIDE 2.5 MG/ML
INJECTION, SOLUTION EPIDURAL; INFILTRATION; INTRACAUDAL
Status: DISCONTINUED | OUTPATIENT
Start: 2024-11-19 | End: 2024-11-19 | Stop reason: HOSPADM

## 2024-11-19 RX ORDER — FENTANYL CITRATE 50 UG/ML
INJECTION, SOLUTION INTRAMUSCULAR; INTRAVENOUS
Status: DISCONTINUED | OUTPATIENT
Start: 2024-11-19 | End: 2024-11-19 | Stop reason: HOSPADM

## 2024-11-19 RX ORDER — SODIUM CHLORIDE, SODIUM LACTATE, POTASSIUM CHLORIDE, CALCIUM CHLORIDE 600; 310; 30; 20 MG/100ML; MG/100ML; MG/100ML; MG/100ML
INJECTION, SOLUTION INTRAVENOUS CONTINUOUS
Status: DISCONTINUED | OUTPATIENT
Start: 2024-11-19 | End: 2024-11-19 | Stop reason: HOSPADM

## 2024-11-19 NOTE — OP NOTE
PROCEDURE DATE: 11/19/2024    PROCEDURE: Left L5-S1 transforaminal epidural steroid injection under fluoroscopy    DIAGNOSIS: Lumbar radiculitis    Post op diagnosis: Same    PHYSICIAN: Dietre York MD    MEDICATIONS INJECTED:  Dexamethasone 5mg (0.5ml) and 1.5ml 0.25% bupivicaine at each nerve root.     LOCAL ANESTHETIC INJECTED:  Lidocaine 1%. 2 ml per site.    SEDATION MEDICATIONS: RN IV sedation    ESTIMATED BLOOD LOSS:  None    COMPLICATIONS:  None    TECHNIQUE:   A time-out was taken to identify patient and procedure side prior to starting the procedure. The patient was placed in a prone position, prepped and draped in the usual sterile fashion using ChloraPrep and sterile towels.  The area to be injected was determined under fluoroscopic guidance in AP and oblique view.  Local anesthetic was given by raising a wheal and going down to the hub of a 25-gauge 1.5 inch needle.  In oblique view, a 3.5 inch 22-gauge bent-tip spinal needle was introduced towards 6 oclock position of the pedicle of each above named nerve root level.  The needle was walked medially then hinged into the neural foramen and position was confirmed in AP and lateral views.  1ml contrast dye was injected to confirm appropriate placement and that there was no vascular uptake.  After negative aspiration for blood or CSF, the medication was then injected. This was performed at the left L5-S1 level(s). The patient tolerated the procedure well.    The patient was monitored after the procedure.  Patient was given post procedure and discharge instructions to follow at home. The patient was discharged in a stable condition.

## 2024-11-19 NOTE — DISCHARGE SUMMARY
[facility] - Periop Services  Discharge Note  Short Stay    Procedure(s) (LRB):  Injection,steroid,epidural,transforaminal approach l5-s1 (Left)      OUTCOME: Patient tolerated treatment/procedure well without complication and is now ready for discharge.    DISPOSITION: Home or Self Care    FINAL DIAGNOSIS:  <principal problem not specified>    FOLLOWUP: In clinic    DISCHARGE INSTRUCTIONS:    Discharge Procedure Orders   Notify your health care provider if you experience any of the following:  temperature >100.4     Notify your health care provider if you experience any of the following:  severe uncontrolled pain     Notify your health care provider if you experience any of the following:  redness, tenderness, or signs of infection (pain, swelling, redness, odor or green/yellow discharge around incision site)     Activity as tolerated        TIME SPENT ON DISCHARGE: 30 minutes

## 2024-11-20 VITALS
WEIGHT: 183 LBS | HEIGHT: 67 IN | SYSTOLIC BLOOD PRESSURE: 126 MMHG | BODY MASS INDEX: 28.72 KG/M2 | HEART RATE: 69 BPM | OXYGEN SATURATION: 95 % | TEMPERATURE: 98 F | DIASTOLIC BLOOD PRESSURE: 64 MMHG | RESPIRATION RATE: 18 BRPM

## 2024-11-21 ENCOUNTER — OFFICE VISIT (OUTPATIENT)
Dept: CARDIOLOGY | Facility: CLINIC | Age: 79
End: 2024-11-21
Payer: MEDICARE

## 2024-11-21 VITALS
WEIGHT: 187.94 LBS | DIASTOLIC BLOOD PRESSURE: 87 MMHG | OXYGEN SATURATION: 94 % | BODY MASS INDEX: 29.44 KG/M2 | HEART RATE: 67 BPM | SYSTOLIC BLOOD PRESSURE: 177 MMHG

## 2024-11-21 DIAGNOSIS — E11.9 TYPE 2 DIABETES MELLITUS WITHOUT COMPLICATION, WITHOUT LONG-TERM CURRENT USE OF INSULIN: ICD-10-CM

## 2024-11-21 DIAGNOSIS — I15.2 HYPERTENSION ASSOCIATED WITH DIABETES: ICD-10-CM

## 2024-11-21 DIAGNOSIS — I70.0 THORACIC AORTA ATHEROSCLEROSIS: ICD-10-CM

## 2024-11-21 DIAGNOSIS — I25.10 CORONARY ARTERY DISEASE INVOLVING NATIVE CORONARY ARTERY OF NATIVE HEART WITHOUT ANGINA PECTORIS: ICD-10-CM

## 2024-11-21 DIAGNOSIS — I25.10 CORONARY ARTERY CALCIFICATION SEEN ON CAT SCAN: Primary | ICD-10-CM

## 2024-11-21 DIAGNOSIS — M54.9 BACK PAIN, UNSPECIFIED BACK LOCATION, UNSPECIFIED BACK PAIN LATERALITY, UNSPECIFIED CHRONICITY: ICD-10-CM

## 2024-11-21 DIAGNOSIS — E11.69 HYPERLIPIDEMIA ASSOCIATED WITH TYPE 2 DIABETES MELLITUS: ICD-10-CM

## 2024-11-21 DIAGNOSIS — E11.59 HYPERTENSION ASSOCIATED WITH DIABETES: ICD-10-CM

## 2024-11-21 DIAGNOSIS — E78.5 HYPERLIPIDEMIA ASSOCIATED WITH TYPE 2 DIABETES MELLITUS: ICD-10-CM

## 2024-11-21 DIAGNOSIS — I70.0 ABDOMINAL AORTIC ATHEROSCLEROSIS: ICD-10-CM

## 2024-11-21 DIAGNOSIS — I65.21 STENOSIS OF RIGHT CAROTID ARTERY: ICD-10-CM

## 2024-11-21 DIAGNOSIS — I95.1 ORTHOSTASIS: ICD-10-CM

## 2024-11-21 DIAGNOSIS — G62.9 NEUROPATHY: ICD-10-CM

## 2024-11-21 PROCEDURE — 99204 OFFICE O/P NEW MOD 45 MIN: CPT | Mod: S$GLB,,, | Performed by: GENERAL PRACTICE

## 2024-11-21 PROCEDURE — 3079F DIAST BP 80-89 MM HG: CPT | Mod: CPTII,S$GLB,, | Performed by: GENERAL PRACTICE

## 2024-11-21 PROCEDURE — 3288F FALL RISK ASSESSMENT DOCD: CPT | Mod: CPTII,S$GLB,, | Performed by: GENERAL PRACTICE

## 2024-11-21 PROCEDURE — 1160F RVW MEDS BY RX/DR IN RCRD: CPT | Mod: CPTII,S$GLB,, | Performed by: GENERAL PRACTICE

## 2024-11-21 PROCEDURE — 99999 PR PBB SHADOW E&M-EST. PATIENT-LVL V: CPT | Mod: PBBFAC,,, | Performed by: GENERAL PRACTICE

## 2024-11-21 PROCEDURE — 3077F SYST BP >= 140 MM HG: CPT | Mod: CPTII,S$GLB,, | Performed by: GENERAL PRACTICE

## 2024-11-21 PROCEDURE — 1101F PT FALLS ASSESS-DOCD LE1/YR: CPT | Mod: CPTII,S$GLB,, | Performed by: GENERAL PRACTICE

## 2024-11-21 PROCEDURE — 1159F MED LIST DOCD IN RCRD: CPT | Mod: CPTII,S$GLB,, | Performed by: GENERAL PRACTICE

## 2024-11-21 RX ORDER — EZETIMIBE 10 MG/1
10 TABLET ORAL DAILY
Qty: 90 TABLET | Refills: 3 | Status: SHIPPED | OUTPATIENT
Start: 2024-11-21 | End: 2025-11-21

## 2024-11-22 DIAGNOSIS — M48.061 DEGENERATIVE LUMBAR SPINAL STENOSIS: ICD-10-CM

## 2024-11-22 RX ORDER — OXYCODONE AND ACETAMINOPHEN 5; 325 MG/1; MG/1
1 TABLET ORAL 2 TIMES DAILY PRN
Qty: 60 TABLET | Refills: 0 | Status: SHIPPED | OUTPATIENT
Start: 2024-11-22

## 2024-11-22 NOTE — TELEPHONE ENCOUNTER
No care due was identified.  [facility] Embedded Care Due Messages. Reference number: [id number].   11/22/2024 7:35:26 AM CST

## 2024-11-23 LAB
OHS QRS DURATION: 84 MS
OHS QTC CALCULATION: 401 MS

## 2024-11-25 ENCOUNTER — TELEPHONE (OUTPATIENT)
Dept: OPHTHALMOLOGY | Facility: CLINIC | Age: 79
End: 2024-11-25
Payer: MEDICARE

## 2024-11-25 NOTE — TELEPHONE ENCOUNTER
Spoke to pt and scheduled next available in march. Pt needs to order more contacts to last her, gave optical shop info. Advised script expires in 3 days order asap    ----- Message from Stacy sent at 11/25/2024 11:09 AM CST -----  Regarding: Needs sooner appt  Type:  Sooner Appointment Request    Caller is requesting a sooner appointment.  Caller declined first available appointment listed below.  Caller will not accept being placed on the waitlist and is requesting a message be sent to doctor.    Name of Caller:  Pt  When is the first available appointment?  March   Symptoms:  needs yearly check  Would the patient rather a call back or a response via [facility]? Call back  Best Call Back Number:  [phone]    Additional Information:

## 2024-12-12 ENCOUNTER — OFFICE VISIT (OUTPATIENT)
Dept: FAMILY MEDICINE | Facility: CLINIC | Age: 79
End: 2024-12-12
Payer: MEDICARE

## 2024-12-12 VITALS
HEIGHT: 67 IN | DIASTOLIC BLOOD PRESSURE: 75 MMHG | HEART RATE: 76 BPM | TEMPERATURE: 99 F | SYSTOLIC BLOOD PRESSURE: 127 MMHG | WEIGHT: 184.19 LBS | BODY MASS INDEX: 28.91 KG/M2 | OXYGEN SATURATION: 95 %

## 2024-12-12 DIAGNOSIS — I25.10 CORONARY ARTERY CALCIFICATION SEEN ON CAT SCAN: ICD-10-CM

## 2024-12-12 DIAGNOSIS — I70.0 THORACIC AORTA ATHEROSCLEROSIS: ICD-10-CM

## 2024-12-12 DIAGNOSIS — F41.1 GENERALIZED ANXIETY DISORDER: ICD-10-CM

## 2024-12-12 DIAGNOSIS — Z00.00 ENCOUNTER FOR PREVENTIVE HEALTH EXAMINATION: ICD-10-CM

## 2024-12-12 DIAGNOSIS — M81.0 AGE-RELATED OSTEOPOROSIS WITHOUT CURRENT PATHOLOGICAL FRACTURE: ICD-10-CM

## 2024-12-12 DIAGNOSIS — M47.816 ARTHRITIS OF LUMBAR SPINE: ICD-10-CM

## 2024-12-12 DIAGNOSIS — E78.5 HYPERLIPIDEMIA ASSOCIATED WITH TYPE 2 DIABETES MELLITUS: ICD-10-CM

## 2024-12-12 DIAGNOSIS — E11.59 HYPERTENSION ASSOCIATED WITH DIABETES: ICD-10-CM

## 2024-12-12 DIAGNOSIS — F33.9 MAJOR DEPRESSION, RECURRENT, CHRONIC: ICD-10-CM

## 2024-12-12 DIAGNOSIS — I70.0 ABDOMINAL AORTIC ATHEROSCLEROSIS: ICD-10-CM

## 2024-12-12 DIAGNOSIS — K21.9 GASTROESOPHAGEAL REFLUX DISEASE, UNSPECIFIED WHETHER ESOPHAGITIS PRESENT: ICD-10-CM

## 2024-12-12 DIAGNOSIS — Z86.006 HISTORY OF MELANOMA IN SITU: ICD-10-CM

## 2024-12-12 DIAGNOSIS — Z00.00 ENCOUNTER FOR MEDICARE ANNUAL WELLNESS EXAM: Primary | ICD-10-CM

## 2024-12-12 DIAGNOSIS — I15.2 HYPERTENSION ASSOCIATED WITH DIABETES: ICD-10-CM

## 2024-12-12 DIAGNOSIS — I65.21 STENOSIS OF RIGHT CAROTID ARTERY: ICD-10-CM

## 2024-12-12 DIAGNOSIS — E11.69 HYPERLIPIDEMIA ASSOCIATED WITH TYPE 2 DIABETES MELLITUS: ICD-10-CM

## 2024-12-12 DIAGNOSIS — E11.9 TYPE 2 DIABETES MELLITUS WITHOUT COMPLICATION, WITHOUT LONG-TERM CURRENT USE OF INSULIN: ICD-10-CM

## 2024-12-12 PROBLEM — S42.301A: Status: RESOLVED | Noted: 2022-12-20 | Resolved: 2024-12-12

## 2024-12-12 PROBLEM — M21.331 RIGHT WRIST DROP: Status: RESOLVED | Noted: 2023-06-20 | Resolved: 2024-12-12

## 2024-12-12 PROBLEM — S42.302A: Status: RESOLVED | Noted: 2022-12-20 | Resolved: 2024-12-12

## 2024-12-12 PROCEDURE — 99999 PR PBB SHADOW E&M-EST. PATIENT-LVL V: CPT | Mod: PBBFAC,,, | Performed by: PHYSICIAN ASSISTANT

## 2024-12-12 NOTE — ASSESSMENT & PLAN NOTE
Recent increase in statin.  Followed by Cardiology    The 10-year ASCVD risk score (Siobhan JASON, et al., 2019) is: 41.1%    Values used to calculate the score:      Age: 79 years      Sex: Female      Is Non- : No      Diabetic: Yes      Tobacco smoker: No      Systolic Blood Pressure: 127 mmHg      Is BP treated: Yes      HDL Cholesterol: 30 mg/dL      Total Cholesterol: 138 mg/dL

## 2024-12-12 NOTE — PROGRESS NOTES
"Jovana Fall presented for an initial Medicare AWV today. The following components were reviewed and updated:    Medical history  Family History  Social history  Allergies and Current Medications  Health Risk Assessment  Health Maintenance  Care Team    **See Completed Assessments for Annual Wellness visit with in the encounter summary    The following assessments were completed:  Depression Screening  Cognitive function Screening  Timed Get Up Test  Whisper Test      Opioid documentation:      Patient does have a current opioid prescription.      Patient accepted further discussion regarding opioid medication use.      Patient is currently taking oxycodone narcotic for back pain.        Pain level today is 4/10.       In addition to narcotic pain medications, patient is also using physical therapy and acetaminophen for pain control.       Patient is followed by a specialist currently for their pain and will not be referred today.       Patient's opioid risk potential based on ORT-OUD tool:       Waqas each box that applies   No   Yes     Family history of substance abuse   Alcohol [x] []   Illegal drugs [x] []   Rx drugs [x] []     Personal history of substance abuse   Alcohol [x] []   Illegal drugs [x] []   Rx drugs [x] []     Age between 16-45 years   [x]   []     Patient with ADD, OCD, Bipolar disorder, schizoprenia   [x]   []     Patient with depression   []   [x]                         Scoring total                           1                                      Non-opioid treatment options have been discussed today and added to the patient's after visit summary.          Vitals:    12/12/24 1131   BP: 127/75   BP Location: Left arm   Patient Position: Sitting   Pulse: 76   Temp: 98.5 °F (36.9 °C)   TempSrc: Oral   SpO2: 95%   Weight: 83.6 kg (184 lb 3.1 oz)   Height: 5' 7" (1.702 m)     Body mass index is 28.85 kg/m².       Physical Exam  Constitutional:       Appearance: Normal appearance.   HENT:      Head: " Normocephalic and atraumatic.   Pulmonary:      Effort: Pulmonary effort is normal.      Breath sounds: Normal breath sounds.   Musculoskeletal:         General: Normal range of motion.   Neurological:      General: No focal deficit present.      Mental Status: She is alert and oriented to person, place, and time. Mental status is at baseline.   Psychiatric:         Mood and Affect: Mood normal.         Behavior: Behavior normal.         Thought Content: Thought content normal.         Judgment: Judgment normal.           Diagnoses and health risks identified today and associated recommendations/orders:    Problem List Items Addressed This Visit       Abdominal aortic atherosclerosis     Stable, continue aspirin         Age-related osteoporosis without current pathological fracture     Stable, continue vitamin-D.  DEXA current         Arthritis of lumbar spine     Stable, continue followups with pain management         Coronary artery calcification seen on CAT scan     Recent increase in statin.  Followed by Cardiology    The 10-year ASCVD risk score (Siobhan JASON, et al., 2019) is: 41.1%    Values used to calculate the score:      Age: 79 years      Sex: Female      Is Non- : No      Diabetic: Yes      Tobacco smoker: No      Systolic Blood Pressure: 127 mmHg      Is BP treated: Yes      HDL Cholesterol: 30 mg/dL      Total Cholesterol: 138 mg/dL          Generalized anxiety disorder     Controlled, continue current regimen         GERD (gastroesophageal reflux disease)     Controlled, continue current regimen         History of melanoma in situ     In remission, keep followups with Dermatology         Hyperlipidemia associated with type 2 diabetes mellitus     Recent increase in statin to reduce cardiovascular risk factor.  Followed by Cardiology         Hypertension associated with diabetes     Controlled, continue current regimen         Major depression, recurrent, chronic     Controlled,  continue current regimen         Stenosis of right carotid artery     Stable, followed and monitored by Cardiology         Thoracic aorta atherosclerosis     Stable, continue aspirin         Type 2 diabetes mellitus without complication, without long-term current use of insulin     Controlled, continue current regimen          Other Visit Diagnoses       Encounter for Medicare annual wellness exam    -  Primary    Encounter for preventive health examination                 Provided Jovana with a 5-10 year written screening schedule and personal prevention plan. Recommendations were developed using the USPSTF age appropriate recommendations. Education, counseling, and referrals were provided as needed.  After Visit Summary printed and given to patient which includes a list of additional screenings\tests needed.    No follow-ups on file.      MAKENZIE Humphries     No

## 2024-12-18 ENCOUNTER — OFFICE VISIT (OUTPATIENT)
Dept: SPINE | Facility: CLINIC | Age: 79
End: 2024-12-18
Payer: MEDICARE

## 2024-12-18 VITALS — HEIGHT: 67 IN | WEIGHT: 184.31 LBS | BODY MASS INDEX: 28.93 KG/M2

## 2024-12-18 DIAGNOSIS — M25.552 CHRONIC LEFT HIP PAIN: ICD-10-CM

## 2024-12-18 DIAGNOSIS — G89.29 CHRONIC BILATERAL LOW BACK PAIN WITH LEFT-SIDED SCIATICA: Primary | ICD-10-CM

## 2024-12-18 DIAGNOSIS — M54.42 CHRONIC BILATERAL LOW BACK PAIN WITH LEFT-SIDED SCIATICA: Primary | ICD-10-CM

## 2024-12-18 DIAGNOSIS — G89.29 CHRONIC LEFT HIP PAIN: ICD-10-CM

## 2024-12-18 PROCEDURE — 1125F AMNT PAIN NOTED PAIN PRSNT: CPT | Mod: CPTII,S$GLB,, | Performed by: PHYSICAL MEDICINE & REHABILITATION

## 2024-12-18 PROCEDURE — 1100F PTFALLS ASSESS-DOCD GE2>/YR: CPT | Mod: CPTII,S$GLB,, | Performed by: PHYSICAL MEDICINE & REHABILITATION

## 2024-12-18 PROCEDURE — 1160F RVW MEDS BY RX/DR IN RCRD: CPT | Mod: CPTII,S$GLB,, | Performed by: PHYSICAL MEDICINE & REHABILITATION

## 2024-12-18 PROCEDURE — 1159F MED LIST DOCD IN RCRD: CPT | Mod: CPTII,S$GLB,, | Performed by: PHYSICAL MEDICINE & REHABILITATION

## 2024-12-18 PROCEDURE — 99999 PR PBB SHADOW E&M-EST. PATIENT-LVL IV: CPT | Mod: PBBFAC,,, | Performed by: PHYSICAL MEDICINE & REHABILITATION

## 2024-12-18 PROCEDURE — 3288F FALL RISK ASSESSMENT DOCD: CPT | Mod: CPTII,S$GLB,, | Performed by: PHYSICAL MEDICINE & REHABILITATION

## 2024-12-18 PROCEDURE — 99213 OFFICE O/P EST LOW 20 MIN: CPT | Mod: S$GLB,,, | Performed by: PHYSICAL MEDICINE & REHABILITATION

## 2024-12-18 NOTE — PROGRESS NOTES
SUBJECTIVE:    Patient ID: Jovana Fall is a 79 y.o. female.    Chief Complaint: Follow-up    She is here for follow-up status post transforaminal injection left L5-S1 on 11/1924 with Dr. York.  Modest response to that procedure.  She describes 40% improvement in her back and left leg symptoms.  Having said that she tells me she is no longer having low back pain or radicular nerve pain that extend below her knee.  Her primary complaint is posterolateral left hip discomfort and left groin pain.  She has known degenerative joint disease of the left hip.  She is being considered for hip replacement with Dr. Amanda.  Current pain level is 5/10 but at times as high as 6/10 and interferes with quality of life in terms of activities of daily living recreation and social activities.          Past Medical History:   Diagnosis Date    Anticoagulant long-term use     Anxiety     Basal cell carcinoma 2014    left upper arm     BCC (basal cell carcinoma of skin) 01/07/2016    mid upper back    BCC (basal cell carcinoma)     left upper back    Bilateral humeral fractures 12/20/2022    Calcium nephrolithiasis     Cataract     Colon polyp     Coronary artery disease     Cortical cataract - Both Eyes 04/29/2013    Depression     Diabetes mellitus     Displacement of lumbar intervertebral disc without myelopathy 05/06/2013    Diverticulosis     GERD (gastroesophageal reflux disease)     H. pylori infection     Hepatomegaly     History of melanoma in situ 02/02/2015    Mid upper back ; 5/2014     Hx of colonic polyps 01/27/2015    Hyperlipidemia     Hypertension     Lactose intolerance     Lumbar spinal stenosis 05/06/2013    Melanoma     mid upper back- in situ 5/2014    Nuclear sclerosis - Both Eyes 04/29/2013    Osteopenia     Spinal stenosis, lumbar region, with neurogenic claudication 05/06/2013    Spondylosis without myelopathy 05/06/2013    Squamous cell carcinoma of skin     Type 2 diabetes mellitus      Social History      Socioeconomic History    Marital status:    Occupational History     Employer: OTHER   Tobacco Use    Smoking status: Never    Smokeless tobacco: Never   Substance and Sexual Activity    Alcohol use: Yes     Alcohol/week: 20.0 standard drinks of alcohol     Types: 20 Glasses of wine per week     Comment: Occasionally    Drug use: No    Sexual activity: Not Currently     Partners: Male     Birth control/protection: Abstinence     Social Drivers of Health     Financial Resource Strain: Low Risk  (12/12/2024)    Overall Financial Resource Strain (CARDIA)     Difficulty of Paying Living Expenses: Not hard at all   Food Insecurity: No Food Insecurity (12/12/2024)    Hunger Vital Sign     Worried About Running Out of Food in the Last Year: Never true     Ran Out of Food in the Last Year: Never true   Transportation Needs: No Transportation Needs (12/12/2024)    PRAPARE - Transportation     Lack of Transportation (Medical): No     Lack of Transportation (Non-Medical): No   Physical Activity: Sufficiently Active (12/12/2024)    Exercise Vital Sign     Days of Exercise per Week: 7 days     Minutes of Exercise per Session: 30 min   Stress: No Stress Concern Present (12/12/2024)    [employer] - Occupational Stress Questionnaire     Feeling of Stress : Only a little   Housing Stability: Low Risk  (12/12/2024)    Housing Stability Vital Sign     Unable to Pay for Housing in the Last Year: No     Homeless in the Last Year: No     Past Surgical History:   Procedure Laterality Date    APPENDECTOMY      BROW LIFT AND BLEPHAROPLASTY      BUNIONECTOMY Right     CARPAL TUNNEL RELEASE Bilateral     CHOLECYSTECTOMY      COLONOSCOPY  02/05/2015    Dr. Blas, repeat in 3-5 years    COLONOSCOPY N/A 02/03/2021    Procedure: COLONOSCOPY;  Surgeon: Cale Aggarwal MD;  Location: [facility];  Service: Endoscopy;  Laterality: N/A;    COSMETIC SURGERY  Eyes/nose    ESOPHAGOGASTRODUODENOSCOPY N/A  "01/27/2021    Procedure: EGD (ESOPHAGOGASTRODUODENOSCOPY);  Surgeon: Cale Aggarwal MD;  Location: [facility];  Service: Endoscopy;  Laterality: N/A;    HEMORRHOID SURGERY      HYSTERECTOMY      ovaries remain    LUMBAR LAMINECTOMY      RHINOPLASTY TIP      SKIN CANCER EXCISION      TRANSFORAMINAL EPIDURAL INJECTION OF STEROID Left 11/19/2024    Procedure: Injection,steroid,epidural,transforaminal approach;  Surgeon: Dieter York MD;  Location: [facility] OR;  Service: Pain Management;  Laterality: Left;    TUBAL LIGATION      UPPER GASTROINTESTINAL ENDOSCOPY  2015    Dr. Blas    UPPER GASTROINTESTINAL ENDOSCOPY  03/30/2017    Dr. Aggarwal     Family History   Problem Relation Name Age of Onset    Heart disease Mother Renee         MI    Cancer Mother Renee         SQ carcinoma of lung    Heart attack Mother Renee     Skin cancer Mother Renee     Cancer Father Ede         prostate    Ovarian cancer Cousin      Colon cancer Cousin      Skin cancer Daughter      Cancer Sister 1/2Barbara         uterine sarcoma    Diabetes Sister 1/2Barbara     Colon cancer Brother 1/2 Romario 60    Cancer Brother 1/2 Romario     No Known Problems Son      No Known Problems Sister 1/2     Diabetes Sister 1/2 joshua     Hypertension Sister 1/2 joshua     Arthritis Sister 1/2 joshua     No Known Problems Daughter      Breast cancer Maternal Aunt      Amblyopia Neg Hx      Blindness Neg Hx      Cataracts Neg Hx      Glaucoma Neg Hx      Retinal detachment Neg Hx      Strabismus Neg Hx      Stroke Neg Hx      Thyroid disease Neg Hx      Melanoma Neg Hx      Psoriasis Neg Hx      Lupus Neg Hx      Eczema Neg Hx      Crohn's disease Neg Hx      Ulcerative colitis Neg Hx       Vitals:    12/18/24 1316   Weight: 83.6 kg (184 lb 4.9 oz)   Height: 5' 7" (1.702 m)       Review of Systems   Constitutional:  Negative for chills, diaphoresis, fatigue, fever and unexpected weight change.   HENT:  Negative for trouble swallowing.    Eyes: "  Negative for visual disturbance.   Respiratory:  Negative for shortness of breath.    Cardiovascular:  Negative for chest pain.   Gastrointestinal:  Negative for abdominal pain, constipation, nausea and vomiting.   Genitourinary:  Negative for difficulty urinating.   Musculoskeletal:  Negative for arthralgias, back pain, gait problem, joint swelling, myalgias, neck pain and neck stiffness.   Neurological:  Negative for dizziness, speech difficulty, weakness, light-headedness, numbness and headaches.          Objective:      Physical Exam  Neurological:      Mental Status: She is alert and oriented to person, place, and time.             Assessment:       1. Chronic bilateral low back pain with left-sided sciatica    2. Chronic left hip pain           Plan:     Improved to her satisfaction status post transforaminal injection left L5-S1 as it pertains to low back and radicular left leg pain.  The residual discomfort in her groin I feel is intrinsic to the hip joint.  I will refer her back to Dr. Amanda for further recommendations.  She can follow up here as needed      Chronic bilateral low back pain with left-sided sciatica    Chronic left hip pain  -     Ambulatory referral/consult to Orthopedics; Future; Expected date: 12/25/2024

## 2024-12-23 ENCOUNTER — HOSPITAL ENCOUNTER (OUTPATIENT)
Dept: CARDIOLOGY | Facility: HOSPITAL | Age: 79
Discharge: HOME | End: 2024-12-23
Attending: GENERAL PRACTICE
Payer: MEDICARE

## 2024-12-23 ENCOUNTER — HOSPITAL ENCOUNTER (OUTPATIENT)
Dept: RADIOLOGY | Facility: HOSPITAL | Age: 79
Discharge: HOME | End: 2024-12-23
Attending: GENERAL PRACTICE
Payer: MEDICARE

## 2024-12-23 DIAGNOSIS — I25.10 CORONARY ARTERY DISEASE INVOLVING NATIVE CORONARY ARTERY OF NATIVE HEART WITHOUT ANGINA PECTORIS: ICD-10-CM

## 2024-12-23 DIAGNOSIS — E11.59 HYPERTENSION ASSOCIATED WITH DIABETES: ICD-10-CM

## 2024-12-23 DIAGNOSIS — I25.10 CORONARY ARTERY CALCIFICATION SEEN ON CAT SCAN: ICD-10-CM

## 2024-12-23 DIAGNOSIS — I15.2 HYPERTENSION ASSOCIATED WITH DIABETES: ICD-10-CM

## 2024-12-23 DIAGNOSIS — I70.0 THORACIC AORTA ATHEROSCLEROSIS: ICD-10-CM

## 2024-12-23 DIAGNOSIS — M48.061 DEGENERATIVE LUMBAR SPINAL STENOSIS: ICD-10-CM

## 2024-12-23 DIAGNOSIS — E11.9 TYPE 2 DIABETES MELLITUS WITHOUT COMPLICATION, WITHOUT LONG-TERM CURRENT USE OF INSULIN: ICD-10-CM

## 2024-12-23 LAB
AORTIC ROOT ANNULUS: 3.25 CM
AORTIC VALVE CUSP SEPERATION: 1.27 CM
APICAL FOUR CHAMBER EJECTION FRACTION: 80 %
APICAL TWO CHAMBER EJECTION FRACTION: 50 %
AV INDEX (PROSTH): 0.71
AV MEAN GRADIENT: 4.1 MMHG
AV PEAK GRADIENT: 7.8 MMHG
AV VALVE AREA BY VELOCITY RATIO: 2.5 CM²
AV VALVE AREA: 2.2 CM²
AV VELOCITY RATIO: 0.79
CV ECHO LV RWT: 0.91 CM
DOP CALC AO PEAK VEL: 1.4 M/S
DOP CALC AO VTI: 32.6 CM
DOP CALC LVOT AREA: 3.1 CM2
DOP CALC LVOT DIAMETER: 2 CM
DOP CALC LVOT PEAK VEL: 1.1 M/S
DOP CALC LVOT STROKE VOLUME: 72.2 CM3
DOP CALC MV VTI: 21.8 CM
DOP CALCLVOT PEAK VEL VTI: 23 CM
E WAVE DECELERATION TIME: 172.74 MSEC
E/A RATIO: 0.76
E/E' RATIO: 8.27 M/S
ECHO LV POSTERIOR WALL: 1.5 CM (ref 0.6–1.1)
FRACTIONAL SHORTENING: 27.3 % (ref 28–44)
INTERVENTRICULAR SEPTUM: 1.7 CM (ref 0.6–1.1)
LA MAJOR: 4.67 CM
LA MINOR: 3.39 CM
LEFT ATRIUM AREA SYSTOLIC (APICAL 2 CHAMBER): 11.74 CM2
LEFT ATRIUM AREA SYSTOLIC (APICAL 4 CHAMBER): 15.77 CM2
LEFT ATRIUM SIZE: 3.73 CM
LEFT ATRIUM VOLUME MOD: 35.9 ML
LEFT INTERNAL DIMENSION IN SYSTOLE: 2.4 CM (ref 2.1–4)
LEFT VENTRICLE DIASTOLIC VOLUME: 45.04 ML
LEFT VENTRICLE END DIASTOLIC VOLUME APICAL 2 CHAMBER: 40.76 ML
LEFT VENTRICLE END DIASTOLIC VOLUME APICAL 4 CHAMBER: 67.02 ML
LEFT VENTRICLE END SYSTOLIC VOLUME APICAL 2 CHAMBER: 26.7 ML
LEFT VENTRICLE END SYSTOLIC VOLUME APICAL 4 CHAMBER: 41.36 ML
LEFT VENTRICLE SYSTOLIC VOLUME: 20.24 ML
LEFT VENTRICULAR INTERNAL DIMENSION IN DIASTOLE: 3.3 CM (ref 3.5–6)
LEFT VENTRICULAR MASS: 199.2 G
LV LATERAL E/E' RATIO: 7.75 M/S
LV SEPTAL E/E' RATIO: 8.86 M/S
LVED V (TEICH): 45.04 ML
LVES V (TEICH): 20.24 ML
LVOT MG: 2.82 MMHG
LVOT MV: 0.81 CM/S
MV MEAN GRADIENT: 1 MMHG
MV PEAK A VEL: 0.82 M/S
MV PEAK E VEL: 0.62 M/S
MV PEAK GRADIENT: 3 MMHG
MV STENOSIS PRESSURE HALF TIME: 50.09 MS
MV VALVE AREA BY CONTINUITY EQUATION: 3.31 CM2
MV VALVE AREA P 1/2 METHOD: 4.39 CM2
OHS CV RV/LV RATIO: 0.76 CM
OHS LV EJECTION FRACTION SIMPSONS BIPLANE MOD: 69 %
PISA TR MAX VEL: 2.14 M/S
RA MAJOR: 4.01 CM
RA PRESSURE ESTIMATED: 3 MMHG
RA WIDTH: 3.01 CM
RIGHT VENTRICLE DIASTOLIC BASEL DIMENSION: 2.5 CM
RIGHT VENTRICLE DIASTOLIC LENGTH: 6.4 CM
RIGHT VENTRICLE DIASTOLIC MID DIMENSION: 2.3 CM
RIGHT VENTRICULAR LENGTH IN DIASTOLE (APICAL 4-CHAMBER VIEW): 6.4 CM
RV MID DIAMA: 2.25 CM
RV TB RVSP: 5 MMHG
TDI LATERAL: 0.08 M/S
TDI SEPTAL: 0.07 M/S
TDI: 0.08 M/S
TR MAX PG: 18 MMHG
TV REST PULMONARY ARTERY PRESSURE: 21 MMHG

## 2024-12-23 PROCEDURE — 93880 EXTRACRANIAL BILAT STUDY: CPT | Mod: TC

## 2024-12-23 PROCEDURE — 93306 TTE W/DOPPLER COMPLETE: CPT | Mod: 26,,, | Performed by: INTERNAL MEDICINE

## 2024-12-23 PROCEDURE — 93306 TTE W/DOPPLER COMPLETE: CPT

## 2024-12-23 RX ORDER — OXYCODONE AND ACETAMINOPHEN 5; 325 MG/1; MG/1
1 TABLET ORAL 2 TIMES DAILY PRN
Qty: 60 TABLET | Refills: 0 | Status: SHIPPED | OUTPATIENT
Start: 2024-12-23

## 2024-12-23 NOTE — TELEPHONE ENCOUNTER
No care due was identified.  [facility] Embedded Care Due Messages. Reference number: [id number].   12/23/2024 6:38:41 AM CST

## 2025-01-02 ENCOUNTER — TELEPHONE (OUTPATIENT)
Dept: CARDIOLOGY | Facility: HOSPITAL | Age: 80
End: 2025-01-02

## 2025-01-02 NOTE — TELEPHONE ENCOUNTER
Left message on voicemail.    Patient advised, test will be at [facility] ([address] [facility]).  Will need to register on the first floor at the main entrance.  Patient advised that arrival time is 06:40.  Patient advised that they may be here about 3.5-4 hours, and may want to bring something to occupy their time, as there will be periods of waiting.    Patient advised, may take her medications prior to testing if you need to.  Patient should HOLD nitroglycerin. Advised if medications are taken with food, please keep it light, like toast and juice.    Patient advised to avoid all caffeine 12 hours prior to testing.  This includes chocolate, decaf tea and coffee.    Will provide peanut butter crackers for a snack after stress test.  If patient would prefer something else, please bring a snack from home.    Wear comfortable clothing.  No lotions, oils, or powders to the upper chest area. May wear deodorant.    No metal jewelry, buttons, or zippers to the upper body.  Advised to call the office if any questions.    Will send instructions via [employer] as well.

## 2025-01-03 ENCOUNTER — HOSPITAL ENCOUNTER (OUTPATIENT)
Dept: CARDIOLOGY | Facility: HOSPITAL | Age: 80
Discharge: HOME | End: 2025-01-03
Attending: GENERAL PRACTICE
Payer: MEDICARE

## 2025-01-03 ENCOUNTER — HOSPITAL ENCOUNTER (OUTPATIENT)
Dept: RADIOLOGY | Facility: HOSPITAL | Age: 80
Discharge: HOME | End: 2025-01-03
Attending: GENERAL PRACTICE
Payer: MEDICARE

## 2025-01-03 DIAGNOSIS — I70.0 THORACIC AORTA ATHEROSCLEROSIS: ICD-10-CM

## 2025-01-03 DIAGNOSIS — E11.59 HYPERTENSION ASSOCIATED WITH DIABETES: ICD-10-CM

## 2025-01-03 DIAGNOSIS — I15.2 HYPERTENSION ASSOCIATED WITH DIABETES: ICD-10-CM

## 2025-01-03 DIAGNOSIS — I25.10 CORONARY ARTERY CALCIFICATION SEEN ON CAT SCAN: ICD-10-CM

## 2025-01-03 LAB
CV PHARM DOSE: 0.4 MG
CV STRESS BASE HR: 67 BPM
DIASTOLIC BLOOD PRESSURE: 84 MMHG
EJECTION FRACTION- HIGH: 65 %
END DIASTOLIC INDEX-HIGH: 153 ML/M2
END DIASTOLIC INDEX-LOW: 93 ML/M2
END SYSTOLIC INDEX-HIGH: 71 ML/M2
END SYSTOLIC INDEX-LOW: 31 ML/M2
NUC REST DIASTOLIC VOLUME INDEX: 46
NUC REST EJECTION FRACTION: 76
NUC REST SYSTOLIC VOLUME INDEX: 11
NUC STRESS DIASTOLIC VOLUME INDEX: 37
NUC STRESS EJECTION FRACTION: 78 %
NUC STRESS SYSTOLIC VOLUME INDEX: 8
OHS CV CPX 1 MINUTE RECOVERY HEART RATE: 90 BPM
OHS CV CPX 85 PERCENT MAX PREDICTED HEART RATE MALE: 122
OHS CV CPX MAX PREDICTED HEART RATE: 143
OHS CV CPX PATIENT IS FEMALE: 1
OHS CV CPX PATIENT IS MALE: 0
OHS CV CPX PEAK DIASTOLIC BLOOD PRESSURE: 79 MMHG
OHS CV CPX PEAK HEAR RATE: 90 BPM
OHS CV CPX PEAK RATE PRESSURE PRODUCT: NORMAL
OHS CV CPX PEAK SYSTOLIC BLOOD PRESSURE: 133 MMHG
OHS CV CPX PERCENT MAX PREDICTED HEART RATE ACHIEVED: 65
OHS CV CPX RATE PRESSURE PRODUCT PRESENTING: 8710
OHS CV INITIAL DOSE: 12.9 MCG/KG/MIN
OHS CV PEAK DOSE: 24.6 MCG/KG/MIN
RETIRED EF AND QEF - SEE NOTES: 53 %
SYSTOLIC BLOOD PRESSURE: 130 MMHG

## 2025-01-03 PROCEDURE — A9502 TC99M TETROFOSMIN: HCPCS | Performed by: GENERAL PRACTICE

## 2025-01-03 PROCEDURE — 93018 CV STRESS TEST I&R ONLY: CPT | Mod: ,,, | Performed by: GENERAL PRACTICE

## 2025-01-03 PROCEDURE — 63600175 PHARM REV CODE 636 W HCPCS: Performed by: GENERAL PRACTICE

## 2025-01-03 PROCEDURE — 78452 HT MUSCLE IMAGE SPECT MULT: CPT

## 2025-01-03 PROCEDURE — 78452 HT MUSCLE IMAGE SPECT MULT: CPT | Mod: 26,,, | Performed by: GENERAL PRACTICE

## 2025-01-03 PROCEDURE — 93016 CV STRESS TEST SUPVJ ONLY: CPT | Mod: ,,,

## 2025-01-03 RX ORDER — REGADENOSON 0.08 MG/ML
0.4 INJECTION, SOLUTION INTRAVENOUS
Status: COMPLETED | OUTPATIENT
Start: 2025-01-03 | End: 2025-01-03

## 2025-01-03 RX ADMIN — TETROFOSMIN 12.9 MILLICURIE: 1.38 INJECTION, POWDER, LYOPHILIZED, FOR SOLUTION INTRAVENOUS at 06:01

## 2025-01-03 RX ADMIN — TETROFOSMIN 24.6 MILLICURIE: 1.38 INJECTION, POWDER, LYOPHILIZED, FOR SOLUTION INTRAVENOUS at 08:01

## 2025-01-03 RX ADMIN — REGADENOSON 0.4 MG: 0.08 INJECTION, SOLUTION INTRAVENOUS at 08:01

## 2025-01-14 ENCOUNTER — TELEPHONE (OUTPATIENT)
Dept: PRIMARY CARE CLINIC | Facility: CLINIC | Age: 80
End: 2025-01-14
Payer: MEDICARE

## 2025-01-14 DIAGNOSIS — M48.061 DEGENERATIVE LUMBAR SPINAL STENOSIS: ICD-10-CM

## 2025-01-14 NOTE — TELEPHONE ENCOUNTER
"Pt said she found a provider in [place]. Wanted refills on meds but it wasn't due yet. Suggested she get her refills from new doctor.  Pt also said "she will Miss you Dr Rain."  "

## 2025-01-14 NOTE — TELEPHONE ENCOUNTER
----- Message from Mihai sent at 1/13/2025  3:15 PM CST -----  Regarding: New Doctor  Contact: Pt [phone]  [email or web address]     Patient is calling for Medical Advice regarding: Patient is looking for a callback to discuss changing doctors. She said her new insurance is no longer in network with Dr. Rain and she wanted to know recommendations from Dr. Rain for her new location/insurance. She would also like her RX with Dr. Rain sent to her new pharmacy- [facility]. Please call patient to discuss options.    How long has patient had these symptoms:    Pharmacy name and phone#:  Call: [facility]  [phone]     Patient wants a call back or thru [facility]: Call    Comments:    Please advise patient replies from provider may take up to 48 hours.

## 2025-01-15 DIAGNOSIS — K21.9 GASTROESOPHAGEAL REFLUX DISEASE WITHOUT ESOPHAGITIS: ICD-10-CM

## 2025-01-15 RX ORDER — OMEPRAZOLE 20 MG/1
20 CAPSULE, DELAYED RELEASE ORAL EVERY MORNING
Qty: 90 CAPSULE | Refills: 0 | Status: SHIPPED | OUTPATIENT
Start: 2025-01-15 | End: 2025-01-23 | Stop reason: SDUPTHER

## 2025-01-15 NOTE — TELEPHONE ENCOUNTER
----- Message from Marion sent at 1/15/2025 11:13 AM CST -----  Requesting an RX refill or new RX.    Is this a refill or new RX: Refill    RX name and strength (copy/paste from chart):   Start Date End Date  omeprazole (PRILOSEC) 20 MG capsule          Is this a 30 day or 90 day RX:     Pharmacy name and phone # (copy/paste from chart):  [facility]                                                                                          [phone]    The doctors have asked that we provide their patients with the following 2 reminders -- prescription refills can take up to 72 hours, and a friendly reminder that in the future you can use your [facility] account to request refills:        ALL FUTURE PRESCRIPTIONS MUST GO TO [facility] DUE TO INSURANCE       PATIENT IS ALSO ABLE TO REMAIN WITH DR. PUTNAM AS HER PCP   OK show

## 2025-01-15 NOTE — TELEPHONE ENCOUNTER
Care Due:                  Date            Visit Type   Department     Provider  --------------------------------------------------------------------------------                                EP -                              PRIMARY      [facility]   Marcy Bria  Last Visit: 10-      CARE ([facility])   CARE           Degranjoshua                              EP -                              PRIMARY      [facility] Bria  Next Visit: 03-      CARE ([facility])   CARE           Luís                                                            Last  Test          Frequency    Reason                     Performed    Due Date  --------------------------------------------------------------------------------    HBA1C.......  6 months...  dulaglutide,               10-   04-                             empagliflozin............    [employer] Embedded Care Due Messages. Reference number: [id number].   1/15/2025 11:26:42 AM CST

## 2025-01-15 NOTE — TELEPHONE ENCOUNTER
LOV 10/8/24  RTC 3/3/25    ALL FUTURE PRESCRIPTIONS MUST GO TO [facility] DUE TO INSURANCE

## 2025-01-15 NOTE — TELEPHONE ENCOUNTER
She has found a doctor, but has she seen the doctor?  I have known Jovana for 14 years, if she needs something now - I will order it.   Portal message sent.

## 2025-01-23 DIAGNOSIS — M48.061 DEGENERATIVE LUMBAR SPINAL STENOSIS: ICD-10-CM

## 2025-01-23 DIAGNOSIS — K21.9 GASTROESOPHAGEAL REFLUX DISEASE WITHOUT ESOPHAGITIS: ICD-10-CM

## 2025-01-23 NOTE — TELEPHONE ENCOUNTER
No care due was identified.  [facility] Embedded Care Due Messages. Reference number: [id number].   1/23/2025 12:59:48 PM CST

## 2025-01-24 RX ORDER — OMEPRAZOLE 20 MG/1
20 CAPSULE, DELAYED RELEASE ORAL EVERY MORNING
Qty: 90 CAPSULE | Refills: 0 | Status: SHIPPED | OUTPATIENT
Start: 2025-01-24

## 2025-01-24 RX ORDER — OXYCODONE AND ACETAMINOPHEN 5; 325 MG/1; MG/1
1 TABLET ORAL 2 TIMES DAILY PRN
Qty: 60 TABLET | Refills: 0 | Status: SHIPPED | OUTPATIENT
Start: 2025-01-24

## 2025-01-26 ENCOUNTER — PATIENT MESSAGE (OUTPATIENT)
Dept: PRIMARY CARE CLINIC | Facility: CLINIC | Age: 80
End: 2025-01-26
Payer: MEDICARE

## 2025-01-26 DIAGNOSIS — I25.84 CORONARY ATHEROSCLEROSIS DUE TO CALCIFIED CORONARY LESION (CODE): ICD-10-CM

## 2025-01-26 DIAGNOSIS — E78.5 HYPERLIPIDEMIA ASSOCIATED WITH TYPE 2 DIABETES MELLITUS: ICD-10-CM

## 2025-01-26 DIAGNOSIS — I11.9 HYPERTENSIVE HEART DISEASE WITHOUT HEART FAILURE: ICD-10-CM

## 2025-01-26 DIAGNOSIS — E11.21 TYPE 2 DIABETES MELLITUS WITH DIABETIC NEPHROPATHY, WITHOUT LONG-TERM CURRENT USE OF INSULIN: ICD-10-CM

## 2025-01-26 DIAGNOSIS — E11.69 HYPERLIPIDEMIA ASSOCIATED WITH TYPE 2 DIABETES MELLITUS: ICD-10-CM

## 2025-01-26 DIAGNOSIS — I25.10 CORONARY ARTERY DISEASE INVOLVING NATIVE CORONARY ARTERY OF NATIVE HEART WITHOUT ANGINA PECTORIS: ICD-10-CM

## 2025-01-26 DIAGNOSIS — M81.0 OSTEOPOROSIS WITHOUT CURRENT PATHOLOGICAL FRACTURE, UNSPECIFIED OSTEOPOROSIS TYPE: ICD-10-CM

## 2025-01-26 DIAGNOSIS — F33.9 MAJOR DEPRESSION, RECURRENT, CHRONIC: ICD-10-CM

## 2025-01-26 DIAGNOSIS — M47.16 SPONDYLOSIS OF LUMBAR SPINE WITH MYELOPATHY: ICD-10-CM

## 2025-01-26 DIAGNOSIS — E11.40 TYPE 2 DIABETES MELLITUS WITH DIABETIC NEUROPATHY, WITHOUT LONG-TERM CURRENT USE OF INSULIN: ICD-10-CM

## 2025-01-27 NOTE — TELEPHONE ENCOUNTER
LOV 10/8/24  RTC 3/3/25    Pt Send Message have New insurance [employer] and my Pharmacy is [facility]   [facility] for all pain medication.       Please Advised

## 2025-01-27 NOTE — TELEPHONE ENCOUNTER
No care due was identified.  [facility] Embedded Care Due Messages. Reference number: [id number].   1/27/2025 9:26:06 AM CST

## 2025-01-29 RX ORDER — TELMISARTAN 80 MG/1
80 TABLET ORAL DAILY
Qty: 90 TABLET | Refills: 0 | Status: SHIPPED | OUTPATIENT
Start: 2025-01-29

## 2025-01-29 RX ORDER — INSULIN PUMP SYRINGE, 3 ML
EACH MISCELLANEOUS
Qty: 3 EACH | Refills: 0 | Status: SHIPPED | OUTPATIENT
Start: 2025-01-29

## 2025-01-29 RX ORDER — GABAPENTIN 300 MG/1
300 CAPSULE ORAL 2 TIMES DAILY
Qty: 180 CAPSULE | Refills: 0 | Status: SHIPPED | OUTPATIENT
Start: 2025-01-29

## 2025-01-29 RX ORDER — DULAGLUTIDE 3 MG/.5ML
3 INJECTION, SOLUTION SUBCUTANEOUS
Qty: 12 PEN | Refills: 0 | Status: SHIPPED | OUTPATIENT
Start: 2025-01-29

## 2025-01-29 RX ORDER — EZETIMIBE 10 MG/1
10 TABLET ORAL DAILY
Qty: 90 TABLET | Refills: 0 | Status: SHIPPED | OUTPATIENT
Start: 2025-01-29

## 2025-01-29 RX ORDER — BLOOD-GLUCOSE CONTROL, NORMAL
1 EACH MISCELLANEOUS DAILY
Qty: 100 EACH | Refills: 3 | Status: SHIPPED | OUTPATIENT
Start: 2025-01-29

## 2025-01-29 RX ORDER — ATORVASTATIN CALCIUM 80 MG/1
80 TABLET, FILM COATED ORAL DAILY
Qty: 90 TABLET | Refills: 0 | Status: SHIPPED | OUTPATIENT
Start: 2025-01-29

## 2025-01-29 RX ORDER — VENLAFAXINE 100 MG/1
100 TABLET ORAL DAILY
Qty: 90 TABLET | Refills: 0 | Status: SHIPPED | OUTPATIENT
Start: 2025-01-29

## 2025-01-29 RX ORDER — ERGOCALCIFEROL 1.25 MG/1
CAPSULE ORAL
Qty: 12 CAPSULE | Refills: 0 | Status: SHIPPED | OUTPATIENT
Start: 2025-01-29

## 2025-01-29 RX ORDER — CARVEDILOL 25 MG/1
25 TABLET ORAL 2 TIMES DAILY
Qty: 180 TABLET | Refills: 0 | Status: SHIPPED | OUTPATIENT
Start: 2025-01-29

## 2025-01-29 RX ORDER — NITROGLYCERIN 0.4 MG/1
0.4 TABLET SUBLINGUAL EVERY 5 MIN PRN
Qty: 100 TABLET | Refills: 0 | Status: SHIPPED | OUTPATIENT
Start: 2025-01-29

## 2025-01-29 RX ORDER — INSULIN PUMP SYRINGE, 3 ML
EACH MISCELLANEOUS
Qty: 1 EACH | Refills: 0 | Status: SHIPPED | OUTPATIENT
Start: 2025-01-29 | End: 2026-01-29

## 2025-01-30 NOTE — PROGRESS NOTES
Subjective:    Patient ID:  Jovana Fall is a 79 y.o. female who presents for follow-up of   Chief Complaint   Patient presents with    Results       HPI:    SHE COMES IN TODAY FOR THE RESULTS OF HER NUCLEAR STRESS TEST.  SHE HAS HAD HEART CATHETERIZATION TWICE ABOUT 10 YEARS AGO.  SHE WAS TOLD SHE HAS NONE OBSTRUCTIVE CORONARY DISEASE AND RISK FACTOR MANAGEMENT WAS RECOMMENDED.  RECENTLY SHE HAS BEEN HAVING SOME DISCOMFORT IN HER CHEST OR DIFFERENT OCCASIONS WHERE SHE FEELS LIKE SOMETHING IS NOT RIGHT.  THE NUCLEAR STRESS TEST SHOWN BELOW IS SUSPICIOUS FOR ISCHEMIA PROFOUND ISCHEMIA OR ARTIFACT.  ECHO SHOWS NORMAL LEFT VENTRICULAR FUNCTION.    Interpretation Summary  Show Result Comparison     Abnormal myocardial perfusion scan.    There is amoderate intensity, large sized, mostly reversible perfusion abnormality with some fixed areas in the inferolateral wall(s).  Can not rule out extrathoracic artifact.  Consider further evaluation    There are no other significant perfusion abnormalities.    The gated perfusion images showed an ejection fraction of 76% at rest. The gated perfusion images showed an ejection fraction of 78% post stress. Normal ejection fraction is greater than 53%.    There is normal wall motion at rest.    The ECG portion of the study is uninterpretable due to baseline ECG abnormalities.    The patient reported no chest pain during the stress test.    There were no arrhythmias during stress.    TID 0.70        Performing Clinician               Left Ventricle: The left ventricle is normal in size. Moderately increased wall thickness. There is moderate concentric hypertrophy. There is normal systolic function with a visually estimated ejection fraction of 55 - 60%. There is normal diastolic function.    Right Ventricle: Normal right ventricular cavity size. Systolic function is normal.    Left Atrium: Normal left atrial size.    Right Atrium: Normal right atrial size.    Aortic Valve: The  aortic valve is structurally normal.    Mitral Valve: There is mild regurgitation.    Tricuspid Valve: There is trace regurgitation.    Pulmonic Valve: The pulmonic valve is structurally normal.    Aorta: Ascending aorta is normal.    IVC/SVC: Normal venous pressure at 3 mmHg.    Pericardium: There is no pericardial effusion.    Suggest increased in LV mass from Echo in 6/2021.         11/21/24  Comes in today referred by Dr. Darrell kwok to establish care  She has a history of hypertension diabetes hyperlipidemia and obesity.  She has coronary calcifications seen on like CT scan.  She has had 2 coronary angiograms at [facility] in the past was told she has a lot of disease but no intervention was required just lifestyle changes.  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  She has noticed over the last 2 months she has complain of dyspnea on exertion walking about 2 blocks and doing housework.  She has been having back pain at L4-5 injection with steroids recently in the last 2 weeks.  She does not feel like it has worked yet she has some pain going down her left leg.  She has fatigue.  She gets lightheaded if she gets up quickly he is in the digital blood pressure monitoring program her pressure usually runs 130/76 she is in the diabetic digital program her sugars been controlled.  He has lost about 25 lb over several months  EKG today reveals normal sinus rhythm poor R-wave progression can not rule out old anterior MI     Orthostatics blood pressure 170/87 pulse 69 sitting 152/83 pulse of 70 standing 141/73 consistent with mild orthostasis and chronotropic incompetence     CT chest abdomen  FINDINGS:    THORACIC INLET: Normal.  BODY WALL AND AXILLAE: Normal.  GREAT VESSELS: Scattered, calcified atherosclerotic changes of the aorta and major branching vessels.  MEDIASTINUM/KARTHIKEYAN: Normal.  HEART, PERICARDIUM, AND CORONARY VESSELS: Coronary  atherosclerosis. No pericardial effusion.  TRACHEA/CENTRAL AIRWAY: Normal.  LUNGS/PLEURA: Right posterior basilar subsegmental atelectasis. No large focal consolidation, pleural effusion or pneumothorax.  DIAPHRAGM: Normal.  MUSCULOSKELETAL SYSTEM: Comminuted, displaced bilateral proximal humeral shaft fractures with marked foreshortening. 4 through 10th lateral right rib fractures.  UPPER ABDOMEN: Findings of the upper abdomen are discussed in detail in the Abdomen/Pelvic CT report performed concurrently.     Exam End: 11/06/22 18:38     Review of patient's allergies indicates:   Allergen Reactions    Adhesive tape-silicones      Other reaction(s): Swelling  Other reaction(s): Itching    Codeine Itching    Metformin Itching    Morphine Itching    Penicillins Itching     Other reaction(s): Itching    Sulfa (sulfonamide antibiotics) Itching     Other reaction(s): Itching       Past Medical History:   Diagnosis Date    Anticoagulant long-term use     Anxiety     Basal cell carcinoma 2014    left upper arm     BCC (basal cell carcinoma of skin) 01/07/2016    mid upper back    BCC (basal cell carcinoma)     left upper back    Bilateral humeral fractures 12/20/2022    Calcium nephrolithiasis     Cataract     Colon polyp     Coronary artery disease     Cortical cataract - Both Eyes 04/29/2013    Depression     Diabetes mellitus     Displacement of lumbar intervertebral disc without myelopathy 05/06/2013    Diverticulosis     GERD (gastroesophageal reflux disease)     H. pylori infection     Hepatomegaly     History of melanoma in situ 02/02/2015    Mid upper back ; 5/2014     Hx of colonic polyps 01/27/2015    Hyperlipidemia     Hypertension     Lactose intolerance     Lumbar spinal stenosis 05/06/2013    Melanoma     mid upper back- in situ 5/2014    Nuclear sclerosis - Both Eyes 04/29/2013    Osteopenia     Spinal stenosis, lumbar region, with neurogenic claudication 05/06/2013    Spondylosis without myelopathy  05/06/2013    Squamous cell carcinoma of skin     Type 2 diabetes mellitus      Past Surgical History:   Procedure Laterality Date    APPENDECTOMY      BROW LIFT AND BLEPHAROPLASTY      BUNIONECTOMY Right     CARPAL TUNNEL RELEASE Bilateral     CHOLECYSTECTOMY      COLONOSCOPY  02/05/2015    Dr. Blas, repeat in 3-5 years    COLONOSCOPY N/A 02/03/2021    Procedure: COLONOSCOPY;  Surgeon: Cale Aggarwal MD;  Location: [facility] ENDO;  Service: Endoscopy;  Laterality: N/A;    COSMETIC SURGERY  Eyes/nose    ESOPHAGOGASTRODUODENOSCOPY N/A 01/27/2021    Procedure: EGD (ESOPHAGOGASTRODUODENOSCOPY);  Surgeon: Cale Aggarwal MD;  Location: [facility] ENDO;  Service: Endoscopy;  Laterality: N/A;    HEMORRHOID SURGERY      HYSTERECTOMY      ovaries remain    LUMBAR LAMINECTOMY      RHINOPLASTY TIP      SKIN CANCER EXCISION      TRANSFORAMINAL EPIDURAL INJECTION OF STEROID Left 11/19/2024    Procedure: Injection,steroid,epidural,transforaminal approach;  Surgeon: Dieter York MD;  Location: [facility] AS OR;  Service: Pain Management;  Laterality: Left;    TUBAL LIGATION      UPPER GASTROINTESTINAL ENDOSCOPY  2015    Dr. Blas    UPPER GASTROINTESTINAL ENDOSCOPY  03/30/2017    Dr. Aggarwal     Social History     Tobacco Use    Smoking status: Never    Smokeless tobacco: Never   Substance Use Topics    Alcohol use: Yes     Alcohol/week: 20.0 standard drinks of alcohol     Types: 20 Glasses of wine per week     Comment: Occasionally    Drug use: No     Family History   Problem Relation Name Age of Onset    Heart disease Mother Renee         MI    Cancer Mother Renee         SQ carcinoma of lung    Heart attack Mother Renee     Skin cancer Mother Renee     Cancer Father Ede         prostate    Ovarian cancer Cousin      Colon cancer Cousin      Skin cancer Daughter      Cancer Sister 1/2Barbara         uterine sarcoma    Diabetes Sister 1/2Barbara     Colon cancer Brother 1/2 Romario 60    Cancer Brother 1/2 Romario     No  Known Problems Son      No Known Problems Sister 1/2     Diabetes Sister 1/2 joshua     Hypertension Sister 1/2 joshua     Arthritis Sister 1/2 joshua     No Known Problems Daughter      Breast cancer Maternal Aunt      Amblyopia Neg Hx      Blindness Neg Hx      Cataracts Neg Hx      Glaucoma Neg Hx      Retinal detachment Neg Hx      Strabismus Neg Hx      Stroke Neg Hx      Thyroid disease Neg Hx      Melanoma Neg Hx      Psoriasis Neg Hx      Lupus Neg Hx      Eczema Neg Hx      Crohn's disease Neg Hx      Ulcerative colitis Neg Hx          Review of Systems:   Constitution: Negative for diaphoresis and fever.   HEENT: Negative for nosebleeds.    Cardiovascular: Negative for chest pain       No dyspnea on exertion       No leg swelling        No palpitations  Respiratory: Negative for shortness of breath and wheezing.    Hematologic/Lymphatic: Negative for bleeding problem. Does not bruise/bleed easily.   Skin: Negative for color change and rash.   Musculoskeletal: Negative for falls and myalgias.   Gastrointestinal: Negative for hematemesis and hematochezia.   Genitourinary: Negative for hematuria.   Neurological: Negative for dizziness and light-headedness.   Psychiatric/Behavioral: Negative for altered mental status and memory loss.          Objective:        Vitals:    01/31/25 1010   BP: 103/66   Pulse: 74       Lab Results   Component Value Date    WBC 6.81 10/10/2024    HGB 15.1 10/10/2024    HCT 45.2 10/10/2024     10/10/2024    CHOL 138 10/10/2024    TRIG 192 (H) 10/10/2024    HDL 30 (L) 10/10/2024    ALT 17 10/10/2024    AST 17 10/10/2024     10/10/2024    K 3.6 10/10/2024     10/10/2024    CREATININE 0.9 10/10/2024    BUN 19 10/10/2024    CO2 21 (L) 10/10/2024    TSH 0.784 12/20/2017    INR 1.0 03/27/2023    GLUF 120 (H) 03/02/2007    HGBA1C 6.3 (H) 10/10/2024        ECHOCARDIOGRAM RESULTS  Results for orders placed during the hospital encounter of 12/23/24    Echo Saline Bubble?  No    Interpretation Summary    Left Ventricle: The left ventricle is normal in size. Moderately increased wall thickness. There is moderate concentric hypertrophy. There is normal systolic function with a visually estimated ejection fraction of 55 - 60%. There is normal diastolic function.    Right Ventricle: Normal right ventricular cavity size. Systolic function is normal.    Left Atrium: Normal left atrial size.    Right Atrium: Normal right atrial size.    Aortic Valve: The aortic valve is structurally normal.    Mitral Valve: There is mild regurgitation.    Tricuspid Valve: There is trace regurgitation.    Pulmonic Valve: The pulmonic valve is structurally normal.    Aorta: Ascending aorta is normal.    IVC/SVC: Normal venous pressure at 3 mmHg.    Pericardium: There is no pericardial effusion.    Suggest increased in LV mass from Echo in 6/2021.        CURRENT/PREVIOUS VISIT EKG  Results for orders placed or performed in visit on 11/21/24   IN OFFICE EKG 12-LEAD (to [place])    Collection Time: 11/21/24  2:31 PM   Result Value Ref Range    QRS Duration 84 ms    [facility] QTC Calculation 401 ms    Narrative    Test Reason : I25.10,I25.10,    Vent. Rate :  67 BPM     Atrial Rate :  67 BPM     P-R Int : 198 ms          QRS Dur :  84 ms      QT Int : 380 ms       P-R-T Axes :  15 -10  44 degrees    QTcB Int : 401 ms    Normal sinus rhythm  Anterior infarct (cited on or before 29-Mar-2018)  Abnormal ECG  When compared with ECG of 10-May-2021 14:27,  No significant change was found  Confirmed by Adalberto Matos ([id number]) on 12/8/2024 10:38:18 AM    Referred By: STEVENSON PUTNAM           Confirmed By: Adalberto Matos     No valid procedures specified.   Results for orders placed during the hospital encounter of 01/03/25    Nuclear Stress - Cardiology Interpreted    Interpretation Summary    Abnormal myocardial perfusion scan.    There is amoderate intensity, large sized, mostly reversible perfusion abnormality with some fixed  areas in the inferolateral wall(s).  Can not rule out extrathoracic artifact.  Consider further evaluation    There are no other significant perfusion abnormalities.    The gated perfusion images showed an ejection fraction of 76% at rest. The gated perfusion images showed an ejection fraction of 78% post stress. Normal ejection fraction is greater than 53%.    There is normal wall motion at rest.    The ECG portion of the study is uninterpretable due to baseline ECG abnormalities.    The patient reported no chest pain during the stress test.    There were no arrhythmias during stress.    TID 0.70      Physical Exam:  CONSTITUTIONAL: No fever, no chills  HEENT: Normocephalic, atraumatic,pupils reactive to light                 NECK:  No JVD no carotid bruit  CVS: S1S2+, RRR, no murmurs,   LUNGS: Clear  ABDOMEN: Soft, NT, BS+  EXTREMITIES: No cyanosis, edema  : No javier catheter  NEURO: AAO X 3  PSY: Normal affect      Medication List with Changes/Refills   Current Medications    ASPIRIN 81 MG TAB    Take 81 mg by mouth. 1 Tablet Oral Every day    ATORVASTATIN (LIPITOR) 80 MG TABLET    Take 1 tablet (80 mg total) by mouth once daily.    BLOOD GLUCOSE CONTROL, NORMAL SOLN    Use as Instructed    BLOOD SUGAR DIAGNOSTIC (TRUE METRIX GLUCOSE TEST STRIP) STRP    1 strip by Misc.(Non-Drug; Combo Route) route once daily.    BLOOD-GLUCOSE METER KIT    Use as instructed    CARVEDILOL (COREG) 25 MG TABLET    Take 1 tablet (25 mg total) by mouth 2 (two) times daily.    CYANOCOBALAMIN (VITAMIN B-12) 250 MCG TABLET    Take 250 mcg by mouth once daily.    DULAGLUTIDE (TRULICITY) 3 MG/0.5 ML PEN INJECTOR    Inject 3 mg into the skin every 7 days.    EMPAGLIFLOZIN (JARDIANCE) 25 MG TABLET    Take 1 tablet (25 mg total) by mouth once daily.    ERGOCALCIFEROL (ERGOCALCIFEROL) 50,000 UNIT CAP    TAKE 1 CAPSULE EVERY 7 DAYS.    EZETIMIBE (ZETIA) 10 MG TABLET    Take 1 tablet (10 mg total) by mouth once daily.    FISH OIL-OMEGA-3 FATTY  ACIDS 300-1,000 MG CAPSULE        GABAPENTIN (NEURONTIN) 300 MG CAPSULE    Take 1 capsule (300 mg total) by mouth 2 (two) times daily.    KETOCONAZOLE (NIZORAL) 2 % CREAM    AAA bid    LANCETS (TRUEPLUS LANCETS) 30 GAUGE MISC    1 lancet  by Misc.(Non-Drug; Combo Route) route once daily.    MELATONIN (MELATIN ORAL)    Take 5 mg by mouth as needed.    MELOXICAM (MOBIC) 15 MG TABLET    Take 1 tablet (15 mg total) by mouth once daily.    NITROGLYCERIN (NITROSTAT) 0.4 MG SL TABLET    Take 1 tablet (0.4 mg total) by mouth every 5 (five) minutes as needed for Chest pain.    OMEPRAZOLE (PRILOSEC) 20 MG CAPSULE    Take 1 capsule (20 mg total) by mouth every morning.    OXYCODONE-ACETAMINOPHEN (PERCOCET) 5-325 MG PER TABLET    Take 1 tablet by mouth 2 (two) times daily as needed for Pain.    TELMISARTAN (MICARDIS) 80 MG TAB    Take 1 tablet (80 mg total) by mouth once daily.    VENLAFAXINE (EFFEXOR) 100 MG TAB    Take 1 tablet (100 mg total) by mouth once daily.    VITAMIN E 100 UNIT CAPSULE    Take 100 Units by mouth 2 (two) times daily.             Assessment:       1. Abnormal stress test         Plan:     Problem List Items Addressed This Visit    None  Visit Diagnoses       Abnormal stress test    -  Primary    Relevant Orders    Case Request-Cath Lab: Left heart cath (Completed)    Insert 2 peripheral IVs    Vital signs    Cardiac Monitoring - Adult    Basic metabolic panel    Comprehensive metabolic panel    EKG 12-lead    X-Ray Chest PA And Lateral    Urinalysis, Reflex to Urine Culture Urine, Clean Catch    CBC auto differential        ANGINA WITH ABNORMAL STRESS TEST.  THE PATIENT IS HAVING SYMPTOMS SUSPICIOUS FOR ANGINA AND KNOWN CORONARY ARTERY DISEASE.  RECOMMEND A CORONARY ANGIOGRAM SHE HAS UNDERSTANDS THE RISKS BENEFITS AND OPTIONS  .  HYPERLIPIDEMIA LDL CHOLESTEROL IS ACCEPTABLE 69.6 HOWEVER STILL ABOVE GOAL WOULD CONSIDER PCSK9.  WILL DISCUSS ON FOLLOW-UP VISIT    HYPERTENSION BLOOD PRESSURE IS AT  GOAL    DIABETES A1C CONTROLLED.  RECOMMEND JARDIANCE FOR CARDIOVASCULAR PROTECTION PREVENTION OF MI.  WILL DISCUSS ON FOLLOW-UP VISIT        Follow up in about 4 weeks (around 2/28/2025).    The patients questions were answered, they verbalized understanding, and agreed with the treatment plan.     JENNIFER JASON MD  [facility]

## 2025-01-30 NOTE — H&P (VIEW-ONLY)
Subjective:    Patient ID:  Jovana Fall is a 79 y.o. female who presents for follow-up of   Chief Complaint   Patient presents with    Results       HPI:    SHE COMES IN TODAY FOR THE RESULTS OF HER NUCLEAR STRESS TEST.  SHE HAS HAD HEART CATHETERIZATION TWICE ABOUT 10 YEARS AGO.  SHE WAS TOLD SHE HAS NONE OBSTRUCTIVE CORONARY DISEASE AND RISK FACTOR MANAGEMENT WAS RECOMMENDED.  RECENTLY SHE HAS BEEN HAVING SOME DISCOMFORT IN HER CHEST OR DIFFERENT OCCASIONS WHERE SHE FEELS LIKE SOMETHING IS NOT RIGHT.  THE NUCLEAR STRESS TEST SHOWN BELOW IS SUSPICIOUS FOR ISCHEMIA PROFOUND ISCHEMIA OR ARTIFACT.  ECHO SHOWS NORMAL LEFT VENTRICULAR FUNCTION.    Interpretation Summary  Show Result Comparison     Abnormal myocardial perfusion scan.    There is amoderate intensity, large sized, mostly reversible perfusion abnormality with some fixed areas in the inferolateral wall(s).  Can not rule out extrathoracic artifact.  Consider further evaluation    There are no other significant perfusion abnormalities.    The gated perfusion images showed an ejection fraction of 76% at rest. The gated perfusion images showed an ejection fraction of 78% post stress. Normal ejection fraction is greater than 53%.    There is normal wall motion at rest.    The ECG portion of the study is uninterpretable due to baseline ECG abnormalities.    The patient reported no chest pain during the stress test.    There were no arrhythmias during stress.    TID 0.70        Performing Clinician               Left Ventricle: The left ventricle is normal in size. Moderately increased wall thickness. There is moderate concentric hypertrophy. There is normal systolic function with a visually estimated ejection fraction of 55 - 60%. There is normal diastolic function.    Right Ventricle: Normal right ventricular cavity size. Systolic function is normal.    Left Atrium: Normal left atrial size.    Right Atrium: Normal right atrial size.    Aortic Valve: The  aortic valve is structurally normal.    Mitral Valve: There is mild regurgitation.    Tricuspid Valve: There is trace regurgitation.    Pulmonic Valve: The pulmonic valve is structurally normal.    Aorta: Ascending aorta is normal.    IVC/SVC: Normal venous pressure at 3 mmHg.    Pericardium: There is no pericardial effusion.    Suggest increased in LV mass from Echo in 6/2021.         11/21/24  Comes in today referred by Dr. Darrell kwok to establish care  She has a history of hypertension diabetes hyperlipidemia and obesity.  She has coronary calcifications seen on like CT scan.  She has had 2 coronary angiograms at [facility] in the past was told she has a lot of disease but no intervention was required just lifestyle changes.  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  She has noticed over the last 2 months she has complain of dyspnea on exertion walking about 2 blocks and doing housework.  She has been having back pain at L4-5 injection with steroids recently in the last 2 weeks.  She does not feel like it has worked yet she has some pain going down her left leg.  She has fatigue.  She gets lightheaded if she gets up quickly he is in the digital blood pressure monitoring program her pressure usually runs 130/76 she is in the diabetic digital program her sugars been controlled.  He has lost about 25 lb over several months  EKG today reveals normal sinus rhythm poor R-wave progression can not rule out old anterior MI     Orthostatics blood pressure 170/87 pulse 69 sitting 152/83 pulse of 70 standing 141/73 consistent with mild orthostasis and chronotropic incompetence     CT chest abdomen  FINDINGS:    THORACIC INLET: Normal.  BODY WALL AND AXILLAE: Normal.  GREAT VESSELS: Scattered, calcified atherosclerotic changes of the aorta and major branching vessels.  MEDIASTINUM/KARTHIKEYAN: Normal.  HEART, PERICARDIUM, AND CORONARY VESSELS: Coronary  atherosclerosis. No pericardial effusion.  TRACHEA/CENTRAL AIRWAY: Normal.  LUNGS/PLEURA: Right posterior basilar subsegmental atelectasis. No large focal consolidation, pleural effusion or pneumothorax.  DIAPHRAGM: Normal.  MUSCULOSKELETAL SYSTEM: Comminuted, displaced bilateral proximal humeral shaft fractures with marked foreshortening. 4 through 10th lateral right rib fractures.  UPPER ABDOMEN: Findings of the upper abdomen are discussed in detail in the Abdomen/Pelvic CT report performed concurrently.     Exam End: 11/06/22 18:38     Review of patient's allergies indicates:   Allergen Reactions    Adhesive tape-silicones      Other reaction(s): Swelling  Other reaction(s): Itching    Codeine Itching    Metformin Itching    Morphine Itching    Penicillins Itching     Other reaction(s): Itching    Sulfa (sulfonamide antibiotics) Itching     Other reaction(s): Itching       Past Medical History:   Diagnosis Date    Anticoagulant long-term use     Anxiety     Basal cell carcinoma 2014    left upper arm     BCC (basal cell carcinoma of skin) 01/07/2016    mid upper back    BCC (basal cell carcinoma)     left upper back    Bilateral humeral fractures 12/20/2022    Calcium nephrolithiasis     Cataract     Colon polyp     Coronary artery disease     Cortical cataract - Both Eyes 04/29/2013    Depression     Diabetes mellitus     Displacement of lumbar intervertebral disc without myelopathy 05/06/2013    Diverticulosis     GERD (gastroesophageal reflux disease)     H. pylori infection     Hepatomegaly     History of melanoma in situ 02/02/2015    Mid upper back ; 5/2014     Hx of colonic polyps 01/27/2015    Hyperlipidemia     Hypertension     Lactose intolerance     Lumbar spinal stenosis 05/06/2013    Melanoma     mid upper back- in situ 5/2014    Nuclear sclerosis - Both Eyes 04/29/2013    Osteopenia     Spinal stenosis, lumbar region, with neurogenic claudication 05/06/2013    Spondylosis without myelopathy  05/06/2013    Squamous cell carcinoma of skin     Type 2 diabetes mellitus      Past Surgical History:   Procedure Laterality Date    APPENDECTOMY      BROW LIFT AND BLEPHAROPLASTY      BUNIONECTOMY Right     CARPAL TUNNEL RELEASE Bilateral     CHOLECYSTECTOMY      COLONOSCOPY  02/05/2015    Dr. Blas, repeat in 3-5 years    COLONOSCOPY N/A 02/03/2021    Procedure: COLONOSCOPY;  Surgeon: Cale Aggarwal MD;  Location: [facility] END[facility];  Service: Endoscopy;  Laterality: N/A;    COSMETIC SURGERY  Eyes/nose    ESOPHAGOGASTRODUODENOSCOPY N/A 01/27/2021    Procedure: EGD (ESOPHAGOGASTRODUODENOSCOPY);  Surgeon: Cale Aggarwal MD;  Location: [facility] ENDO;  Service: Endoscopy;  Laterality: N/A;    HEMORRHOID SURGERY      HYSTERECTOMY      ovaries remain    LUMBAR LAMINECTOMY      RHINOPLASTY TIP      SKIN CANCER EXCISION      TRANSFORAMINAL EPIDURAL INJECTION OF STEROID Left 11/19/2024    Procedure: Injection,steroid,epidural,transforaminal approach;  Surgeon: Dieter York MD;  Location: [facility];  Service: Pain Management;  Laterality: Left;    TUBAL LIGATION      UPPER GASTROINTESTINAL ENDOSCOPY  2015    Dr. Blas    UPPER GASTROINTESTINAL ENDOSCOPY  03/30/2017    Dr. Aggarwal     Social History     Tobacco Use    Smoking status: Never    Smokeless tobacco: Never   Substance Use Topics    Alcohol use: Yes     Alcohol/week: 20.0 standard drinks of alcohol     Types: 20 Glasses of wine per week     Comment: Occasionally    Drug use: No     Family History   Problem Relation Name Age of Onset    Heart disease Mother Renee         MI    Cancer Mother Renee         SQ carcinoma of lung    Heart attack Mother Renee     Skin cancer Mother Renee     Cancer Father Ede         prostate    Ovarian cancer Cousin      Colon cancer Cousin      Skin cancer Daughter      Cancer Sister 1/2Barbara         uterine sarcoma    Diabetes Sister 1/2Barbara     Colon cancer Brother 1/2 Romario 60    Cancer Brother 1/2 Romario     No  Known Problems Son      No Known Problems Sister 1/2     Diabetes Sister 1/2 joshua     Hypertension Sister 1/2 joshua     Arthritis Sister 1/2 joshua     No Known Problems Daughter      Breast cancer Maternal Aunt      Amblyopia Neg Hx      Blindness Neg Hx      Cataracts Neg Hx      Glaucoma Neg Hx      Retinal detachment Neg Hx      Strabismus Neg Hx      Stroke Neg Hx      Thyroid disease Neg Hx      Melanoma Neg Hx      Psoriasis Neg Hx      Lupus Neg Hx      Eczema Neg Hx      Crohn's disease Neg Hx      Ulcerative colitis Neg Hx          Review of Systems:   Constitution: Negative for diaphoresis and fever.   HEENT: Negative for nosebleeds.    Cardiovascular: Negative for chest pain       No dyspnea on exertion       No leg swelling        No palpitations  Respiratory: Negative for shortness of breath and wheezing.    Hematologic/Lymphatic: Negative for bleeding problem. Does not bruise/bleed easily.   Skin: Negative for color change and rash.   Musculoskeletal: Negative for falls and myalgias.   Gastrointestinal: Negative for hematemesis and hematochezia.   Genitourinary: Negative for hematuria.   Neurological: Negative for dizziness and light-headedness.   Psychiatric/Behavioral: Negative for altered mental status and memory loss.          Objective:        Vitals:    01/31/25 1010   BP: 103/66   Pulse: 74       Lab Results   Component Value Date    WBC 6.81 10/10/2024    HGB 15.1 10/10/2024    HCT 45.2 10/10/2024     10/10/2024    CHOL 138 10/10/2024    TRIG 192 (H) 10/10/2024    HDL 30 (L) 10/10/2024    ALT 17 10/10/2024    AST 17 10/10/2024     10/10/2024    K 3.6 10/10/2024     10/10/2024    CREATININE 0.9 10/10/2024    BUN 19 10/10/2024    CO2 21 (L) 10/10/2024    TSH 0.784 12/20/2017    INR 1.0 03/27/2023    GLUF 120 (H) 03/02/2007    HGBA1C 6.3 (H) 10/10/2024        ECHOCARDIOGRAM RESULTS  Results for orders placed during the hospital encounter of 12/23/24    Echo Saline Bubble?  No    Interpretation Summary    Left Ventricle: The left ventricle is normal in size. Moderately increased wall thickness. There is moderate concentric hypertrophy. There is normal systolic function with a visually estimated ejection fraction of 55 - 60%. There is normal diastolic function.    Right Ventricle: Normal right ventricular cavity size. Systolic function is normal.    Left Atrium: Normal left atrial size.    Right Atrium: Normal right atrial size.    Aortic Valve: The aortic valve is structurally normal.    Mitral Valve: There is mild regurgitation.    Tricuspid Valve: There is trace regurgitation.    Pulmonic Valve: The pulmonic valve is structurally normal.    Aorta: Ascending aorta is normal.    IVC/SVC: Normal venous pressure at 3 mmHg.    Pericardium: There is no pericardial effusion.    Suggest increased in LV mass from Echo in 6/2021.        CURRENT/PREVIOUS VISIT EKG  Results for orders placed or performed in visit on 11/21/24   IN OFFICE EKG 12-LEAD (to [place])    Collection Time: 11/21/24  2:31 PM   Result Value Ref Range    QRS Duration 84 ms    [facility] QTC Calculation 401 ms    Narrative    Test Reason : I25.10,I25.10,    Vent. Rate :  67 BPM     Atrial Rate :  67 BPM     P-R Int : 198 ms          QRS Dur :  84 ms      QT Int : 380 ms       P-R-T Axes :  15 -10  44 degrees    QTcB Int : 401 ms    Normal sinus rhythm  Anterior infarct (cited on or before 29-Mar-2018)  Abnormal ECG  When compared with ECG of 10-May-2021 14:27,  No significant change was found  Confirmed by Adalberto Matos ([id number]) on 12/8/2024 10:38:18 AM    Referred By: STEVENSON PUTNAM           Confirmed By: Adalberto Matos     No valid procedures specified.   Results for orders placed during the hospital encounter of 01/03/25    Nuclear Stress - Cardiology Interpreted    Interpretation Summary    Abnormal myocardial perfusion scan.    There is amoderate intensity, large sized, mostly reversible perfusion abnormality with some fixed  areas in the inferolateral wall(s).  Can not rule out extrathoracic artifact.  Consider further evaluation    There are no other significant perfusion abnormalities.    The gated perfusion images showed an ejection fraction of 76% at rest. The gated perfusion images showed an ejection fraction of 78% post stress. Normal ejection fraction is greater than 53%.    There is normal wall motion at rest.    The ECG portion of the study is uninterpretable due to baseline ECG abnormalities.    The patient reported no chest pain during the stress test.    There were no arrhythmias during stress.    TID 0.70      Physical Exam:  CONSTITUTIONAL: No fever, no chills  HEENT: Normocephalic, atraumatic,pupils reactive to light                 NECK:  No JVD no carotid bruit  CVS: S1S2+, RRR, no murmurs,   LUNGS: Clear  ABDOMEN: Soft, NT, BS+  EXTREMITIES: No cyanosis, edema  : No javier catheter  NEURO: AAO X 3  PSY: Normal affect      Medication List with Changes/Refills   Current Medications    ASPIRIN 81 MG TAB    Take 81 mg by mouth. 1 Tablet Oral Every day    ATORVASTATIN (LIPITOR) 80 MG TABLET    Take 1 tablet (80 mg total) by mouth once daily.    BLOOD GLUCOSE CONTROL, NORMAL SOLN    Use as Instructed    BLOOD SUGAR DIAGNOSTIC (TRUE METRIX GLUCOSE TEST STRIP) STRP    1 strip by Misc.(Non-Drug; Combo Route) route once daily.    BLOOD-GLUCOSE METER KIT    Use as instructed    CARVEDILOL (COREG) 25 MG TABLET    Take 1 tablet (25 mg total) by mouth 2 (two) times daily.    CYANOCOBALAMIN (VITAMIN B-12) 250 MCG TABLET    Take 250 mcg by mouth once daily.    DULAGLUTIDE (TRULICITY) 3 MG/0.5 ML PEN INJECTOR    Inject 3 mg into the skin every 7 days.    EMPAGLIFLOZIN (JARDIANCE) 25 MG TABLET    Take 1 tablet (25 mg total) by mouth once daily.    ERGOCALCIFEROL (ERGOCALCIFEROL) 50,000 UNIT CAP    TAKE 1 CAPSULE EVERY 7 DAYS.    EZETIMIBE (ZETIA) 10 MG TABLET    Take 1 tablet (10 mg total) by mouth once daily.    FISH OIL-OMEGA-3 FATTY  ACIDS 300-1,000 MG CAPSULE        GABAPENTIN (NEURONTIN) 300 MG CAPSULE    Take 1 capsule (300 mg total) by mouth 2 (two) times daily.    KETOCONAZOLE (NIZORAL) 2 % CREAM    AAA bid    LANCETS (TRUEPLUS LANCETS) 30 GAUGE MISC    1 lancet  by Misc.(Non-Drug; Combo Route) route once daily.    MELATONIN (MELATIN ORAL)    Take 5 mg by mouth as needed.    MELOXICAM (MOBIC) 15 MG TABLET    Take 1 tablet (15 mg total) by mouth once daily.    NITROGLYCERIN (NITROSTAT) 0.4 MG SL TABLET    Take 1 tablet (0.4 mg total) by mouth every 5 (five) minutes as needed for Chest pain.    OMEPRAZOLE (PRILOSEC) 20 MG CAPSULE    Take 1 capsule (20 mg total) by mouth every morning.    OXYCODONE-ACETAMINOPHEN (PERCOCET) 5-325 MG PER TABLET    Take 1 tablet by mouth 2 (two) times daily as needed for Pain.    TELMISARTAN (MICARDIS) 80 MG TAB    Take 1 tablet (80 mg total) by mouth once daily.    VENLAFAXINE (EFFEXOR) 100 MG TAB    Take 1 tablet (100 mg total) by mouth once daily.    VITAMIN E 100 UNIT CAPSULE    Take 100 Units by mouth 2 (two) times daily.             Assessment:       1. Abnormal stress test         Plan:     Problem List Items Addressed This Visit    None  Visit Diagnoses       Abnormal stress test    -  Primary    Relevant Orders    Case Request-Cath Lab: Left heart cath (Completed)    Insert 2 peripheral IVs    Vital signs    Cardiac Monitoring - Adult    Basic metabolic panel    Comprehensive metabolic panel    EKG 12-lead    X-Ray Chest PA And Lateral    Urinalysis, Reflex to Urine Culture Urine, Clean Catch    CBC auto differential        ANGINA WITH ABNORMAL STRESS TEST.  THE PATIENT IS HAVING SYMPTOMS SUSPICIOUS FOR ANGINA AND KNOWN CORONARY ARTERY DISEASE.  RECOMMEND A CORONARY ANGIOGRAM SHE HAS UNDERSTANDS THE RISKS BENEFITS AND OPTIONS  .  HYPERLIPIDEMIA LDL CHOLESTEROL IS ACCEPTABLE 69.6 HOWEVER STILL ABOVE GOAL WOULD CONSIDER PCSK9.  WILL DISCUSS ON FOLLOW-UP VISIT    HYPERTENSION BLOOD PRESSURE IS AT  GOAL    DIABETES A1C CONTROLLED.  RECOMMEND JARDIANCE FOR CARDIOVASCULAR PROTECTION PREVENTION OF MI.  WILL DISCUSS ON FOLLOW-UP VISIT        Follow up in about 4 weeks (around 2/28/2025).    The patients questions were answered, they verbalized understanding, and agreed with the treatment plan.     JENNIFER JASON MD  [facility]

## 2025-01-31 ENCOUNTER — OFFICE VISIT (OUTPATIENT)
Dept: CARDIOLOGY | Facility: CLINIC | Age: 80
End: 2025-01-31
Payer: MEDICARE

## 2025-01-31 VITALS
DIASTOLIC BLOOD PRESSURE: 66 MMHG | OXYGEN SATURATION: 98 % | HEART RATE: 74 BPM | BODY MASS INDEX: 28.66 KG/M2 | SYSTOLIC BLOOD PRESSURE: 103 MMHG | WEIGHT: 183 LBS

## 2025-01-31 DIAGNOSIS — I25.10 CORONARY ARTERY CALCIFICATION SEEN ON CAT SCAN: ICD-10-CM

## 2025-01-31 DIAGNOSIS — I15.2 HYPERTENSION ASSOCIATED WITH DIABETES: ICD-10-CM

## 2025-01-31 DIAGNOSIS — R94.39 ABNORMAL STRESS TEST: Primary | ICD-10-CM

## 2025-01-31 DIAGNOSIS — E11.9 TYPE 2 DIABETES MELLITUS WITHOUT COMPLICATION, WITHOUT LONG-TERM CURRENT USE OF INSULIN: ICD-10-CM

## 2025-01-31 DIAGNOSIS — E78.5 HYPERLIPIDEMIA ASSOCIATED WITH TYPE 2 DIABETES MELLITUS: ICD-10-CM

## 2025-01-31 DIAGNOSIS — I65.21 STENOSIS OF RIGHT CAROTID ARTERY: ICD-10-CM

## 2025-01-31 DIAGNOSIS — I70.0 THORACIC AORTA ATHEROSCLEROSIS: ICD-10-CM

## 2025-01-31 DIAGNOSIS — I20.81 ANGINA PECTORIS WITH CORONARY MICROVASCULAR DYSFUNCTION: ICD-10-CM

## 2025-01-31 DIAGNOSIS — E11.59 HYPERTENSION ASSOCIATED WITH DIABETES: ICD-10-CM

## 2025-01-31 DIAGNOSIS — E11.69 HYPERLIPIDEMIA ASSOCIATED WITH TYPE 2 DIABETES MELLITUS: ICD-10-CM

## 2025-01-31 PROCEDURE — 99214 OFFICE O/P EST MOD 30 MIN: CPT | Mod: S$GLB,,, | Performed by: GENERAL PRACTICE

## 2025-01-31 PROCEDURE — 99999 PR PBB SHADOW E&M-EST. PATIENT-LVL V: CPT | Mod: PBBFAC,,, | Performed by: GENERAL PRACTICE

## 2025-01-31 RX ORDER — SODIUM CHLORIDE 9 MG/ML
INJECTION, SOLUTION INTRAVENOUS ONCE
OUTPATIENT
Start: 2025-01-31 | End: 2025-01-31

## 2025-01-31 RX ORDER — DIPHENHYDRAMINE HCL 50 MG
50 CAPSULE ORAL
OUTPATIENT
Start: 2025-01-31

## 2025-01-31 RX ORDER — SODIUM CHLORIDE 0.9 % (FLUSH) 0.9 %
2 SYRINGE (ML) INJECTION
Status: SHIPPED | OUTPATIENT
Start: 2025-01-31

## 2025-02-11 ENCOUNTER — HOSPITAL ENCOUNTER (OUTPATIENT)
Dept: RADIOLOGY | Facility: HOSPITAL | Age: 80
Discharge: HOME | End: 2025-02-11
Attending: GENERAL PRACTICE
Payer: MEDICARE

## 2025-02-11 DIAGNOSIS — I70.0 THORACIC AORTA ATHEROSCLEROSIS: ICD-10-CM

## 2025-02-11 DIAGNOSIS — I25.10 CORONARY ARTERY CALCIFICATION SEEN ON CAT SCAN: ICD-10-CM

## 2025-02-11 DIAGNOSIS — I20.81 ANGINA PECTORIS WITH CORONARY MICROVASCULAR DYSFUNCTION: ICD-10-CM

## 2025-02-11 DIAGNOSIS — R94.39 ABNORMAL STRESS TEST: ICD-10-CM

## 2025-02-11 PROCEDURE — 71046 X-RAY EXAM CHEST 2 VIEWS: CPT | Mod: TC

## 2025-02-11 PROCEDURE — 71046 X-RAY EXAM CHEST 2 VIEWS: CPT | Mod: 26,,, | Performed by: RADIOLOGY

## 2025-02-14 ENCOUNTER — HOSPITAL ENCOUNTER (OUTPATIENT)
Dept: PREADMISSION TESTING | Facility: HOSPITAL | Age: 80
Discharge: HOME | End: 2025-02-14
Attending: STUDENT IN AN ORGANIZED HEALTH CARE EDUCATION/TRAINING PROGRAM
Payer: MEDICARE

## 2025-02-14 VITALS
WEIGHT: 183.69 LBS | RESPIRATION RATE: 18 BRPM | DIASTOLIC BLOOD PRESSURE: 80 MMHG | OXYGEN SATURATION: 97 % | SYSTOLIC BLOOD PRESSURE: 120 MMHG | HEIGHT: 67 IN | HEART RATE: 69 BPM | BODY MASS INDEX: 28.83 KG/M2 | TEMPERATURE: 98 F

## 2025-02-14 DIAGNOSIS — R94.39 ABNORMAL STRESS TEST: ICD-10-CM

## 2025-02-14 DIAGNOSIS — Z01.810 PREOP CARDIOVASCULAR EXAM: ICD-10-CM

## 2025-02-14 LAB
APTT PPP: 25.2 SEC (ref 21–32)
BASOPHILS # BLD AUTO: 0.07 K/UL (ref 0–0.2)
BASOPHILS NFR BLD: 1 % (ref 0–1.9)
DIFFERENTIAL METHOD BLD: ABNORMAL
EOSINOPHIL # BLD AUTO: 0.3 K/UL (ref 0–0.5)
EOSINOPHIL NFR BLD: 3.8 % (ref 0–8)
ERYTHROCYTE [DISTWIDTH] IN BLOOD BY AUTOMATED COUNT: 13 % (ref 11.5–14.5)
HCT VFR BLD AUTO: 47.7 % (ref 37–48.5)
HGB BLD-MCNC: 15.6 G/DL (ref 12–16)
IMM GRANULOCYTES # BLD AUTO: 0.03 K/UL (ref 0–0.04)
IMM GRANULOCYTES NFR BLD AUTO: 0.4 % (ref 0–0.5)
INR PPP: 1 (ref 0.8–1.2)
LYMPHOCYTES # BLD AUTO: 2.4 K/UL (ref 1–4.8)
LYMPHOCYTES NFR BLD: 32.8 % (ref 18–48)
MCH RBC QN AUTO: 31.2 PG (ref 27–31)
MCHC RBC AUTO-ENTMCNC: 32.7 G/DL (ref 32–36)
MCV RBC AUTO: 95 FL (ref 82–98)
MONOCYTES # BLD AUTO: 0.3 K/UL (ref 0.3–1)
MONOCYTES NFR BLD: 4.7 % (ref 4–15)
NEUTROPHILS # BLD AUTO: 4.2 K/UL (ref 1.8–7.7)
NEUTROPHILS NFR BLD: 57.3 % (ref 38–73)
NRBC BLD-RTO: 0 /100 WBC
OHS QRS DURATION: 84 MS
OHS QTC CALCULATION: 418 MS
PLATELET # BLD AUTO: 233 K/UL (ref 150–450)
PMV BLD AUTO: 9.6 FL (ref 9.2–12.9)
PROTHROMBIN TIME: 10.7 SEC (ref 9–12.5)
RBC # BLD AUTO: 5 M/UL (ref 4–5.4)
WBC # BLD AUTO: 7.31 K/UL (ref 3.9–12.7)

## 2025-02-14 PROCEDURE — 85025 COMPLETE CBC W/AUTO DIFF WBC: CPT | Performed by: GENERAL PRACTICE

## 2025-02-14 PROCEDURE — 36415 COLL VENOUS BLD VENIPUNCTURE: CPT | Performed by: GENERAL PRACTICE

## 2025-02-14 PROCEDURE — 85730 THROMBOPLASTIN TIME PARTIAL: CPT | Performed by: GENERAL PRACTICE

## 2025-02-14 PROCEDURE — 85610 PROTHROMBIN TIME: CPT | Performed by: GENERAL PRACTICE

## 2025-02-14 NOTE — DISCHARGE INSTRUCTIONS
Your procedure is scheduled for:2/18/25          [facility] thru [facility] entrance at:0530    Nothing to eat  after midnight the night before your procedure. You may have clear liquids up to 2 hours before coming in for procedure. This includes water, Gatorade, clear juice  Do not take any medications the morning of your procedure  Bring all your medications with you in the original pill bottles from pharmacy. Please bring your ASPIRIN   If you take blood thinners, ask your doctor if you should stop taking them.  Do not take metformin 24 hours prior to your procedure.  Adjust your insulin or other diabetes medications if needed.   Do your chlorhexidine wash the night before and morning of your procedure.  If you use a CPAP or BiPAP at home, please bring it with you the day of your procedure.  Make arrangements for someone you know to drive you home after your procedure. Taxi and Uber are not acceptable.  Come dressed comfortably          Any questions call the [facility] at [phone]

## 2025-02-18 ENCOUNTER — HOSPITAL ENCOUNTER (OUTPATIENT)
Facility: HOSPITAL | Age: 80
Discharge: HOME | End: 2025-02-18
Attending: STUDENT IN AN ORGANIZED HEALTH CARE EDUCATION/TRAINING PROGRAM | Admitting: STUDENT IN AN ORGANIZED HEALTH CARE EDUCATION/TRAINING PROGRAM
Payer: MEDICARE

## 2025-02-18 VITALS
RESPIRATION RATE: 20 BRPM | OXYGEN SATURATION: 95 % | DIASTOLIC BLOOD PRESSURE: 77 MMHG | SYSTOLIC BLOOD PRESSURE: 144 MMHG | HEART RATE: 67 BPM

## 2025-02-18 DIAGNOSIS — R94.39 ABNORMAL STRESS TEST: ICD-10-CM

## 2025-02-18 LAB
ALBUMIN SERPL BCP-MCNC: 4.3 G/DL (ref 3.5–5.2)
ALP SERPL-CCNC: 113 U/L (ref 55–135)
ALT SERPL W/O P-5'-P-CCNC: 14 U/L (ref 10–44)
ANION GAP SERPL CALC-SCNC: 8 MMOL/L (ref 8–16)
AST SERPL-CCNC: 16 U/L (ref 10–40)
BILIRUB SERPL-MCNC: 0.8 MG/DL (ref 0.1–1)
BUN SERPL-MCNC: 17 MG/DL (ref 8–23)
CALCIUM SERPL-MCNC: 9.3 MG/DL (ref 8.7–10.5)
CHLORIDE SERPL-SCNC: 108 MMOL/L (ref 95–110)
CO2 SERPL-SCNC: 25 MMOL/L (ref 23–29)
CREAT SERPL-MCNC: 0.6 MG/DL (ref 0.5–1.4)
EST. GFR  (NO RACE VARIABLE): >60 ML/MIN/1.73 M^2
GLUCOSE SERPL-MCNC: 144 MG/DL (ref 70–110)
POTASSIUM SERPL-SCNC: 3.9 MMOL/L (ref 3.5–5.1)
PROT SERPL-MCNC: 7.1 G/DL (ref 6–8.4)
SODIUM SERPL-SCNC: 141 MMOL/L (ref 136–145)

## 2025-02-18 PROCEDURE — 25000003 PHARM REV CODE 250: Performed by: GENERAL PRACTICE

## 2025-02-18 PROCEDURE — C1894 INTRO/SHEATH, NON-LASER: HCPCS | Performed by: STUDENT IN AN ORGANIZED HEALTH CARE EDUCATION/TRAINING PROGRAM

## 2025-02-18 PROCEDURE — 93458 L HRT ARTERY/VENTRICLE ANGIO: CPT | Performed by: STUDENT IN AN ORGANIZED HEALTH CARE EDUCATION/TRAINING PROGRAM

## 2025-02-18 PROCEDURE — 99152 MOD SED SAME PHYS/QHP 5/>YRS: CPT | Mod: ,,, | Performed by: STUDENT IN AN ORGANIZED HEALTH CARE EDUCATION/TRAINING PROGRAM

## 2025-02-18 PROCEDURE — C1769 GUIDE WIRE: HCPCS | Performed by: STUDENT IN AN ORGANIZED HEALTH CARE EDUCATION/TRAINING PROGRAM

## 2025-02-18 PROCEDURE — 63600175 PHARM REV CODE 636 W HCPCS: Performed by: STUDENT IN AN ORGANIZED HEALTH CARE EDUCATION/TRAINING PROGRAM

## 2025-02-18 PROCEDURE — 93458 L HRT ARTERY/VENTRICLE ANGIO: CPT | Mod: 26,,, | Performed by: STUDENT IN AN ORGANIZED HEALTH CARE EDUCATION/TRAINING PROGRAM

## 2025-02-18 PROCEDURE — 25500020 PHARM REV CODE 255: Performed by: STUDENT IN AN ORGANIZED HEALTH CARE EDUCATION/TRAINING PROGRAM

## 2025-02-18 PROCEDURE — 25000003 PHARM REV CODE 250: Performed by: STUDENT IN AN ORGANIZED HEALTH CARE EDUCATION/TRAINING PROGRAM

## 2025-02-18 PROCEDURE — 63600175 PHARM REV CODE 636 W HCPCS

## 2025-02-18 PROCEDURE — 99152 MOD SED SAME PHYS/QHP 5/>YRS: CPT | Performed by: STUDENT IN AN ORGANIZED HEALTH CARE EDUCATION/TRAINING PROGRAM

## 2025-02-18 PROCEDURE — 80053 COMPREHEN METABOLIC PANEL: CPT | Performed by: GENERAL PRACTICE

## 2025-02-18 RX ORDER — FENTANYL CITRATE 50 UG/ML
INJECTION, SOLUTION INTRAMUSCULAR; INTRAVENOUS
Status: DISCONTINUED | OUTPATIENT
Start: 2025-02-18 | End: 2025-02-18 | Stop reason: HOSPADM

## 2025-02-18 RX ORDER — HEPARIN SODIUM 10000 [USP'U]/ML
INJECTION, SOLUTION INTRAVENOUS; SUBCUTANEOUS
Status: DISCONTINUED | OUTPATIENT
Start: 2025-02-18 | End: 2025-02-18 | Stop reason: HOSPADM

## 2025-02-18 RX ORDER — ISOSORBIDE MONONITRATE 30 MG/1
30 TABLET, EXTENDED RELEASE ORAL DAILY
Qty: 30 TABLET | Refills: 1 | Status: SHIPPED | OUTPATIENT
Start: 2025-02-18 | End: 2025-02-24

## 2025-02-18 RX ORDER — HYDRALAZINE HYDROCHLORIDE 20 MG/ML
10 INJECTION INTRAMUSCULAR; INTRAVENOUS ONCE
Status: COMPLETED | OUTPATIENT
Start: 2025-02-18 | End: 2025-02-18

## 2025-02-18 RX ORDER — SODIUM CHLORIDE 9 MG/ML
INJECTION, SOLUTION INTRAVENOUS ONCE
Status: COMPLETED | OUTPATIENT
Start: 2025-02-18 | End: 2025-02-18

## 2025-02-18 RX ORDER — HYDRALAZINE HYDROCHLORIDE 20 MG/ML
INJECTION INTRAMUSCULAR; INTRAVENOUS
Status: COMPLETED
Start: 2025-02-18 | End: 2025-02-18

## 2025-02-18 RX ORDER — DIPHENHYDRAMINE HCL 25 MG
50 CAPSULE ORAL
Status: DISCONTINUED | OUTPATIENT
Start: 2025-02-18 | End: 2025-02-18 | Stop reason: HOSPADM

## 2025-02-18 RX ORDER — MIDAZOLAM HYDROCHLORIDE 1 MG/ML
INJECTION INTRAMUSCULAR; INTRAVENOUS
Status: DISCONTINUED | OUTPATIENT
Start: 2025-02-18 | End: 2025-02-18 | Stop reason: HOSPADM

## 2025-02-18 RX ORDER — LIDOCAINE HYDROCHLORIDE 10 MG/ML
INJECTION, SOLUTION EPIDURAL; INFILTRATION; INTRACAUDAL; PERINEURAL
Status: DISCONTINUED | OUTPATIENT
Start: 2025-02-18 | End: 2025-02-18 | Stop reason: HOSPADM

## 2025-02-18 RX ORDER — NITROGLYCERIN 5 MG/ML
INJECTION, SOLUTION INTRAVENOUS
Status: DISCONTINUED | OUTPATIENT
Start: 2025-02-18 | End: 2025-02-18 | Stop reason: HOSPADM

## 2025-02-18 RX ORDER — VERAPAMIL HYDROCHLORIDE 2.5 MG/ML
INJECTION, SOLUTION INTRAVENOUS
Status: DISCONTINUED | OUTPATIENT
Start: 2025-02-18 | End: 2025-02-18 | Stop reason: HOSPADM

## 2025-02-18 RX ADMIN — HYDRALAZINE HYDROCHLORIDE 10 MG: 20 INJECTION, SOLUTION INTRAMUSCULAR; INTRAVENOUS at 04:02

## 2025-02-18 RX ADMIN — HYDRALAZINE HYDROCHLORIDE 10 MG: 20 INJECTION INTRAMUSCULAR; INTRAVENOUS at 04:02

## 2025-02-18 RX ADMIN — SODIUM CHLORIDE: 0.9 INJECTION, SOLUTION INTRAVENOUS at 07:02

## 2025-02-18 NOTE — INTERVAL H&P NOTE
The patient has been examined and the H&P has been reviewed:    I concur with the findings and no changes have occurred since H&P was written.    Procedure risks, benefits and alternative options discussed and understood by patient/family.    Risks, benefits and alternatives were explained to the patient.    Risks include but not limited to bleeding, allergic reaction to contrast, vascular damage, renal failure with potential need for dialysis, infection, rhythm abnormalities, stroke, myocardial infarction, emergency bypass surgery and death.    The risks of moderate sedation include hypotension, respiratory depression, arrhythmias, bronchospasm and death.  Patient does understand these risks and wants to proceed with cardiac catheterization.  Should stenting be indicated, patient has agreed to dual antiplatelet therapy for 12 months with drug-eluting stent   Additionally, patient is aware that noncompliance with medications is likely to result in the stent clotting with heart attack, heart failure and/or death.  All questions have been answered to the patient's satisfaction and the patient has voiced to proceed with the procedure.  Verbal consent has been obtained and the patient is agreeable to proceed with the procedure.           There are no hospital problems to display for this patient.

## 2025-02-18 NOTE — Clinical Note
An angiography was performed of the left coronary arteries. Multiple views were taken. The angiography was performed via power injection. The injected amount was 8 mL contrast at 4 mL/s. The PSI from the power injection was 4000.

## 2025-02-19 ENCOUNTER — RESULTS FOLLOW-UP (OUTPATIENT)
Dept: CARDIOLOGY | Facility: CLINIC | Age: 80
End: 2025-02-19

## 2025-02-21 ENCOUNTER — NURSE TRIAGE (OUTPATIENT)
Dept: ADMINISTRATIVE | Facility: CLINIC | Age: 80
End: 2025-02-21
Payer: MEDICARE

## 2025-02-21 ENCOUNTER — TELEPHONE (OUTPATIENT)
Dept: CARDIOLOGY | Facility: CLINIC | Age: 80
End: 2025-02-21
Payer: MEDICARE

## 2025-02-21 NOTE — TELEPHONE ENCOUNTER
STOP MEDICATION AND BENADRYL?    Patient transferred from [facility]. Patient noted rash last night when bathing. Started Imdur 2 days,ago after heart cath. Has only taken 2 pills. Took last dose yesterday. Has not taken last night dose or today. Denies any breathing difficulty. States flat rash, not itchy. Has not taken any antihistamine. Upon further questioning, does state the rash seems to be worse where the electrodes were placed. Rash is only on trunk

## 2025-02-21 NOTE — TELEPHONE ENCOUNTER
Patient transferred from [facility]. Patient noted rash last night when bathing. Started Imdur 2 days,ago after heart cath. Has only taken 2 pills. Took last dose yesterday. Has not taken last night dose or today. Denies any breathing difficulty. States flat rash, not itchy. Has not taken any antihistamine. Upon further questioning, does state the rash seems to be worse where the electrodes were placed. Rash is only on trunk. Triage done-dispo call back from provider. Patient verb understanding. Secure chat to MA, I can see she is already addressing with the NP. Per MA, she has sent message to NP to address.     Reason for Disposition   Taking new prescription medicine  (Exceptions: Finished taking new prescription antibiotic OR questions about flushing from niacin.)    Additional Information   Negative: Difficulty breathing or wheezing   Negative: Hoarseness or cough that started soon after 1st dose of drug   Negative: Swollen tongue that started soon after first dose of drug   Negative: Fever and purple or blood-colored spots or dots   Negative: Too weak or sick to stand   Negative: Sounds like a life-threatening emergency to the triager   Negative: Widespread hives and onset < 2 hours of exposure to 1st dose of drug   Negative: Patient sounds very sick or weak to the triager   Negative: Fever   Negative: Face or lip swelling   Negative: Purple or blood-colored spots or dots (no fever and sounds well to triager)   Negative: Joint pain or swelling   Negative: Bloody crusts on lips or in mouth   Negative: Large or small blisters on skin (i.e., fluid filled bubbles or sacs)   Negative: Pregnant   Negative: Rash beginning within 4 hours of a new prescription medication    Protocols used: Rash - Widespread On Drugs-A-[place]

## 2025-02-21 NOTE — TELEPHONE ENCOUNTER
----- Message from Antolin sent at 2/21/2025 11:24 AM CST -----  Regarding: advice  Type:  Needs Medical AdviceWho Called: pt Best Call Back Number: [phone] Information: pt st she's having an allergic reaction to isosorbide mononitrate (IMDUR) 30 MG 24 hr tablet. She st she only been on it for two days in have a rash around her neck & back.  please call to discuss.

## 2025-02-21 NOTE — TELEPHONE ENCOUNTER
S/w pt & advised to stop the isosorbide. Take an antihistamine such as zyrtec or benadryl. If she develops any more symptoms got to ER.   Advised will discuss med changes at appt with dr alvarado

## 2025-02-23 NOTE — PROGRESS NOTES
Subjective:    Patient ID:  Jovana Fall is a 79 y.o. female who presents for follow-up of   Chief Complaint   Patient presents with    Hospital Follow Up       HPI:    Here for followup cath. No angina on Isosorbide but has rash.  Cath films reviewed.  She did have some chest pains before the catheterization and the isosorbide was helping.  She has a rash over her chest and back which she attributes to the isosorbide she quit taking it a day ago is gradually getting little better    Coronary Findings    Diagnostic  Dominance: Right  Left Main   Large caliber. Short vessel. Angiographically normal.      Left Anterior Descending   Large caliber. Tortuous. 50-60% diffuse stenosis in mid LAD. 75% diffuse stenosis in distal LAD. Medium caliber D1 with 85% stenosis of ostial-proximal segment.      Left Circumflex   Large caliber. 60% stenosis in the proximal segment. Gives off a medium caliber OM1 which has 95% stenosis in the ostial-proximal segment. After the takeoff of OM1, circ continues as a large vessel and gives rise to a medium caliber OM2.      Right Coronary Artery   Large caliber. Dominant. Gives off large RPDA and RPL distally. There is 40-50% stenosis in mid RCA. 40% stenosis in the proximal segment of RPDA.      Intervention     No interventions have been documented.     Left Heart Findings    Left Ventricle    LVEDP (Pre): 13 mmHG.     Hemodynamic Data    Link to Hemodynamic Data during the procedure     Recommendations     Routine post-cath care.   Maximize medical management.   Risk factor reductions.      The patient decided to opt for medical therapy.  Maximize medical therapy. Consider revascularization if symptomatic on medical therapy.     Complications    Complications documented before study signed (2/20/2025  8:32 PM)     No complications were associated with this study.   Documented by Colette Rain MD - 2/20/2025  8:28 PM        Signed    Procedure Log and Final Result signed by  Colette Rain MD on 2/20/25 at 2032 CST        SHE COMES IN TODAY FOR THE RESULTS OF HER NUCLEAR STRESS TEST.  SHE HAS HAD HEART CATHETERIZATION TWICE ABOUT 10 YEARS AGO.  SHE WAS TOLD SHE HAS NONE OBSTRUCTIVE CORONARY DISEASE AND RISK FACTOR MANAGEMENT WAS RECOMMENDED.  RECENTLY SHE HAS BEEN HAVING SOME DISCOMFORT IN HER CHEST OR DIFFERENT OCCASIONS WHERE SHE FEELS LIKE SOMETHING IS NOT RIGHT.  THE NUCLEAR STRESS TEST SHOWN BELOW IS SUSPICIOUS FOR ISCHEMIA PROFOUND ISCHEMIA OR ARTIFACT.  ECHO SHOWS NORMAL LEFT VENTRICULAR FUNCTION.     Interpretation Summary  Show Result Comparison     Abnormal myocardial perfusion scan.    There is amoderate intensity, large sized, mostly reversible perfusion abnormality with some fixed areas in the inferolateral wall(s).  Can not rule out extrathoracic artifact.  Consider further evaluation    There are no other significant perfusion abnormalities.    The gated perfusion images showed an ejection fraction of 76% at rest. The gated perfusion images showed an ejection fraction of 78% post stress. Normal ejection fraction is greater than 53%.    There is normal wall motion at rest.    The ECG portion of the study is uninterpretable due to baseline ECG abnormalities.    The patient reported no chest pain during the stress test.    There were no arrhythmias during stress.    TID 0.70        Performing Clinician                    Left Ventricle: The left ventricle is normal in size. Moderately increased wall thickness. There is moderate concentric hypertrophy. There is normal systolic function with a visually estimated ejection fraction of 55 - 60%. There is normal diastolic function.    Right Ventricle: Normal right ventricular cavity size. Systolic function is normal.    Left Atrium: Normal left atrial size.    Right Atrium: Normal right atrial size.    Aortic Valve: The aortic valve is structurally normal.    Mitral Valve: There is mild regurgitation.    Tricuspid Valve: There  is trace regurgitation.    Pulmonic Valve: The pulmonic valve is structurally normal.    Aorta: Ascending aorta is normal.    IVC/SVC: Normal venous pressure at 3 mmHg.    Pericardium: There is no pericardial effusion.    Suggest increased in LV mass from Echo in 6/2021.           11/21/24  Comes in today referred by Dr. Darrell kwok to establish care  She has a history of hypertension diabetes hyperlipidemia and obesity.  She has coronary calcifications seen on like CT scan.  She has had 2 coronary angiograms at [facility] in the past was told she has a lot of disease but no intervention was required just lifestyle changes.  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  She has noticed over the last 2 months she has complain of dyspnea on exertion walking about 2 blocks and doing housework.  She has been having back pain at L4-5 injection with steroids recently in the last 2 weeks.  She does not feel like it has worked yet she has some pain going down her left leg.  She has fatigue.  She gets lightheaded if she gets up quickly he is in the digital blood pressure monitoring program her pressure usually runs 130/76 she is in the diabetic digital program her sugars been controlled.  He has lost about 25 lb over several months  EKG today reveals normal sinus rhythm poor R-wave progression can not rule out old anterior MI     Orthostatics blood pressure 170/87 pulse 69 sitting 152/83 pulse of 70 standing 141/73 consistent with mild orthostasis and chronotropic incompetence    Review of patient's allergies indicates:   Allergen Reactions    Imdur [isosorbide mononitrate] Rash    Adhesive tape-silicones      Other reaction(s): Swelling  Other reaction(s): Itching    Codeine Itching    Metformin Itching    Morphine Itching    Penicillins Itching     Other reaction(s): Itching    Sulfa (sulfonamide antibiotics) Itching     Other reaction(s): Itching        Past Medical History:   Diagnosis Date    Anticoagulant long-term use     Anxiety     Basal cell carcinoma 2014    left upper arm     BCC (basal cell carcinoma of skin) 01/07/2016    mid upper back    BCC (basal cell carcinoma)     left upper back    Bilateral humeral fractures 12/20/2022    Calcium nephrolithiasis     Cataract     Colon polyp     Coronary artery disease     Cortical cataract - Both Eyes 04/29/2013    Depression     Diabetes mellitus     Displacement of lumbar intervertebral disc without myelopathy 05/06/2013    Diverticulosis     GERD (gastroesophageal reflux disease)     H. pylori infection     Hepatomegaly     History of melanoma in situ 02/02/2015    Mid upper back ; 5/2014     Hx of colonic polyps 01/27/2015    Hyperlipidemia     Hypertension     Lactose intolerance     Lumbar spinal stenosis 05/06/2013    Melanoma     mid upper back- in situ 5/2014    Nuclear sclerosis - Both Eyes 04/29/2013    Osteopenia     Spinal stenosis, lumbar region, with neurogenic claudication 05/06/2013    Spondylosis without myelopathy 05/06/2013    Squamous cell carcinoma of skin     Type 2 diabetes mellitus      Past Surgical History:   Procedure Laterality Date    APPENDECTOMY      BROW LIFT AND BLEPHAROPLASTY      BUNIONECTOMY Right     CARPAL TUNNEL RELEASE Bilateral     CHOLECYSTECTOMY      COLONOSCOPY  02/05/2015    Dr. Blas, repeat in 3-5 years    COLONOSCOPY N/A 02/03/2021    Procedure: COLONOSCOPY;  Surgeon: Cale Aggarwal MD;  Location: [facility];  Service: Endoscopy;  Laterality: N/A;    COSMETIC SURGERY  Eyes/nose    ESOPHAGOGASTRODUODENOSCOPY N/A 01/27/2021    Procedure: EGD (ESOPHAGOGASTRODUODENOSCOPY);  Surgeon: Cale Aggarwal MD;  Location: [facility] ENDO;  Service: Endoscopy;  Laterality: N/A;    HEMORRHOID SURGERY      HYSTERECTOMY      ovaries remain    LEFT HEART CATHETERIZATION Left 2/18/2025    Procedure: Left heart cath;  Surgeon: Colette Rain MD;  Location: [facility] CATH/EP LAB;   Service: Cardiology;  Laterality: Left;    LUMBAR LAMINECTOMY      RHINOPLASTY TIP      SKIN CANCER EXCISION      TRANSFORAMINAL EPIDURAL INJECTION OF STEROID Left 11/19/2024    Procedure: Injection,steroid,epidural,transforaminal approach;  Surgeon: Dieter York MD;  Location: [facility] OR;  Service: Pain Management;  Laterality: Left;    TUBAL LIGATION      UPPER GASTROINTESTINAL ENDOSCOPY  2015    Dr. Blas    UPPER GASTROINTESTINAL ENDOSCOPY  03/30/2017    Dr. Dickson     Social History[1]  Family History   Problem Relation Name Age of Onset    Heart disease Mother Renee         MI    Cancer Mother Renee         SQ carcinoma of lung    Heart attack Mother Renee     Skin cancer Mother Renee     Cancer Father Ede         prostate    Ovarian cancer Cousin      Colon cancer Cousin      Skin cancer Daughter      Cancer Sister 1/2Barbara         uterine sarcoma    Diabetes Sister 1/2Barbara     Colon cancer Brother 1/2 Romario 60    Cancer Brother 1/2 Romario     No Known Problems Son      No Known Problems Sister 1/2     Diabetes Sister 1/2 joshua     Hypertension Sister 1/2 joshua     Arthritis Sister 1/2 joshua     No Known Problems Daughter      Breast cancer Maternal Aunt      Amblyopia Neg Hx      Blindness Neg Hx      Cataracts Neg Hx      Glaucoma Neg Hx      Retinal detachment Neg Hx      Strabismus Neg Hx      Stroke Neg Hx      Thyroid disease Neg Hx      Melanoma Neg Hx      Psoriasis Neg Hx      Lupus Neg Hx      Eczema Neg Hx      Crohn's disease Neg Hx      Ulcerative colitis Neg Hx          Review of Systems:   Constitution: Negative for diaphoresis and fever.   HEENT: Negative for nosebleeds.    Cardiovascular: Negative for chest pain       No dyspnea on exertion       No leg swelling        No palpitations  Respiratory: Negative for shortness of breath and wheezing.    Hematologic/Lymphatic: Negative for bleeding problem. Does not bruise/bleed easily.   Skin: Negative for color change and  rash.   Musculoskeletal: Negative for falls and myalgias.   Gastrointestinal: Negative for hematemesis and hematochezia.   Genitourinary: Negative for hematuria.   Neurological: Negative for dizziness and light-headedness.   Psychiatric/Behavioral: Negative for altered mental status and memory loss.          Objective:        Vitals:    02/24/25 1456   BP: 124/65   Pulse: 76       Lab Results   Component Value Date    WBC 7.31 02/14/2025    HGB 15.6 02/14/2025    HCT 47.7 02/14/2025     02/14/2025    CHOL 138 10/10/2024    TRIG 192 (H) 10/10/2024    HDL 30 (L) 10/10/2024    ALT 14 02/18/2025    AST 16 02/18/2025     02/18/2025    K 3.9 02/18/2025     02/18/2025    CREATININE 0.6 02/18/2025    BUN 17 02/18/2025    CO2 25 02/18/2025    TSH 0.784 12/20/2017    INR 1.0 02/14/2025    GLUF 120 (H) 03/02/2007    HGBA1C 6.3 (H) 10/10/2024        ECHOCARDIOGRAM RESULTS  Results for orders placed during the hospital encounter of 12/23/24    Echo Saline Bubble? No    Interpretation Summary    Left Ventricle: The left ventricle is normal in size. Moderately increased wall thickness. There is moderate concentric hypertrophy. There is normal systolic function with a visually estimated ejection fraction of 55 - 60%. There is normal diastolic function.    Right Ventricle: Normal right ventricular cavity size. Systolic function is normal.    Left Atrium: Normal left atrial size.    Right Atrium: Normal right atrial size.    Aortic Valve: The aortic valve is structurally normal.    Mitral Valve: There is mild regurgitation.    Tricuspid Valve: There is trace regurgitation.    Pulmonic Valve: The pulmonic valve is structurally normal.    Aorta: Ascending aorta is normal.    IVC/SVC: Normal venous pressure at 3 mmHg.    Pericardium: There is no pericardial effusion.    Suggest increased in LV mass from Echo in 6/2021.        CURRENT/PREVIOUS VISIT EKG  Results for orders placed or performed during the hospital  encounter of 02/14/25   EKG 12-lead    Collection Time: 02/14/25 10:53 AM   Result Value Ref Range    QRS Duration 84 ms    [facility] QTC Calculation 418 ms    Narrative    Test Reason : Z01.810,    Vent. Rate :  68 BPM     Atrial Rate :  68 BPM     P-R Int : 220 ms          QRS Dur :  84 ms      QT Int : 394 ms       P-R-T Axes :  44  -6  61 degrees    QTcB Int : 418 ms    Sinus rhythm with 1st degree A-V block  Low voltage QRS  Cannot rule out Anterior infarct (cited on or before 29-Mar-2018)  Abnormal ECG  When compared with ECG of 21-Nov-2024 14:31,  No significant change was found    Referred By:            Confirmed By:      No valid procedures specified.   Results for orders placed during the hospital encounter of 01/03/25    Nuclear Stress - Cardiology Interpreted    Interpretation Summary    Abnormal myocardial perfusion scan.    There is amoderate intensity, large sized, mostly reversible perfusion abnormality with some fixed areas in the inferolateral wall(s).  Can not rule out extrathoracic artifact.  Consider further evaluation    There are no other significant perfusion abnormalities.    The gated perfusion images showed an ejection fraction of 76% at rest. The gated perfusion images showed an ejection fraction of 78% post stress. Normal ejection fraction is greater than 53%.    There is normal wall motion at rest.    The ECG portion of the study is uninterpretable due to baseline ECG abnormalities.    The patient reported no chest pain during the stress test.    There were no arrhythmias during stress.    TID 0.70      Physical Exam:  CONSTITUTIONAL: No fever, no chills  HEENT: Normocephalic, atraumatic,pupils reactive to light                 NECK:  No JVD no carotid bruit  CVS: S1S2+, RRR, no murmurs,   LUNGS: Clear  ABDOMEN: Soft, NT, BS+  EXTREMITIES: No cyanosis, edema  : No javier catheter  NEURO: AAO X 3  PSY: Normal affect      Medication List with Changes/Refills   New Medications    RANOLAZINE  (RANEXA) 500 MG TB12    Take 1 tablet (500 mg total) by mouth 2 (two) times daily.   Current Medications    ASPIRIN 81 MG TAB    Take 81 mg by mouth. 1 Tablet Oral Every day    ATORVASTATIN (LIPITOR) 80 MG TABLET    Take 1 tablet (80 mg total) by mouth once daily.    BLOOD GLUCOSE CONTROL, NORMAL SOLN    Use as Instructed    BLOOD SUGAR DIAGNOSTIC (TRUE METRIX GLUCOSE TEST STRIP) STRP    1 strip by Misc.(Non-Drug; Combo Route) route once daily.    BLOOD-GLUCOSE METER KIT    Use as instructed    CARVEDILOL (COREG) 25 MG TABLET    Take 1 tablet (25 mg total) by mouth 2 (two) times daily.    CYANOCOBALAMIN (VITAMIN B-12) 250 MCG TABLET    Take 250 mcg by mouth once daily.    DULAGLUTIDE (TRULICITY) 3 MG/0.5 ML PEN INJECTOR    Inject 3 mg into the skin every 7 days.    EMPAGLIFLOZIN (JARDIANCE) 25 MG TABLET    Take 1 tablet (25 mg total) by mouth once daily.    ERGOCALCIFEROL (ERGOCALCIFEROL) 50,000 UNIT CAP    TAKE 1 CAPSULE EVERY 7 DAYS.    EZETIMIBE (ZETIA) 10 MG TABLET    Take 1 tablet (10 mg total) by mouth once daily.    FISH OIL-OMEGA-3 FATTY ACIDS 300-1,000 MG CAPSULE        GABAPENTIN (NEURONTIN) 300 MG CAPSULE    Take 1 capsule (300 mg total) by mouth 2 (two) times daily.    KETOCONAZOLE (NIZORAL) 2 % CREAM    AAA bid    LANCETS (TRUEPLUS LANCETS) 30 GAUGE MISC    1 lancet  by Misc.(Non-Drug; Combo Route) route once daily.    MELATONIN (MELATIN ORAL)    Take 5 mg by mouth as needed.    NITROGLYCERIN (NITROSTAT) 0.4 MG SL TABLET    Take 1 tablet (0.4 mg total) by mouth every 5 (five) minutes as needed for Chest pain.    OMEPRAZOLE (PRILOSEC) 20 MG CAPSULE    Take 1 capsule (20 mg total) by mouth every morning.    OXYCODONE-ACETAMINOPHEN (PERCOCET) 5-325 MG PER TABLET    Take 1 tablet by mouth 2 (two) times daily as needed for Pain.    TELMISARTAN (MICARDIS) 80 MG TAB    Take 1 tablet (80 mg total) by mouth once daily.    VENLAFAXINE (EFFEXOR) 100 MG TAB    Take 1 tablet (100 mg total) by mouth once daily.     VITAMIN E 100 UNIT CAPSULE    Take 100 Units by mouth 2 (two) times daily.   Discontinued Medications    ISOSORBIDE MONONITRATE (IMDUR) 30 MG 24 HR TABLET    Take 1 tablet (30 mg total) by mouth once daily.    MELOXICAM (MOBIC) 15 MG TABLET    Take 1 tablet (15 mg total) by mouth once daily.             Assessment:       1. Adverse effect of isosorbide dinitrate, initial encounter    2. Hypertensive heart disease without heart failure    3. Angina pectoris with coronary microvascular dysfunction    4. Coronary artery calcification seen on CAT scan    5. Hyperlipidemia associated with type 2 diabetes mellitus    6. Hypertension associated with diabetes    7. Abdominal aortic atherosclerosis    8. Abnormal stress test    9. Coronary artery disease involving native coronary artery of native heart without angina pectoris    10. Type 2 diabetes mellitus without complication, without long-term current use of insulin         Plan:     Problem List Items Addressed This Visit          Cardiac/Vascular    Coronary artery calcification seen on CAT scan    Hyperlipidemia associated with type 2 diabetes mellitus    Hypertension associated with diabetes    Abdominal aortic atherosclerosis       Endocrine    Type 2 diabetes mellitus without complication, without long-term current use of insulin     Other Visit Diagnoses         Adverse effect of isosorbide dinitrate, initial encounter    -  Primary    Relevant Orders    Lipid Panel    CBC Without Differential    Comprehensive Metabolic Panel      Hypertensive heart disease without heart failure        Relevant Orders    Lipid Panel    CBC Without Differential    Comprehensive Metabolic Panel      Angina pectoris with coronary microvascular dysfunction          Abnormal stress test          Coronary artery disease involving native coronary artery of native heart without angina pectoris              Coronary artery disease, chronic stable angina DC isosorbide continue medical  management start Ranexa.  Continue Jardiance aspirin, consider low-dose Xarelto  Continue moderate exercise    Hyperlipidemia LDL cholesterol 69 which is above goal a 50 she recently started on Zetia will recheck a PCSK9    Adverse reaction to isosorbide she will take p.r.n. Benadryl isosorbide will be DC .    Diabetes continue her diet exercise    Hypertension controlled    Follow up in about 2 months (around 4/24/2025).    The patients questions were answered, they verbalized understanding, and agreed with the treatment plan.     JENNIFER JASON MD  [facility]         [1]   Social History  Tobacco Use    Smoking status: Never    Smokeless tobacco: Never   Substance Use Topics    Alcohol use: Yes     Alcohol/week: 20.0 standard drinks of alcohol     Types: 20 Glasses of wine per week     Comment: Occasionally    Drug use: No

## 2025-02-24 ENCOUNTER — OFFICE VISIT (OUTPATIENT)
Dept: CARDIOLOGY | Facility: CLINIC | Age: 80
End: 2025-02-24
Payer: MEDICARE

## 2025-02-24 VITALS
BODY MASS INDEX: 29.29 KG/M2 | DIASTOLIC BLOOD PRESSURE: 65 MMHG | OXYGEN SATURATION: 97 % | WEIGHT: 187 LBS | SYSTOLIC BLOOD PRESSURE: 124 MMHG | HEART RATE: 76 BPM

## 2025-02-24 DIAGNOSIS — E11.59 HYPERTENSION ASSOCIATED WITH DIABETES: ICD-10-CM

## 2025-02-24 DIAGNOSIS — I20.81 ANGINA PECTORIS WITH CORONARY MICROVASCULAR DYSFUNCTION: ICD-10-CM

## 2025-02-24 DIAGNOSIS — E78.5 HYPERLIPIDEMIA ASSOCIATED WITH TYPE 2 DIABETES MELLITUS: ICD-10-CM

## 2025-02-24 DIAGNOSIS — I11.9 HYPERTENSIVE HEART DISEASE WITHOUT HEART FAILURE: ICD-10-CM

## 2025-02-24 DIAGNOSIS — I25.10 CORONARY ARTERY DISEASE INVOLVING NATIVE CORONARY ARTERY OF NATIVE HEART WITHOUT ANGINA PECTORIS: ICD-10-CM

## 2025-02-24 DIAGNOSIS — R94.39 ABNORMAL STRESS TEST: ICD-10-CM

## 2025-02-24 DIAGNOSIS — I70.0 ABDOMINAL AORTIC ATHEROSCLEROSIS: ICD-10-CM

## 2025-02-24 DIAGNOSIS — T46.3X5A: Primary | ICD-10-CM

## 2025-02-24 DIAGNOSIS — I15.2 HYPERTENSION ASSOCIATED WITH DIABETES: ICD-10-CM

## 2025-02-24 DIAGNOSIS — E11.9 TYPE 2 DIABETES MELLITUS WITHOUT COMPLICATION, WITHOUT LONG-TERM CURRENT USE OF INSULIN: ICD-10-CM

## 2025-02-24 DIAGNOSIS — E11.69 HYPERLIPIDEMIA ASSOCIATED WITH TYPE 2 DIABETES MELLITUS: ICD-10-CM

## 2025-02-24 DIAGNOSIS — I25.10 CORONARY ARTERY CALCIFICATION SEEN ON CAT SCAN: ICD-10-CM

## 2025-02-24 PROCEDURE — 99999 PR PBB SHADOW E&M-EST. PATIENT-LVL IV: CPT | Mod: PBBFAC,,, | Performed by: GENERAL PRACTICE

## 2025-02-24 RX ORDER — RANOLAZINE 500 MG/1
500 TABLET, EXTENDED RELEASE ORAL 2 TIMES DAILY
Qty: 60 TABLET | Refills: 11 | Status: SHIPPED | OUTPATIENT
Start: 2025-02-24 | End: 2026-02-24

## 2025-02-28 DIAGNOSIS — E11.40 TYPE 2 DIABETES MELLITUS WITH DIABETIC NEUROPATHY, WITHOUT LONG-TERM CURRENT USE OF INSULIN: ICD-10-CM

## 2025-02-28 DIAGNOSIS — I11.9 HYPERTENSIVE HEART DISEASE WITHOUT HEART FAILURE: ICD-10-CM

## 2025-02-28 DIAGNOSIS — E11.69 HYPERLIPIDEMIA ASSOCIATED WITH TYPE 2 DIABETES MELLITUS: ICD-10-CM

## 2025-02-28 DIAGNOSIS — K21.9 GASTROESOPHAGEAL REFLUX DISEASE WITHOUT ESOPHAGITIS: ICD-10-CM

## 2025-02-28 DIAGNOSIS — F33.9 MAJOR DEPRESSION, RECURRENT, CHRONIC: ICD-10-CM

## 2025-02-28 DIAGNOSIS — M81.0 OSTEOPOROSIS WITHOUT CURRENT PATHOLOGICAL FRACTURE, UNSPECIFIED OSTEOPOROSIS TYPE: ICD-10-CM

## 2025-02-28 DIAGNOSIS — M47.16 SPONDYLOSIS OF LUMBAR SPINE WITH MYELOPATHY: ICD-10-CM

## 2025-02-28 DIAGNOSIS — M48.061 DEGENERATIVE LUMBAR SPINAL STENOSIS: ICD-10-CM

## 2025-02-28 DIAGNOSIS — E78.5 HYPERLIPIDEMIA ASSOCIATED WITH TYPE 2 DIABETES MELLITUS: ICD-10-CM

## 2025-02-28 RX ORDER — OMEPRAZOLE 20 MG/1
20 CAPSULE, DELAYED RELEASE ORAL EVERY MORNING
Qty: 90 CAPSULE | Refills: 0 | Status: CANCELLED | OUTPATIENT
Start: 2025-02-28

## 2025-02-28 RX ORDER — EZETIMIBE 10 MG/1
10 TABLET ORAL DAILY
Qty: 90 TABLET | Refills: 0 | Status: CANCELLED | OUTPATIENT
Start: 2025-02-28

## 2025-02-28 RX ORDER — DULAGLUTIDE 3 MG/.5ML
3 INJECTION, SOLUTION SUBCUTANEOUS
Qty: 12 PEN | Refills: 0 | Status: CANCELLED | OUTPATIENT
Start: 2025-02-28

## 2025-02-28 RX ORDER — ERGOCALCIFEROL 1.25 MG/1
CAPSULE ORAL
Qty: 12 CAPSULE | Refills: 0 | Status: CANCELLED | OUTPATIENT
Start: 2025-02-28

## 2025-02-28 RX ORDER — GABAPENTIN 300 MG/1
300 CAPSULE ORAL 2 TIMES DAILY
Qty: 180 CAPSULE | Refills: 0 | Status: CANCELLED | OUTPATIENT
Start: 2025-02-28

## 2025-02-28 RX ORDER — VENLAFAXINE 100 MG/1
100 TABLET ORAL DAILY
Qty: 90 TABLET | Refills: 0 | Status: CANCELLED | OUTPATIENT
Start: 2025-02-28

## 2025-02-28 RX ORDER — TELMISARTAN 80 MG/1
80 TABLET ORAL DAILY
Qty: 90 TABLET | Refills: 0 | Status: CANCELLED | OUTPATIENT
Start: 2025-02-28

## 2025-03-01 NOTE — TELEPHONE ENCOUNTER
No care due was identified.  [facility] Embedded Care Due Messages. Reference number: [id number].   2/28/2025 11:30:04 PM CST

## 2025-03-03 ENCOUNTER — TELEPHONE (OUTPATIENT)
Dept: PRIMARY CARE CLINIC | Facility: CLINIC | Age: 80
End: 2025-03-03
Payer: MEDICARE

## 2025-03-03 NOTE — TELEPHONE ENCOUNTER
----- Message from Angela sent at 3/3/2025  7:14 AM CST -----  Contact: [phone]  [email or web address] Patient is calling for Medical Advice regarding: Patient had an appointment today for 10:30 however she cannot make it and she would like a call back to r/s because the next available for an annual is July and she would like to be seen before July and please schedule after 12 noon. Patient wants a call back or thru [facility]: callComments: Please call to r/s phys after 12 noon.Please advise patient replies from provider may take up to 48 hours.

## 2025-03-05 RX ORDER — OXYCODONE AND ACETAMINOPHEN 5; 325 MG/1; MG/1
1 TABLET ORAL 2 TIMES DAILY PRN
Qty: 60 TABLET | Refills: 0 | Status: SHIPPED | OUTPATIENT
Start: 2025-03-05

## 2025-03-14 DIAGNOSIS — E11.40 TYPE 2 DIABETES MELLITUS WITH DIABETIC NEUROPATHY, WITHOUT LONG-TERM CURRENT USE OF INSULIN: ICD-10-CM

## 2025-03-14 RX ORDER — DULAGLUTIDE 3 MG/.5ML
3 INJECTION, SOLUTION SUBCUTANEOUS
Qty: 12 PEN | Refills: 0 | Status: SHIPPED | OUTPATIENT
Start: 2025-03-14

## 2025-03-14 NOTE — TELEPHONE ENCOUNTER
No care due was identified.  [facility] Embedded Care Due Messages. Reference number: [id number].   3/14/2025 11:50:28 AM CDT

## 2025-03-14 NOTE — TELEPHONE ENCOUNTER
----- Message from Kaelyn sent at 3/14/2025 11:43 AM CDT -----  Contact: [phone] Patient  Pharmacy is calling to clarify an RX.RX name:  dulaglutide (TRULICITY) 3 mg/0.5 mL pen injector 12 pen 0 1/29/2025 - NoWhat do they need to clarify:  PA needed for continuing useComments: Feedback needed to Pt as well - via portal

## 2025-03-19 ENCOUNTER — OFFICE VISIT (OUTPATIENT)
Dept: OPTOMETRY | Facility: CLINIC | Age: 80
End: 2025-03-19
Payer: MEDICARE

## 2025-03-19 DIAGNOSIS — H25.813 COMBINED FORMS OF AGE-RELATED CATARACT OF BOTH EYES: ICD-10-CM

## 2025-03-19 DIAGNOSIS — Z46.0 CONTACT LENS/GLASSES FITTING: Primary | ICD-10-CM

## 2025-03-19 DIAGNOSIS — H52.7 REFRACTIVE ERROR: ICD-10-CM

## 2025-03-19 DIAGNOSIS — E11.9 DIABETES MELLITUS TYPE 2 WITHOUT RETINOPATHY: Primary | ICD-10-CM

## 2025-03-19 PROCEDURE — 92015 DETERMINE REFRACTIVE STATE: CPT | Mod: S$GLB,,, | Performed by: OPTOMETRIST

## 2025-03-19 PROCEDURE — 92014 COMPRE OPH EXAM EST PT 1/>: CPT | Mod: S$GLB,,, | Performed by: OPTOMETRIST

## 2025-03-19 PROCEDURE — 99499 UNLISTED E&M SERVICE: CPT | Mod: ,,, | Performed by: OPTOMETRIST

## 2025-03-19 PROCEDURE — 99999 PR PBB SHADOW E&M-EST. PATIENT-LVL III: CPT | Mod: PBBFAC,,, | Performed by: OPTOMETRIST

## 2025-03-19 NOTE — PROGRESS NOTES
Assessment /Plan     For exam results, see Encounter Report.    Contact lens/glasses fitting      Patient is here for a comprehensive eye exam and contact lens fit. See other exam visit with same encounter date 03/19/2025 for detailed exam information.

## 2025-03-19 NOTE — PROGRESS NOTES
HPI    Pt states no changes in vision noticed  Wears her glasses most of the time (did not bring them). Sees fine out of   them    (-)dry eyes or related symptoms  (-)gtts  (-)headaches  (+)floaters very seldom    Hemoglobin A1C       Date                     Value               Ref Range             Status                10/10/2024               6.3 (H)             4.0 - 5.6 %           Final                 04/18/2024               6.7 (H)             4.0 - 5.6 %           Final                  09/12/2023               6.0 (H)             4.0 - 5.6 %           Final              Last edited by Brad Trimble, OD on 3/19/2025  1:26 PM.            Assessment /Plan     For exam results, see Encounter Report.    Diabetes mellitus type 2 without retinopathy    Combined forms of age-related cataract of both eyes    Refractive error      1. Diabetes mellitus type 2 without retinopathy (Primary)  Discussed possible ocular affects of uncontrolled blood sugar with patient.   Recommended continued strong blood sugar control and continued care with PCP.   Monitor yearly.     2. Combined forms of age-related cataract of both eyes  Moderate OU, approaching visual significance   Discussed options  Scheduled for cataract eval in 6 months with Dr. Roman     3. Refractive error  Dispensed updated spectacle Rx. Discussed various spectacle lens options. Discussed adaptation period to new specs.     Discussed cls options -- happy with current brand, wears occasionally  Dispensed CLs trials (mild change OD / no change OS): Acuvue Oasys 1-Day. Daily wear only, dispose of daily.   Discussed proper hand hygiene and wear/care of lenses. Do not sleep/swim/shower in lenses.   Discontinue CL wear ASAP and RTC if any redness or discomfort occurs.   Try new lenses x few days, report back with progress

## 2025-03-31 ENCOUNTER — TELEPHONE (OUTPATIENT)
Dept: CARDIOLOGY | Facility: CLINIC | Age: 80
End: 2025-03-31
Payer: MEDICARE

## 2025-03-31 NOTE — TELEPHONE ENCOUNTER
----- Message from Rosibel sent at 3/28/2025  2:15 PM CDT -----  Regarding: Needs Medical Advice  Contact: Patient at [phone]  Type: Needs Medical AdviceWho Called:  Patient at [phone] (please be specific):  diarrhea Additional Information: Patient has had 8 hrs of diarrhea and would like to talk to someone concerning a new medication symptoms. Patient would like to make a appointment in April.  Please call and advice. Thank you.

## 2025-04-04 ENCOUNTER — HOSPITAL ENCOUNTER (EMERGENCY)
Facility: HOSPITAL | Age: 80
Discharge: HOME | End: 2025-04-05
Attending: EMERGENCY MEDICINE
Payer: MEDICARE
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
## 2025-04-04 DIAGNOSIS — S22.22XA CLOSED FRACTURE OF BODY OF STERNUM, INITIAL ENCOUNTER: ICD-10-CM

## 2025-04-04 LAB
ABSOLUTE EOSINOPHIL (OHS): 0.57 K/UL
ABSOLUTE MONOCYTE (OHS): 0.54 K/UL (ref 0.3–1)
ABSOLUTE NEUTROPHIL COUNT (OHS): 6.21 K/UL (ref 1.8–7.7)
ALBUMIN SERPL BCP-MCNC: 4.1 G/DL (ref 3.5–5.2)
ALP SERPL-CCNC: 123 UNIT/L (ref 40–150)
ALT SERPL W/O P-5'-P-CCNC: 14 UNIT/L (ref 10–44)
AMPHET UR QL SCN: NEGATIVE
ANION GAP (OHS): 13 MMOL/L (ref 8–16)
AST SERPL-CCNC: 17 UNIT/L (ref 11–45)
BARBITURATE SCN PRESENT UR: NEGATIVE
BASOPHILS # BLD AUTO: 0.1 K/UL
BASOPHILS NFR BLD AUTO: 0.9 %
BENZODIAZ UR QL SCN: NEGATIVE
BILIRUB SERPL-MCNC: 0.7 MG/DL (ref 0.1–1)
BILIRUB UR QL STRIP.AUTO: NEGATIVE
BUN SERPL-MCNC: 14 MG/DL (ref 8–23)
CALCIUM SERPL-MCNC: 9.3 MG/DL (ref 8.7–10.5)
CANNABINOIDS UR QL SCN: NEGATIVE
CHLORIDE SERPL-SCNC: 105 MMOL/L (ref 95–110)
CLARITY UR: CLEAR
CO2 SERPL-SCNC: 20 MMOL/L (ref 23–29)
COCAINE UR QL SCN: NEGATIVE
COLOR UR AUTO: YELLOW
CREAT SERPL-MCNC: 0.8 MG/DL (ref 0.5–1.4)
CREAT UR-MCNC: 41.1 MG/DL (ref 15–325)
ERYTHROCYTE [DISTWIDTH] IN BLOOD BY AUTOMATED COUNT: 12.6 % (ref 11.5–14.5)
ETHANOL SERPL-MCNC: 225 MG/DL
GFR SERPLBLD CREATININE-BSD FMLA CKD-EPI: >60 ML/MIN/1.73/M2
GLUCOSE SERPL-MCNC: 145 MG/DL (ref 70–110)
GLUCOSE UR QL STRIP: ABNORMAL
HCT VFR BLD AUTO: 45.3 % (ref 37–48.5)
HGB BLD-MCNC: 15.4 GM/DL (ref 12–16)
HGB UR QL STRIP: NEGATIVE
HOLD SPECIMEN: NORMAL
IMM GRANULOCYTES # BLD AUTO: 0.09 K/UL (ref 0–0.04)
IMM GRANULOCYTES NFR BLD AUTO: 0.8 % (ref 0–0.5)
KETONES UR QL STRIP: NEGATIVE
LACTATE SERPL-SCNC: 2.6 MMOL/L (ref 0.5–2.2)
LEUKOCYTE ESTERASE UR QL STRIP: NEGATIVE
LYMPHOCYTES # BLD AUTO: 3.79 K/UL (ref 1–4.8)
MCH RBC QN AUTO: 30.9 PG (ref 27–31)
MCHC RBC AUTO-ENTMCNC: 34 G/DL (ref 32–36)
MCV RBC AUTO: 91 FL (ref 82–98)
METHADONE UR QL SCN: NEGATIVE
NITRITE UR QL STRIP: NEGATIVE
NUCLEATED RBC (/100WBC) (OHS): 0 /100 WBC
OPIATES UR QL SCN: NEGATIVE
PCP UR QL: NEGATIVE
PH UR STRIP: 6 [PH]
PLATELET # BLD AUTO: 230 K/UL (ref 150–450)
PMV BLD AUTO: 8.9 FL (ref 9.2–12.9)
POTASSIUM SERPL-SCNC: 4 MMOL/L (ref 3.5–5.1)
PROT SERPL-MCNC: 7.5 GM/DL (ref 6–8.4)
PROT UR QL STRIP: NEGATIVE
RBC # BLD AUTO: 4.98 M/UL (ref 4–5.4)
RELATIVE EOSINOPHIL (OHS): 5 %
RELATIVE LYMPHOCYTE (OHS): 33.5 % (ref 18–48)
RELATIVE MONOCYTE (OHS): 4.8 % (ref 4–15)
RELATIVE NEUTROPHIL (OHS): 55 % (ref 38–73)
SODIUM SERPL-SCNC: 138 MMOL/L (ref 136–145)
SP GR UR STRIP: 1.01
UROBILINOGEN UR STRIP-ACNC: NEGATIVE EU/DL
WBC # BLD AUTO: 11.3 K/UL (ref 3.9–12.7)

## 2025-04-04 PROCEDURE — 83605 ASSAY OF LACTIC ACID: CPT | Performed by: EMERGENCY MEDICINE

## 2025-04-04 PROCEDURE — 85025 COMPLETE CBC W/AUTO DIFF WBC: CPT | Performed by: EMERGENCY MEDICINE

## 2025-04-04 PROCEDURE — 72125 CT NECK SPINE W/O DYE: CPT | Mod: TC

## 2025-04-04 PROCEDURE — 81003 URINALYSIS AUTO W/O SCOPE: CPT | Performed by: EMERGENCY MEDICINE

## 2025-04-04 PROCEDURE — 99285 EMERGENCY DEPT VISIT HI MDM: CPT | Mod: 25

## 2025-04-04 PROCEDURE — 80307 DRUG TEST PRSMV CHEM ANLYZR: CPT | Performed by: EMERGENCY MEDICINE

## 2025-04-04 PROCEDURE — 96361 HYDRATE IV INFUSION ADD-ON: CPT

## 2025-04-04 PROCEDURE — 80053 COMPREHEN METABOLIC PANEL: CPT | Performed by: EMERGENCY MEDICINE

## 2025-04-04 PROCEDURE — G0390 TRAUMA RESPONS W/HOSP CRITI: HCPCS | Performed by: EMERGENCY MEDICINE

## 2025-04-04 PROCEDURE — 82077 ASSAY SPEC XCP UR&BREATH IA: CPT | Performed by: EMERGENCY MEDICINE

## 2025-04-04 PROCEDURE — 63600175 PHARM REV CODE 636 W HCPCS: Performed by: EMERGENCY MEDICINE

## 2025-04-04 PROCEDURE — 96374 THER/PROPH/DIAG INJ IV PUSH: CPT

## 2025-04-04 PROCEDURE — 25500020 PHARM REV CODE 255: Performed by: EMERGENCY MEDICINE

## 2025-04-04 PROCEDURE — 71260 CT THORAX DX C+: CPT | Mod: TC

## 2025-04-04 PROCEDURE — 36415 COLL VENOUS BLD VENIPUNCTURE: CPT | Performed by: EMERGENCY MEDICINE

## 2025-04-04 PROCEDURE — 70450 CT HEAD/BRAIN W/O DYE: CPT | Mod: TC

## 2025-04-04 RX ORDER — ONDANSETRON HYDROCHLORIDE 2 MG/ML
4 INJECTION, SOLUTION INTRAVENOUS
Status: COMPLETED | OUTPATIENT
Start: 2025-04-04 | End: 2025-04-04

## 2025-04-04 RX ADMIN — IOHEXOL 100 ML: 350 INJECTION, SOLUTION INTRAVENOUS at 09:04

## 2025-04-04 RX ADMIN — ONDANSETRON 4 MG: 2 INJECTION INTRAMUSCULAR; INTRAVENOUS at 09:04

## 2025-04-04 RX ADMIN — SODIUM CHLORIDE, POTASSIUM CHLORIDE, SODIUM LACTATE AND CALCIUM CHLORIDE 1000 ML: 600; 310; 30; 20 INJECTION, SOLUTION INTRAVENOUS at 09:04

## 2025-04-05 ENCOUNTER — PATIENT MESSAGE (OUTPATIENT)
Dept: ADMINISTRATIVE | Facility: OTHER | Age: 80
End: 2025-04-05
Payer: MEDICARE

## 2025-04-05 VITALS
DIASTOLIC BLOOD PRESSURE: 60 MMHG | TEMPERATURE: 99 F | HEART RATE: 76 BPM | BODY MASS INDEX: 28.25 KG/M2 | RESPIRATION RATE: 16 BRPM | WEIGHT: 180 LBS | HEIGHT: 67 IN | SYSTOLIC BLOOD PRESSURE: 126 MMHG | OXYGEN SATURATION: 92 %

## 2025-04-06 DIAGNOSIS — M48.061 DEGENERATIVE LUMBAR SPINAL STENOSIS: ICD-10-CM

## 2025-04-06 NOTE — TELEPHONE ENCOUNTER
Care Due:                  Date            Visit Type   Department     Provider  --------------------------------------------------------------------------------                                EP -                              PRIMARY      [facility]   Marcy Bria  Last Visit: 10-      CARE ([facility])   CARE           Luís                              [facility]                              [facility]  Next Visit: 04-      CARE ([facility])   CARE           Luís                                                            Last  Test          Frequency    Reason                     Performed    Due Date  --------------------------------------------------------------------------------    HBA1C.......  6 months...  dulaglutide,               10-   04-                             empagliflozin............    [employer] Embedded Care Due Messages. Reference number: [id number].   4/06/2025 5:51:24 PM CDT

## 2025-04-07 RX ORDER — OXYCODONE AND ACETAMINOPHEN 5; 325 MG/1; MG/1
1 TABLET ORAL EVERY 12 HOURS PRN
Qty: 60 TABLET | Refills: 0 | Status: SHIPPED | OUTPATIENT
Start: 2025-04-07
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
## 2025-04-22 ENCOUNTER — OFFICE VISIT (OUTPATIENT)
Dept: PRIMARY CARE CLINIC | Facility: CLINIC | Age: 80
End: 2025-04-22
Payer: MEDICARE

## 2025-04-22 ENCOUNTER — LAB VISIT (OUTPATIENT)
Dept: LAB | Facility: HOSPITAL | Age: 80
End: 2025-04-22
Attending: INTERNAL MEDICINE
Payer: MEDICARE

## 2025-04-22 VITALS
OXYGEN SATURATION: 95 % | RESPIRATION RATE: 19 BRPM | SYSTOLIC BLOOD PRESSURE: 116 MMHG | WEIGHT: 185.63 LBS | HEIGHT: 67 IN | DIASTOLIC BLOOD PRESSURE: 64 MMHG | BODY MASS INDEX: 29.13 KG/M2 | HEART RATE: 85 BPM

## 2025-04-22 DIAGNOSIS — E78.5 HYPERLIPIDEMIA ASSOCIATED WITH TYPE 2 DIABETES MELLITUS: ICD-10-CM

## 2025-04-22 DIAGNOSIS — E11.69 HYPERLIPIDEMIA ASSOCIATED WITH TYPE 2 DIABETES MELLITUS: ICD-10-CM

## 2025-04-22 DIAGNOSIS — M81.0 OSTEOPOROSIS WITHOUT CURRENT PATHOLOGICAL FRACTURE, UNSPECIFIED OSTEOPOROSIS TYPE: ICD-10-CM

## 2025-04-22 DIAGNOSIS — I11.9 HYPERTENSIVE HEART DISEASE WITHOUT HEART FAILURE: ICD-10-CM

## 2025-04-22 DIAGNOSIS — M48.061 DEGENERATIVE LUMBAR SPINAL STENOSIS: ICD-10-CM

## 2025-04-22 DIAGNOSIS — R13.19 ESOPHAGEAL DYSPHAGIA: ICD-10-CM

## 2025-04-22 DIAGNOSIS — E11.40 TYPE 2 DIABETES MELLITUS WITH DIABETIC NEUROPATHY, WITHOUT LONG-TERM CURRENT USE OF INSULIN: ICD-10-CM

## 2025-04-22 DIAGNOSIS — K21.9 GASTROESOPHAGEAL REFLUX DISEASE WITHOUT ESOPHAGITIS: ICD-10-CM

## 2025-04-22 DIAGNOSIS — I25.10 CORONARY ARTERY DISEASE INVOLVING NATIVE CORONARY ARTERY OF NATIVE HEART WITHOUT ANGINA PECTORIS: ICD-10-CM

## 2025-04-22 DIAGNOSIS — R15.9 INCONTINENCE OF FECES, UNSPECIFIED FECAL INCONTINENCE TYPE: ICD-10-CM

## 2025-04-22 DIAGNOSIS — E11.40 TYPE 2 DIABETES MELLITUS WITH DIABETIC NEUROPATHY, WITHOUT LONG-TERM CURRENT USE OF INSULIN: Primary | ICD-10-CM

## 2025-04-22 DIAGNOSIS — F33.9 MAJOR DEPRESSION, RECURRENT, CHRONIC: ICD-10-CM

## 2025-04-22 LAB
EAG (OHS): 131 MG/DL (ref 68–131)
HBA1C MFR BLD: 6.2 % (ref 4–5.6)

## 2025-04-22 PROCEDURE — 99999 PR PBB SHADOW E&M-EST. PATIENT-LVL IV: CPT | Mod: PBBFAC,,, | Performed by: INTERNAL MEDICINE

## 2025-04-22 PROCEDURE — G2211 COMPLEX E/M VISIT ADD ON: HCPCS | Mod: S$GLB,,, | Performed by: INTERNAL MEDICINE

## 2025-04-22 PROCEDURE — 99214 OFFICE O/P EST MOD 30 MIN: CPT | Mod: S$GLB,,, | Performed by: INTERNAL MEDICINE

## 2025-04-22 PROCEDURE — 36415 COLL VENOUS BLD VENIPUNCTURE: CPT | Mod: PN

## 2025-04-22 PROCEDURE — 83036 HEMOGLOBIN GLYCOSYLATED A1C: CPT

## 2025-04-23 ENCOUNTER — PATIENT MESSAGE (OUTPATIENT)
Dept: PRIMARY CARE CLINIC | Facility: CLINIC | Age: 80
End: 2025-04-23
Payer: MEDICARE

## 2025-04-24 ENCOUNTER — OFFICE VISIT (OUTPATIENT)
Dept: DERMATOLOGY | Facility: CLINIC | Age: 80
End: 2025-04-24
Payer: MEDICARE

## 2025-04-24 DIAGNOSIS — Z85.828 ENCOUNTER FOR FOLLOW-UP SURVEILLANCE OF SKIN CANCER: ICD-10-CM

## 2025-04-24 DIAGNOSIS — Z85.820 HISTORY OF MALIGNANT MELANOMA OF SKIN: Primary | ICD-10-CM

## 2025-04-24 DIAGNOSIS — L57.8 ACTINIC SKIN DAMAGE: ICD-10-CM

## 2025-04-24 DIAGNOSIS — D22.9 MULTIPLE BENIGN NEVI: ICD-10-CM

## 2025-04-24 DIAGNOSIS — D18.01 CHERRY ANGIOMA: ICD-10-CM

## 2025-04-24 DIAGNOSIS — L57.0 ACTINIC KERATOSIS: ICD-10-CM

## 2025-04-24 DIAGNOSIS — L73.8 SEBACEOUS HYPERPLASIA OF FACE: ICD-10-CM

## 2025-04-24 DIAGNOSIS — L82.1 SEBORRHEIC KERATOSES: ICD-10-CM

## 2025-04-24 DIAGNOSIS — Z08 ENCOUNTER FOR FOLLOW-UP SURVEILLANCE OF SKIN CANCER: ICD-10-CM

## 2025-04-24 DIAGNOSIS — L81.4 SOLAR LENTIGO: ICD-10-CM

## 2025-04-24 NOTE — PROGRESS NOTES
Subjective:      Patient ID:  Jovana Fall is a 79 y.o. female who presents for   Chief Complaint   Patient presents with    Skin Check     TBSC     LOV 10/23/2024 - AK, Hx MM, SK, lentigo, intertrigo, yeast vaginitis     Patient is coming in for a TBSC.   No areas of concern.     Derm Hx:  BCC-Right upper arm 4/2024 Dr. Joiner ED& C 6/2024  BCC- Left upper back 4/2024 Dr. Joiner monitoring   Melanoma- midline posterior crown 5/23/23 [facility]  SCC- right lateral thigh 5/23/23  BCC- left ankle 5/23/23  AK w/ focal transition to SCCIS- right cheek tx w/ imiquimod  Basal cell carcinoma 2014      left upper arm   BCC (basal cell carcinoma of skin) mid upper back  BCC (basal cell carcinoma)  left upper back  superficial BCC mid upper back 1/2016, E&S  BCC R mid infraorbital, 4/2014  Severely DN left mid upper back 4/2014  Has no fhx of MM      Current Outpatient Medications:   ·  aspirin 81 mg Tab, Take 81 mg by mouth. 1 Tablet Oral Every day, Disp: , Rfl:   ·  atorvastatin (LIPITOR) 80 MG tablet, Take 1 tablet (80 mg total) by mouth once daily., Disp: 90 tablet, Rfl: 0  ·  blood glucose control, normal Soln, Use as Instructed, Disp: 3 each, Rfl: 0  ·  blood sugar diagnostic (TRUE METRIX GLUCOSE TEST STRIP) Strp, 1 strip by Misc.(Non-Drug; Combo Route) route once daily., Disp: 100 strip, Rfl: 3  ·  blood-glucose meter kit, Use as instructed, Disp: 1 each, Rfl: 0  ·  carvediloL (COREG) 25 MG tablet, Take 1 tablet (25 mg total) by mouth 2 (two) times daily., Disp: 180 tablet, Rfl: 0  ·  cyanocobalamin (VITAMIN B-12) 250 MCG tablet, Take 250 mcg by mouth once daily., Disp: , Rfl:   ·  dulaglutide (TRULICITY) 3 mg/0.5 mL pen injector, Inject 3 mg into the skin every 7 days., Disp: 4 pen , Rfl: 5  ·  empagliflozin (JARDIANCE) 25 mg tablet, Take 1 tablet (25 mg total) by mouth once daily., Disp: 90 tablet, Rfl: 0  ·  ergocalciferol (ERGOCALCIFEROL) 50,000 unit Cap, TAKE 1 CAPSULE EVERY 7  DAYS., Disp: 12 capsule, Rfl: 0  ·  ezetimibe (ZETIA) 10 mg tablet, Take 1 tablet (10 mg total) by mouth once daily., Disp: 90 tablet, Rfl: 0  ·  fish oil-omega-3 fatty acids 300-1,000 mg capsule, , Disp: , Rfl:   ·  gabapentin (NEURONTIN) 300 MG capsule, Take 1 capsule (300 mg total) by mouth 2 (two) times daily., Disp: 180 capsule, Rfl: 0  ·  ketoconazole (NIZORAL) 2 % cream, AAA bid, Disp: 60 g, Rfl: 3  ·  lancets (TRUEPLUS LANCETS) 30 gauge Misc, 1 lancet  by Misc.(Non-Drug; Combo Route) route once daily., Disp: 100 each, Rfl: 3  ·  melatonin (MELATIN ORAL), Take 5 mg by mouth as needed., Disp: , Rfl:   ·  nitroGLYCERIN (NITROSTAT) 0.4 MG SL tablet, Take 1 tablet (0.4 mg total) by mouth every 5 (five) minutes as needed for Chest pain., Disp: 100 tablet, Rfl: 0  ·  omeprazole (PRILOSEC) 20 MG capsule, Take 1 capsule (20 mg total) by mouth every morning., Disp: 90 capsule, Rfl: 0  ·  oxyCODONE-acetaminophen (PERCOCET) 5-325 mg per tablet, Take 1 tablet by mouth every 12 (twelve) hours as needed for Pain., Disp: 60 tablet, Rfl: 0  ·  ranolazine (RANEXA) 500 MG Tb12, Take 1 tablet (500 mg total) by mouth 2 (two) times daily., Disp: 60 tablet, Rfl: 11  ·  telmisartan (MICARDIS) 80 MG Tab, Take 1 tablet (80 mg total) by mouth once daily., Disp: 90 tablet, Rfl: 0  ·  venlafaxine (EFFEXOR) 100 MG Tab, Take 1 tablet (100 mg total) by mouth once daily., Disp: 90 tablet, Rfl: 0  ·  vitamin E 100 UNIT capsule, Take 100 Units by mouth 2 (two) times daily., Disp: , Rfl:        Review of Systems   Skin:  Positive for daily sunscreen use, activity-related sunscreen use and wears hat. Negative for itching and rash.   Hematologic/Lymphatic: Bruises/bleeds easily (asa).       Objective:   Physical Exam   Constitutional: She appears well-developed and well-nourished. No distress.   Genitourinary:         Neurological: She is alert and oriented to person, place, and time. She is not disoriented.   Psychiatric: She has a normal mood  and affect.   Skin:   Areas Examined (abnormalities noted in diagram):   Scalp / Hair Palpated and Inspected  Head / Face Inspection Performed  Neck Inspection Performed  Chest / Axilla Inspection Performed  Abdomen Inspection Performed  Genitals / Buttocks / Groin Inspection Performed  Back Inspection Performed  RUE Inspected  LUE Inspection Performed  RLE Inspected  LLE Inspection Performed  Nails and Digits Inspection Performed                         Diagram Legend     Erythematous scaling macule/papule c/w actinic keratosis       Vascular papule c/w angioma      Pigmented verrucoid papule/plaque c/w seborrheic keratosis      Yellow umbilicated papule c/w sebaceous hyperplasia      Irregularly shaped tan macule c/w lentigo     1-2 mm smooth white papules consistent with Milia      Movable subcutaneous cyst with punctum c/w epidermal inclusion cyst      Subcutaneous movable cyst c/w pilar cyst      Firm pink to brown papule c/w dermatofibroma      Pedunculated fleshy papule(s) c/w skin tag(s)      Evenly pigmented macule c/w junctional nevus     Mildly variegated pigmented, slightly irregular-bordered macule c/w mildly atypical nevus      Flesh colored to evenly pigmented papule c/w intradermal nevus       Pink pearly papule/plaque c/w basal cell carcinoma      Erythematous hyperkeratotic cursted plaque c/w SCC      Surgical scar with no sign of skin cancer recurrence      Open and closed comedones      Inflammatory papules and pustules      Verrucoid papule consistent consistent with wart     Erythematous eczematous patches and plaques     Dystrophic onycholytic nail with subungual debris c/w onychomycosis     Umbilicated papule    Erythematous-base heme-crusted tan verrucoid plaque consistent with inflamed seborrheic keratosis     Erythematous Silvery Scaling Plaque c/w Psoriasis     See annotation      Assessment / Plan:      AK  Cryosurgery Procedure Note    Verbal consent from the patient is obtained and the  patient is aware of the precancerous quality and need for treatment of these lesions. Liquid nitrogen cryosurgery is applied to the 1 actinic keratoses, as detailed in the physical exam, to produce a freeze injury. The patient is aware that blisters may form and is instructed on wound care with gentle cleansing and use of vaseline ointment to keep moist until healed. The patient is supplied a handout on cryosurgery and is instructed to call if lesions do not completely resolve.    History of malignant melanoma of skin  Encounter for follow-up surveillance of skin cancer  Area of previous melanoma and NMSCs examined. Sites well healed with no signs of recurrence.    Total body skin examination performed today including at least 12 points as noted in physical examination. No lesions suspicious for malignancy noted.    Seborrheic keratoses  These are benign inherited growths without a malignant potential. Reassurance given to patient. No treatment is necessary.     Solar lentigo  This is a benign hyperpigmented sun induced lesion. Daily sun protection will reduce the number of new lesions. Treatment of these benign lesions are considered cosmetic.    Multiple benign nevi  Careful dermoscopy evaluation of nevi performed with none identified as needing biopsy today  Monitor for new mole or moles that are becoming bigger, darker, irritated, or developing irregular borders.     Sebaceous hyperplasia of face  This is a common condition representing benign enlargement of the sebaceous lobule. It typically occurs in adulthood. Reassurance given to patient.     Man angioma  This is a benign vascular lesion. Reassurance given. No treatment required.     Actinic skin damage  Patient instructed in importance in daily broad spectrum sun protection of at least spf 30. Mineral sunscreen ingredients preferred (Zinc +/- Titanium) and can be found OTC.   Patient encouraged to wear hat for all outdoor exposure.   Also discussed sun  avoidance and use of protective clothing.             Follow up in about 6 months (around 10/24/2025).

## 2025-04-24 NOTE — PATIENT INSTRUCTIONS

## 2025-04-26 RX ORDER — EZETIMIBE 10 MG/1
10 TABLET ORAL DAILY
Qty: 90 TABLET | Refills: 2 | Status: SHIPPED | OUTPATIENT
Start: 2025-04-26

## 2025-04-26 RX ORDER — TELMISARTAN 80 MG/1
80 TABLET ORAL DAILY
Qty: 90 TABLET | Refills: 2 | Status: SHIPPED | OUTPATIENT
Start: 2025-04-26

## 2025-04-26 RX ORDER — OMEPRAZOLE 20 MG/1
20 CAPSULE, DELAYED RELEASE ORAL EVERY MORNING
Qty: 90 CAPSULE | Refills: 2 | Status: SHIPPED | OUTPATIENT
Start: 2025-04-26

## 2025-04-26 RX ORDER — ERGOCALCIFEROL 1.25 MG/1
CAPSULE ORAL
Qty: 12 CAPSULE | Refills: 2 | Status: SHIPPED | OUTPATIENT
Start: 2025-04-26

## 2025-04-26 RX ORDER — VENLAFAXINE 100 MG/1
100 TABLET ORAL DAILY
Qty: 90 TABLET | Refills: 2 | Status: SHIPPED | OUTPATIENT
Start: 2025-04-26

## 2025-04-26 RX ORDER — ATORVASTATIN CALCIUM 80 MG/1
80 TABLET, FILM COATED ORAL DAILY
Qty: 90 TABLET | Refills: 2 | Status: SHIPPED | OUTPATIENT
Start: 2025-04-26

## 2025-04-26 RX ORDER — DULAGLUTIDE 3 MG/.5ML
3 INJECTION, SOLUTION SUBCUTANEOUS
Qty: 12 PEN | Refills: 2 | Status: SHIPPED | OUTPATIENT
Start: 2025-04-26

## 2025-04-26 RX ORDER — CARVEDILOL 25 MG/1
25 TABLET ORAL 2 TIMES DAILY
Qty: 180 TABLET | Refills: 2 | Status: SHIPPED | OUTPATIENT
Start: 2025-04-26
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
## 2025-05-05 DIAGNOSIS — M81.0 OSTEOPOROSIS WITHOUT CURRENT PATHOLOGICAL FRACTURE, UNSPECIFIED OSTEOPOROSIS TYPE: ICD-10-CM

## 2025-05-05 NOTE — TELEPHONE ENCOUNTER
----- Message from Shantal sent at 5/5/2025 11:48 AM CDT -----  Requesting an RX refill or new RX.Is this a refill or new RX: RX name and strength (copy/paste from chart):  ergocalciferol (ERGOCALCIFEROL) 50,000 unit Cap Is this a 30 day or 90 day RX: Pharmacy name and phone # (copy/paste from chart): [facility] [address] [place] [id number]: [phone] Fax: [phone] doctors have asked that we provide their patients with the following 2 reminders -- prescription refills can take up to 72 hours, and a friendly reminder that in the future you can use your [facility] account to request refills:

## 2025-05-05 NOTE — TELEPHONE ENCOUNTER
No care due was identified.  [facility] Embedded Care Due Messages. Reference number: [id number].   5/05/2025 3:23:52 PM CDT

## 2025-05-06 RX ORDER — ERGOCALCIFEROL 1.25 MG/1
CAPSULE ORAL
Qty: 12 CAPSULE | Refills: 2 | OUTPATIENT
Start: 2025-05-06

## 2025-05-12 DIAGNOSIS — M48.061 DEGENERATIVE LUMBAR SPINAL STENOSIS: ICD-10-CM

## 2025-05-12 NOTE — TELEPHONE ENCOUNTER
No care due was identified.  [facility] Embedded Care Due Messages. Reference number: [id number].   5/12/2025 6:34:14 PM CDT

## 2025-05-14 RX ORDER — OXYCODONE AND ACETAMINOPHEN 5; 325 MG/1; MG/1
1 TABLET ORAL EVERY 12 HOURS PRN
Qty: 40 TABLET | Refills: 0 | Status: SHIPPED | OUTPATIENT
Start: 2025-05-14

## 2025-06-13 ENCOUNTER — OFFICE VISIT (OUTPATIENT)
Dept: CARDIOLOGY | Facility: CLINIC | Age: 80
End: 2025-06-13
Payer: MEDICARE

## 2025-06-13 VITALS
HEART RATE: 78 BPM | BODY MASS INDEX: 28.51 KG/M2 | DIASTOLIC BLOOD PRESSURE: 64 MMHG | SYSTOLIC BLOOD PRESSURE: 106 MMHG | OXYGEN SATURATION: 96 % | WEIGHT: 182 LBS

## 2025-06-13 DIAGNOSIS — E11.69 HYPERLIPIDEMIA ASSOCIATED WITH TYPE 2 DIABETES MELLITUS: ICD-10-CM

## 2025-06-13 DIAGNOSIS — E11.59 HYPERTENSION ASSOCIATED WITH DIABETES: ICD-10-CM

## 2025-06-13 DIAGNOSIS — E11.9 TYPE 2 DIABETES MELLITUS WITHOUT COMPLICATION, WITHOUT LONG-TERM CURRENT USE OF INSULIN: ICD-10-CM

## 2025-06-13 DIAGNOSIS — I15.2 HYPERTENSION ASSOCIATED WITH DIABETES: ICD-10-CM

## 2025-06-13 DIAGNOSIS — I25.10 CORONARY ARTERY CALCIFICATION SEEN ON CAT SCAN: ICD-10-CM

## 2025-06-13 DIAGNOSIS — I11.9 HYPERTENSIVE HEART DISEASE WITHOUT HEART FAILURE: ICD-10-CM

## 2025-06-13 DIAGNOSIS — R94.39 ABNORMAL STRESS TEST: ICD-10-CM

## 2025-06-13 DIAGNOSIS — I65.21 STENOSIS OF RIGHT CAROTID ARTERY: ICD-10-CM

## 2025-06-13 DIAGNOSIS — T46.3X5A: ICD-10-CM

## 2025-06-13 DIAGNOSIS — I20.81 ANGINA PECTORIS WITH CORONARY MICROVASCULAR DYSFUNCTION: ICD-10-CM

## 2025-06-13 DIAGNOSIS — I70.0 ABDOMINAL AORTIC ATHEROSCLEROSIS: ICD-10-CM

## 2025-06-13 DIAGNOSIS — E78.5 HYPERLIPIDEMIA ASSOCIATED WITH TYPE 2 DIABETES MELLITUS: ICD-10-CM

## 2025-06-13 PROCEDURE — 99999 PR PBB SHADOW E&M-EST. PATIENT-LVL III: CPT | Mod: PBBFAC,,, | Performed by: GENERAL PRACTICE

## 2025-06-13 RX ORDER — EVOLOCUMAB 140 MG/ML
140 INJECTION, SOLUTION SUBCUTANEOUS
Qty: 2 ML | Refills: 11 | Status: SHIPPED | OUTPATIENT
Start: 2025-06-13 | End: 2025-06-13

## 2025-06-13 RX ORDER — EVOLOCUMAB 140 MG/ML
140 INJECTION, SOLUTION SUBCUTANEOUS
Qty: 6 ML | Refills: 3 | Status: SHIPPED | OUTPATIENT
Start: 2025-06-13 | End: 2026-06-13

## 2025-06-13 NOTE — PROGRESS NOTES
Subjective:    Patient ID:  Jovana Fall is a 79 y.o. female who presents for evaluation of No chief complaint on file.      HPI:        Most Recent Echocardiogram Results  Results for orders placed during the hospital encounter of 12/23/24    Echo Saline Bubble? No    Interpretation Summary    Left Ventricle: The left ventricle is normal in size. Moderately increased wall thickness. There is moderate concentric hypertrophy. There is normal systolic function with a visually estimated ejection fraction of 55 - 60%. There is normal diastolic function.    Right Ventricle: Normal right ventricular cavity size. Systolic function is normal.    Left Atrium: Normal left atrial size.    Right Atrium: Normal right atrial size.    Aortic Valve: The aortic valve is structurally normal.    Mitral Valve: There is mild regurgitation.    Tricuspid Valve: There is trace regurgitation.    Pulmonic Valve: The pulmonic valve is structurally normal.    Aorta: Ascending aorta is normal.    IVC/SVC: Normal venous pressure at 3 mmHg.    Pericardium: There is no pericardial effusion.    Suggest increased in LV mass from Echo in 6/2021.      Most Recent Nuclear Stress Test Results  Results for orders placed during the hospital encounter of 01/03/25    Nuclear Stress - Cardiology Interpreted    Interpretation Summary    Abnormal myocardial perfusion scan.    There is amoderate intensity, large sized, mostly reversible perfusion abnormality with some fixed areas in the inferolateral wall(s).  Can not rule out extrathoracic artifact.  Consider further evaluation    There are no other significant perfusion abnormalities.    The gated perfusion images showed an ejection fraction of 76% at rest. The gated perfusion images showed an ejection fraction of 78% post stress. Normal ejection fraction is greater than 53%.    There is normal wall motion at rest.    The ECG portion of the study is uninterpretable due to baseline ECG abnormalities.     The patient reported no chest pain during the stress test.    There were no arrhythmias during stress.    TID 0.70      Most Recent Cardiac PET Stress Test Results  No results found for this or any previous visit.      Most Recent NM Myocardial Perfusion  No results found for this or any previous visit.      Most Recent Cardiovascular Angiogram results  Results for orders placed during the hospital encounter of 02/18/25    Cardiac catheterization    Conclusion    Multivessel coronary artery disease.    The left ventricular end diastolic pressure was 13.    The procedure log was documented by Documenter: Caitlyn Farris RT and verified by Colette Rain MD.    Date: 2/20/2025  Time: 8:15 PM    Coronary Findings    Diagnostic  Dominance: Right  Left Main   Large caliber. Short vessel. Angiographically normal.      Left Anterior Descending   Large caliber. Tortuous. 50-60% diffuse stenosis in mid LAD. 75% diffuse stenosis in distal LAD. Medium caliber D1 with 85% stenosis of ostial-proximal segment.      Left Circumflex   Large caliber. 60% stenosis in the proximal segment. Gives off a medium caliber OM1 which has 95% stenosis in the ostial-proximal segment. After the takeoff of OM1, circ continues as a large vessel and gives rise to a medium caliber OM2.      Right Coronary Artery   Large caliber. Dominant. Gives off large RPDA and RPL distally. There is 40-50% stenosis in mid RCA. 40% stenosis in the proximal segment of RPDA.      Intervention     No interventions have been documented.     Left Heart Findings    Left Ventricle    LVEDP (Pre): 13 mmHG.     Hemodynamic Data    Link to Hemodynamic Data during the procedure     Recommendations     Routine post-cath care.   Maximize medical management.   Risk factor reductions.     Other Most Recent Cardiology Results  Results for orders placed during the hospital encounter of 06/11/21    CV Ultrasound Bilateral Doppler Carotid    Interpretation Summary  · 20-39%  right internal carotid artery stenosis.  · 20-39% left internal carotid artery stenosis.    Results for orders placed or performed during the hospital encounter of 02/14/25   EKG 12-lead    Collection Time: 02/14/25 10:53 AM   Result Value Ref Range    QRS Duration 84 ms    [facility] QTC Calculation 418 ms    Narrative    Test Reason : Z01.810,    Vent. Rate :  68 BPM     Atrial Rate :  68 BPM     P-R Int : 220 ms          QRS Dur :  84 ms      QT Int : 394 ms       P-R-T Axes :  44  -6  61 degrees    QTcB Int : 418 ms    Sinus rhythm with 1st degree A-V block  Low voltage QRS  Cannot rule out Anterior infarct (cited on or before 29-Mar-2018)  Abnormal ECG  When compared with ECG of 21-Nov-2024 14:31,  No significant change was found  Confirmed by Jose Alfredo Samaniego ([id number]) on 2/26/2025 6:59:29 PM    Referred By:            Confirmed By: Jose Alfredo Samaniego           Review of patient's allergies indicates:   Allergen Reactions    Imdur [isosorbide mononitrate] Rash    Adhesive tape-silicones      Other reaction(s): Swelling  Other reaction(s): Itching    Codeine Itching    Metformin Itching    Morphine Itching    Penicillins Itching     Other reaction(s): Itching    Sulfa (sulfonamide antibiotics) Itching     Other reaction(s): Itching       Past Medical History:   Diagnosis Date    Anticoagulant long-term use     Anxiety     Basal cell carcinoma 2014    left upper arm     BCC (basal cell carcinoma of skin) 01/07/2016    mid upper back    BCC (basal cell carcinoma)     left upper back    Bilateral humeral fractures 12/20/2022    Calcium nephrolithiasis     Cataract     Colon polyp     Coronary artery disease     Cortical cataract - Both Eyes 04/29/2013    Depression     Diabetes mellitus     Displacement of lumbar intervertebral disc without myelopathy 05/06/2013    Diverticulosis     GERD (gastroesophageal reflux disease)     H. pylori infection     Hepatomegaly     History of melanoma in situ 02/02/2015    Mid upper back ;  5/2014     Hx of colonic polyps 01/27/2015    Hyperlipidemia     Hypertension     Lactose intolerance     Lumbar spinal stenosis 05/06/2013    Melanoma     mid upper back- in situ 5/2014    Nuclear sclerosis - Both Eyes 04/29/2013    Osteopenia     Spinal stenosis, lumbar region, with neurogenic claudication 05/06/2013    Spondylosis without myelopathy 05/06/2013    Squamous cell carcinoma of skin     Type 2 diabetes mellitus      Past Surgical History:   Procedure Laterality Date    APPENDECTOMY      BROW LIFT AND BLEPHAROPLASTY      BUNIONECTOMY Right     CARPAL TUNNEL RELEASE Bilateral     CHOLECYSTECTOMY      COLONOSCOPY  02/05/2015    Dr. Blas, repeat in 3-5 years    COLONOSCOPY N/A 02/03/2021    Procedure: COLONOSCOPY;  Surgeon: Cale Aggarwal MD;  Location: [facility] END[facility];  Service: Endoscopy;  Laterality: N/A;    COSMETIC SURGERY  Eyes/nose    ESOPHAGOGASTRODUODENOSCOPY N/A 01/27/2021    Procedure: EGD (ESOPHAGOGASTRODUODENOSCOPY);  Surgeon: Cale Aggarwal MD;  Location: [facility] END[facility];  Service: Endoscopy;  Laterality: N/A;    HEMORRHOID SURGERY      HYSTERECTOMY      ovaries remain    LEFT HEART CATHETERIZATION Left 2/18/2025    Procedure: Left heart cath;  Surgeon: Colette Rain MD;  Location: [facility] CATH/EP LAB;  Service: Cardiology;  Laterality: Left;    LUMBAR LAMINECTOMY      RHINOPLASTY TIP      SKIN CANCER EXCISION      TRANSFORAMINAL EPIDURAL INJECTION OF STEROID Left 11/19/2024    Procedure: Injection,steroid,epidural,transforaminal approach;  Surgeon: Dieter York MD;  Location: [facility];  Service: Pain Management;  Laterality: Left;    TUBAL LIGATION      UPPER GASTROINTESTINAL ENDOSCOPY  2015    Dr. Blas    UPPER GASTROINTESTINAL ENDOSCOPY  03/30/2017    Dr. Aggarwal     Social History[1]  Family History   Problem Relation Name Age of Onset    Heart disease Mother Renee         MI    Cancer Mother Renee         SQ carcinoma of lung    Heart attack Mother Renee     Skin cancer Mother  Renee     Cancer Father Ede         prostate    Ovarian cancer Cousin      Colon cancer Cousin      Skin cancer Daughter      Cancer Sister 1/2Barbara         uterine sarcoma    Diabetes Sister 1/2Barbara     Colon cancer Brother 1/2 Romario 60    Cancer Brother 1/2 Romario     No Known Problems Son      No Known Problems Sister 1/2     Diabetes Sister 1/2 joshua     Hypertension Sister 1/2 joshua     Arthritis Sister 1/2 joshua     No Known Problems Daughter      Breast cancer Maternal Aunt      Amblyopia Neg Hx      Blindness Neg Hx      Cataracts Neg Hx      Glaucoma Neg Hx      Retinal detachment Neg Hx      Strabismus Neg Hx      Stroke Neg Hx      Thyroid disease Neg Hx      Melanoma Neg Hx      Psoriasis Neg Hx      Lupus Neg Hx      Eczema Neg Hx      Crohn's disease Neg Hx      Ulcerative colitis Neg Hx          Review of Systems:   Constitution: Negative for diaphoresis and fever.   HEENT: Negative for nosebleeds.    Cardiovascular: Negative for chest pain       No dyspnea on exertion       No leg swelling        No palpitations  Respiratory: Negative for shortness of breath and wheezing.    Hematologic/Lymphatic: Negative for bleeding problem. Does not bruise/bleed easily.   Skin: Negative for color change and rash.   Musculoskeletal: Negative for falls and myalgias.   Gastrointestinal: Negative for hematemesis and hematochezia.   Genitourinary: Negative for hematuria.   Neurological: Negative for dizziness and light-headedness.   Psychiatric/Behavioral: Negative for altered mental status and memory loss.          Objective:        There were no vitals filed for this visit.    LIPIDS - LAST 2   Lab Results   Component Value Date    CHOL 138 10/10/2024    CHOL 161 09/12/2023    HDL 30 (L) 10/10/2024    HDL 36 (L) 09/12/2023    LDLCALC 69.6 10/10/2024    LDLCALC 81.6 09/12/2023    TRIG 192 (H) 10/10/2024    TRIG 217 (H) 09/12/2023    CHOLHDL 21.7 10/10/2024    CHOLHDL 22.4 09/12/2023       CBC - LAST  2  Lab Results   Component Value Date    WBC 11.30 04/04/2025    WBC 7.31 02/14/2025    RBC 4.98 04/04/2025    RBC 5.00 02/14/2025    HGB 15.4 04/04/2025    HGB 15.6 02/14/2025    HCT 45.3 04/04/2025    HCT 47.7 02/14/2025    MCV 91 04/04/2025    MCV 95 02/14/2025    MCH 30.9 04/04/2025    MCH 31.2 (H) 02/14/2025    MCHC 34.0 04/04/2025    MCHC 32.7 02/14/2025    RDW 12.6 04/04/2025    RDW 13.0 02/14/2025     04/04/2025     02/14/2025    MPV 8.9 (L) 04/04/2025    MPV 9.6 02/14/2025    GRAN 4.2 02/14/2025    GRAN 57.3 02/14/2025    LYMPH 33.5 04/04/2025    LYMPH 3.79 04/04/2025    MONO 4.8 04/04/2025    MONO 0.54 04/04/2025    BASO 0.07 02/14/2025    BASO 0.10 10/10/2024    NRBC 0 04/04/2025    NRBC 0 02/14/2025       CHEMISTRY & LIVER FUNCTION - LAST 2  Lab Results   Component Value Date     04/04/2025     02/18/2025    K 4.0 04/04/2025    K 3.9 02/18/2025     04/04/2025     02/18/2025    CO2 20 (L) 04/04/2025    CO2 25 02/18/2025    ANIONGAP 13 04/04/2025    ANIONGAP 8 02/18/2025    BUN 14 04/04/2025    BUN 17 02/18/2025    CREATININE 0.8 04/04/2025    CREATININE 0.6 02/18/2025     (H) 04/04/2025     (H) 02/18/2025    CALCIUM 9.3 04/04/2025    CALCIUM 9.3 02/18/2025    PH 5.0 03/30/2023    MG 2.0 09/01/2010    MG 2.2 08/31/2010    ALBUMIN 4.1 04/04/2025    ALBUMIN 4.3 02/18/2025    PROT 7.5 04/04/2025    PROT 7.1 02/18/2025    ALKPHOS 123 04/04/2025    ALKPHOS 113 02/18/2025    ALT 14 04/04/2025    ALT 14 02/18/2025    AST 17 04/04/2025    AST 16 02/18/2025    BILITOT 0.7 04/04/2025    BILITOT 0.8 02/18/2025        CARDIAC PROFILE - LAST 2  Lab Results   Component Value Date    BNP 14 10/03/2017    BNP 14 08/31/2010     03/30/2012    CPK 87 08/31/2010    CPKMB 2.1 08/31/2010    CPKMB 2.3 08/31/2010    TROPONINI <0.006 10/03/2017    TROPONINI 0.013 08/31/2010        COAGULATION - LAST 2  Lab Results   Component Value Date    INR 1.0 02/14/2025    INR 1.0  03/27/2023    APTT 25.2 02/14/2025    APTT 27.9 03/27/2023       ENDOCRINE & PSA - LAST 2  Lab Results   Component Value Date    HGBA1C 6.2 (H) 04/22/2025    HGBA1C 6.3 (H) 10/10/2024    TSH 0.784 12/20/2017    TSH 0.385 (L) 03/30/2012          Physical Exam:  CONSTITUTIONAL: No fever, no chills  HEENT: Normocephalic, atraumatic,pupils reactive to light                 NECK:  No JVD no carotid bruit  CVS: S1S2+, RRR, no murmurs,   LUNGS: Clear  ABDOMEN: Soft, NT, BS+  EXTREMITIES: No cyanosis, edema  : No javier catheter  NEURO: AAO X 3  PSY: Normal affect    Medication List with Changes/Refills   Current Medications    ASPIRIN 81 MG TAB    Take 81 mg by mouth. 1 Tablet Oral Every day    ATORVASTATIN (LIPITOR) 80 MG TABLET    Take 1 tablet (80 mg total) by mouth once daily.    BLOOD GLUCOSE CONTROL, NORMAL SOLN    Use as Instructed    BLOOD SUGAR DIAGNOSTIC (TRUE METRIX GLUCOSE TEST STRIP) STRP    1 strip by Misc.(Non-Drug; Combo Route) route once daily.    BLOOD-GLUCOSE METER KIT    Use as instructed    CARVEDILOL (COREG) 25 MG TABLET    Take 1 tablet (25 mg total) by mouth 2 (two) times daily.    CYANOCOBALAMIN (VITAMIN B-12) 250 MCG TABLET    Take 250 mcg by mouth once daily.    DULAGLUTIDE (TRULICITY) 3 MG/0.5 ML PEN INJECTOR    Inject 3 mg into the skin every 7 days.    EMPAGLIFLOZIN (JARDIANCE) 25 MG TABLET    Take 1 tablet (25 mg total) by mouth once daily.    ERGOCALCIFEROL (ERGOCALCIFEROL) 50,000 UNIT CAP    TAKE 1 CAPSULE EVERY 7 DAYS.    EZETIMIBE (ZETIA) 10 MG TABLET    Take 1 tablet (10 mg total) by mouth once daily.    FISH OIL-OMEGA-3 FATTY ACIDS 300-1,000 MG CAPSULE        GABAPENTIN (NEURONTIN) 300 MG CAPSULE    Take 1 capsule (300 mg total) by mouth 2 (two) times daily.    KETOCONAZOLE (NIZORAL) 2 % CREAM    AAA bid    LANCETS (TRUEPLUS LANCETS) 30 GAUGE MISC    1 lancet  by Misc.(Non-Drug; Combo Route) route once daily.    MELATONIN (MELATIN ORAL)    Take 5 mg by mouth as needed.    NITROGLYCERIN  (NITROSTAT) 0.4 MG SL TABLET    Take 1 tablet (0.4 mg total) by mouth every 5 (five) minutes as needed for Chest pain.    OMEPRAZOLE (PRILOSEC) 20 MG CAPSULE    Take 1 capsule (20 mg total) by mouth every morning.    OXYCODONE-ACETAMINOPHEN (PERCOCET) 5-325 MG PER TABLET    Take 1 tablet by mouth every 12 (twelve) hours as needed for Pain.    RANOLAZINE (RANEXA) 500 MG TB12    Take 1 tablet (500 mg total) by mouth 2 (two) times daily.    TELMISARTAN (MICARDIS) 80 MG TAB    Take 1 tablet (80 mg total) by mouth once daily.    VENLAFAXINE (EFFEXOR) 100 MG TAB    Take 1 tablet (100 mg total) by mouth once daily.    VITAMIN E 100 UNIT CAPSULE    Take 100 Units by mouth 2 (two) times daily.           Assessment:       1. Adverse effect of isosorbide dinitrate, initial encounter    2. Hypertensive heart disease without heart failure    3. Angina pectoris with coronary microvascular dysfunction    4. Coronary artery calcification seen on CAT scan    5. Hyperlipidemia associated with type 2 diabetes mellitus    6. Hypertension associated with diabetes    7. Abdominal aortic atherosclerosis    8. Abnormal stress test    9. Stenosis of right carotid artery    10. Type 2 diabetes mellitus without complication, without long-term current use of insulin         Plan:     Problem List Items Addressed This Visit          Cardiac/Vascular    Coronary artery calcification seen on CAT scan    Hyperlipidemia associated with type 2 diabetes mellitus    Hypertension associated with diabetes    Abdominal aortic atherosclerosis    Stenosis of right carotid artery       Endocrine    Type 2 diabetes mellitus without complication, without long-term current use of insulin     Other Visit Diagnoses         Adverse effect of isosorbide dinitrate, initial encounter    -  Primary      Hypertensive heart disease without heart failure          Angina pectoris with coronary microvascular dysfunction          Abnormal stress test            Coronary  artery disease, chronic stable angina DC isosorbide continue medical management start Ranexa.  Continue Jardiance aspirin, consider low-dose Xarelto  Continue moderate exercise   Tolerating Ranexa well has occasional chest discomfort but no symptoms of angina which limit her activity.  She walks several times a day with her dog    Hyperlipidemia LDL cholesterol 69 which is above goal a 50 she recently started on Zetia will prescribe Repatha because she is above goal     Adverse reaction to isosorbide she will take p.r.n. Benadryl isosorbide will be DC .     Diabetes continue her diet exercise     Hypertension controlled       No follow-ups on file.    The patients questions were answered, they verbalized understanding, and agreed with the treatment plan.       All pertinent data including labs, imaging, EKGs, and studies listed above were reviewed personally.       JENNIFER JASON MD  [facility]         [1]   Social History  Tobacco Use    Smoking status: Never    Smokeless tobacco: Never   Substance Use Topics    Alcohol use: Yes     Alcohol/week: 20.0 standard drinks of alcohol     Types: 20 Glasses of wine per week     Comment: Occasionally    Drug use: No

## 2025-06-16 DIAGNOSIS — M47.16 SPONDYLOSIS OF LUMBAR SPINE WITH MYELOPATHY: ICD-10-CM

## 2025-06-16 DIAGNOSIS — M81.0 OSTEOPOROSIS WITHOUT CURRENT PATHOLOGICAL FRACTURE, UNSPECIFIED OSTEOPOROSIS TYPE: ICD-10-CM

## 2025-06-16 RX ORDER — GABAPENTIN 300 MG/1
300 CAPSULE ORAL 2 TIMES DAILY
Qty: 180 CAPSULE | Refills: 0 | Status: SHIPPED | OUTPATIENT
Start: 2025-06-16

## 2025-06-16 RX ORDER — ERGOCALCIFEROL 1.25 MG/1
CAPSULE ORAL
Qty: 12 CAPSULE | Refills: 2 | Status: SHIPPED | OUTPATIENT
Start: 2025-06-16

## 2025-06-16 NOTE — TELEPHONE ENCOUNTER
No care due was identified.  [facility] Embedded Care Due Messages. Reference number: [id number].   6/16/2025 11:34:31 AM CDT

## 2025-06-16 NOTE — TELEPHONE ENCOUNTER
No care due was identified.  [facility] Embedded Care Due Messages. Reference number: [id number].   6/16/2025 11:33:54 AM CDT

## 2025-06-19 ENCOUNTER — TELEPHONE (OUTPATIENT)
Dept: PRIMARY CARE CLINIC | Facility: CLINIC | Age: 80
End: 2025-06-19
Payer: MEDICARE

## 2025-06-19 DIAGNOSIS — M81.0 OSTEOPOROSIS WITHOUT CURRENT PATHOLOGICAL FRACTURE, UNSPECIFIED OSTEOPOROSIS TYPE: ICD-10-CM

## 2025-06-19 RX ORDER — ERGOCALCIFEROL 1.25 MG/1
CAPSULE ORAL
Qty: 12 CAPSULE | Refills: 2 | OUTPATIENT
Start: 2025-06-19

## 2025-06-19 NOTE — TELEPHONE ENCOUNTER
After Visit Summary   5/7/2018    Alison Chandler    MRN: [id number]           Patient Information     Date Of Birth          1963        Visit Information        Provider Department      5/7/2018 12:10 PM Nia Arshad MD [facility] (formerly [facility])        Today's Diagnoses     Need for hepatitis C screening test    -  1    Encounter for routine adult health examination without abnormal findings        Recurrent major depressive disorder, in partial remission (H)          Care Instructions      Preventive Health Recommendations  Female Ages 50 - 64    Yearly exam: See your health care provider every year in order to  o Review health changes.   o Discuss preventive care.    o Review your medicines if your doctor has prescribed any.      Get a Pap test every three years (unless you have an abnormal result and your provider advises testing more often).    If you get Pap tests with HPV test, you only need to test every 5 years, unless you have an abnormal result.     You do not need a Pap test if your uterus was removed (hysterectomy) and you have not had cancer.    You should be tested each year for STDs (sexually transmitted diseases) if you're at risk.     Have a mammogram every 1 to 2 years.    Have a colonoscopy at age 50, or have a yearly FIT test (stool test). These exams screen for colon cancer.      Have a cholesterol test every 5 years, or more often if advised.    Have a diabetes test (fasting glucose) every three years. If you are at risk for diabetes, you should have this test more often.     If you are at risk for osteoporosis (brittle bone disease), think about having a bone density scan (DEXA).    Shots: Get a flu shot each year. Get a tetanus shot every 10 years.    Nutrition:     Eat at least 5 servings of fruits and vegetables each day.    Eat whole-grain bread, whole-wheat pasta and brown rice instead of white grains and rice.    Talk to your provider about Calcium and  Copied from CRM #[id number]. Topic: Medications - Medication Refill  >> Jun 19, 2025  2:10 PM April wrote:  Type: Rx Clarification/ Additional Information/ Questions    Medication:Vitamin D     What questions do you have about the medication, if any? She states the mail order will not fill this [phone]     What information is needed?asking if you can please send to the pharmacy listed below and give her a return call at     Pharmacy number:    [facility] [address] [place] [address] - [address] [address]  Phone: [phone] Fax: [phone]       Pharmacy Contact Name and phone number:     Comments:   Vitamin D.     Lifestyle    Exercise at least 150 minutes a week (30 minutes a day, 5 days a week). This will help you control your weight and prevent disease.    Limit alcohol to one drink per day.    No smoking.     Wear sunscreen to prevent skin cancer.     See your dentist every six months for an exam and cleaning.    See your eye doctor every 1 to 2 years.    Be as fit as you can at your current weight.  Try not to gain any more weight.  If you can, try to lose weight.    Don't go to bed at night without knowing what you are going to do for exercise the next day. (or eat the next day)    If you can't make the best choice, make a better choice.            Follow-ups after your visit        Follow-up notes from your care team     Return in about 6 months (around 11/7/2018) for Follow Up Visit.      Who to contact     If you have questions or need follow up information about today's clinic visit or your schedule please contact [facility] directly at [phone].  Normal or non-critical lab and imaging results will be communicated to you by Tellyohart, letter or phone within 4 business days after the clinic has received the results. If you do not hear from us within 7 days, please contact the clinic through S[employer]ee or phone. If you have a critical or abnormal lab result, we will notify you by phone as soon as possible.  Submit refill requests through S[employer]ee or call your pharmacy and they will forward the refill request to us. Please allow 3 business days for your refill to be completed.          Additional Information About Your Visit        S[employer]ee Information     S[employer]ee gives you secure access to your electronic health record. If you see a primary care provider, you can also send messages to your care team and make appointments. If you have questions, please call your primary care clinic.  If you do not have a primary care provider, please call [phone] and they will assist you.        Care  "EveryWhere ID     This is your Care EveryWhere ID. This could be used by other organizations to access your [place] medical records  [id number]        Your Vitals Were     Pulse Temperature Height Last Period Pulse Oximetry BMI (Body Mass Index)    64 98.7  F (37.1  C) (Oral) 5' 11\" (1.803 m) 01/18/2018 98% 34.3 kg/m2       Blood Pressure from Last 3 Encounters:   05/07/18 118/76   10/13/17 123/79   03/01/17 100/70    Weight from Last 3 Encounters:   05/07/18 245 lb 14.4 oz (111.5 kg)   03/01/17 253 lb 6.4 oz (114.9 kg)   01/25/17 249 lb 14.4 oz (113.4 kg)              We Performed the Following     Hemoglobin A1c     Hepatitis C Screen Reflex to HCV RNA Quant and Genotype          Today's Medication Changes          These changes are accurate as of 5/7/18  1:09 PM.  If you have any questions, ask your nurse or doctor.               These medicines have changed or have updated prescriptions.        Dose/Directions    * FLUoxetine 20 MG capsule   Commonly known as:  PROzac   This may have changed:  Another medication with the same name was added. Make sure you understand how and when to take each.   Changed by:  Nia Arshad MD        Dose:  40 mg   Take 40 mg by mouth daily   Refills:  1       * FLUoxetine 40 MG capsule   Commonly known as:  PROzac   This may have changed:  You were already taking a medication with the same name, and this prescription was added. Make sure you understand how and when to take each.   Used for:  Recurrent major depressive disorder, in partial remission (H)   Changed by:  Nia Arshad MD        Dose:  40 mg   Take 1 capsule (40 mg) by mouth daily   Quantity:  90 capsule   Refills:  1       * Notice:  This list has 2 medication(s) that are the same as other medications prescribed for you. Read the directions carefully, and ask your doctor or other care provider to review them with you.         Where to get your medicines      These medications were sent to [id number] - [facility] [address] AT [facility] [address]  [address], [facility] [address]     Phone:  [phone]     FLUoxetine 40 MG capsule                Primary Care Provider Office Phone # Fax #    Nia Arshad [phone] [address] [place] [address]        Equal Access to Services     BETITO DENSON : Hadii aad ku hadasho Soomaali, waaxda luqadaha, qaybta kaalmada adeegyada, waxay idiin hayaan adeeg kharash lanuris . So [facility] [phone].    ATENCIÓN: Si habla español, tiene a frias disposición servicios gratuitos de asistencia lingüística. [facility] [phone].    We comply with applicable federal civil rights laws and [place] laws. We do not discriminate on the basis of race, color, national origin, age, disability, sex, sexual orientation, or gender identity.            Thank you!     Thank you for choosing [facility]  for your care. Our goal is always to provide you with excellent care. Hearing back from our patients is one way we can continue to improve our services. Please take a few minutes to complete the written survey that you may receive in the mail after your visit with us. Thank you!             Your Updated Medication List - Protect others around you: Learn how to safely use, store and throw away your medicines at www.disposemymeds.org.          This list is accurate as of 5/7/18  1:09 PM.  Always use your most recent med list.                   Brand Name Dispense Instructions for use Diagnosis    fish Oil 1200 MG capsule      Take 1 capsule by mouth daily        * FLUoxetine 20 MG capsule    PROzac     Take 40 mg by mouth daily        * FLUoxetine 40 MG capsule    PROzac    90 capsule    Take 1 capsule (40 mg) by mouth daily    Recurrent major depressive disorder, in partial remission (H)       MULTIVITAMIN ADULT PO      Take by mouth daily        UNABLE TO FIND      MEDICATION NAME:  calcium 100 mg daily        Vitamin D (Cholecalciferol) 1000 units Caps      Take 1 tablet by mouth 2 times daily        * Notice:  This list has 2 medication(s) that are the same as other medications prescribed for you. Read the directions carefully, and ask your doctor or other care provider to review them with you.       Xray Chest 1 View-PORTABLE IMMEDIATE n/a

## 2025-06-19 NOTE — TELEPHONE ENCOUNTER
No care due was identified.  [facility] Embedded Care Due Messages. Reference number: [id number].   6/19/2025 3:53:39 PM CDT

## 2025-06-23 DIAGNOSIS — M48.061 DEGENERATIVE LUMBAR SPINAL STENOSIS: ICD-10-CM

## 2025-06-23 RX ORDER — OXYCODONE AND ACETAMINOPHEN 5; 325 MG/1; MG/1
1 TABLET ORAL EVERY 12 HOURS PRN
Qty: 40 TABLET | Refills: 0 | Status: SHIPPED | OUTPATIENT
Start: 2025-06-23

## 2025-06-23 NOTE — TELEPHONE ENCOUNTER
No care due was identified.  [facility] Embedded Care Due Messages. Reference number: [id number].   6/23/2025 12:50:05 PM CDT

## 2025-06-25 ENCOUNTER — OFFICE VISIT (OUTPATIENT)
Dept: GASTROENTEROLOGY | Facility: CLINIC | Age: 80
End: 2025-06-25
Payer: MEDICARE

## 2025-06-25 VITALS — WEIGHT: 184.5 LBS | HEIGHT: 67 IN | BODY MASS INDEX: 28.96 KG/M2

## 2025-06-25 DIAGNOSIS — R15.9 INCONTINENCE OF FECES, UNSPECIFIED FECAL INCONTINENCE TYPE: ICD-10-CM

## 2025-06-25 DIAGNOSIS — R19.4 CHANGE IN BOWEL HABITS: Primary | ICD-10-CM

## 2025-06-25 DIAGNOSIS — R13.19 ESOPHAGEAL DYSPHAGIA: ICD-10-CM

## 2025-06-25 DIAGNOSIS — K21.9 GASTROESOPHAGEAL REFLUX DISEASE WITHOUT ESOPHAGITIS: ICD-10-CM

## 2025-06-25 PROCEDURE — 99999 PR PBB SHADOW E&M-EST. PATIENT-LVL III: CPT | Mod: PBBFAC,,, | Performed by: INTERNAL MEDICINE

## 2025-06-25 PROCEDURE — 99214 OFFICE O/P EST MOD 30 MIN: CPT | Mod: S$GLB,,, | Performed by: INTERNAL MEDICINE

## 2025-06-25 NOTE — PROGRESS NOTES
"Subjective     This is an established patient.      Patient ID: Jovana Fall is a 79 y.o. female.    Chief Complaint: Gastroesophageal Reflux, Dysphagia, and Encopresis    Patient seen for dysphagia, occurring with solid and liquid foods, frequency intermittent, alleviated/exacerbated by nothing in particular, associated signs/symptoms including none, onset recent.  She has had EGD with dilation in the past which has helped.  She is up to date with colonoscopy with next surveillance scope due in 2026.  She admits to change in bowel habits lately with alternating diarrhea/constipation and pattern suggestive of constipation with overflow/pseudodiarrhea.  She notes that when she has diarrhea, there is urgency associated with it which has led to two episodes of encopresis.  She denies any other complaints.  She has had H.pylori in the past and is worried that this may be recurrent.        Review of Systems   Constitutional:  Negative for chills, fatigue and fever.   HENT:  Positive for trouble swallowing. Negative for sore throat.    Respiratory:  Negative for cough, shortness of breath and wheezing.    Cardiovascular:  Negative for chest pain and palpitations.   Gastrointestinal:  Positive for change in bowel habit, constipation and diarrhea. Negative for abdominal pain, blood in stool, nausea and vomiting.   Genitourinary:  Negative for dysuria and hematuria.   Musculoskeletal:  Negative for arthralgias and myalgias.   Integumentary:  Negative for color change and rash.   Neurological:  Negative for dizziness and headaches.   Hematological:  Negative for adenopathy.   Psychiatric/Behavioral:  Negative for confusion. The patient is not nervous/anxious.    All other systems reviewed and are negative.         Objective     Vitals:    06/25/25 1352   Weight: 83.7 kg (184 lb 8.4 oz)   Height: 5' 7" (1.702 m)         Physical Exam  Constitutional:       Appearance: She is well-developed.   HENT:      Head: " Normocephalic and atraumatic.   Eyes:      General: No scleral icterus.     Pupils: Pupils are equal, round, and reactive to light.   Cardiovascular:      Rate and Rhythm: Normal rate and regular rhythm.      Heart sounds: No murmur heard.  Pulmonary:      Effort: Pulmonary effort is normal.      Breath sounds: Normal breath sounds. No wheezing.   Abdominal:      General: Bowel sounds are normal. There is no distension.      Palpations: Abdomen is soft.      Tenderness: There is no abdominal tenderness.   Musculoskeletal:         General: No tenderness.      Cervical back: Normal range of motion.   Lymphadenopathy:      Cervical: No cervical adenopathy.   Skin:     General: Skin is warm and dry.      Findings: No rash.   Neurological:      Mental Status: She is alert.       Lab Results   Component Value Date    WBC 11.30 04/04/2025    HGB 15.4 04/04/2025    HCT 45.3 04/04/2025    MCV 91 04/04/2025     04/04/2025              Assessment and Plan     1. Change in bowel habits    2. Gastroesophageal reflux disease without esophagitis  -     Ambulatory referral/consult to Gastroenterology    3. Esophageal dysphagia  -     Ambulatory referral/consult to Gastroenterology    4. Incontinence of feces, unspecified fecal incontinence type  -     Ambulatory referral/consult to Gastroenterology        Antireflux precautions including avoidance of late night eating and lying down soon after eating.     Check abdominal x ray to assess stool burden  High fiber diet  EGD for above  Check stool for h.pylori antigen  Further recommendations to follow after above.  Colonoscopy for polyp surveillance due in 2026         No follow-ups on file.

## 2025-06-27 ENCOUNTER — HOSPITAL ENCOUNTER (OUTPATIENT)
Dept: RADIOLOGY | Facility: HOSPITAL | Age: 80
Discharge: HOME | End: 2025-06-27
Attending: INTERNAL MEDICINE
Payer: MEDICARE

## 2025-06-27 DIAGNOSIS — R15.9 INCONTINENCE OF FECES, UNSPECIFIED FECAL INCONTINENCE TYPE: ICD-10-CM

## 2025-06-27 DIAGNOSIS — R13.19 ESOPHAGEAL DYSPHAGIA: ICD-10-CM

## 2025-06-27 DIAGNOSIS — R19.4 CHANGE IN BOWEL HABITS: ICD-10-CM

## 2025-06-27 DIAGNOSIS — K21.9 GASTROESOPHAGEAL REFLUX DISEASE WITHOUT ESOPHAGITIS: ICD-10-CM

## 2025-06-27 PROCEDURE — 74018 RADEX ABDOMEN 1 VIEW: CPT | Mod: TC

## 2025-06-27 PROCEDURE — 74018 RADEX ABDOMEN 1 VIEW: CPT | Mod: 26,,, | Performed by: RADIOLOGY

## 2025-06-30 ENCOUNTER — RESULTS FOLLOW-UP (OUTPATIENT)
Dept: GASTROENTEROLOGY | Facility: CLINIC | Age: 80
End: 2025-06-30

## 2025-07-01 ENCOUNTER — PATIENT MESSAGE (OUTPATIENT)
Dept: GASTROENTEROLOGY | Facility: CLINIC | Age: 80
End: 2025-07-01
Payer: MEDICARE

## 2025-07-02 ENCOUNTER — LAB VISIT (OUTPATIENT)
Dept: LAB | Facility: HOSPITAL | Age: 80
End: 2025-07-02
Attending: INTERNAL MEDICINE
Payer: MEDICARE

## 2025-07-02 DIAGNOSIS — K21.9 GASTROESOPHAGEAL REFLUX DISEASE WITHOUT ESOPHAGITIS: ICD-10-CM

## 2025-07-02 PROCEDURE — 87338 HPYLORI STOOL AG IA: CPT

## 2025-07-03 ENCOUNTER — ANESTHESIA EVENT (OUTPATIENT)
Dept: ENDOSCOPY | Facility: HOSPITAL | Age: 80
End: 2025-07-03
Payer: MEDICARE

## 2025-07-03 ENCOUNTER — HOSPITAL ENCOUNTER (OUTPATIENT)
Facility: HOSPITAL | Age: 80
Discharge: HOME | End: 2025-07-03
Attending: INTERNAL MEDICINE | Admitting: INTERNAL MEDICINE
Payer: MEDICARE

## 2025-07-03 ENCOUNTER — ANESTHESIA (OUTPATIENT)
Dept: ENDOSCOPY | Facility: HOSPITAL | Age: 80
End: 2025-07-03
Payer: MEDICARE

## 2025-07-03 VITALS
DIASTOLIC BLOOD PRESSURE: 67 MMHG | HEART RATE: 70 BPM | TEMPERATURE: 98 F | SYSTOLIC BLOOD PRESSURE: 141 MMHG | OXYGEN SATURATION: 95 % | RESPIRATION RATE: 21 BRPM

## 2025-07-03 DIAGNOSIS — K29.70 GASTRITIS, PRESENCE OF BLEEDING UNSPECIFIED, UNSPECIFIED CHRONICITY, UNSPECIFIED GASTRITIS TYPE: ICD-10-CM

## 2025-07-03 DIAGNOSIS — K22.2 SCHATZKI RING OF DISTAL ESOPHAGUS: Primary | ICD-10-CM

## 2025-07-03 DIAGNOSIS — K44.9 HIATAL HERNIA: ICD-10-CM

## 2025-07-03 DIAGNOSIS — R13.10 DYSPHAGIA: ICD-10-CM

## 2025-07-03 LAB
H. PYLORI SURFACE ANTIGEN, INTERPRETATION (OHS): NEGATIVE
HELICOBACTER PYLORI SURFACE ANTIGEN (OHS): 0.17

## 2025-07-03 PROCEDURE — 43239 EGD BIOPSY SINGLE/MULTIPLE: CPT | Mod: XS | Performed by: INTERNAL MEDICINE

## 2025-07-03 PROCEDURE — C1769 GUIDE WIRE: HCPCS | Performed by: INTERNAL MEDICINE

## 2025-07-03 PROCEDURE — 88305 TISSUE EXAM BY PATHOLOGIST: CPT | Mod: TC | Performed by: PATHOLOGY

## 2025-07-03 PROCEDURE — 27201012 HC FORCEPS, HOT/COLD, DISP: Performed by: INTERNAL MEDICINE

## 2025-07-03 PROCEDURE — 43248 EGD GUIDE WIRE INSERTION: CPT | Performed by: INTERNAL MEDICINE

## 2025-07-03 PROCEDURE — 25000003 PHARM REV CODE 250: Performed by: INTERNAL MEDICINE

## 2025-07-03 PROCEDURE — 37000008 HC ANESTHESIA 1ST 15 MINUTES: Performed by: INTERNAL MEDICINE

## 2025-07-03 PROCEDURE — 43248 EGD GUIDE WIRE INSERTION: CPT | Mod: ,,, | Performed by: INTERNAL MEDICINE

## 2025-07-03 PROCEDURE — 43239 EGD BIOPSY SINGLE/MULTIPLE: CPT | Mod: XS,,, | Performed by: INTERNAL MEDICINE

## 2025-07-03 PROCEDURE — 63600175 PHARM REV CODE 636 W HCPCS: Performed by: NURSE ANESTHETIST, CERTIFIED REGISTERED

## 2025-07-03 RX ORDER — PROPOFOL 10 MG/ML
VIAL (ML) INTRAVENOUS
Status: DISCONTINUED | OUTPATIENT
Start: 2025-07-03 | End: 2025-07-03

## 2025-07-03 RX ORDER — LIDOCAINE HYDROCHLORIDE 20 MG/ML
INJECTION INTRAVENOUS
Status: DISCONTINUED | OUTPATIENT
Start: 2025-07-03 | End: 2025-07-03

## 2025-07-03 RX ORDER — SODIUM CHLORIDE 9 MG/ML
INJECTION, SOLUTION INTRAVENOUS CONTINUOUS
Status: DISCONTINUED | OUTPATIENT
Start: 2025-07-03 | End: 2025-07-03 | Stop reason: HOSPADM

## 2025-07-03 RX ADMIN — SODIUM CHLORIDE: 9 INJECTION, SOLUTION INTRAVENOUS at 11:07

## 2025-07-03 RX ADMIN — PROPOFOL 30 MG: 10 INJECTION, EMULSION INTRAVENOUS at 01:07

## 2025-07-03 RX ADMIN — LIDOCAINE HYDROCHLORIDE 60 MG: 20 INJECTION, SOLUTION INTRAVENOUS at 01:07

## 2025-07-03 RX ADMIN — PROPOFOL 70 MG: 10 INJECTION, EMULSION INTRAVENOUS at 01:07

## 2025-07-03 RX ADMIN — PROPOFOL 20 MG: 10 INJECTION, EMULSION INTRAVENOUS at 01:07

## 2025-07-03 NOTE — ANESTHESIA POSTPROCEDURE EVALUATION
Anesthesia Post Evaluation    Patient: Jovana Fall    Procedure(s) Performed: Procedure(s) (LRB):  EGD (ESOPHAGOGASTRODUODENOSCOPY) (N/A)    Final Anesthesia Type: general      Patient location during evaluation: PACU  Patient participation: Yes- Able to Participate  Level of consciousness: awake and alert and oriented  Post-procedure vital signs: reviewed and stable  Pain management: adequate  Airway patency: patent    PONV status at discharge: No PONV  Anesthetic complications: no      Cardiovascular status: blood pressure returned to baseline and stable  Respiratory status: unassisted and spontaneous ventilation  Hydration status: euvolemic  Follow-up not needed.              Vitals Value Taken Time   /67 07/03/25 13:55   Temp 36.5 °C (97.7 °F) 07/03/25 13:42   Pulse 70 07/03/25 14:00   Resp 21 07/03/25 14:00   SpO2 95 % 07/03/25 14:00         No case tracking events are documented in the log.      Pain/Braden Score: Braden Score: 10 (7/3/2025  1:55 PM)

## 2025-07-03 NOTE — TRANSFER OF CARE
Anesthesia Transfer of Care Note    Patient: Jovana Fall    Procedure(s) Performed: Procedure(s) (LRB):  EGD (ESOPHAGOGASTRODUODENOSCOPY) (N/A)    Patient location: GI    Anesthesia Type: general    Transport from OR: Transported from OR on room air with adequate spontaneous ventilation    Post pain: adequate analgesia    Post assessment: no apparent anesthetic complications and tolerated procedure well    Post vital signs: stable    Level of consciousness: sedated and responds to stimulation    Nausea/Vomiting: no nausea/vomiting    Complications: none    Transfer of care protocol was followed      Last vitals: Visit Vitals  /70 (BP Location: Left arm, Patient Position: Lying)   Pulse 74   Temp 37.1 °C (98.8 °F) (Skin)   Resp 18   SpO2 (!) 93%   Breastfeeding No

## 2025-07-03 NOTE — ANESTHESIA PREPROCEDURE EVALUATION
07/03/2025  Jovana Fall is a 79 y.o., female.    Anesthesia Evaluation    I have reviewed the Patient Summary Reports.     I have reviewed the Nursing Notes. I have reviewed the NPO Status.   I have reviewed the Medications.     Review of Systems  Anesthesia Hx:  No problems with previous Anesthesia                Social:  Non-Smoker       Hematology/Oncology:                        --  Cancer in past history (basal cell CA, melanoma):                     Cardiovascular:     Hypertension, well controlled   CAD  asymptomatic                                        Pulmonary:  Pulmonary Normal                       Renal/:  Chronic Renal Disease renal calculi               Hepatic/GI:     GERD Liver Disease,               Musculoskeletal:  Arthritis               Neurological:  Neurology Normal                                      Endocrine:  Diabetes, well controlled, type 2           Psych:  Psychiatric History anxiety depression                Physical Exam  General:  Well nourished       Airway/Jaw/Neck:  Airway Findings: Mouth Opening: Normal     Tongue: Normal      General Airway Assessment: Adult   Oropharynx Findings:    Mallampati: II    Jaw/Neck Findings:     Neck ROM: Normal ROM         Eyes/Ears/Nose:  Eyes/Ears/Nose Findings:     Dental:  Dental Findings:     Chest/Lungs:  Chest/Lungs Findings:  Normal Respiratory Rate       Heart/Vascular:  Heart Findings: Rate: Normal  Rhythm: Regular Rhythm                        Mental Status:  Mental Status Findings:  Cooperative, Alert and Oriented         Anesthesia Plan  Type of Anesthesia, risks & benefits discussed:  Anesthesia Type:  general    Patient's Preference:   Plan Factors:          Intra-op Monitoring Plan: standard ASA monitors  Intra-op Monitoring Plan Comments:   Post Op Pain Control Plan: multimodal analgesia  Post Op Pain Control  Plan Comments:     Induction:   IV  Beta Blocker:  Patient is on a Beta-Blocker and has received one dose within the past 24 hours (No further documentation required).       Informed Consent: Informed consent signed with the Patient and all parties understand the risks and agree with anesthesia plan.  All questions answered.  Anesthesia consent signed with patient.  ASA Score: 3     Day of Surgery Review of History & Physical:              Ready For Surgery From Anesthesia Perspective.             Physical Exam  General: Well nourished    Airway:  Mallampati: II   Mouth Opening: Normal  Tongue: Normal  Neck ROM: Normal ROM    Chest/Lungs:  Normal Respiratory Rate    Heart:  Rate: Normal  Rhythm: Regular Rhythm      Anesthesia Plan  Type of Anesthesia, risks & benefits discussed:    Anesthesia Type: general  Intra-op Monitoring Plan: standard ASA monitors  Post Op Pain Control Plan: multimodal analgesia  Induction:  IV  Informed Consent: Informed consent signed with the Patient and all parties understand the risks and agree with anesthesia plan.  All questions answered.   ASA Score: 3    Ready For Surgery From Anesthesia Perspective.     .

## 2025-07-03 NOTE — PROVATION PATIENT INSTRUCTIONS
Discharge Summary/Instructions after an Endoscopic Procedure  Patient Name: Jovana Fall  Patient MRN: [id number]  Patient YOB: 1945  Thursday, July 3, 2025  Cale Dickson MD  Dear patient,  As a result of recent federal legislation (The Federal Cures Act), you may   receive lab or pathology results from your procedure in your [facility]   account before your physician is able to contact you. Your physician or   their representative will relay the results to you with their   recommendations at their soonest availability.  Thank you,  RESTRICTIONS:  During your procedure today, you received medications for sedation.  These   medications may affect your judgment, balance and coordination.  Therefore,   for 24 hours, you have the following restrictions:   - DO NOT drive a car, operate machinery, make legal/financial decisions,   sign important papers or drink alcohol.    ACTIVITY:  Today: no heavy lifting, straining or running due to procedural   sedation/anesthesia.  The following day: return to full activity including work.  DIET:  Eat and drink normally unless instructed otherwise.     TREATMENT FOR COMMON SIDE EFFECTS:  - Mild abdominal pain, nausea, belching, bloating or excessive gas:  rest,   eat lightly and use a heating pad.  - Sore Throat: treat with throat lozenges and/or gargle with warm salt   water.  - Because air was used during the procedure, expelling large amounts of air   from your rectum or belching is normal.  - If a bowel prep was taken, you may not have a bowel movement for 1-3 days.    This is normal.  SYMPTOMS TO WATCH FOR AND REPORT TO YOUR PHYSICIAN:  1. Abdominal pain or bloating, other than gas cramps.  2. Chest pain.  3. Back pain.  4. Signs of infection such as: chills or fever occurring within 24 hours   after the procedure.  5. Rectal bleeding, which would show as bright red, maroon, or black stools.   (A tablespoon of blood from the rectum is not serious, especially if    hemorrhoids are present.)  6. Vomiting.  7. Weakness or dizziness.  GO DIRECTLY TO THE NEAREST EMERGENCY ROOM IF YOU HAVE ANY OF THE FOLLOWING:      Difficulty breathing              Chills and/or fever over 101 F   Persistent vomiting and/or vomiting blood   Severe abdominal pain   Severe chest pain   Black, tarry stools   Bleeding- more than one tablespoon   Any other symptom or condition that you feel may need urgent attention  Your doctor recommends these additional instructions:  If any biopsies were taken, your doctors clinic will contact you in 1 to 2   weeks with any results.  - Patient has a contact number available for emergencies.  The signs and   symptoms of potential delayed complications were discussed with the   patient.  Return to normal activities tomorrow.  Written discharge   instructions were provided to the patient.   - Resume previous diet.   - Continue present medications.   - No aspirin, ibuprofen, naproxen, or other non-steroidal anti-inflammatory   drugs.   - Await pathology results.   - Discharge patient to home (ambulatory).   - Follow an antireflux regimen.   - Use Prilosec (omeprazole) 40 mg PO daily.   - Return to GI office after studies are complete.  For questions, problems or results please call your physician - Cale Dickson MD at Work:  [phone].  [facility], EMERGENCY ROOM PHONE NUMBER: [phone]  IF A COMPLICATION OR EMERGENCY SITUATION ARISES AND YOU ARE UNABLE TO REACH   YOUR PHYSICIAN - GO DIRECTLY TO THE EMERGENCY ROOM.  Cale Dickson MD  7/3/2025 1:37:57 PM  This report has been verified and signed electronically.  Dear patient,  As a result of recent federal legislation (The Federal Cures Act), you may   receive lab or pathology results from your procedure in your [facility]   account before your physician is able to contact you. Your physician or   their representative will relay the results to you with their   recommendations at their soonest  availability.  Thank you,  PROVATION

## 2025-07-07 ENCOUNTER — RESULTS FOLLOW-UP (OUTPATIENT)
Dept: GASTROENTEROLOGY | Facility: CLINIC | Age: 80
End: 2025-07-07

## 2025-07-23 NOTE — IP AVS SNAPSHOT
[address] [place] [address]  Phone: [phone]           Patient Discharge Instructions   Our goal is to set you up for success. This packet includes information on your condition, medications, and your home care.  It will help you care for yourself to prevent having to return to the hospital.     Please ask your nurse if you have any questions.      There are many details to remember when preparing to leave the hospital. Here is what you will need to do:    1. Take your medicine. If you are prescribed medications, review your Medication List on the following pages. You may have new medications to  at the pharmacy and others that you'll need to stop taking. Review the instructions for how and when to take your medications. Talk with your doctor or nurses if you are unsure of what to do.     2. Go to your follow-up appointments. Specific follow-up information is listed in the following pages. Your may be contacted by a nurse or clinical provider about future appointments. Be sure we have all of the phone numbers to reach you. Please contact your provider's office if you are unable to make an appointment.     3. Watch for warning signs. Your doctor or nurse will give you detailed warning signs to watch for and when to call for assistance. These instructions may also include educational information about your condition. If you experience any of warning signs to your health, call your doctor.           [facility]  Unless otherwise directed by your provider, please   contact [facility], our nurse care line   that is available for 24/7 assistance.     [phone] (toll-free)     Registered nurses in the [facility]   provide: appointment scheduling, clinical advisement, health education, and other advisory services.                  ** Verify the list of medication(s) below is accurate and up to date. Carry this with you in case of  Patient is in the supine position.   The body was positioned using the following devices: safety strap, gel pad mattress and blanket.  The head was positioned using the following devices: gel pad mattress and regular pillow.  The left arm was positioned using the following devices: arm board and gel arm pads.  The right arm was positioned using the following devices: arm board and gel arm pads.  The patient was positioned by Jerri Dick RN, Hemanth Albarado Basterash, Megan C emergency. If your medications have changed, please notify your healthcare provider.             Medication List      START taking these medications        Additional Info                      sucralfate 1 gram tablet   Commonly known as:  CARAFATE   Quantity:  360 tablet   Refills:  3   Dose:  1 g    Instructions:  Take 1 tablet (1 g total) by mouth 4 (four) times daily.     Begin Date    AM    Noon    PM    Bedtime         CONTINUE taking these medications        Additional Info                      aspirin 81 mg Tab   Refills:  0   Dose:  81 mg    Instructions:  Take 81 mg by mouth. 1 Tablet Oral Every day     Begin Date    AM    Noon    PM    Bedtime       atorvastatin 80 MG tablet   Commonly known as:  LIPITOR   Quantity:  90 tablet   Refills:  2    Instructions:  TAKE 1 TABLET ONE TIME DAILY     Begin Date    AM    Noon    PM    Bedtime       blood sugar diagnostic Strp   Quantity:  100 strip   Refills:  3   Dose:  1 strip    Instructions:  1 strip by Misc.(Non-Drug; Combo Route) route once daily.     Begin Date    AM    Noon    PM    Bedtime       carvedilol 12.5 MG tablet   Commonly known as:  COREG   Quantity:  180 tablet   Refills:  2    Instructions:  TAKE 1 TABLET TWICE DAILY WITH MEALS     Begin Date    AM    Noon    PM    Bedtime       clonazePAM 0.5 MG tablet   Commonly known as:  KLONOPIN   Quantity:  60 tablet   Refills:  5   Dose:  0.5 mg    Instructions:  Take 1 tablet (0.5 mg total) by mouth 2 (two) times daily as needed.     Begin Date    AM    Noon    PM    Bedtime       fish oil-omega-3 fatty acids 300-1,000 mg capsule   Refills:  0      Begin Date    AM    Noon    PM    Bedtime       gabapentin 300 MG capsule   Commonly known as:  NEURONTIN   Quantity:  90 capsule   Refills:  3    Instructions:  TAKE 1 CAPSULE EVERY EVENING     Begin Date    AM    Noon    PM    Bedtime       JANUVIA 50 MG Tab   Quantity:  90 tablet   Refills:  3   Generic drug:  SITagliptan    Instructions:  TAKE 1 TABLET ONE  TIME DAILY     Begin Date    AM    Noon    PM    Bedtime       lancets Misc   Quantity:  100 each   Refills:  3   Dose:  1 lancet    Instructions:  1 lancet by Misc.(Non-Drug; Combo Route) route once daily.     Begin Date    AM    Noon    PM    Bedtime       losartan 50 MG tablet   Commonly known as:  COZAAR   Quantity:  90 tablet   Refills:  1    Instructions:  TAKE 1 TABLET ONE TIME DAILY     Begin Date    AM    Noon    PM    Bedtime       nitroGLYCERIN 0.4 MG SL tablet   Commonly known as:  NITROSTAT   Quantity:  25 tablet   Refills:  3    Instructions:  Take one tab under the tongue every 5 minutes for max of three doses for chest pain. If chest pain not resolved to to the ER.     Begin Date    AM    Noon    PM    Bedtime       omeprazole 40 MG capsule   Commonly known as:  PRILOSEC   Quantity:  90 capsule   Refills:  1   Dose:  40 mg    Instructions:  Take 1 capsule (40 mg total) by mouth every morning.     Begin Date    AM    Noon    PM    Bedtime       oxycodone-acetaminophen 5-325 mg per tablet   Commonly known as:  PERCOCET   Quantity:  60 tablet   Refills:  0   Dose:  1 tablet    Instructions:  Take 1 tablet by mouth 2 (two) times daily as needed for Pain.     Begin Date    AM    Noon    PM    Bedtime       valacyclovir 500 MG tablet   Commonly known as:  VALTREX   Quantity:  30 tablet   Refills:  2   Dose:  500 mg    Instructions:  Take 1 tablet (500 mg total) by mouth 2 (two) times daily as needed. Treat for 3 days.     Begin Date    AM    Noon    PM    Bedtime       venlafaxine 100 MG Tab   Commonly known as:  EFFEXOR   Quantity:  90 tablet   Refills:  2    Instructions:  TAKE 1 TABLET EVERY DAY (DOSE INCREASE)     Begin Date    AM    Noon    PM    Bedtime       VITAMIN D2 50,000 unit Cap   Quantity:  12 capsule   Refills:  2   Generic drug:  ergocalciferol    Instructions:  TAKE 1 CAPSULE EVERY SEVEN DAYS     Begin Date    AM    Noon    PM    Bedtime            Where to Get Your Medications      These  medications were sent to [facility] [address] [place] [address], [place] - [address]  [address], [place] [address]     Phone:  [phone]     sucralfate 1 gram tablet                  Please bring to all follow up appointments:    1. A copy of your discharge instructions.  2. All medicines you are currently taking in their original bottles.  3. Identification and insurance card.    Please arrive 15 minutes ahead of scheduled appointment time.    Please call 24 hours in advance if you must reschedule your appointment and/or time.        Your Scheduled Appointments     Apr 20, 2017  1:00 PM CDT   Established Patient Visit with JAS العلي - Optometry ([facility] )    [address]  [place] [address] [phone]            Apr 20, 2017  1:30 PM CDT   Contact Lens F/U with JAS العلي - Optometry ([facility] )    [address]  [place] [address] [phone]            Apr 21, 2017  1:30 PM CDT   Established Patient Visit with MD Porfirio Chapa - Internal Medicine ([facility])    [address]  [place] [address] [phone]              Follow-up Information     Follow up with KAYLA Jackson In 6 weeks.    Specialty:  Gastroenterology    Contact information:    [address] [place] [address]  [phone]          Discharge Instructions     Future Orders    Activity as tolerated     Diet general     Questions:    Total calories:      Fat restriction, if any:      Protein restriction, if any:      Na restriction, if any:      Fluid restriction:      Additional restrictions:          Discharge Instructions       Discharge Instructions: After Your Surgery/Procedure  Youve just had surgery. During surgery you were given medicine called anesthesia to keep you relaxed and free of pain. After surgery you may have some pain or nausea. This is common.  "Here are some tips for feeling better and getting well after surgery.     Stay on schedule with your medication.   Going home  Your doctor or nurse will show you how to take care of yourself when you go home. He or she will also answer your questions. Have an adult family member or friend drive you home.      For your safety we recommend these precaution for the first 24 hours after your procedure:  · Do not drive or use heavy equipment.  · Do not make important decisions or sign legal papers.  · Do not drink alcohol.  · Have someone stay with you, if needed. He or she can watch for problems and help keep you safe.  · Your concentration, balance, coordination, and judgement may be impaired for many hours after anesthesia.  Use caution when ambulating or standing up.     · You may feel weak and "washed out" after anesthesia and surgery.      Subtle residual effects of general anesthesia or sedation with regional / local anesthesia can last more than 24 hours.  Rest for the remainder of the day or longer if your Doctor/Surgeon has advised you to do so.  Although you may feel normal within the first 24 hours, your reflexes and mental ability may be impaired without you realizing it.  You may feel dizzy, lightheaded or sleepy for 24 hours or longer.      Be sure to go to all follow-up visits with your doctor. And rest after your surgery for as long as your doctor tells you to.  Coping with pain  If you have pain after surgery, pain medicine will help you feel better. Take it as told, before pain becomes severe. Also, ask your doctor or pharmacist about other ways to control pain. This might be with heat, ice, or relaxation. And follow any other instructions your surgeon or nurse gives you.  Tips for taking pain medicine  To get the best relief possible, remember these points:  · Pain medicines can upset your stomach. Taking them with a little food may help.  · Most pain relievers taken by mouth need at least 20 to 30 " minutes to start to work.  · Taking medicine on a schedule can help you remember to take it. Try to time your medicine so that you can take it before starting an activity. This might be before you get dressed, go for a walk, or sit down for dinner.  · Constipation is a common side effect of pain medicines. Call your doctor before taking any medicines such as laxatives or stool softeners to help ease constipation. Also ask if you should skip any foods. Drinking lots of fluids and eating foods such as fruits and vegetables that are high in fiber can also help. Remember, do not take laxatives unless your surgeon has prescribed them.  · Drinking alcohol and taking pain medicine can cause dizziness and slow your breathing. It can even be deadly. Do not drink alcohol while taking pain medicine.  · Pain medicine can make you react more slowly to things. Do not drive or run machinery while taking pain medicine.  Your health care provider may tell you to take acetaminophen to help ease your pain. Ask him or her how much you are supposed to take each day. Acetaminophen or other pain relievers may interact with your prescription medicines or other over-the-counter (OTC) drugs. Some prescription medicines have acetaminophen and other ingredients. Using both prescription and OTC acetaminophen for pain can cause you to overdose. Read the labels on your OTC medicines with care. This will help you to clearly know the list of ingredients, how much to take, and any warnings. It may also help you not take too much acetaminophen. If you have questions or do not understand the information, ask your pharmacist or health care provider to explain it to you before you take the OTC medicine.  Managing nausea  Some people have an upset stomach after surgery. This is often because of anesthesia, pain, or pain medicine, or the stress of surgery. These tips will help you handle nausea and eat healthy foods as you get better. If you were on a  special food plan before surgery, ask your doctor if you should follow it while you get better. These tips may help:  · Do not push yourself to eat. Your body will tell you when to eat and how much.  · Start off with clear liquids and soup. They are easier to digest.  · Next try semi-solid foods, such as mashed potatoes, applesauce, and gelatin, as you feel ready.  · Slowly move to solid foods. Dont eat fatty, rich, or spicy foods at first.  · Do not force yourself to have 3 large meals a day. Instead eat smaller amounts more often.  · Take pain medicines with a small amount of solid food, such as crackers or toast, to avoid nausea.     Call your surgeon if  · You still have pain an hour after taking medicine. The medicine may not be strong enough.  · You feel too sleepy, dizzy, or groggy. The medicine may be too strong.  · You have side effects like nausea, vomiting, or skin changes, such as rash, itching, or hives.       If you have obstructive sleep apnea  You were given anesthesia medicine during surgery to keep you comfortable and free of pain. After surgery, you may have more apnea spells because of this medicine and other medicines you were given. The spells may last longer than usual.   At home:  · Keep using the continuous positive airway pressure (CPAP) device when you sleep. Unless your health care provider tells you not to, use it when you sleep, day or night. CPAP is a common device used to treat obstructive sleep apnea.  · Talk with your provider before taking any pain medicine, muscle relaxants, or sedatives. Your provider will tell you about the possible dangers of taking these medicines.  © 7031-5383 [employer]. [address] [place] [address], [place] [address]. All rights reserved. This information is not intended as a substitute for professional medical care. Always follow your healthcare professional's instructions.        Esophageal Dilation     A balloon dilator may be used to widen a  stricture in the esophagus.   An esophageal dilation is a procedure used to widen a narrowed section of your esophagus. This is the tube that leads from your throat to your stomach. Narrowing (stricture) of the esophagus can cause problems. These include trouble swallowing. This sheet explains what to expect with esophageal dilation.  Why esophageal dilation is needed  Several problems can be treated with esophageal dilation. They include:  · Peptic stricture. This is caused by reflux esophagitis. With this problem, the esophagus is irritated by acid reflux (heartburn). This occurs when acid from your stomach flows back up into the esophagus. Stomach acid damages the lining of the esophagus. This leads to a buildup of scar tissue. As a result, the esophagus is narrowed.  · Schatzkis ring. This is an abnormal ring of tissue. It forms where the esophagus meets the stomach. It can cause trouble swallowing. It can also cause food to get stuck in the esophagus. The cause of this condition is not known.  · Achalasia. This condition stops food and liquids from moving into your stomach from the esophagus. It affects the lower esophageal sphincter (LES). The LES is a muscular ring that opens (relaxes) when you swallow. With achalasia, the LES does not relax. This condition can also cause problems with peristalsis. This is the normal muscular action of the esophagus that moves food into the stomach.  · Eosinophilic esophagitis. This is a redness and swelling (inflammation) in the esophagus. It is caused by an environmental trigger such as a food allergy. It can lead to pain, trouble swallowing, and strictures.  · Less common causes of stricture. Other causes of stricture include radiation treatment and cancer.  Before you have esophageal dilation  · Tell your provider about any medicines you take. This includes prescription medicines, over-the-counter medicines, herbs, vitamins, and other supplements. Be sure to mention  aspirin or any blood thinners youre taking.  · Let your provider know if you need to take antibiotics before dental procedures. You may need to take them before esophageal dilation as well.  · Tell your provider about any health conditions you have, such as heart or lung disease. Also mention any allergies to medicines.  · Youll need to have an empty stomach for the procedure. Follow your providers instructions for not eating and drinking before the procedure.  · Arrange to have a family member or friend drive you home after the procedure.  During the procedure  · You may be given local anesthesia to numb your throat. Youll also likely be given medicine to relax you. The procedure takes about 15 minutes. It does not cause trouble breathing.  · A tube called an endoscope (scope) is used. This is a narrow tube with a tiny light and camera at the end. The scope is inserted through your mouth and into your esophagus. It lets your provider see inside your esophagus. To help guide your provider, an imaging method called fluoroscopy may also be used. This creates a moving X-ray image on a computer screen.   · Next, special tiny tools are carefully guided through your mouth and down into the esophagus. They widen the stricture and are then removed. Different types of instruments are used. The type used depends on the size and cause of the stricture. Types include:  ¨ Balloon dilator. A tiny empty balloon is put into the stricture using an endoscope. The balloon is slowly filled with air. The air is removed from the balloon when the stricture is widened to the right size. Balloon dilators are used to treat many types of strictures.  ¨ Guided wire dilator. A thin wire is eased down the esophagus. A small tube thats wider on one end is guided down the wire. It is put into the stricture to stretch it. These dilators are used to treat all kinds of strictures.  ¨ Bougies. These are weighted, cone-shaped tubes. Starting with  smaller cones, your provider uses increasingly larger cones until the stricture is stretched the right amount. Bougies are often used to treat strictures that are simple (short, straight, and not very narrow).  After the procedure  · Youll be watched closely until your provider says youre ready to go home. Youll need to have a friend or family member drive you home.  · You may have a sore throat for the rest of the day.  · You may have pain behind your breastbone for a short time afterwards.  · You can start drinking fluids again after the numbness in your throat goes away. You can resume eating the same day or the next day.  Risks and possible complications  Risks and possible complications for esophageal dilation include:  · Infection  · A tear or hole in the esophagus lining, causing bleeding and possibly needing surgery to fix  · Risks of anesthesia  Follow-up  You may need to have the procedure repeated one or more times. This depends on the cause and extent of the narrowing. Repeat procedures can allow the dilation to take place more slowly. This reduces the risks of the procedure.  If your stricture was caused by reflux esophagitis, youll likely need to take medicine to treat that condition. Your provider will tell you more.  When to call your provider  Call your healthcare provider right away if you have any of the following after the procedure:  · Fever of 100.4°F (38.0°C)  · Chest pain  · Trouble swallowing  · Vomiting blood or material that looks like coffee grounds  · Bleeding  · Black, tarry, or bloody stools   Date Last Reviewed: 7/1/2016  © 4979-6186 [employer]. [address] [place] [address], [place] [address]. All rights reserved. This information is not intended as a substitute for professional medical care. Always follow your healthcare professional's instructions.            Admission Information     Date & Time Provider Department CSN    3/30/2017  9:51 AM [facility] [id number]      Care Providers     Provider Role Specialty Primary office phone    Cale Aggarwal MD Attending Provider Gastroenterology [phone]    Cale Aggarwal MD Surgeon  Gastroenterology [phone]      Your Vitals Were     BP Pulse Temp Resp SpO2       122/71 67 98 °F (36.7 °C) (Oral) 16 94%       Recent Lab Values        4/5/2013 7/22/2013 11/22/2013 5/23/2014 11/18/2014 7/7/2015 3/3/2016 10/6/2016     10:33 AM 11:16 AM  3:10 PM 10:45 AM  3:11 PM  4:15 PM  3:27 PM  1:51 PM    A1C 6.0 6.0 6.1 6.2 6.1 5.7 5.9 5.9          5.7      Comment for A1C at  1:51 PM on 10/6/2016:  According to ADA guidelines, hemoglobin A1C <7.0% represents  optimal control in non-pregnant diabetic patients.  Different  metrics may apply to specific populations.   Standards of Medical Care in Diabetes - 2016.  For the purpose of screening for the presence of diabetes:  <5.7%     Consistent with the absence of diabetes  5.7-6.4%  Consistent with increasing risk for diabetes   (prediabetes)  >or=6.5%  Consistent with diabetes  Currently no consensus exists for use of hemoglobin A1C  for diagnosis of diabetes for children.        Allergies as of 3/30/2017        Reactions    Adhesive Tape-silicones     Other reaction(s): Swelling  Other reaction(s): Itching    Codeine Itching    Metformin Itching    Morphine Itching    Penicillins Itching    Other reaction(s): Itching    Sulfa (Sulfonamide Antibiotics) Itching    Other reaction(s): Itching      Advance Directives     An advance directive is a document which, in the event you are no longer able to make decisions for yourself, tells your healthcare team what kind of treatment you do or do not want to receive, or who you would like to make those decisions for you.  If you do not currently have an advance directive, [facility] encourages you to create one.  For more information call:  [phone]), [phone] ([phone]),  or log on to  [email or web address].        Language Assistance Services     ATTENTION: Language assistance services are available, free of charge. Please call [phone].      ATENCIÓN: Si habla jasmyn, tiene a jaimes disposición servicios gratuitos de asistencia lingüística. Llame al [phone].     CHÚ Ý: N?u b?n nói Ti?ng Vi?t, có các d?ch v? h? tr? ngôn ng? mi?n phí dành cho b?n. G?i s? [phone].        Diabetes Discharge Instructions                                    [facility] complies with applicable Federal civil rights laws and does not discriminate on the basis of race, color, national origin, age, disability, or sex.

## 2025-08-05 DIAGNOSIS — M48.061 DEGENERATIVE LUMBAR SPINAL STENOSIS: ICD-10-CM

## 2025-08-05 RX ORDER — OXYCODONE AND ACETAMINOPHEN 5; 325 MG/1; MG/1
1 TABLET ORAL EVERY 12 HOURS PRN
Qty: 40 TABLET | Refills: 0 | Status: SHIPPED | OUTPATIENT
Start: 2025-08-05

## 2025-08-05 NOTE — TELEPHONE ENCOUNTER
Care Due:                  Date            Visit Type   Department     Provider  --------------------------------------------------------------------------------                                EP -                              PRIMARY      [facility] PRIMARY   Marcy Camaradonna  Last Visit: 04-      CARE ([facility])   CARE           Degranjoshua                              EP -                              [facility]  Next Visit: 10-      CARE ([facility])   CARE           Degranjoshua                                                            Last  Test          Frequency    Reason                     Performed    Due Date  --------------------------------------------------------------------------------    HBA1C.......  6 months...  dulaglutide,               04-   10-                             empagliflozin............    Lipid Panel.  12 months..  atorvastatin.............  10-   10-    [facility] Embedded Care Due Messages. Reference number: [id number].   8/05/2025 4:14:33 PM CDT

## 2025-09-05 ENCOUNTER — TELEPHONE (OUTPATIENT)
Dept: OPHTHALMOLOGY | Facility: CLINIC | Age: 80
End: 2025-09-05
Payer: MEDICARE

## (undated) DEVICE — HEMOSTAT VASC BAND REG 24CM

## (undated) DEVICE — CATH JL4 5FR

## (undated) DEVICE — CATH 5FR JR4 100CM

## (undated) DEVICE — CATH JL3.5 5FR

## (undated) DEVICE — KIT GLIDESHEATH SLEND 6FR 10CM

## (undated) DEVICE — GUIDEWIRE INQWIRE SE 3MM JTIP